# Patient Record
Sex: FEMALE | Race: BLACK OR AFRICAN AMERICAN | NOT HISPANIC OR LATINO | Employment: FULL TIME | URBAN - NONMETROPOLITAN AREA
[De-identification: names, ages, dates, MRNs, and addresses within clinical notes are randomized per-mention and may not be internally consistent; named-entity substitution may affect disease eponyms.]

---

## 2017-01-23 RX ORDER — HYDROXYZINE PAMOATE 25 MG/1
CAPSULE ORAL
Qty: 240 CAPSULE | Refills: 5 | Status: SHIPPED | OUTPATIENT
Start: 2017-01-23 | End: 2018-10-15

## 2017-03-13 ENCOUNTER — OFFICE VISIT (OUTPATIENT)
Dept: FAMILY MEDICINE CLINIC | Facility: CLINIC | Age: 47
End: 2017-03-13

## 2017-03-13 VITALS
OXYGEN SATURATION: 97 % | HEART RATE: 91 BPM | TEMPERATURE: 98.7 F | DIASTOLIC BLOOD PRESSURE: 70 MMHG | SYSTOLIC BLOOD PRESSURE: 115 MMHG | WEIGHT: 240 LBS | HEIGHT: 67 IN | BODY MASS INDEX: 37.67 KG/M2 | RESPIRATION RATE: 12 BRPM

## 2017-03-13 DIAGNOSIS — E66.01 MORBID OBESITY, UNSPECIFIED OBESITY TYPE (HCC): Primary | ICD-10-CM

## 2017-03-13 DIAGNOSIS — M25.50 CHRONIC JOINT PAIN: ICD-10-CM

## 2017-03-13 DIAGNOSIS — F41.8 DEPRESSION WITH ANXIETY: ICD-10-CM

## 2017-03-13 DIAGNOSIS — G89.29 CHRONIC JOINT PAIN: ICD-10-CM

## 2017-03-13 DIAGNOSIS — K76.0 NON-ALCOHOLIC FATTY LIVER DISEASE: ICD-10-CM

## 2017-03-13 DIAGNOSIS — M54.59 MECHANICAL LOW BACK PAIN: ICD-10-CM

## 2017-03-13 DIAGNOSIS — E78.5 DYSLIPIDEMIA: ICD-10-CM

## 2017-03-13 DIAGNOSIS — Z98.84 HISTORY OF BARIATRIC SURGERY: ICD-10-CM

## 2017-03-13 PROCEDURE — 99214 OFFICE O/P EST MOD 30 MIN: CPT | Performed by: GENERAL PRACTICE

## 2017-03-13 RX ORDER — LORAZEPAM 0.5 MG/1
0.5 TABLET ORAL DAILY PRN
Qty: 20 TABLET | Refills: 0 | Status: SHIPPED | OUTPATIENT
Start: 2017-03-13 | End: 2017-05-19 | Stop reason: SDUPTHER

## 2017-03-13 RX ORDER — PHENTERMINE HYDROCHLORIDE 37.5 MG/1
37.5 TABLET ORAL
Qty: 30 TABLET | Refills: 2 | Status: SHIPPED | OUTPATIENT
Start: 2017-03-13 | End: 2017-05-19

## 2017-03-13 NOTE — PROGRESS NOTES
Subjective   Susie Rangel is a 46 y.o. female.     History of Present Illness     Depression  Since last here she has had intermittent depression and anxiety associated with a loss of intention activities, difficulty concentrating, intermittent insomnia, and daytime fatigue.  When especially stressed it is felt as though her throat is closing off.  This generally resolves if she is able to relax.  There is no history of any suicidal ideation.  Her father  unexpectedly several months ago and work has been especially stressful as of late.    Left Lower Extremity Edema  She continues to have intermittent mild swelling about her left foot.  This has been on associated with any other symptoms and she denies any pain or discoloration.  The swelling tends to develop toward the end of the day and improves overnight    Dyslipidemia  Compliance with treatment has been fair. The patient exercises intermittently. She is currently being prescribed the following medication for her dyslipidemia - lifestyle modifcation. Most recent lipids include  Lab Results   Component Value Date    TRIG 67 2016    HDL 56 (L) 2016    LDLCALC 161 (H) 2016     Labs  Most recent , HDL 56, and TG 67.  TSH slightly low at 0.29.  Iron studies, B12, and vitamin D within normal limits     The following portions of the patient's history were reviewed and updated as appropriate: allergies, current medications, past family history, past medical history, past social history, past surgical history and problem list.    Review of Systems   Constitutional: Positive for fatigue. Negative for appetite change, chills, fever and unexpected weight change.   HENT: Negative for congestion, ear pain, rhinorrhea, sneezing, sore throat and voice change.    Eyes: Negative for visual disturbance.   Respiratory: Negative for cough, shortness of breath and wheezing.    Cardiovascular: Positive for leg swelling (left). Negative for chest pain and  palpitations.   Gastrointestinal: Negative for abdominal pain, blood in stool, constipation, diarrhea, nausea and vomiting.   Endocrine: Negative for polydipsia.   Genitourinary: Negative for difficulty urinating, dysuria, frequency, hematuria, menstrual problem, pelvic pain, urgency, vaginal bleeding and vaginal discharge.   Musculoskeletal: Positive for back pain. Negative for arthralgias, joint swelling, myalgias and neck pain.   Skin: Negative for color change.   Neurological: Negative for tremors, weakness, numbness and headaches.   Psychiatric/Behavioral: Positive for decreased concentration, dysphoric mood and sleep disturbance. Negative for suicidal ideas. The patient is nervous/anxious.      Objective   Physical Exam   Constitutional: She is oriented to person, place, and time. No distress.   No apparent distress. No pallor, jaundice, diaphoresis, or cyanosis.   HENT:   Head: Atraumatic.   Right Ear: Tympanic membrane, external ear and ear canal normal.   Left Ear: Tympanic membrane, external ear and ear canal normal.   Nose: Nose normal.   Mouth/Throat: Oropharynx is clear and moist. Mucous membranes are not pale and not cyanotic.   Eyes: EOM are normal. Pupils are equal, round, and reactive to light. No scleral icterus.   Neck: No JVD present. Carotid bruit is not present. No tracheal deviation present. No thyromegaly present.   Cardiovascular: Normal rate, regular rhythm, S1 normal, S2 normal and intact distal pulses.  Exam reveals no gallop, no S3 and no S4.    No murmur heard.  Pulmonary/Chest: Breath sounds normal. No stridor. No respiratory distress.   Abdominal: Soft. Normal aorta and bowel sounds are normal. She exhibits no distension, no abdominal bruit and no mass. There is no hepatosplenomegaly. There is no tenderness. No hernia.   Musculoskeletal: She exhibits no tenderness or deformity.       Vascular Status -  Her exam exhibits no right foot edema. Her exam exhibits left foot edema  (trace).  Lymphadenopathy:        Head (right side): No submandibular adenopathy present.        Head (left side): No submandibular adenopathy present.     She has no cervical adenopathy.   Neurological: She is alert and oriented to person, place, and time. She has normal reflexes. She displays normal reflexes. No cranial nerve deficit. She exhibits normal muscle tone. Coordination normal.   Skin: Skin is warm and dry. No rash noted. She is not diaphoretic. No cyanosis. No pallor. Nails show no clubbing.   Psychiatric: Her speech is normal and behavior is normal. She exhibits a depressed mood (teary when discussing her father's death however also smiled at times when appropriate). She expresses no suicidal ideation.     Assessment/Plan   Problems Addressed this Visit        Digestive    Morbid obesity  Post-bariatric surgery   Encouraged to continue to work on her diet and exercise plan   Continue current medication     Relevant Medications    phentermine (ADIPEX-P) 37.5 MG tablet    Non-alcoholic fatty liver disease       Nervous and Auditory    Mechanical low back pain  Reminded regarding symptomatic treatment.   Will continue current treatment.    Chronic joint pain       Other    Dyslipidemia  As above.  We'll continue to monitor     History of bariatric surgery    Depression with anxiety  With panic attacks  Significant situational component   Supportive therapy   Trial of lorazepam short-term for her panic attacks   We'll arrange therapy   Encouraged report if any worse or if any new symptoms or concerns     Relevant Medications    LORazepam (ATIVAN) 0.5 MG tablet

## 2017-03-20 ENCOUNTER — OFFICE VISIT (OUTPATIENT)
Dept: PSYCHIATRY | Facility: CLINIC | Age: 47
End: 2017-03-20

## 2017-03-20 DIAGNOSIS — F33.1 MAJOR DEPRESSIVE DISORDER, RECURRENT EPISODE, MODERATE (HCC): Primary | ICD-10-CM

## 2017-03-20 DIAGNOSIS — F41.9 ANXIETY DISORDER, UNSPECIFIED: ICD-10-CM

## 2017-03-20 PROCEDURE — 90791 PSYCH DIAGNOSTIC EVALUATION: CPT | Performed by: SOCIAL WORKER

## 2017-03-21 NOTE — PROGRESS NOTES
"IDENTIFYING INFORMATION:   The patient is a 46 y.o. female who is here today for initial appointment.  Patient currently lives with her  of many years in [place] and is employed as a  with [employer].  Patient has 1 adult daughter and 1 granddaughter who currently lives in [facility].    CHIEF COMPLIANT:  \"I struggle with depression and anxiety\"    HPI: Patient reports she has always struggled with anxiety and states her father recently passed away along with an incident of infidelity with her  which she feels significantly contributed to the recent exacerbation of her symptoms.  She also reports the lack of cooperation with family members in an attempt to settle her father's estate has caused her to discontinue communication was much of her family members due to the stress she was feeling while trying to be the facilitator of the process.  She reports she continues to struggle with feeling on edge, difficulty concentrating, inability to relax, ongoing insomnia, increased heart rate, becoming very irritable, and a periodic sense of impending doom.  She also reports secondary symptoms of depression including sad mood, periodic feelings of hopelessness, anhedonia, anergia, decreased motivation, and social isolation.  The patient also reports she has a history of obesity and had gastric surgery in the past and states she continues to struggle with her weight as she feels she eats as a stress reaction.      PAST PSYCHIATRIC HISTORY: Patient reports previous treatment history at [facility] in [facility] and is currently being treated by her primary care physician, Dr. Candelaria, in this office.  Patient reports no previous psychiatric hospitalizations and no other outpatient treatment episodes.  The patient denies any suicidal ideation or suicidal behavior.      SUBSTANCE ABUSE HISTORY: Patient reports she has an occasional social drink and " "denies any other substance use.      MEDICAL HISTORY: Patient has history of gastric sleeve surgery and hysterectomy.      CURRENT MEDICATIONS:  Current Outpatient Prescriptions   Medication Sig Dispense Refill   • hydrOXYzine (VISTARIL) 25 MG capsule TAKE 1 OR 2 CAPSULES 4 TIMES DAILY AS NEEDED 240 capsule 5   • LORazepam (ATIVAN) 0.5 MG tablet Take 1 tablet by mouth Daily As Needed for Anxiety. 20 tablet 0   • omeprazole (priLOSEC) 20 MG capsule   1   • phentermine (ADIPEX-P) 37.5 MG tablet Take 1 tablet by mouth Every Morning Before Breakfast. 30 tablet 2     No current facility-administered medications for this visit.          FAMILY HISTORY: Patient reports her father was an alcoholic and also reports positive family history of substance abuse with other family members including siblings.  She also reports positive family history of general mental illness but is unable to specify further.      SOCIAL HISTORY: Patient grew up in [place] with her biological parents until the age of 15 when her father left the family.  However, she states her father continued to be a part of her life as he returned to the area at least 2-3 times a year.  She reports she continues to live with her mother and states her childhood was \"okay\" with no abuse and domestic violence.  Patient reports she is a Scientologist person but does not attend Sikh at this time because of not getting along with her mother who is a .  Patient reports she has worked various jobs and is currently employed as a food services supervisor at [employer].  Patient has 1 adult daughter and one granddaughter.  Patient has been  multiple decades and states her  has a long history of  service.  Patient is not served in the  and does not have an arrest record.  She reports she is sexually active and considers herself heterosexual.      MENTAL STATUS EXAM:   Hygiene:   good  Cooperation:  " Cooperative  Eye Contact:  Good  Psychomotor Behavior:  Appropriate  Affect:  Appropriate  Hopelessness: Denies  Speech:  Normal  Thought Process:  Linear  Thought Content:  Normal  Suicidal:  None  Homicidal:  None  Hallucinations:  None  Delusion:  None  Memory:  Intact  Orientation:  Person, Place, Time and Situation  Reliability:  good  Insight:  Fair  Judgement:  Fair  Impulse Control:  Good  Physical/Medical Issues:  No     PROBLEM LIST:   Anxiety, depression     STRENGTHS:   Willingness to seek treatment, stable housing, stable employment, sense of responsibility to family    WEAKNESSES:  Strained relationship with family of origin, Limited support network, poor coping skills      SHORT-TERM GOALS: Patient will be compliant with clinic appointments.  Patient will complete Jiménez Depression Inventory and burns anxiety inventory and return in next session.  Patient will maintain stability and avoid higher level of care.    LONG-TERM GOALS: Patient will have cessation of symptoms and be able to function at optimal levels without continued treatment.     PLAN:   Patient will continue in individual outpatient psychotherapy sessions every 2-3 weeks at [facility] and pharmacotherapy as scheduled with Dr. Candelaria.        The patient was instructed to contact the clinic, call 911, or present to the nearest emergency room if crisis occurs.       Boyd Mead LCSW, ALEX

## 2017-04-14 ENCOUNTER — TELEPHONE (OUTPATIENT)
Dept: FAMILY MEDICINE CLINIC | Facility: CLINIC | Age: 47
End: 2017-04-14

## 2017-04-14 DIAGNOSIS — R60.0 EDEMA OF LEFT LOWER EXTREMITY: Primary | ICD-10-CM

## 2017-04-14 NOTE — TELEPHONE ENCOUNTER
I have sent this to Dr. Ocasio's office.     ----- Message from Hoang Palacios MD sent at 4/10/2017 12:23 PM EDT -----  Dr Ocasio would be my first choice        ----- Message -----     From: China Rangel MA     Sent: 4/10/2017  11:15 AM       To: Hoang Palacios MD    Pt is still having some swelling in her left foot. She wanted to know if you think she should see a foot dr?

## 2017-05-19 ENCOUNTER — OFFICE VISIT (OUTPATIENT)
Dept: FAMILY MEDICINE CLINIC | Facility: CLINIC | Age: 47
End: 2017-05-19

## 2017-05-19 DIAGNOSIS — L63.9 ALOPECIA AREATA: Primary | ICD-10-CM

## 2017-05-19 DIAGNOSIS — F41.8 DEPRESSION WITH ANXIETY: ICD-10-CM

## 2017-05-19 DIAGNOSIS — Z98.84 HISTORY OF BARIATRIC SURGERY: ICD-10-CM

## 2017-05-19 DIAGNOSIS — E78.5 DYSLIPIDEMIA: ICD-10-CM

## 2017-05-19 DIAGNOSIS — R60.0 EDEMA OF LEFT LOWER EXTREMITY: ICD-10-CM

## 2017-05-19 DIAGNOSIS — E66.01 MORBID OBESITY, UNSPECIFIED OBESITY TYPE (HCC): ICD-10-CM

## 2017-05-19 DIAGNOSIS — I87.2 VENOUS INSUFFICIENCY: ICD-10-CM

## 2017-05-19 PROCEDURE — 99214 OFFICE O/P EST MOD 30 MIN: CPT | Performed by: GENERAL PRACTICE

## 2017-05-19 RX ORDER — LORAZEPAM 0.5 MG/1
0.5 TABLET ORAL DAILY PRN
Qty: 20 TABLET | Refills: 0 | Status: SHIPPED | OUTPATIENT
Start: 2017-05-19 | End: 2017-08-11 | Stop reason: SDUPTHER

## 2017-05-20 VITALS
TEMPERATURE: 98.2 F | DIASTOLIC BLOOD PRESSURE: 70 MMHG | SYSTOLIC BLOOD PRESSURE: 120 MMHG | OXYGEN SATURATION: 96 % | HEIGHT: 67 IN | BODY MASS INDEX: 37.2 KG/M2 | RESPIRATION RATE: 12 BRPM | WEIGHT: 237 LBS | HEART RATE: 100 BPM

## 2017-05-22 ENCOUNTER — TELEPHONE (OUTPATIENT)
Dept: FAMILY MEDICINE CLINIC | Facility: CLINIC | Age: 47
End: 2017-05-22

## 2017-08-04 ENCOUNTER — OFFICE VISIT (OUTPATIENT)
Dept: FAMILY MEDICINE CLINIC | Facility: CLINIC | Age: 47
End: 2017-08-04

## 2017-08-04 DIAGNOSIS — R07.89 ATYPICAL CHEST PAIN: ICD-10-CM

## 2017-08-04 DIAGNOSIS — E78.5 DYSLIPIDEMIA: ICD-10-CM

## 2017-08-04 DIAGNOSIS — F41.8 DEPRESSION WITH ANXIETY: ICD-10-CM

## 2017-08-04 DIAGNOSIS — R60.0 EDEMA OF LEFT LOWER EXTREMITY: ICD-10-CM

## 2017-08-04 DIAGNOSIS — Z98.84 HISTORY OF BARIATRIC SURGERY: ICD-10-CM

## 2017-08-04 DIAGNOSIS — K21.9 GASTROESOPHAGEAL REFLUX DISEASE WITHOUT ESOPHAGITIS: ICD-10-CM

## 2017-08-04 DIAGNOSIS — I87.2 VENOUS INSUFFICIENCY: Primary | ICD-10-CM

## 2017-08-04 DIAGNOSIS — E66.01 MORBID OBESITY, UNSPECIFIED OBESITY TYPE (HCC): ICD-10-CM

## 2017-08-04 PROCEDURE — 99214 OFFICE O/P EST MOD 30 MIN: CPT | Performed by: GENERAL PRACTICE

## 2017-08-04 RX ORDER — HYDROXYZINE HYDROCHLORIDE 10 MG/1
TABLET, FILM COATED ORAL
COMMUNITY
Start: 2017-06-16 | End: 2018-02-09

## 2017-08-04 RX ORDER — PROPRANOLOL HYDROCHLORIDE 20 MG/1
20 TABLET ORAL 2 TIMES DAILY
Qty: 60 TABLET | Refills: 5 | Status: SHIPPED | OUTPATIENT
Start: 2017-08-04 | End: 2017-08-11 | Stop reason: SDUPTHER

## 2017-08-04 RX ORDER — BUPROPION HYDROCHLORIDE 150 MG/1
150 TABLET, EXTENDED RELEASE ORAL 2 TIMES DAILY
Qty: 60 TABLET | Refills: 5 | Status: SHIPPED | OUTPATIENT
Start: 2017-08-04 | End: 2019-01-18

## 2017-08-04 NOTE — PROGRESS NOTES
Subjective   Susie Rangel is a 46 y.o. female.     History of Present Illness     Chest Pain  Discharged from [facility] on 7/27/17 after an overnight admission for chest pain. This started abruptly and has largely been present since. The pain is described as a sharp left anterior ache radiating to the left shoulder. The pain has been associated with increased shortness of breath with activity, intermittent palpitations when she lies down at night, and initially she experienced numbness of the left arm. She has had more frequent heartburn over the last month described as a burning retrosternal pain associated with a bitter taste in her mouth. She also admits to more nervousness and worrying. She denies any orthopnea, PND, change in her left lower extremity swelling, cough, hemoptysis, dysphagia, nausea, vomiting, depression, loss of interest in activities, suicidal ideation, fever or chills. Initial EKG done at the hospital revealed very slow R wave progression however follow up studies are identical to those done here in the past. CXR and echocardiogram were unremarkable as was a HIDA scan done as an outpatient on 7/31/17. She has been scheduled to undergo a GXT on 8/8/17. She has been taking omeprazole since her discharge home and has been heartburn free    Left Lower Extremity Edema  She continues to have intermittent mild swelling about her left foot.  This has been on associated with any other symptoms and she denies any pain or discoloration.  The swelling tends to develop toward the end of the day and improves overnight.    Depression  She admits to anxiety and worrying associated with difficulty concentrating, intermittent insomnia, and daytime fatigue.  When especially stressed it feels as though her throat is closing off.  This generally resolves if she is able to relax.  There is no history of any depression, loss of interest in activities, or suicidal ideation. She has started a new job since last  here and while she enjoys it she has long days and a lot of responsibilities    The following portions of the patient's history were reviewed and updated as appropriate: allergies, current medications, past family history, past medical history, past social history and problem list.    Review of Systems   Constitutional: Positive for fatigue. Negative for appetite change, chills, fever and unexpected weight change.   HENT: Negative for congestion, ear pain, rhinorrhea, sneezing, sore throat and voice change.    Eyes: Negative for visual disturbance.   Respiratory: Positive for shortness of breath. Negative for cough and wheezing.    Cardiovascular: Positive for chest pain, palpitations and leg swelling (left).   Gastrointestinal: Negative for abdominal pain, blood in stool, constipation, diarrhea, nausea and vomiting.        Heartburn   Endocrine: Negative for polydipsia.   Genitourinary: Negative for difficulty urinating, dysuria, frequency, hematuria, menstrual problem, pelvic pain, urgency, vaginal bleeding and vaginal discharge.   Musculoskeletal: Positive for back pain. Negative for arthralgias, joint swelling, myalgias and neck pain.   Skin: Negative for color change.        Hair loss   Neurological: Negative for tremors, weakness, numbness and headaches.   Psychiatric/Behavioral: Positive for decreased concentration and sleep disturbance. Negative for dysphoric mood and suicidal ideas. The patient is nervous/anxious.      Objective   Physical Exam   Constitutional: She is oriented to person, place, and time. No distress.   No apparent distress. No pallor, jaundice, diaphoresis, or cyanosis.   HENT:   Head: Atraumatic.   Right Ear: Tympanic membrane, external ear and ear canal normal.   Left Ear: Tympanic membrane, external ear and ear canal normal.   Nose: Nose normal.   Mouth/Throat: Oropharynx is clear and moist. Mucous membranes are not pale and not cyanotic.   Eyes: EOM are normal. Pupils are equal, round,  and reactive to light. No scleral icterus.   Neck: No JVD present. Carotid bruit is not present. No tracheal deviation present. No thyromegaly present.   Cardiovascular: Normal rate, regular rhythm, S1 normal, S2 normal and intact distal pulses.  Exam reveals no gallop, no S3 and no S4.    No murmur heard.  Pulmonary/Chest: Breath sounds normal. No stridor. No respiratory distress. She exhibits tenderness (about the left upper anterior chest - identical to presenting pain).   Abdominal: Soft. Normal aorta and bowel sounds are normal. She exhibits no distension, no abdominal bruit and no mass. There is no hepatosplenomegaly. There is no tenderness. No hernia.   Musculoskeletal: She exhibits no tenderness or deformity.       Vascular Status -  Her exam exhibits no right foot edema. Her exam exhibits left foot edema (trace).  Lymphadenopathy:        Head (right side): No submandibular adenopathy present.        Head (left side): No submandibular adenopathy present.     She has no cervical adenopathy.   Neurological: She is alert and oriented to person, place, and time. She has normal reflexes. She displays normal reflexes. No cranial nerve deficit. She exhibits normal muscle tone. Coordination normal.   Skin: Skin is warm and dry. No rash noted. She is not diaphoretic. No cyanosis. No pallor. Nails show no clubbing.   Psychiatric: Her speech is normal and behavior is normal. Her mood appears anxious (at times). She expresses no suicidal ideation.     Assessment/Plan   Problems Addressed this Visit        Cardiovascular and Mediastinum    Venous insufficiency  Reminded regarding lifestyle modification       Digestive    Morbid obesity  S/P bariatric surgery  Encouraged to continue to work on her diet and exercise plan.    Gastroesophageal reflux disease without esophagitis  Advised regarding lifestyle modification including: eating smaller meals, elevation of the head of bed at night, avoidance of caffeine, chocolate,  nicotine and peppermint, and avoiding tight fitting clothing.  Will continue current treatment.       Nervous and Auditory    Atypical chest pain  Appears to be a combination of chest wall pain, GERD, and anxiety  Advised regarding symptomatic treatment  Trial of propranolol for the physical symptoms of anxiety  Patient would like to proceed with GXT  Encouraged to report if any worse or if any new symptoms or concerns.    Relevant Medications    propranolol (INDERAL) 20 MG tablet       Other    Dyslipidemia    History of bariatric surgery    Depression with anxiety  Supportive therapy.   Trial of bupropion  Will see back in 4-6 weeks    Relevant Medications    hydrOXYzine (ATARAX) 10 MG tablet    buPROPion SR (WELLBUTRIN SR) 150 MG 12 hr tablet    Edema of left lower extremity

## 2017-08-05 ENCOUNTER — APPOINTMENT (OUTPATIENT)
Dept: GENERAL RADIOLOGY | Facility: HOSPITAL | Age: 47
End: 2017-08-05

## 2017-08-05 ENCOUNTER — HOSPITAL ENCOUNTER (EMERGENCY)
Facility: HOSPITAL | Age: 47
Discharge: HOME | End: 2017-08-05
Attending: EMERGENCY MEDICINE | Admitting: EMERGENCY MEDICINE

## 2017-08-05 VITALS
OXYGEN SATURATION: 99 % | WEIGHT: 225 LBS | DIASTOLIC BLOOD PRESSURE: 80 MMHG | HEART RATE: 64 BPM | SYSTOLIC BLOOD PRESSURE: 118 MMHG | BODY MASS INDEX: 35.31 KG/M2 | RESPIRATION RATE: 18 BRPM | TEMPERATURE: 97.8 F | HEIGHT: 67 IN

## 2017-08-05 VITALS
SYSTOLIC BLOOD PRESSURE: 120 MMHG | BODY MASS INDEX: 36.73 KG/M2 | RESPIRATION RATE: 12 BRPM | DIASTOLIC BLOOD PRESSURE: 70 MMHG | HEIGHT: 67 IN | TEMPERATURE: 98 F | HEART RATE: 93 BPM | WEIGHT: 234 LBS | OXYGEN SATURATION: 97 %

## 2017-08-05 DIAGNOSIS — R07.9 CHEST PAIN, UNSPECIFIED TYPE: Primary | ICD-10-CM

## 2017-08-05 PROBLEM — K21.9 GASTROESOPHAGEAL REFLUX DISEASE WITHOUT ESOPHAGITIS: Status: ACTIVE | Noted: 2017-08-05

## 2017-08-05 LAB
ALBUMIN SERPL-MCNC: 4.4 G/DL (ref 3.5–5)
ALBUMIN/GLOB SERPL: 1.5 G/DL (ref 1.5–2.5)
ALP SERPL-CCNC: 94 U/L (ref 35–104)
ALT SERPL W P-5'-P-CCNC: 33 U/L (ref 10–36)
ANION GAP SERPL CALCULATED.3IONS-SCNC: 5.5 MMOL/L (ref 3.6–11.2)
AST SERPL-CCNC: 28 U/L (ref 10–30)
BASOPHILS # BLD AUTO: 0.04 10*3/MM3 (ref 0–0.3)
BASOPHILS NFR BLD AUTO: 0.4 % (ref 0–2)
BILIRUB SERPL-MCNC: 0.3 MG/DL (ref 0.2–1.8)
BUN BLD-MCNC: 10 MG/DL (ref 7–21)
BUN/CREAT SERPL: 13.3 (ref 7–25)
CALCIUM SPEC-SCNC: 9.3 MG/DL (ref 7.7–10)
CHLORIDE SERPL-SCNC: 108 MMOL/L (ref 99–112)
CO2 SERPL-SCNC: 28.5 MMOL/L (ref 24.3–31.9)
CREAT BLD-MCNC: 0.75 MG/DL (ref 0.43–1.29)
D DIMER PPP FEU-MCNC: 0.24 MCGFEU/ML (ref 0–0.5)
DEPRECATED RDW RBC AUTO: 39.3 FL (ref 37–54)
EOSINOPHIL # BLD AUTO: 0.14 10*3/MM3 (ref 0–0.7)
EOSINOPHIL NFR BLD AUTO: 1.6 % (ref 0–5)
ERYTHROCYTE [DISTWIDTH] IN BLOOD BY AUTOMATED COUNT: 12.1 % (ref 11.5–14.5)
GFR SERPL CREATININE-BSD FRML MDRD: 102 ML/MIN/1.73
GLOBULIN UR ELPH-MCNC: 3 GM/DL
GLUCOSE BLD-MCNC: 94 MG/DL (ref 70–110)
HCT VFR BLD AUTO: 39.8 % (ref 37–47)
HGB BLD-MCNC: 14.2 G/DL (ref 12–16)
HOLD SPECIMEN: NORMAL
IMM GRANULOCYTES # BLD: 0.02 10*3/MM3 (ref 0–0.03)
IMM GRANULOCYTES NFR BLD: 0.2 % (ref 0–0.5)
LYMPHOCYTES # BLD AUTO: 2.56 10*3/MM3 (ref 1–3)
LYMPHOCYTES NFR BLD AUTO: 28.6 % (ref 21–51)
MCH RBC QN AUTO: 32.5 PG (ref 27–33)
MCHC RBC AUTO-ENTMCNC: 35.7 G/DL (ref 33–37)
MCV RBC AUTO: 91.1 FL (ref 80–94)
MONOCYTES # BLD AUTO: 0.87 10*3/MM3 (ref 0.1–0.9)
MONOCYTES NFR BLD AUTO: 9.7 % (ref 0–10)
NEUTROPHILS # BLD AUTO: 5.33 10*3/MM3 (ref 1.4–6.5)
NEUTROPHILS NFR BLD AUTO: 59.5 % (ref 30–70)
OSMOLALITY SERPL CALC.SUM OF ELEC: 281.9 MOSM/KG (ref 273–305)
PLATELET # BLD AUTO: 302 10*3/MM3 (ref 130–400)
PMV BLD AUTO: 9.8 FL (ref 6–10)
POTASSIUM BLD-SCNC: 3.9 MMOL/L (ref 3.5–5.3)
PROT SERPL-MCNC: 7.4 G/DL (ref 6–8)
RBC # BLD AUTO: 4.37 10*6/MM3 (ref 4.2–5.4)
SODIUM BLD-SCNC: 142 MMOL/L (ref 135–153)
TROPONIN I SERPL-MCNC: <0.006 NG/ML
TROPONIN I SERPL-MCNC: <0.006 NG/ML
WBC NRBC COR # BLD: 8.96 10*3/MM3 (ref 4.5–12.5)
WHOLE BLOOD HOLD SPECIMEN: NORMAL
WHOLE BLOOD HOLD SPECIMEN: NORMAL

## 2017-08-05 PROCEDURE — 84484 ASSAY OF TROPONIN QUANT: CPT | Performed by: EMERGENCY MEDICINE

## 2017-08-05 PROCEDURE — 71010 XR CHEST 1 VW: CPT | Performed by: RADIOLOGY

## 2017-08-05 PROCEDURE — 93005 ELECTROCARDIOGRAM TRACING: CPT | Performed by: EMERGENCY MEDICINE

## 2017-08-05 PROCEDURE — 36415 COLL VENOUS BLD VENIPUNCTURE: CPT

## 2017-08-05 PROCEDURE — 80053 COMPREHEN METABOLIC PANEL: CPT | Performed by: EMERGENCY MEDICINE

## 2017-08-05 PROCEDURE — 85025 COMPLETE CBC W/AUTO DIFF WBC: CPT

## 2017-08-05 PROCEDURE — 99284 EMERGENCY DEPT VISIT MOD MDM: CPT

## 2017-08-05 PROCEDURE — 85379 FIBRIN DEGRADATION QUANT: CPT | Performed by: EMERGENCY MEDICINE

## 2017-08-05 PROCEDURE — 71010 HC CHEST PA OR AP: CPT

## 2017-08-05 RX ORDER — ASPIRIN 325 MG
325 TABLET ORAL ONCE
Status: COMPLETED | OUTPATIENT
Start: 2017-08-05 | End: 2017-08-05

## 2017-08-05 RX ORDER — SODIUM CHLORIDE 0.9 % (FLUSH) 0.9 %
10 SYRINGE (ML) INJECTION AS NEEDED
Status: DISCONTINUED | OUTPATIENT
Start: 2017-08-05 | End: 2017-08-06 | Stop reason: HOSPADM

## 2017-08-05 RX ADMIN — ASPIRIN 325 MG: 325 TABLET ORAL at 17:41

## 2017-08-05 NOTE — ED PROVIDER NOTES
Subjective   Patient is a 46 y.o. female presenting with chest pain.   Chest Pain   Pain location:  Substernal area and L chest  Pain quality: aching and dull    Pain radiates to:  Does not radiate  Onset quality:  Gradual  Timing:  Constant  Progression:  Worsening  Chronicity:  New  Context: lifting, movement, raising an arm and at rest    Context: not breathing, not drug use, not eating, not intercourse, not stress and not trauma    Relieved by:  Nothing  Worsened by:  Certain positions, coughing, deep breathing, exertion and movement  Ineffective treatments:  None tried  Associated symptoms: no abdominal pain, no altered mental status, no anorexia, no anxiety, no back pain, no cough, no diaphoresis, no dizziness, no fatigue, no fever, no headache, no lower extremity edema, no nausea, no numbness, no palpitations, no shortness of breath, no syncope, no vomiting and no weakness    Risk factors: no coronary artery disease, no high cholesterol, no hypertension and no smoking        Review of Systems   Constitutional: Negative for activity change, appetite change, chills, diaphoresis, fatigue and fever.   HENT: Negative for congestion, ear pain and sore throat.    Eyes: Negative for redness.   Respiratory: Negative for cough, chest tightness, shortness of breath and wheezing.    Cardiovascular: Positive for chest pain. Negative for palpitations, leg swelling and syncope.   Gastrointestinal: Negative for abdominal pain, anorexia, diarrhea, nausea and vomiting.   Genitourinary: Negative for dysuria and urgency.   Musculoskeletal: Negative for arthralgias, back pain, myalgias and neck pain.   Skin: Negative for pallor, rash and wound.   Neurological: Negative for dizziness, speech difficulty, weakness, numbness and headaches.   Psychiatric/Behavioral: Negative for agitation, behavioral problems, confusion and decreased concentration.       Past Medical History:   Diagnosis Date   • GERD (gastroesophageal reflux disease)     • Low back pain    • Obesity    • Vitamin D deficiency        No Known Allergies    Past Surgical History:   Procedure Laterality Date   • BREAST SURGERY     • GASTRIC SLEEVE LAPAROSCOPIC     • HYSTERECTOMY         Family History   Problem Relation Age of Onset   • No Known Problems Mother    • Heart disease Father        Social History     Social History   • Marital status:      Spouse name: N/A   • Number of children: N/A   • Years of education: N/A     Social History Main Topics   • Smoking status: Never Smoker   • Smokeless tobacco: Never Used   • Alcohol use No   • Drug use: No   • Sexual activity: Defer     Other Topics Concern   • None     Social History Narrative           Objective   Physical Exam   Constitutional: She is oriented to person, place, and time. She appears well-developed and well-nourished.  Non-toxic appearance. No distress.   HENT:   Head: Normocephalic and atraumatic.   Right Ear: External ear normal.   Left Ear: External ear normal.   Nose: Nose normal.   Mouth/Throat: Oropharynx is clear and moist and mucous membranes are normal. No oropharyngeal exudate. No tonsillar exudate.   Eyes: Conjunctivae, EOM and lids are normal. Pupils are equal, round, and reactive to light.   Neck: Normal range of motion and full passive range of motion without pain. Neck supple. No thyromegaly present.   Cardiovascular: Normal rate, regular rhythm, S1 normal, S2 normal, normal heart sounds, intact distal pulses and normal pulses.    Pulmonary/Chest: Effort normal and breath sounds normal. No tachypnea. No respiratory distress. She has no decreased breath sounds. She has no wheezes. She has no rales. She exhibits tenderness.   Abdominal: Soft. Normal appearance and bowel sounds are normal. She exhibits no distension. There is no tenderness. There is no rebound and no guarding.   Musculoskeletal: Normal range of motion. She exhibits no edema, tenderness or deformity.   Lymphadenopathy:     She has no  cervical adenopathy.   Neurological: She is alert and oriented to person, place, and time. She has normal strength. No cranial nerve deficit or sensory deficit. GCS eye subscore is 4. GCS verbal subscore is 5. GCS motor subscore is 6.   Skin: Skin is warm, dry and intact. No rash noted. She is not diaphoretic. No erythema. No pallor.   Psychiatric: She has a normal mood and affect. Her speech is normal and behavior is normal. Judgment and thought content normal. Cognition and memory are normal.   Nursing note and vitals reviewed.      Procedures         ED Course  ED Course   Value Comment By Time   XR Chest 1 View No Acute Abnormality. Paulino Holm MD 08/05 2677   ECG 12 Lead Normal Sinus Rhythm.  Nonspecific T-Wave Abnormalities.  No Acute Ischemia. Paulino Holm MD 08/05 4897                  MDM  Number of Diagnoses or Management Options  Chest pain, unspecified type: new and requires workup     Amount and/or Complexity of Data Reviewed  Clinical lab tests: ordered and reviewed  Tests in the radiology section of CPT®: ordered and reviewed  Tests in the medicine section of CPT®: ordered and reviewed  Independent visualization of images, tracings, or specimens: yes    Risk of Complications, Morbidity, and/or Mortality  Presenting problems: moderate  Diagnostic procedures: moderate  Management options: moderate    Patient Progress  Patient progress: stable      Final diagnoses:   Chest pain, unspecified type            Paulino Holm MD  08/07/17 0235

## 2017-08-06 NOTE — ED NOTES
Patient is resting on stretcher, patient's family at bedside x1. Patient updated on plan of care at this time, patient declines needing anything further. Patient is alert and oriented x4, respirations are regular and unlabored, NADN, will continue to monitor.     Derik Zavala RN  08/05/17 4131

## 2017-08-06 NOTE — ED NOTES
Patient is leaving ED with family at this time. Patient has no further needs or questions at discharge, patient verbalizes understanding of discharge instructions and importance of follow up care. Patient is alert and oriented x4, respirations are regular and unlabored, NADN.     Derik Zavala RN  08/05/17 0714

## 2017-08-11 ENCOUNTER — OFFICE VISIT (OUTPATIENT)
Dept: FAMILY MEDICINE CLINIC | Facility: CLINIC | Age: 47
End: 2017-08-11

## 2017-08-11 ENCOUNTER — TELEPHONE (OUTPATIENT)
Dept: FAMILY MEDICINE CLINIC | Facility: CLINIC | Age: 47
End: 2017-08-11

## 2017-08-11 DIAGNOSIS — G89.29 CHRONIC JOINT PAIN: ICD-10-CM

## 2017-08-11 DIAGNOSIS — K76.0 NON-ALCOHOLIC FATTY LIVER DISEASE: ICD-10-CM

## 2017-08-11 DIAGNOSIS — R00.2 PALPITATIONS: ICD-10-CM

## 2017-08-11 DIAGNOSIS — M25.50 CHRONIC JOINT PAIN: ICD-10-CM

## 2017-08-11 DIAGNOSIS — Z98.84 HISTORY OF BARIATRIC SURGERY: ICD-10-CM

## 2017-08-11 DIAGNOSIS — E78.5 DYSLIPIDEMIA: ICD-10-CM

## 2017-08-11 DIAGNOSIS — I87.2 VENOUS INSUFFICIENCY: Primary | ICD-10-CM

## 2017-08-11 DIAGNOSIS — F41.8 DEPRESSION WITH ANXIETY: ICD-10-CM

## 2017-08-11 DIAGNOSIS — K21.9 GASTROESOPHAGEAL REFLUX DISEASE WITHOUT ESOPHAGITIS: ICD-10-CM

## 2017-08-11 DIAGNOSIS — R07.89 ATYPICAL CHEST PAIN: ICD-10-CM

## 2017-08-11 DIAGNOSIS — M54.59 MECHANICAL LOW BACK PAIN: ICD-10-CM

## 2017-08-11 DIAGNOSIS — E66.01 MORBID OBESITY, UNSPECIFIED OBESITY TYPE (HCC): ICD-10-CM

## 2017-08-11 PROCEDURE — 99214 OFFICE O/P EST MOD 30 MIN: CPT | Performed by: GENERAL PRACTICE

## 2017-08-11 RX ORDER — PROPRANOLOL HYDROCHLORIDE 40 MG/1
40 TABLET ORAL 2 TIMES DAILY
Qty: 60 TABLET | Refills: 5 | Status: SHIPPED | OUTPATIENT
Start: 2017-08-11 | End: 2018-01-08 | Stop reason: SDUPTHER

## 2017-08-11 RX ORDER — LORAZEPAM 0.5 MG/1
0.5 TABLET ORAL DAILY PRN
Qty: 20 TABLET | Refills: 0 | Status: SHIPPED | OUTPATIENT
Start: 2017-08-11 | End: 2017-09-14 | Stop reason: SDUPTHER

## 2017-08-11 NOTE — PROGRESS NOTES
Subjective   Susie Rangel is a 46 y.o. female.     History of Present Illness     Chest Pain  Discharged from [facility] on 7/27/17 after an overnight admission for chest pain. This started abruptly and has largely been present since. The pain is described as a sharp left anterior ache radiating to the left shoulder. The pain has been associated with increased shortness of breath with activity, palpitations when she lies down at night, and initially she experienced numbness of the left arm. She has had more frequent heartburn over the last month described as a burning retrosternal pain associated with a bitter taste in her mouth. She also admits to more nervousness and worrying. She denies any orthopnea, PND, change in her left lower extremity swelling, cough, hemoptysis, dysphagia, nausea, vomiting, depression, loss of interest in activities, suicidal ideation, fever or chills. Initial EKG done at the hospital revealed very slow R wave progression however follow up studies are identical to those done here in the past. CXR and echocardiogram were unremarkable as was a HIDA scan done as an outpatient on 7/31/17. GXT performed on 8/8/17 revealed no evidence of inducible ischemia.     Left Lower Extremity Edema  She continues to have intermittent mild swelling about her left foot.  This has been on associated with any other symptoms and she denies any pain or discoloration.  The swelling tends to develop toward the end of the day and improves overnight.    Depression  She admits to persistent anxiety and worrying associated with difficulty concentrating, intermittent insomnia, and daytime fatigue.  When especially stressed it feels as though her throat is closing off.  This generally resolves if she is able to relax.  There is no history of any depression, loss of interest in activities, or suicidal ideation. She has started a new job since last here and while she enjoys it she has long days and a lot of  responsibilities. Taking bupropion with no apparent side effects thus far    The following portions of the patient's history were reviewed and updated as appropriate: allergies, current medications, past medical history, past social history and problem list.    Review of Systems   Constitutional: Positive for fatigue. Negative for appetite change, chills, fever and unexpected weight change.   HENT: Negative for congestion, ear pain, rhinorrhea, sneezing, sore throat and voice change.    Eyes: Negative for visual disturbance.   Respiratory: Positive for shortness of breath. Negative for cough and wheezing.    Cardiovascular: Positive for chest pain, palpitations and leg swelling (left).   Gastrointestinal: Negative for abdominal pain, blood in stool, constipation, diarrhea, nausea and vomiting.        Heartburn   Endocrine: Negative for polydipsia.   Genitourinary: Negative for difficulty urinating, dysuria, frequency, hematuria, menstrual problem, pelvic pain, urgency, vaginal bleeding and vaginal discharge.   Musculoskeletal: Positive for back pain. Negative for arthralgias, joint swelling, myalgias and neck pain.   Skin: Negative for color change.        Hair loss   Neurological: Negative for tremors, weakness, numbness and headaches.   Psychiatric/Behavioral: Positive for decreased concentration and sleep disturbance. Negative for dysphoric mood and suicidal ideas. The patient is nervous/anxious.      Objective   Physical Exam   Constitutional: She is oriented to person, place, and time. No distress.   No apparent distress. No pallor, jaundice, diaphoresis, or cyanosis.   HENT:   Head: Atraumatic.   Right Ear: Tympanic membrane, external ear and ear canal normal.   Left Ear: Tympanic membrane, external ear and ear canal normal.   Nose: Nose normal.   Mouth/Throat: Oropharynx is clear and moist. Mucous membranes are not pale and not cyanotic.   Eyes: EOM are normal. Pupils are equal, round, and reactive to light.  No scleral icterus.   Neck: No JVD present. Carotid bruit is not present. No tracheal deviation present. No thyromegaly present.   Cardiovascular: Normal rate, regular rhythm, S1 normal, S2 normal and intact distal pulses.  Exam reveals no gallop, no S3 and no S4.    No murmur heard.  Pulmonary/Chest: Breath sounds normal. No stridor. No respiratory distress. She exhibits tenderness (remains tender about the left upper anterior chest - identical to presenting pain).   Abdominal: Soft. Normal aorta and bowel sounds are normal. She exhibits no distension, no abdominal bruit and no mass. There is no hepatosplenomegaly. There is no tenderness. No hernia.   Musculoskeletal: She exhibits no tenderness or deformity.       Vascular Status -  Her exam exhibits no right foot edema. Her exam exhibits left foot edema (trace).  Lymphadenopathy:        Head (right side): No submandibular adenopathy present.        Head (left side): No submandibular adenopathy present.     She has no cervical adenopathy.   Neurological: She is alert and oriented to person, place, and time. She has normal reflexes. She displays normal reflexes. No cranial nerve deficit. She exhibits normal muscle tone. Coordination normal.   Skin: Skin is warm and dry. No rash noted. She is not diaphoretic. No cyanosis. No pallor. Nails show no clubbing.   Psychiatric: Her speech is normal and behavior is normal. Her mood appears anxious (at times). She expresses no suicidal ideation.     Assessment/Plan   Problems Addressed this Visit        Cardiovascular and Mediastinum    Venous insufficiency    Palpitations  Likely anxiety related  24 hour holter will me arranged  Will titrate propranolol somewhat    Relevant Orders    Holter Monitor - 24 Hour       Digestive    Morbid obesity    Non-alcoholic fatty liver disease    Gastroesophageal reflux disease without esophagitis  Symptoms are currently well controlled.  Reminded regarding lifestyle modification.  Will  continue current treatment.       Nervous and Auditory    Mechanical low back pain    Chronic joint pain    Atypical chest pain  Likely combination of chest wall pain, GERD, and anxiety  Will monitor    Relevant Medications    propranolol (INDERAL) 40 MG tablet       Other    Dyslipidemia  Encouraged to continue to work on her diet and exercise plan.    History of bariatric surgery    Depression with anxiety  Significant situational component. Supportive therapy. Will continue current medication.    Relevant Medications    LORazepam (ATIVAN) 0.5 MG tablet

## 2017-08-11 NOTE — TELEPHONE ENCOUNTER
----- Message from Hoang Raymundo MD sent at 8/10/2017  8:54 PM EDT -----  If she wishes we can increase her propranolol to 40 twice a day  ----- Message -----     From: Juana Nance MA     Sent: 8/10/2017   5:50 PM       To: Hoang Raymundo MD    Still having some small palpations   ----- Message -----     From: Hoang Raymundo MD     Sent: 8/10/2017   5:35 PM       To: Juana Nance MA    How has she felt since I saw her last week?  ----- Message -----     From: Juana Nance MA     Sent: 8/10/2017   4:38 PM       To: Hoang Raymundo MD    Please review her stress test, she wants your opinion and to know if you have any changes to her medications         Dr raymundo had a cancellation today and pt request to see him in person to discuss meds and not feeling in better.

## 2017-08-12 VITALS
RESPIRATION RATE: 12 BRPM | SYSTOLIC BLOOD PRESSURE: 120 MMHG | DIASTOLIC BLOOD PRESSURE: 65 MMHG | OXYGEN SATURATION: 97 % | HEIGHT: 67 IN | HEART RATE: 79 BPM | BODY MASS INDEX: 37.51 KG/M2 | WEIGHT: 239 LBS | TEMPERATURE: 98.1 F

## 2017-08-12 PROBLEM — R00.2 PALPITATIONS: Status: ACTIVE | Noted: 2017-08-12

## 2017-08-21 ENCOUNTER — OFFICE VISIT (OUTPATIENT)
Dept: FAMILY MEDICINE CLINIC | Facility: CLINIC | Age: 47
End: 2017-08-21

## 2017-08-21 VITALS
HEIGHT: 67 IN | SYSTOLIC BLOOD PRESSURE: 120 MMHG | BODY MASS INDEX: 37.67 KG/M2 | HEART RATE: 74 BPM | WEIGHT: 240 LBS | RESPIRATION RATE: 12 BRPM | TEMPERATURE: 98.3 F | DIASTOLIC BLOOD PRESSURE: 70 MMHG | OXYGEN SATURATION: 98 %

## 2017-08-21 DIAGNOSIS — E66.01 MORBID OBESITY, UNSPECIFIED OBESITY TYPE (HCC): ICD-10-CM

## 2017-08-21 DIAGNOSIS — M54.59 MECHANICAL LOW BACK PAIN: ICD-10-CM

## 2017-08-21 DIAGNOSIS — F41.8 DEPRESSION WITH ANXIETY: ICD-10-CM

## 2017-08-21 DIAGNOSIS — K21.9 GASTROESOPHAGEAL REFLUX DISEASE WITHOUT ESOPHAGITIS: ICD-10-CM

## 2017-08-21 DIAGNOSIS — K76.0 NON-ALCOHOLIC FATTY LIVER DISEASE: ICD-10-CM

## 2017-08-21 DIAGNOSIS — Z98.84 HISTORY OF BARIATRIC SURGERY: ICD-10-CM

## 2017-08-21 DIAGNOSIS — I87.2 VENOUS INSUFFICIENCY: Primary | ICD-10-CM

## 2017-08-21 DIAGNOSIS — G89.29 CHRONIC JOINT PAIN: ICD-10-CM

## 2017-08-21 DIAGNOSIS — E78.5 DYSLIPIDEMIA: ICD-10-CM

## 2017-08-21 DIAGNOSIS — M25.50 CHRONIC JOINT PAIN: ICD-10-CM

## 2017-08-21 DIAGNOSIS — R07.89 ATYPICAL CHEST PAIN: ICD-10-CM

## 2017-08-21 DIAGNOSIS — R00.2 PALPITATIONS: ICD-10-CM

## 2017-08-21 PROCEDURE — 99214 OFFICE O/P EST MOD 30 MIN: CPT | Performed by: GENERAL PRACTICE

## 2017-08-21 RX ORDER — OMEPRAZOLE 40 MG/1
40 CAPSULE, DELAYED RELEASE ORAL 2 TIMES DAILY
Qty: 60 CAPSULE | Refills: 5 | Status: SHIPPED | OUTPATIENT
Start: 2017-08-21 | End: 2018-10-15 | Stop reason: SDUPTHER

## 2017-08-21 RX ORDER — OMEPRAZOLE 40 MG/1
40 CAPSULE, DELAYED RELEASE ORAL DAILY
Qty: 30 CAPSULE | Refills: 5 | Status: SHIPPED | OUTPATIENT
Start: 2017-08-21 | End: 2017-08-21 | Stop reason: SDUPTHER

## 2017-08-21 NOTE — PROGRESS NOTES
Subjective   Susie Rangel is a 46 y.o. female.     History of Present Illness     Chest Pain  Discharged from [facility] on 7/27/17 after an overnight admission for chest pain. This started abruptly and has largely been present since. The pain is described as a sharp left anterior ache radiating to the left shoulder. The pain has been associated with increased shortness of breath with activity, palpitations when she lies down at night, and initially she experienced numbness of the left arm. She has had more frequent heartburn over the last month described as a burning retrosternal pain associated with a bitter taste in her mouth. She had a bad attack following lunch yesterday despite taking omeprazole 20 twice a day consistently.  She continues to struggle with increased nervousness and worrying. She denies any orthopnea, PND, change in her left lower extremity swelling, cough, hemoptysis, dysphagia, nausea, vomiting, depression, loss of interest in activities, suicidal ideation, fever or chills. Initial EKG done at the hospital revealed very slow R wave progression however follow up studies wer identical to those done here in the past. CXR and echocardiogram were unremarkable as was a HIDA scan done as an outpatient on 7/31/17. GXT performed on 8/8/17 revealed no evidence of inducible ischemia.  24-hour Holter monitor performed on 8/14/17 was unremarkable    Left Lower Extremity Edema  She continues to have intermittent mild swelling about her left foot.  This has been unassociated with any other symptoms and she denies any pain or discoloration.  The swelling tends to develop toward the end of the day and improves overnight.    Depression  She admits to persistent anxiety and worrying associated with difficulty concentrating, intermittent insomnia, and daytime fatigue.  When especially stressed it feels as though her throat is closing off.  This generally resolves if she is able to relax.  There is no  history of any depression, loss of interest in activities, or suicidal ideation. She has started a new job since last here and while she enjoys it she has long days and a lot of responsibilities. Taking bupropion with no apparent side effects thus far but has noted little improvement    The following portions of the patient's history were reviewed and updated as appropriate: allergies, current medications, past medical history, past social history and problem list.    Review of Systems   Constitutional: Positive for fatigue. Negative for appetite change, chills, fever and unexpected weight change.   HENT: Negative for congestion, ear pain, rhinorrhea, sneezing, sore throat and voice change.    Eyes: Negative for visual disturbance.   Respiratory: Positive for shortness of breath. Negative for cough and wheezing.    Cardiovascular: Positive for chest pain, palpitations and leg swelling (left).   Gastrointestinal: Negative for abdominal pain, blood in stool, constipation, diarrhea, nausea and vomiting.        Heartburn   Endocrine: Negative for polydipsia.   Genitourinary: Negative for difficulty urinating, dysuria, frequency, hematuria, menstrual problem, pelvic pain, urgency, vaginal bleeding and vaginal discharge.   Musculoskeletal: Positive for back pain. Negative for arthralgias, joint swelling, myalgias and neck pain.   Skin: Negative for color change.        Hair loss   Neurological: Negative for tremors, weakness, numbness and headaches.   Psychiatric/Behavioral: Positive for decreased concentration and sleep disturbance. Negative for dysphoric mood and suicidal ideas. The patient is nervous/anxious.      Objective   Physical Exam   Constitutional: She is oriented to person, place, and time. No distress.   No apparent distress. No pallor, jaundice, diaphoresis, or cyanosis.   HENT:   Head: Atraumatic.   Right Ear: Tympanic membrane, external ear and ear canal normal.   Left Ear: Tympanic membrane, external ear  and ear canal normal.   Nose: Nose normal.   Mouth/Throat: Oropharynx is clear and moist. Mucous membranes are not pale and not cyanotic.   Eyes: EOM are normal. Pupils are equal, round, and reactive to light. No scleral icterus.   Neck: No JVD present. Carotid bruit is not present. No tracheal deviation present. No thyromegaly present.   Cardiovascular: Normal rate, regular rhythm, S1 normal, S2 normal and intact distal pulses.  Exam reveals no gallop, no S3 and no S4.    No murmur heard.  Pulmonary/Chest: Breath sounds normal. No stridor. No respiratory distress. She exhibits tenderness (remains tender about the left upper anterior chest - identical to presenting pain).   Abdominal: Soft. Normal aorta and bowel sounds are normal. She exhibits no distension, no abdominal bruit and no mass. There is no hepatosplenomegaly. There is no tenderness. No hernia.   Musculoskeletal: She exhibits no tenderness or deformity.       Vascular Status -  Her exam exhibits no right foot edema. Her exam exhibits left foot edema (trace).  Lymphadenopathy:        Head (right side): No submandibular adenopathy present.        Head (left side): No submandibular adenopathy present.     She has no cervical adenopathy.   Neurological: She is alert and oriented to person, place, and time. She has normal reflexes. She displays normal reflexes. No cranial nerve deficit. She exhibits normal muscle tone. Coordination normal.   Skin: Skin is warm and dry. No rash noted. She is not diaphoretic. No cyanosis. No pallor. Nails show no clubbing.   Psychiatric: Her speech is normal and behavior is normal. Her mood appears anxious (at times). She expresses no suicidal ideation.     Assessment/Plan   Problems Addressed this Visit        Cardiovascular and Mediastinum    Venous insufficiency   Reminded regarding lifestyle modification    Palpitations  Recent 24-hour Holter unremarkable despite being symptomatic  Likely anxiety related  Continue current  dose of propranolol       Digestive    Morbid obesity    Non-alcoholic fatty liver disease    Gastroesophageal reflux disease without esophagitis  Symptoms are currently worse  Reminded regarding lifestyle modification.  We'll titrate omeprazole    Relevant Medications    omeprazole (priLOSEC) 40 MG capsule       Nervous and Auditory    Mechanical low back pain    Chronic joint pain    Atypical chest pain  Likely combination of chest wall pain, GERD, and anxiety  We'll continue to monitor        Other    Dyslipidemia    History of bariatric surgery    Depression with anxiety  Significant situational component. Supportive therapy. Will continue current medication And see back in 3 weeks' time sooner if any worse or if any new symptoms or concerns in the meantime

## 2017-09-12 ENCOUNTER — TELEPHONE (OUTPATIENT)
Dept: FAMILY MEDICINE CLINIC | Facility: CLINIC | Age: 47
End: 2017-09-12

## 2017-09-12 NOTE — TELEPHONE ENCOUNTER
----- Message from Hoang Palacios MD sent at 9/7/2017  5:13 PM EDT -----  Yes - release can be written by front staff  ----- Message -----     From: China Rangel MA     Sent: 9/7/2017   4:00 PM       To: Hoang Palacios MD    She is still feeling bad but needs to go back to work. Can you release her?

## 2017-09-14 ENCOUNTER — OFFICE VISIT (OUTPATIENT)
Dept: FAMILY MEDICINE CLINIC | Facility: CLINIC | Age: 47
End: 2017-09-14

## 2017-09-14 VITALS
BODY MASS INDEX: 38.14 KG/M2 | SYSTOLIC BLOOD PRESSURE: 120 MMHG | DIASTOLIC BLOOD PRESSURE: 70 MMHG | RESPIRATION RATE: 12 BRPM | HEART RATE: 72 BPM | TEMPERATURE: 98.4 F | OXYGEN SATURATION: 97 % | WEIGHT: 243 LBS | HEIGHT: 67 IN

## 2017-09-14 DIAGNOSIS — M25.50 CHRONIC JOINT PAIN: ICD-10-CM

## 2017-09-14 DIAGNOSIS — K76.0 NON-ALCOHOLIC FATTY LIVER DISEASE: ICD-10-CM

## 2017-09-14 DIAGNOSIS — R60.0 EDEMA OF LEFT LOWER EXTREMITY: ICD-10-CM

## 2017-09-14 DIAGNOSIS — F41.8 DEPRESSION WITH ANXIETY: ICD-10-CM

## 2017-09-14 DIAGNOSIS — R00.2 PALPITATIONS: Primary | ICD-10-CM

## 2017-09-14 DIAGNOSIS — E78.5 DYSLIPIDEMIA: ICD-10-CM

## 2017-09-14 DIAGNOSIS — E66.01 MORBID OBESITY, UNSPECIFIED OBESITY TYPE (HCC): ICD-10-CM

## 2017-09-14 DIAGNOSIS — K59.09 CHRONIC CONSTIPATION: ICD-10-CM

## 2017-09-14 DIAGNOSIS — G89.29 CHRONIC JOINT PAIN: ICD-10-CM

## 2017-09-14 DIAGNOSIS — K21.9 GASTROESOPHAGEAL REFLUX DISEASE WITHOUT ESOPHAGITIS: ICD-10-CM

## 2017-09-14 DIAGNOSIS — R23.2 HOT FLASHES: ICD-10-CM

## 2017-09-14 DIAGNOSIS — M54.59 MECHANICAL LOW BACK PAIN: ICD-10-CM

## 2017-09-14 DIAGNOSIS — Z98.84 HISTORY OF BARIATRIC SURGERY: ICD-10-CM

## 2017-09-14 PROBLEM — R07.89 ATYPICAL CHEST PAIN: Status: RESOLVED | Noted: 2017-08-04 | Resolved: 2017-09-14

## 2017-09-14 PROCEDURE — 99214 OFFICE O/P EST MOD 30 MIN: CPT | Performed by: GENERAL PRACTICE

## 2017-09-14 RX ORDER — DOCUSATE SODIUM 100 MG/1
100 CAPSULE, LIQUID FILLED ORAL 2 TIMES DAILY
Qty: 60 CAPSULE | Refills: 5 | Status: SHIPPED | OUTPATIENT
Start: 2017-09-14 | End: 2019-10-18 | Stop reason: SDUPTHER

## 2017-09-14 RX ORDER — LORAZEPAM 0.5 MG/1
0.5 TABLET ORAL DAILY PRN
Qty: 10 TABLET | Refills: 0 | Status: SHIPPED | OUTPATIENT
Start: 2017-09-14 | End: 2018-01-08

## 2017-09-14 NOTE — PROGRESS NOTES
Subjective   Susie Rangel is a 47 y.o. female.     History of Present Illness     Depression  Return to work since last here with a prompt increase in her anxiety along with increased chest pain, palpitations, difficulty sleeping, and fatigue.  Quit her job after several days with a marked improvement in the symptoms.  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  She denies any depression or suicidal ideation.  She remains on bupropion, hydroxyzine, propranolol, and lorazepam once or twice weekly as needed when especially stressed.    Dyslipidemia  Compliance with treatment has been good.  She has been following her diet and over the last week or two resumed yoga and hopes to start walking more.     Left Lower Extremity Edema  She continues to have intermittent mild swelling about her left foot.  This has been unassociated with any other symptoms and she denies any pain or discoloration.  The swelling tends to develop toward the end of the day and improves overnight.    The following portions of the patient's history were reviewed and updated as appropriate: allergies, current medications, past medical history, past social history, past surgical history and problem list.    Review of Systems   Constitutional: Positive for fatigue. Negative for appetite change, chills, fever and unexpected weight change.   HENT: Negative for congestion, ear pain, rhinorrhea, sneezing, sore throat and voice change.    Eyes: Negative for visual disturbance.   Respiratory: Positive for shortness of breath. Negative for cough and wheezing.    Cardiovascular: Positive for chest pain, palpitations and leg swelling (left).   Gastrointestinal: Positive for constipation (started on docusate since last year with a significant improvement). Negative for abdominal pain, blood in stool, diarrhea, nausea and vomiting.        Heartburn   Endocrine: Negative for polydipsia.        Intermittent hot  flashes   Genitourinary: Negative for difficulty urinating, dysuria, frequency, hematuria, menstrual problem, pelvic pain, urgency, vaginal bleeding and vaginal discharge.   Musculoskeletal: Positive for back pain. Negative for arthralgias, joint swelling, myalgias and neck pain.   Skin: Negative for color change.        Hair loss   Neurological: Negative for tremors, weakness, numbness and headaches.   Psychiatric/Behavioral: Positive for decreased concentration and sleep disturbance. Negative for dysphoric mood and suicidal ideas. The patient is nervous/anxious.      Objective   Physical Exam   Constitutional: She is oriented to person, place, and time. No distress.   No apparent distress. No pallor, jaundice, diaphoresis, or cyanosis.   HENT:   Head: Atraumatic.   Right Ear: Tympanic membrane, external ear and ear canal normal.   Left Ear: Tympanic membrane, external ear and ear canal normal.   Nose: Nose normal.   Mouth/Throat: Oropharynx is clear and moist. Mucous membranes are not pale and not cyanotic.   Eyes: EOM are normal. Pupils are equal, round, and reactive to light. No scleral icterus.   Neck: No JVD present. Carotid bruit is not present. No tracheal deviation present. No thyromegaly present.   Cardiovascular: Normal rate, regular rhythm, S1 normal, S2 normal and intact distal pulses.  Exam reveals no gallop, no S3 and no S4.    No murmur heard.  Pulmonary/Chest: Breath sounds normal. No stridor. No respiratory distress. She exhibits tenderness (remains tender about the left upper anterior chest - identical to presenting pain).   Abdominal: Soft. Normal aorta and bowel sounds are normal. She exhibits no distension, no abdominal bruit and no mass. There is no hepatosplenomegaly. There is no tenderness. No hernia.   Musculoskeletal: She exhibits no tenderness or deformity.       Vascular Status -  Her exam exhibits no right foot edema. Her exam exhibits left foot edema (trace).  Lymphadenopathy:         Head (right side): No submandibular adenopathy present.        Head (left side): No submandibular adenopathy present.     She has no cervical adenopathy.   Neurological: She is alert and oriented to person, place, and time. She has normal reflexes. She displays normal reflexes. No cranial nerve deficit. She exhibits normal muscle tone. Coordination normal.   Skin: Skin is warm and dry. No rash noted. She is not diaphoretic. No cyanosis. No pallor. Nails show no clubbing.   Psychiatric: Her speech is normal and behavior is normal. Her mood appears anxious (at times). She expresses no suicidal ideation.     Assessment/Plan   Problems Addressed this Visit        Cardiovascular and Mediastinum    Palpitations   Likely anxiety related  Improved  If she continues to do well at her return we'll consider tapering and discontinuing propranolol       Digestive    Morbid obesity  Post-bariatric surgery    Non-alcoholic fatty liver disease    Gastroesophageal reflux disease without esophagitis    Chronic constipation  Continue docusate    Relevant Medications    docusate sodium (COLACE) 100 MG capsule       Nervous and Auditory    Mechanical low back pain    Chronic joint pain       Genitourinary    Hot flashes  Likely anxiety related however mother reached menopause early  She is post ARELIS/USO for benign disease  Will check an FSH and LH with her next labs       Other    Dyslipidemia  Encouraged to continue to work on her diet and exercise plan.  Fasting lab work scheduled     Relevant Orders    Comprehensive Metabolic Panel    Lipid Panel    History of bariatric surgery    Relevant Orders    CBC & Differential    Comprehensive Metabolic Panel    Vitamin B12    Depression with anxiety  Significant situational component. Supportive therapy.   Continue current medication.    Relevant Medications    LORazepam (ATIVAN) 0.5 MG tablet    Other Relevant Orders    TSH    FSH & LH    Edema of left lower extremity

## 2017-10-13 ENCOUNTER — LAB (OUTPATIENT)
Dept: FAMILY MEDICINE CLINIC | Facility: CLINIC | Age: 47
End: 2017-10-13

## 2017-10-13 DIAGNOSIS — E78.5 DYSLIPIDEMIA: ICD-10-CM

## 2017-10-13 DIAGNOSIS — Z98.84 HISTORY OF BARIATRIC SURGERY: ICD-10-CM

## 2017-10-13 DIAGNOSIS — F41.8 DEPRESSION WITH ANXIETY: ICD-10-CM

## 2017-10-13 LAB
ALBUMIN SERPL-MCNC: 4.3 G/DL (ref 3.5–5)
ALBUMIN/GLOB SERPL: 1.3 G/DL (ref 1.5–2.5)
ALP SERPL-CCNC: 115 U/L (ref 35–104)
ALT SERPL W P-5'-P-CCNC: 57 U/L (ref 10–36)
ANION GAP SERPL CALCULATED.3IONS-SCNC: 4.8 MMOL/L (ref 3.6–11.2)
AST SERPL-CCNC: 45 U/L (ref 10–30)
BASOPHILS # BLD AUTO: 0.06 10*3/MM3 (ref 0–0.3)
BASOPHILS NFR BLD AUTO: 0.9 % (ref 0–2)
BILIRUB SERPL-MCNC: 0.5 MG/DL (ref 0.2–1.8)
BUN BLD-MCNC: 13 MG/DL (ref 7–21)
BUN/CREAT SERPL: 15.9 (ref 7–25)
CALCIUM SPEC-SCNC: 9.8 MG/DL (ref 7.7–10)
CHLORIDE SERPL-SCNC: 110 MMOL/L (ref 99–112)
CHOLEST SERPL-MCNC: 274 MG/DL (ref 0–200)
CO2 SERPL-SCNC: 26.2 MMOL/L (ref 24.3–31.9)
CREAT BLD-MCNC: 0.82 MG/DL (ref 0.43–1.29)
DEPRECATED RDW RBC AUTO: 41 FL (ref 37–54)
EOSINOPHIL # BLD AUTO: 0.12 10*3/MM3 (ref 0–0.7)
EOSINOPHIL NFR BLD AUTO: 1.8 % (ref 0–5)
ERYTHROCYTE [DISTWIDTH] IN BLOOD BY AUTOMATED COUNT: 12.7 % (ref 11.5–14.5)
FSH SERPL-ACNC: 38.7 MIU/ML
GFR SERPL CREATININE-BSD FRML MDRD: 91 ML/MIN/1.73
GLOBULIN UR ELPH-MCNC: 3.3 GM/DL
GLUCOSE BLD-MCNC: 98 MG/DL (ref 70–110)
HCT VFR BLD AUTO: 41.1 % (ref 37–47)
HDLC SERPL-MCNC: 64 MG/DL (ref 60–100)
HGB BLD-MCNC: 14.7 G/DL (ref 12–16)
IMM GRANULOCYTES # BLD: 0.01 10*3/MM3 (ref 0–0.03)
IMM GRANULOCYTES NFR BLD: 0.2 % (ref 0–0.5)
LDLC SERPL CALC-MCNC: 191 MG/DL (ref 0–100)
LDLC/HDLC SERPL: 2.99 {RATIO}
LH SERPL-ACNC: 13.6 MIU/ML
LYMPHOCYTES # BLD AUTO: 2.32 10*3/MM3 (ref 1–3)
LYMPHOCYTES NFR BLD AUTO: 34.8 % (ref 21–51)
MCH RBC QN AUTO: 32.2 PG (ref 27–33)
MCHC RBC AUTO-ENTMCNC: 35.8 G/DL (ref 33–37)
MCV RBC AUTO: 90.1 FL (ref 80–94)
MONOCYTES # BLD AUTO: 0.66 10*3/MM3 (ref 0.1–0.9)
MONOCYTES NFR BLD AUTO: 9.9 % (ref 0–10)
NEUTROPHILS # BLD AUTO: 3.49 10*3/MM3 (ref 1.4–6.5)
NEUTROPHILS NFR BLD AUTO: 52.4 % (ref 30–70)
OSMOLALITY SERPL CALC.SUM OF ELEC: 281.3 MOSM/KG (ref 273–305)
PLATELET # BLD AUTO: 330 10*3/MM3 (ref 130–400)
PMV BLD AUTO: 9.6 FL (ref 6–10)
POTASSIUM BLD-SCNC: 4.1 MMOL/L (ref 3.5–5.3)
PROT SERPL-MCNC: 7.6 G/DL (ref 6–8)
RBC # BLD AUTO: 4.56 10*6/MM3 (ref 4.2–5.4)
SODIUM BLD-SCNC: 141 MMOL/L (ref 135–153)
TRIGL SERPL-MCNC: 93 MG/DL (ref 0–150)
TSH SERPL DL<=0.05 MIU/L-ACNC: 0.74 MIU/ML (ref 0.55–4.78)
VIT B12 BLD-MCNC: 625 PG/ML (ref 211–911)
VLDLC SERPL-MCNC: 18.6 MG/DL
WBC NRBC COR # BLD: 6.66 10*3/MM3 (ref 4.5–12.5)

## 2017-10-13 PROCEDURE — 82607 VITAMIN B-12: CPT | Performed by: GENERAL PRACTICE

## 2017-10-13 PROCEDURE — 83002 ASSAY OF GONADOTROPIN (LH): CPT | Performed by: GENERAL PRACTICE

## 2017-10-13 PROCEDURE — 80053 COMPREHEN METABOLIC PANEL: CPT | Performed by: GENERAL PRACTICE

## 2017-10-13 PROCEDURE — 83001 ASSAY OF GONADOTROPIN (FSH): CPT | Performed by: GENERAL PRACTICE

## 2017-10-13 PROCEDURE — 36415 COLL VENOUS BLD VENIPUNCTURE: CPT

## 2017-10-13 PROCEDURE — 84443 ASSAY THYROID STIM HORMONE: CPT | Performed by: GENERAL PRACTICE

## 2017-10-13 PROCEDURE — 85025 COMPLETE CBC W/AUTO DIFF WBC: CPT | Performed by: GENERAL PRACTICE

## 2017-10-13 PROCEDURE — 80061 LIPID PANEL: CPT | Performed by: GENERAL PRACTICE

## 2017-10-24 ENCOUNTER — TELEPHONE (OUTPATIENT)
Dept: FAMILY MEDICINE CLINIC | Facility: CLINIC | Age: 47
End: 2017-10-24

## 2017-10-24 NOTE — TELEPHONE ENCOUNTER
----- Message from Hoang Palacios MD sent at 10/22/2017 10:48 PM EDT -----  Borderline menopausal - would repeat in 3-6 months  ----- Message -----     From: China Rangel MA     Sent: 10/20/2017   6:19 PM       To: Hoang Palacios MD    Pt wanted to know your thoughts on her hormone results?

## 2017-12-28 ENCOUNTER — CLINICAL SUPPORT (OUTPATIENT)
Dept: FAMILY MEDICINE CLINIC | Facility: CLINIC | Age: 47
End: 2017-12-28

## 2017-12-28 DIAGNOSIS — M54.5 LOW BACK PAIN, UNSPECIFIED BACK PAIN LATERALITY, UNSPECIFIED CHRONICITY, WITH SCIATICA PRESENCE UNSPECIFIED: Primary | ICD-10-CM

## 2017-12-28 PROCEDURE — 96372 THER/PROPH/DIAG INJ SC/IM: CPT | Performed by: PHYSICIAN ASSISTANT

## 2017-12-28 RX ORDER — METHYLPREDNISOLONE ACETATE 80 MG/ML
80 INJECTION, SUSPENSION INTRA-ARTICULAR; INTRALESIONAL; INTRAMUSCULAR; SOFT TISSUE ONCE
Status: COMPLETED | OUTPATIENT
Start: 2017-12-28 | End: 2017-12-28

## 2017-12-28 RX ORDER — KETOROLAC TROMETHAMINE 30 MG/ML
60 INJECTION, SOLUTION INTRAMUSCULAR; INTRAVENOUS ONCE
Status: COMPLETED | OUTPATIENT
Start: 2017-12-28 | End: 2017-12-28

## 2017-12-28 RX ADMIN — KETOROLAC TROMETHAMINE 60 MG: 30 INJECTION, SOLUTION INTRAMUSCULAR; INTRAVENOUS at 12:27

## 2017-12-28 RX ADMIN — METHYLPREDNISOLONE ACETATE 80 MG: 80 INJECTION, SUSPENSION INTRA-ARTICULAR; INTRALESIONAL; INTRAMUSCULAR; SOFT TISSUE at 12:29

## 2017-12-29 DIAGNOSIS — M54.5 ACUTE LOW BACK PAIN, UNSPECIFIED BACK PAIN LATERALITY, WITH SCIATICA PRESENCE UNSPECIFIED: Primary | ICD-10-CM

## 2017-12-29 RX ORDER — CYCLOBENZAPRINE HCL 10 MG
10 TABLET ORAL 3 TIMES DAILY PRN
Qty: 30 TABLET | Refills: 0 | Status: SHIPPED | OUTPATIENT
Start: 2017-12-29 | End: 2020-07-17

## 2018-01-08 ENCOUNTER — OFFICE VISIT (OUTPATIENT)
Dept: FAMILY MEDICINE CLINIC | Facility: CLINIC | Age: 48
End: 2018-01-08

## 2018-01-08 VITALS
RESPIRATION RATE: 12 BRPM | SYSTOLIC BLOOD PRESSURE: 125 MMHG | HEART RATE: 91 BPM | WEIGHT: 251 LBS | TEMPERATURE: 98.3 F | HEIGHT: 67 IN | BODY MASS INDEX: 39.39 KG/M2 | DIASTOLIC BLOOD PRESSURE: 80 MMHG | OXYGEN SATURATION: 97 %

## 2018-01-08 DIAGNOSIS — Z23 ENCOUNTER FOR IMMUNIZATION: ICD-10-CM

## 2018-01-08 DIAGNOSIS — IMO0001 CLASS 2 OBESITY WITH SERIOUS COMORBIDITY AND BODY MASS INDEX (BMI) OF 38.0 TO 38.9 IN ADULT, UNSPECIFIED OBESITY TYPE: ICD-10-CM

## 2018-01-08 DIAGNOSIS — E78.5 DYSLIPIDEMIA: ICD-10-CM

## 2018-01-08 DIAGNOSIS — Z00.00 HEALTHCARE MAINTENANCE: ICD-10-CM

## 2018-01-08 DIAGNOSIS — F41.8 DEPRESSION WITH ANXIETY: ICD-10-CM

## 2018-01-08 DIAGNOSIS — L21.9 SEBORRHEIC DERMATITIS: ICD-10-CM

## 2018-01-08 DIAGNOSIS — K76.0 NON-ALCOHOLIC FATTY LIVER DISEASE: ICD-10-CM

## 2018-01-08 DIAGNOSIS — M54.59 MECHANICAL LOW BACK PAIN: ICD-10-CM

## 2018-01-08 DIAGNOSIS — R82.90 FOUL SMELLING URINE: ICD-10-CM

## 2018-01-08 DIAGNOSIS — R07.89 ATYPICAL CHEST PAIN: ICD-10-CM

## 2018-01-08 DIAGNOSIS — I87.2 CHRONIC VENOUS INSUFFICIENCY: Primary | ICD-10-CM

## 2018-01-08 DIAGNOSIS — Z98.84 HISTORY OF BARIATRIC SURGERY: ICD-10-CM

## 2018-01-08 DIAGNOSIS — K59.09 CHRONIC CONSTIPATION: ICD-10-CM

## 2018-01-08 DIAGNOSIS — K21.9 GASTROESOPHAGEAL REFLUX DISEASE WITHOUT ESOPHAGITIS: ICD-10-CM

## 2018-01-08 PROBLEM — R00.2 PALPITATIONS: Status: RESOLVED | Noted: 2017-08-12 | Resolved: 2018-01-08

## 2018-01-08 LAB
BILIRUB BLD-MCNC: NEGATIVE MG/DL
CLARITY, POC: CLEAR
COLOR UR: YELLOW
GLUCOSE UR STRIP-MCNC: NEGATIVE MG/DL
KETONES UR QL: NEGATIVE
LEUKOCYTE EST, POC: NEGATIVE
NITRITE UR-MCNC: NEGATIVE MG/ML
PH UR: 6 [PH] (ref 5–8)
PROT UR STRIP-MCNC: NEGATIVE MG/DL
RBC # UR STRIP: NEGATIVE /UL
SP GR UR: 1.02 (ref 1–1.03)
UROBILINOGEN UR QL: ABNORMAL

## 2018-01-08 PROCEDURE — 99214 OFFICE O/P EST MOD 30 MIN: CPT | Performed by: GENERAL PRACTICE

## 2018-01-08 PROCEDURE — 90471 IMMUNIZATION ADMIN: CPT | Performed by: GENERAL PRACTICE

## 2018-01-08 PROCEDURE — 81003 URINALYSIS AUTO W/O SCOPE: CPT | Performed by: GENERAL PRACTICE

## 2018-01-08 PROCEDURE — 90686 IIV4 VACC NO PRSV 0.5 ML IM: CPT | Performed by: GENERAL PRACTICE

## 2018-01-08 RX ORDER — MELOXICAM 15 MG/1
15 TABLET ORAL DAILY PRN
Qty: 30 TABLET | Refills: 5 | Status: SHIPPED | OUTPATIENT
Start: 2018-01-08 | End: 2019-03-22

## 2018-01-08 RX ORDER — TRIAMCINOLONE ACETONIDE 1 MG/G
CREAM TOPICAL 2 TIMES DAILY PRN
Qty: 80 G | Refills: 5 | Status: SHIPPED | OUTPATIENT
Start: 2018-01-08 | End: 2019-03-22

## 2018-01-08 RX ORDER — PROPRANOLOL HYDROCHLORIDE 20 MG/1
20 TABLET ORAL 2 TIMES DAILY
Qty: 60 TABLET | Refills: 5 | Status: SHIPPED | OUTPATIENT
Start: 2018-01-08 | End: 2019-01-19

## 2018-01-08 NOTE — PROGRESS NOTES
Subjective   Susie Rangel is a 47 y.o. female.     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  She denies any depression or suicidal ideation.  She remains on bupropion, hydroxyzine, propranolol, and lorazepam once or twice weekly as needed when especially stressed.    Low Back Pain  While not entirely back to baseline, there has been a significant improvement in her low back pain.  This does not radiate at present and has been on associated with any changes in her strength, sensation, or bowel/bladder control.  There is no history of any fever, chills, or night sweats    Dyslipidemia  Compliance with treatment has been fair.  She resumed her diet within the last week and hopes to start walking again in the near future.  Lab Results   Component Value Date    CHOL 274 (H) 10/13/2017    TRIG 93 10/13/2017    HDL 64 10/13/2017    LDLCALC 191 (H) 10/13/2017     Left Lower Extremity Edema  She continues to have intermittent mild swelling about her left foot.  This has been unassociated with any other symptoms and she denies any pain or discoloration.  The swelling tends to develop toward the end of the day and improves overnight.    Labs  Most recent AST 45, ALT 57, and . FSH 38.7 and LH 13.6    The following portions of the patient's history were reviewed and updated as appropriate: allergies, current medications, past medical history, past social history and problem list.    Review of Systems   Constitutional: Positive for fatigue. Negative for appetite change, chills, fever and unexpected weight change.   HENT: Negative for congestion, ear pain, rhinorrhea, sneezing, sore throat and voice change.    Eyes: Negative for visual disturbance.   Respiratory: Negative for cough, shortness of breath and wheezing.    Cardiovascular: Positive for leg swelling (left). Negative for chest pain and palpitations.    Gastrointestinal: Negative for abdominal pain, blood in stool, constipation (chronic-intermittent), diarrhea, nausea and vomiting.   Endocrine: Negative for polydipsia.        Intermittent hot flashes   Genitourinary: Negative for difficulty urinating, dysuria, frequency, hematuria, menstrual problem, pelvic pain, urgency, vaginal bleeding and vaginal discharge.        Foul-smelling urine over the last several weeks   Musculoskeletal: Positive for back pain. Negative for arthralgias, joint swelling, myalgias and neck pain.   Skin: Negative for color change.        Hair loss   Neurological: Negative for tremors, weakness, numbness and headaches.   Psychiatric/Behavioral: Positive for decreased concentration. Negative for dysphoric mood, sleep disturbance and suicidal ideas. The patient is not nervous/anxious.      Objective   Physical Exam   Constitutional: She is oriented to person, place, and time. No distress.   No apparent distress. No pallor, jaundice, diaphoresis, or cyanosis.   HENT:   Head: Atraumatic.   Right Ear: Tympanic membrane, external ear and ear canal normal.   Left Ear: Tympanic membrane, external ear and ear canal normal.   Nose: Nose normal.   Mouth/Throat: Oropharynx is clear and moist. Mucous membranes are not pale and not cyanotic.   Eyes: EOM are normal. Pupils are equal, round, and reactive to light. No scleral icterus.   Neck: No JVD present. Carotid bruit is not present. No tracheal deviation present. No thyromegaly present.   Cardiovascular: Normal rate, regular rhythm, S1 normal, S2 normal and intact distal pulses.  Exam reveals no gallop, no S3 and no S4.    No murmur heard.  Pulmonary/Chest: Breath sounds normal. No stridor. No respiratory distress.   Abdominal: Soft. Normal aorta and bowel sounds are normal. She exhibits no distension, no abdominal bruit and no mass. There is no hepatosplenomegaly. There is no tenderness. No hernia.   Musculoskeletal: She exhibits no tenderness or  deformity.       Vascular Status -  Her exam exhibits no right foot edema. Her exam exhibits left foot edema (trace).  Lymphadenopathy:        Head (right side): No submandibular adenopathy present.        Head (left side): No submandibular adenopathy present.     She has no cervical adenopathy.   Neurological: She is alert and oriented to person, place, and time. She has normal reflexes. She displays normal reflexes. No cranial nerve deficit. She exhibits normal muscle tone. Coordination normal.   Skin: Skin is warm and dry. No rash noted. She is not diaphoretic. No cyanosis. No pallor. Nails show no clubbing.   Psychiatric: She has a normal mood and affect. Her speech is normal and behavior is normal. She expresses no suicidal ideation.     Assessment/Plan   Problems Addressed this Visit        Cardiovascular and Mediastinum    Chronic venous insufficiency   Reminded regarding lifestyle modification       Digestive    Class 2 obesity with serious comorbidity and body mass index (BMI) of 38.0 to 38.9 in adult  Encouraged to continue to work on her diet and exercise plan.    Non-alcoholic fatty liver disease  As above.     Gastroesophageal reflux disease without esophagitis    Chronic constipation       Nervous and Auditory    Mechanical low back pain  Reminded regarding symptomatic treatment.   Reviewed options going forward.  Patient will like to resume meloxicam     Relevant Medications    meloxicam (MOBIC) 15 MG tablet       Musculoskeletal and Integument    Seborrheic dermatitis    Relevant Medications    triamcinolone (KENALOG) 0.1 % cream       Other    Dyslipidemia  As above.   Reviewed the potential benefits and risks of lipid lowering therapy.  Patient will like to work harder on lifestyle modification prior to the initiation of medication     History of bariatric surgery    Depression with anxiety  Improved.. Supportive therapy.   Propranolol will be tapered.  Continue current medication  otherwise  Relevant Medications   propranolol (INDERAL) 20 MG tablet       Foul smelling urine    Relevant Orders    POC Urinalysis Dipstick, Automated (Completed)    Healthcare maintenance  Recommended a flu shot    Relevant Orders    Flu Vaccine Quad PF 3YR+ (Completed)    Encounter for immunization    Relevant Orders    Flu Vaccine Quad PF 3YR+ (Completed)

## 2018-01-22 ENCOUNTER — OFFICE VISIT (OUTPATIENT)
Dept: FAMILY MEDICINE CLINIC | Facility: CLINIC | Age: 48
End: 2018-01-22

## 2018-01-22 VITALS
OXYGEN SATURATION: 94 % | HEIGHT: 67 IN | BODY MASS INDEX: 39.71 KG/M2 | SYSTOLIC BLOOD PRESSURE: 126 MMHG | DIASTOLIC BLOOD PRESSURE: 80 MMHG | WEIGHT: 253 LBS | HEART RATE: 94 BPM | TEMPERATURE: 98.1 F

## 2018-01-22 DIAGNOSIS — K76.0 NON-ALCOHOLIC FATTY LIVER DISEASE: ICD-10-CM

## 2018-01-22 DIAGNOSIS — R59.0 LYMPHADENOPATHY, INGUINAL: Primary | ICD-10-CM

## 2018-01-22 DIAGNOSIS — M54.59 MECHANICAL LOW BACK PAIN: ICD-10-CM

## 2018-01-22 DIAGNOSIS — K21.9 GASTROESOPHAGEAL REFLUX DISEASE WITHOUT ESOPHAGITIS: ICD-10-CM

## 2018-01-22 PROCEDURE — 99214 OFFICE O/P EST MOD 30 MIN: CPT | Performed by: PHYSICIAN ASSISTANT

## 2018-01-22 RX ORDER — AMOXICILLIN AND CLAVULANATE POTASSIUM 875; 125 MG/1; MG/1
1 TABLET, FILM COATED ORAL EVERY 12 HOURS SCHEDULED
Qty: 20 TABLET | Refills: 0 | Status: SHIPPED | OUTPATIENT
Start: 2018-01-22 | End: 2018-02-09

## 2018-01-22 NOTE — PROGRESS NOTES
Subjective   Lynn Rangel is a 47 y.o. female.     Chief complaint: Lump in right abdomen    History of Present Illness     Lungs-  She complains of a lump in right groin area.  She has a history of tummy tuck and lump is in the area of scarring.  Onset one week ago.  No pus or fever reported.  She has a history of partial hysterectomy with removal of right ovary in 2017.  Pain is described as mild and worse with pushing on the lung.  No known injury.    Low back pain-stable    Gastroesophageal reflux disease-stable    Nonalcoholic fatty liver disease-stable  Results for LYNN RANGEL (MRN [id number]) as of 1/23/2018 09:51   Ref. Range 10/13/2017 08:41   Alkaline Phosphatase Latest Ref Range: 35 - 104 U/L 115 (H)   Total Protein Latest Ref Range: 6.0 - 8.0 g/dL 7.6   ALT (SGPT) Latest Ref Range: 10 - 36 U/L 57 (H)   AST (SGOT) Latest Ref Range: 10 - 30 U/L 45 (H)     The following portions of the patient's history were reviewed and updated as appropriate: allergies, current medications, past family history, past medical history, past social history, past surgical history and problem list.    Review of Systems   Constitutional: Negative for activity change, appetite change and fever.   HENT: Negative for ear pain, sinus pressure and sore throat.    Eyes: Negative for pain and visual disturbance.   Respiratory: Negative for cough and chest tightness.    Cardiovascular: Negative for chest pain and palpitations.   Gastrointestinal: Negative for abdominal pain, constipation, diarrhea, nausea and vomiting.   Endocrine: Negative for polydipsia and polyuria.   Genitourinary: Negative for dysuria and frequency.   Musculoskeletal: Negative for back pain and myalgias.        Abdominal lump   Skin: Negative for color change and rash.   Allergic/Immunologic: Negative for food allergies and immunocompromised state.   Neurological: Negative for dizziness, syncope and headaches.   Hematological: Negative for adenopathy. Does not  "bruise/bleed easily.   Psychiatric/Behavioral: Negative for hallucinations and suicidal ideas. The patient is not nervous/anxious.        /80 (BP Location: Left arm, Patient Position: Sitting, Cuff Size: Adult)  Pulse 94  Temp 98.1 °F (36.7 °C) (Oral)   Ht 170.2 cm (67.01\")  Wt 115 kg (253 lb)  SpO2 94%  BMI 39.62 kg/m2    Physical Exam   Constitutional: She is oriented to person, place, and time. She appears well-developed and well-nourished.   HENT:   Head: Normocephalic and atraumatic.   Nose: Nose normal.   Mouth/Throat: Oropharynx is clear and moist.   Eyes: Conjunctivae and EOM are normal. Pupils are equal, round, and reactive to light.   Neck: Normal range of motion. Neck supple. No tracheal deviation present. No thyromegaly present.   Cardiovascular: Normal rate, regular rhythm, normal heart sounds and intact distal pulses.    No murmur heard.  Pulmonary/Chest: Effort normal and breath sounds normal. No respiratory distress. She has no wheezes.   Abdominal: Soft. Bowel sounds are normal. There is no tenderness. There is no guarding.   Musculoskeletal: Normal range of motion. She exhibits tenderness. She exhibits no edema.   Scarring noted from hip to hip status post tummy tuck.  Numerous small masses are palpated in area of scarring but patient specifies approximately 1 x 1 cm tender flesh-colored mass slightly inferior in right groin area.   Lymphadenopathy:     She has no cervical adenopathy.   Neurological: She is alert and oriented to person, place, and time.   Skin: Skin is warm and dry. No rash noted. No erythema.   Psychiatric: She has a normal mood and affect. Her behavior is normal.   Nursing note and vitals reviewed.      Assessment/Plan     Diagnoses and all orders for this visit:    Lymphadenopathy, inguinal  -     amoxicillin-clavulanate (AUGMENTIN) 875-125 MG per tablet; Take 1 tablet by mouth Every 12 (Twelve) Hours.  -     Ambulatory Referral to General Surgery    [facility]" back pain    Gastroesophageal reflux disease without esophagitis    Non-alcoholic fatty liver disease               This document has been electronically signed by:  Trudy Quinones PA-C

## 2018-02-09 ENCOUNTER — OFFICE VISIT (OUTPATIENT)
Dept: SURGERY | Facility: CLINIC | Age: 48
End: 2018-02-09

## 2018-02-09 VITALS
HEART RATE: 74 BPM | DIASTOLIC BLOOD PRESSURE: 90 MMHG | SYSTOLIC BLOOD PRESSURE: 132 MMHG | BODY MASS INDEX: 40.02 KG/M2 | WEIGHT: 255 LBS | HEIGHT: 67 IN

## 2018-02-09 DIAGNOSIS — R19.09 LUMP IN THE GROIN: Primary | ICD-10-CM

## 2018-02-09 PROCEDURE — 76942 ECHO GUIDE FOR BIOPSY: CPT | Performed by: SURGERY

## 2018-02-09 PROCEDURE — 99243 OFF/OP CNSLTJ NEW/EST LOW 30: CPT | Performed by: SURGERY

## 2018-02-09 PROCEDURE — 20206 BIOPSY MUSCLE PERQ NEEDLE: CPT | Performed by: SURGERY

## 2018-02-09 NOTE — PROGRESS NOTES
Subjective   Susie Rangel is a 47 y.o. female here today for a lump in her groin referred by Trudy Quinones.    History of Present Illness  Ms. Rangel was seen in the office today for evaluation of a palpable abnormality in the right groin in the area of a previous abdominoplasty scar.  The patient states she noticed it a month ago.  It is not causing her pain but she was concerned about its presence.  The patient's last surgical procedure in the area of the incision was a hysterectomy in 2016 in November and an abdominoplasty in 2016 in June.  The patient states that there has not been any signs of infection associated with the mass  No Known Allergies  Current Outpatient Prescriptions   Medication Sig Dispense Refill   • buPROPion SR (WELLBUTRIN SR) 150 MG 12 hr tablet Take 1 tablet by mouth 2 (Two) Times a Day. 60 tablet 5   • cyclobenzaprine (FLEXERIL) 10 MG tablet Take 1 tablet by mouth 3 (Three) Times a Day As Needed for Muscle Spasms. 30 tablet 0   • hydrOXYzine (VISTARIL) 25 MG capsule TAKE 1 OR 2 CAPSULES 4 TIMES DAILY AS NEEDED 240 capsule 5   • meloxicam (MOBIC) 15 MG tablet Take 1 tablet by mouth Daily As Needed for Moderate Pain . 30 tablet 5   • omeprazole (priLOSEC) 40 MG capsule Take 1 capsule by mouth 2 (Two) Times a Day. 60 capsule 5   • propranolol (INDERAL) 20 MG tablet Take 1 tablet by mouth 2 (Two) Times a Day. 60 tablet 5   • triamcinolone (KENALOG) 0.1 % cream Apply  topically 2 (Two) Times a Day As Needed for Rash. 80 g 5   • docusate sodium (COLACE) 100 MG capsule Take 1 capsule by mouth 2 (Two) Times a Day. 60 capsule 5     No current facility-administered medications for this visit.      Past Medical History:   Diagnosis Date   • Arthritis    • GERD (gastroesophageal reflux disease)    • Low back pain    • Obesity    • Vitamin D deficiency      Past Surgical History:   Procedure Laterality Date   • BREAST SURGERY     • GASTRIC SLEEVE LAPAROSCOPIC     • HYSTERECTOMY     • PANNICULECTOMY        Review of Systems  General: weight gain 20 lbs  Integumentary: lump  Eyes: eyesight problems  ENT: negative  Respiratory: negative  Gastrointestinal: diverticulosis, constipation and abdominal pain  Cardiovascular: palpitations  Neurological: numbness and dizziness  Psychiatric: anxiety, insomnia and mood swings  Hematologic/Lymphatic: negative  Genitourinary: negative  Musculoskeletal: sore muscles, back pain and joint stiffness  Endocrine: hot flashes  Breasts: negative        Objective   There were no vitals taken for this visit.  Physical Exam  General:  This is a WD WN obese black female in no acute distress  HEENT exam:  WNL. Sclera are anicteric.  EOMI  Neck:  supple, FROM, without thyromegaly, cervical or supraclavicular adenopathy  Lungs:  Respiratory effort normal. Auscultation: Clear, without wheezes, rhonchi, rales  Heart:  Regular rate and rhythm, without murmur, gallop, rub.  No pedal edema  Abdomen: Status post abdominoplasty.  There is a well-healed abdominoplasty scar.  In the right inguinal area there is a firm approximately 1 cm mass in the subcutaneous tissue.  I do not appreciate any other palpable masses.  Musculoskeletal:  muscle strength/tone is normal.  Gait and station: normal. No digital cyanosis  Psyc:  alert, oriented x 3.  Mood and affect are appropriate  skin:  Warm with good turgor.  Without rash or lesion  extremities:  Examination of the extremities revealed no cyanosis, clubbing or edema.  Results/Data    Procedures   Ultrasound of Soft Tissues    Indication: Palpable abnormality right inguinal area    Description:With use of the 12.5 MHz transducer the area of clinical concern was scanned in multiple planes.    Findings: Corresponding to the area of clinical concern is a mixed echogenicity lesion approximately 1.3 cm in greatest dimension.  This is within 5 mm of the dermis.  On the sagittal view it appears to represent 2 adjacent lymph nodes that it does not have this  appearance in the transverse view.  No other masses are noted in the inguinal area.    Impression: Solid mass right inguinal area corresponding to palpable abnormality    Plan: Biopsy for histologic evaluation      Procedure Note    Procedure:  Right groin mass    Location:  Right inguinal area below abdominoplasty incision    Anesthesia: 1 Percent lidocaine with epinephrine    Description:  The risks and benefits of the procedure were discussed.  After prep of the skin with Betadine and infiltration with lidocaine a 3 mm incision was made in the skin.  Under ultrasound guidance 3 core biopsies of the lesion were obtained.    Complications:  none    Follow-up:  Pathology    Assessment/Plan     Solid mass right groin, low index of suspicion for neoplasm    Plan: Check pathology       Discussion/Summary    Errors in dictation may reflect use of voice recognition software and not all errors in transcription may have been detected prior to signing.    Future Appointments  Date Time Provider Department Center   5/8/2018 9:30 AM MD YVES Lester None

## 2018-02-13 ENCOUNTER — TELEPHONE (OUTPATIENT)
Dept: SURGERY | Facility: CLINIC | Age: 48
End: 2018-02-13

## 2018-04-17 ENCOUNTER — OFFICE VISIT (OUTPATIENT)
Dept: FAMILY MEDICINE CLINIC | Facility: CLINIC | Age: 48
End: 2018-04-17

## 2018-04-17 DIAGNOSIS — J06.9 VIRAL UPPER RESPIRATORY INFECTION: Primary | ICD-10-CM

## 2018-04-17 PROCEDURE — 99213 OFFICE O/P EST LOW 20 MIN: CPT | Performed by: GENERAL PRACTICE

## 2018-04-18 VITALS
HEIGHT: 67 IN | DIASTOLIC BLOOD PRESSURE: 70 MMHG | BODY MASS INDEX: 40.49 KG/M2 | OXYGEN SATURATION: 96 % | SYSTOLIC BLOOD PRESSURE: 120 MMHG | WEIGHT: 258 LBS | HEART RATE: 89 BPM | RESPIRATION RATE: 12 BRPM | TEMPERATURE: 98.1 F

## 2018-04-18 PROBLEM — J06.9 VIRAL UPPER RESPIRATORY INFECTION: Status: ACTIVE | Noted: 2018-04-18

## 2018-04-18 NOTE — PROGRESS NOTES
Subjective   Susie Rangel is a 47 y.o. female.     History of Present Illness     Upper Respiratory Tract Infection  Presents with the following symptoms : nasal congestion, postnasal drip, sore throst, cough, shortness of breath, nausea and diarrhea. Onset of symptoms was 3 days ago, and have been unchanged since that time. There is no history of any bilateral ear pain, hemoptysis, chest pain, vomiting, abdominal pain, rash, fever and chills.  She is drinking plenty of fluids. Evaluation to date: none. Treatment to date: none    The following portions of the patient's history were reviewed and updated as appropriate: allergies, current medications, past medical history and problem list.    Review of Systems   Constitutional: Positive for fatigue. Negative for appetite change, chills, fever and unexpected weight change.   HENT: Positive for congestion, postnasal drip, rhinorrhea, sneezing and sore throat. Negative for ear pain and voice change.    Eyes: Negative for visual disturbance.   Respiratory: Positive for cough and shortness of breath. Negative for wheezing.    Cardiovascular: Positive for leg swelling (left). Negative for chest pain and palpitations.   Gastrointestinal: Negative for abdominal pain, blood in stool, constipation (chronic-intermittent), diarrhea, nausea and vomiting.   Endocrine: Negative for polydipsia.        Intermittent hot flashes   Genitourinary: Negative for difficulty urinating, dysuria, frequency, hematuria, menstrual problem, pelvic pain, urgency, vaginal bleeding and vaginal discharge.   Musculoskeletal: Positive for back pain. Negative for arthralgias, joint swelling, myalgias and neck pain.   Skin: Negative for color change.        Hair loss   Neurological: Negative for tremors, weakness, numbness and headaches.   Psychiatric/Behavioral: Positive for decreased concentration. Negative for dysphoric mood, sleep disturbance and suicidal ideas. The patient is not nervous/anxious.       Objective   Physical Exam   Constitutional: She is oriented to person, place, and time. No distress.   No apparent distress. No pallor, jaundice, diaphoresis, or cyanosis.   HENT:   Head: Atraumatic.   Right Ear: Tympanic membrane, external ear and ear canal normal.   Left Ear: Tympanic membrane, external ear and ear canal normal.   Nose: Nose normal.   Mouth/Throat: Oropharynx is clear and moist. Mucous membranes are not pale and not cyanotic.   Eyes: EOM are normal. Pupils are equal, round, and reactive to light. No scleral icterus.   Neck: No JVD present. Carotid bruit is not present. No tracheal deviation present. No thyromegaly present.   Cardiovascular: Normal rate, regular rhythm, S1 normal, S2 normal and intact distal pulses.  Exam reveals no gallop, no S3 and no S4.    No murmur heard.  Pulmonary/Chest: Breath sounds normal. No stridor. No respiratory distress.   Abdominal: Soft. Normal aorta and bowel sounds are normal. She exhibits no distension, no abdominal bruit and no mass. There is no hepatosplenomegaly. There is no tenderness. No hernia.   Musculoskeletal: She exhibits no tenderness or deformity.     Vascular Status -  Her right foot exhibits no edema. Her left foot exhibits abnormal foot edema (trace).  Lymphadenopathy:        Head (right side): No submandibular adenopathy present.        Head (left side): No submandibular adenopathy present.     She has no cervical adenopathy.   Neurological: She is alert and oriented to person, place, and time. She has normal reflexes. She displays normal reflexes. No cranial nerve deficit. She exhibits normal muscle tone. Coordination normal.   Skin: Skin is warm and dry. No rash noted. She is not diaphoretic. No cyanosis. No pallor. Nails show no clubbing.   Psychiatric: She has a normal mood and affect. Her speech is normal and behavior is normal. She expresses no suicidal ideation.     Assessment/Plan   Problems Addressed this Visit        Respiratory     Viral upper respiratory infection  Viral upper respiratory tract infection  Advised regarding symptomatic treatment.  Discussed the importance of avoiding unnecessary antibiotic therapy.  Suggested OTC remedies.  Encouraged to report if any worse or if any new symptoms.  Call in 4 days if symptoms aren't resolving.

## 2018-04-25 ENCOUNTER — TELEPHONE (OUTPATIENT)
Dept: FAMILY MEDICINE CLINIC | Facility: CLINIC | Age: 48
End: 2018-04-25

## 2018-04-25 ENCOUNTER — HOSPITAL ENCOUNTER (OUTPATIENT)
Dept: GENERAL RADIOLOGY | Facility: HOSPITAL | Age: 48
Discharge: HOME | End: 2018-04-25
Admitting: GENERAL PRACTICE

## 2018-04-25 DIAGNOSIS — R05.9 COUGH: Primary | ICD-10-CM

## 2018-04-25 PROCEDURE — 71046 X-RAY EXAM CHEST 2 VIEWS: CPT

## 2018-04-25 PROCEDURE — 71046 X-RAY EXAM CHEST 2 VIEWS: CPT | Performed by: RADIOLOGY

## 2018-04-25 NOTE — TELEPHONE ENCOUNTER
4/25/2018 5:19 PM   S/W LYNN REGARDING XRAY ORDER TMILLS   ----- Message from Hoang Palacios MD sent at 4/25/2018  4:56 PM EDT -----  Ok - cayetano placed an order for a CXR at [facility] - she doesn't need appt she can just drop by there or the diagnostic center for it    ----- Message -----  From: Suzanna Marcus Rep  Sent: 4/25/2018   4:48 PM  To: Hoang Palacios MD    Little better but cough is no better worse if anything

## 2018-04-26 NOTE — PROGRESS NOTES
Spoke with pt about the following per Dr. Palacios. VH      -- Please let patient know that her CXR was normal

## 2018-06-26 ENCOUNTER — TELEPHONE (OUTPATIENT)
Dept: FAMILY MEDICINE CLINIC | Facility: CLINIC | Age: 48
End: 2018-06-26

## 2018-06-26 NOTE — TELEPHONE ENCOUNTER
LVM    ----- Message from Hoang Palacios MD sent at 6/23/2018 10:55 AM EDT -----  Can refer to dr donovan or dr hidalgo    ----- Message -----  From: China Rangel MA  Sent: 6/22/2018   9:41 AM  To: MD Dr. Robert Lester and yes      ----- Message -----  From: Hoang Palacios MD  Sent: 6/22/2018   9:32 AM  To: China Rangel MA    Who is her eye doc? Does she want a second opinion?     ----- Message -----  From: China Rangel MA  Sent: 6/22/2018   8:42 AM  To: Hoang Palacios MD    Says she has been losing her vision, the eye doc says she was fine, she wondered if she needed in labs or anything?

## 2018-08-23 ENCOUNTER — OFFICE VISIT (OUTPATIENT)
Dept: FAMILY MEDICINE CLINIC | Facility: CLINIC | Age: 48
End: 2018-08-23

## 2018-08-23 ENCOUNTER — TELEPHONE (OUTPATIENT)
Dept: FAMILY MEDICINE CLINIC | Facility: CLINIC | Age: 48
End: 2018-08-23

## 2018-08-23 VITALS
OXYGEN SATURATION: 98 % | WEIGHT: 260 LBS | BODY MASS INDEX: 40.81 KG/M2 | DIASTOLIC BLOOD PRESSURE: 80 MMHG | HEART RATE: 86 BPM | RESPIRATION RATE: 12 BRPM | TEMPERATURE: 98.7 F | HEIGHT: 67 IN | SYSTOLIC BLOOD PRESSURE: 125 MMHG

## 2018-08-23 DIAGNOSIS — L30.9 DERMATITIS: Primary | ICD-10-CM

## 2018-08-23 PROCEDURE — 96372 THER/PROPH/DIAG INJ SC/IM: CPT | Performed by: GENERAL PRACTICE

## 2018-08-23 PROCEDURE — 99213 OFFICE O/P EST LOW 20 MIN: CPT | Performed by: GENERAL PRACTICE

## 2018-08-23 RX ORDER — METHYLPREDNISOLONE ACETATE 80 MG/ML
80 INJECTION, SUSPENSION INTRA-ARTICULAR; INTRALESIONAL; INTRAMUSCULAR; SOFT TISSUE ONCE
Status: COMPLETED | OUTPATIENT
Start: 2018-08-23 | End: 2018-08-23

## 2018-08-23 RX ADMIN — METHYLPREDNISOLONE ACETATE 80 MG: 80 INJECTION, SUSPENSION INTRA-ARTICULAR; INTRALESIONAL; INTRAMUSCULAR; SOFT TISSUE at 16:53

## 2018-08-23 NOTE — TELEPHONE ENCOUNTER
Pt is getting an appointment this morning.   ----- Message from Hoang Palacios MD sent at 8/23/2018  9:42 AM EDT -----  Sure - can work her into see me this am    ----- Message -----  From: Ariadna Little MA  Sent: 8/23/2018   9:41 AM  To: Hoang Palacios MD    Patient called and stated that she has a really bad case of poison ivy. She wants to know if she can come in for a shot.

## 2018-08-24 NOTE — PROGRESS NOTES
Subjective   Susie Rangel is a 47 y.o. female.     History of Present Illness     Rash  Developed a rash about her forearms 4 days ago.  She had pulled weeds from her yard the day prior to its onset.  The rash started with blisters about the right forearm and then she developed redness about both antecubital fossa areas.  This has been associated with mild pruritus and burning.  There is no history of any rash elsewhere and she denies any fever, chills, or night sweats.  She denies anything else new in her environment    The following portions of the patient's history were reviewed and updated as appropriate: allergies, current medications, past medical history and problem list.    Review of Systems   Constitutional: Positive for fatigue. Negative for appetite change, chills, fever and unexpected weight change.   HENT: Negative for congestion, ear pain, rhinorrhea, sneezing, sore throat and voice change.    Eyes: Negative for visual disturbance.   Respiratory: Positive for shortness of breath. Negative for cough and wheezing.    Cardiovascular: Positive for chest pain, palpitations and leg swelling (left).   Gastrointestinal: Negative for abdominal pain, blood in stool, constipation, diarrhea, nausea and vomiting.        Heartburn   Endocrine: Negative for polydipsia.        Intermittent hot flashes   Genitourinary: Negative for difficulty urinating, dysuria, frequency, hematuria, menstrual problem, pelvic pain, urgency, vaginal bleeding and vaginal discharge.   Musculoskeletal: Positive for back pain. Negative for arthralgias, joint swelling, myalgias and neck pain.   Skin: Positive for rash. Negative for color change.        Hair loss   Neurological: Negative for tremors, weakness, numbness and headaches.   Psychiatric/Behavioral: Positive for decreased concentration and sleep disturbance. Negative for dysphoric mood and suicidal ideas. The patient is nervous/anxious.      Objective   Physical Exam    Constitutional: She is oriented to person, place, and time. She appears well-developed and well-nourished. No distress.   No apparent distress. No pallor, jaundice, diaphoresis, or cyanosis.   HENT:   Head: Normocephalic and atraumatic.   Right Ear: Tympanic membrane, external ear and ear canal normal.   Left Ear: Tympanic membrane, external ear and ear canal normal.   Nose: Nose normal.   Mouth/Throat: Oropharynx is clear and moist. Mucous membranes are not pale and not cyanotic.   Eyes: Pupils are equal, round, and reactive to light. Conjunctivae and EOM are normal. No scleral icterus.   Neck: Normal range of motion. Neck supple. No JVD present. Carotid bruit is not present. No tracheal deviation present. No thyromegaly present.   Cardiovascular: Normal rate, regular rhythm, S1 normal, S2 normal, normal heart sounds and intact distal pulses.  Exam reveals no gallop, no S3 and no S4.    No murmur heard.  Pulmonary/Chest: Effort normal and breath sounds normal. No stridor. No respiratory distress. She has no wheezes.   Musculoskeletal: Normal range of motion. She exhibits no edema, tenderness or deformity.     Vascular Status -  Her right foot exhibits no edema. Her left foot exhibits abnormal foot edema (trace).  Lymphadenopathy:        Head (right side): No submandibular adenopathy present.        Head (left side): No submandibular adenopathy present.     She has no cervical adenopathy.   Neurological: She is alert and oriented to person, place, and time. No cranial nerve deficit. Coordination normal.   Skin: Skin is warm and dry. She is not diaphoretic. No cyanosis. No pallor. Nails show no clubbing.   3-4 linear narrow blisters will aspect right forearm.  Slightly violaceous erythema both antecubital fossa's   Psychiatric: She has a normal mood and affect. Her speech is normal and behavior is normal. She expresses no suicidal ideation.   Nursing note and vitals reviewed.    Assessment/Plan   Problems Addressed  this Visit        Musculoskeletal and Integument    Dermatitis   Contact dermatitis however atypical for poison ivy   Advise regarding symptomatic treatment   Agreed on a corticosteroid injection   Encouraged to report if any worse, any new symptoms, or if not resolving over the next week     Relevant Medications    methylPREDNISolone acetate (DEPO-medrol) injection 80 mg (Completed)

## 2018-10-15 DIAGNOSIS — K21.9 GASTROESOPHAGEAL REFLUX DISEASE WITHOUT ESOPHAGITIS: ICD-10-CM

## 2018-10-15 DIAGNOSIS — F41.8 DEPRESSION WITH ANXIETY: ICD-10-CM

## 2018-10-15 RX ORDER — HYDROXYZINE HYDROCHLORIDE 10 MG/1
TABLET, FILM COATED ORAL
Qty: 90 TABLET | Refills: 5 | Status: SHIPPED | OUTPATIENT
Start: 2018-10-15 | End: 2019-03-22 | Stop reason: SDUPTHER

## 2018-10-15 RX ORDER — OMEPRAZOLE 40 MG/1
CAPSULE, DELAYED RELEASE ORAL
Qty: 60 CAPSULE | Refills: 5 | Status: SHIPPED | OUTPATIENT
Start: 2018-10-15 | End: 2019-03-22 | Stop reason: SDUPTHER

## 2019-01-18 ENCOUNTER — OFFICE VISIT (OUTPATIENT)
Dept: FAMILY MEDICINE CLINIC | Facility: CLINIC | Age: 49
End: 2019-01-18

## 2019-01-18 DIAGNOSIS — K76.0 NON-ALCOHOLIC FATTY LIVER DISEASE: ICD-10-CM

## 2019-01-18 DIAGNOSIS — F41.8 DEPRESSION WITH ANXIETY: ICD-10-CM

## 2019-01-18 DIAGNOSIS — K59.09 CHRONIC CONSTIPATION: ICD-10-CM

## 2019-01-18 DIAGNOSIS — Z00.00 HEALTHCARE MAINTENANCE: ICD-10-CM

## 2019-01-18 DIAGNOSIS — E78.5 DYSLIPIDEMIA: ICD-10-CM

## 2019-01-18 DIAGNOSIS — E66.01 CLASS 3 SEVERE OBESITY DUE TO EXCESS CALORIES WITHOUT SERIOUS COMORBIDITY WITH BODY MASS INDEX (BMI) OF 40.0 TO 44.9 IN ADULT (HCC): ICD-10-CM

## 2019-01-18 DIAGNOSIS — M25.50 CHRONIC JOINT PAIN: ICD-10-CM

## 2019-01-18 DIAGNOSIS — K21.9 GASTROESOPHAGEAL REFLUX DISEASE WITHOUT ESOPHAGITIS: ICD-10-CM

## 2019-01-18 DIAGNOSIS — G89.29 CHRONIC JOINT PAIN: ICD-10-CM

## 2019-01-18 DIAGNOSIS — M54.59 MECHANICAL LOW BACK PAIN: ICD-10-CM

## 2019-01-18 DIAGNOSIS — I87.2 CHRONIC VENOUS INSUFFICIENCY: Primary | ICD-10-CM

## 2019-01-18 DIAGNOSIS — Z23 ENCOUNTER FOR IMMUNIZATION: ICD-10-CM

## 2019-01-18 DIAGNOSIS — Z98.84 HISTORY OF BARIATRIC SURGERY: ICD-10-CM

## 2019-01-18 PROBLEM — R19.09 LUMP IN THE GROIN: Status: RESOLVED | Noted: 2018-02-09 | Resolved: 2019-01-18

## 2019-01-18 PROBLEM — R82.90 FOUL SMELLING URINE: Status: RESOLVED | Noted: 2018-01-08 | Resolved: 2019-01-18

## 2019-01-18 PROBLEM — J06.9 VIRAL UPPER RESPIRATORY INFECTION: Status: RESOLVED | Noted: 2018-04-18 | Resolved: 2019-01-18

## 2019-01-18 PROCEDURE — 99214 OFFICE O/P EST MOD 30 MIN: CPT | Performed by: GENERAL PRACTICE

## 2019-01-18 PROCEDURE — 90632 HEPA VACCINE ADULT IM: CPT | Performed by: GENERAL PRACTICE

## 2019-01-18 PROCEDURE — 90471 IMMUNIZATION ADMIN: CPT | Performed by: GENERAL PRACTICE

## 2019-01-18 NOTE — PROGRESS NOTES
Subjective   Susie Rangel is a 48 y.o. female.     History of Present Illness     Obesity  Post bariatric surgery. Unable to tolerate phentiramine and did not respond to topiramate. Trying to follow a low calorie diet but her activity remains limited due to her schedule.     Low Back Pain  There has been no change in the quality or severity of her back pain nor any new associated symptoms. This does not radiate at present and she denies any changes in her strength, sensation, or bowel/bladder control.  There is no history of any fever, chills, or night sweats    Dyslipidemia  Compliance with treatment has been fair. She has had no recent labs    Left Lower Extremity Edema  She continues to have intermittent mild swelling about her left foot.  This has been unassociated with any other symptoms and she denies any pain or discoloration.  The swelling tends to develop toward the end of the day and improves overnight.    Depression  She is frustrated regarding her weight gain but has otherwise been doing fairly well.  She denies any depression or suicidal ideation.  She remains on hydroxyzine as needed .    The following portions of the patient's history were reviewed and updated as appropriate: allergies, current medications, past medical history, past social history and problem list.    Review of Systems   Constitutional: Positive for fatigue. Negative for appetite change, chills, fever and unexpected weight change.   HENT: Negative for congestion, ear pain, rhinorrhea, sneezing, sore throat and voice change.    Eyes: Negative for visual disturbance.   Respiratory: Negative for cough, shortness of breath and wheezing.    Cardiovascular: Positive for leg swelling (left). Negative for chest pain and palpitations.   Gastrointestinal: Negative for abdominal pain, blood in stool, constipation (chronic-intermittent), diarrhea, nausea and vomiting.   Endocrine: Negative for polydipsia.        Intermittent hot flashes    Genitourinary: Negative for difficulty urinating, dysuria, frequency, hematuria, menstrual problem, pelvic pain, urgency, vaginal bleeding and vaginal discharge.   Musculoskeletal: Positive for back pain. Negative for arthralgias, joint swelling, myalgias and neck pain.   Skin: Negative for color change.        Hair loss   Neurological: Negative for tremors, weakness, numbness and headaches.   Psychiatric/Behavioral: Positive for decreased concentration. Negative for dysphoric mood, sleep disturbance and suicidal ideas. The patient is not nervous/anxious.      Objective   Physical Exam   Constitutional: She is oriented to person, place, and time. No distress.   Bright and in good spirits. No apparent distress. No pallor, jaundice, diaphoresis, or cyanosis.   HENT:   Head: Atraumatic.   Right Ear: Tympanic membrane, external ear and ear canal normal.   Left Ear: Tympanic membrane, external ear and ear canal normal.   Nose: Nose normal.   Mouth/Throat: Oropharynx is clear and moist. Mucous membranes are not pale and not cyanotic.   Eyes: EOM are normal. Pupils are equal, round, and reactive to light. No scleral icterus.   Neck: No JVD present. Carotid bruit is not present. No tracheal deviation present. No thyromegaly present.   Cardiovascular: Normal rate, regular rhythm, S1 normal, S2 normal and intact distal pulses. Exam reveals no gallop, no S3 and no S4.   No murmur heard.  Pulmonary/Chest: Breath sounds normal. No stridor. No respiratory distress.   Abdominal: Soft. Normal aorta and bowel sounds are normal. She exhibits no distension, no abdominal bruit and no mass. There is no hepatosplenomegaly. There is no tenderness. No hernia.   Musculoskeletal: She exhibits no tenderness or deformity.     Vascular Status -  Her right foot exhibits no edema. Her left foot exhibits abnormal foot edema (1+ primarily confined to the dorsum of the ankle and foot).  Lymphadenopathy:        Head (right side): No submandibular  adenopathy present.        Head (left side): No submandibular adenopathy present.     She has no cervical adenopathy.   Neurological: She is alert and oriented to person, place, and time. She has normal reflexes. She displays normal reflexes. No cranial nerve deficit. She exhibits normal muscle tone. Coordination normal.   Skin: Skin is warm and dry. No rash noted. She is not diaphoretic. No cyanosis. No pallor. Nails show no clubbing.   Psychiatric: She has a normal mood and affect. Her speech is normal and behavior is normal. She expresses no suicidal ideation.     Assessment/Plan   Problems Addressed this Visit        Cardiovascular and Mediastinum    Chronic venous insufficiency   Reminded regarding lifestyle modification       Digestive    Class 3 severe obesity without serious comorbidity with body mass index (BMI) of 40.0 to 44.9 in adult (CMS/[facility])  Encouraged to continue to work on her diet and exercise plan.  Will try to PA contrave    Non-alcoholic fatty liver disease    Gastroesophageal reflux disease without esophagitis    Chronic constipation       Nervous and Auditory    Mechanical low back pain  Reminded regarding symptomatic treatment.   Continue current medication    Chronic joint pain       Other    Dyslipidemia  As above.   Updated labs will be arranged at return.    History of bariatric surgery    Depression with anxiety  Stable.  Supportive therapy.   Continue current medication.    Healthcare maintenance  Patient has already received a flu shot   Reviewed the potential benefits and risks of hepatitis A immunizations. Patient wished to proceed with this and dose # 1 administered. Patient is aware that a second dose is required in 6 months.    Relevant Orders    Hepatitis A Vaccine Adult IM (Completed)    Encounter for immunization    Relevant Orders    Hepatitis A Vaccine Adult IM (Completed)

## 2019-01-19 VITALS
WEIGHT: 268 LBS | BODY MASS INDEX: 42.06 KG/M2 | SYSTOLIC BLOOD PRESSURE: 130 MMHG | TEMPERATURE: 98.7 F | RESPIRATION RATE: 12 BRPM | HEIGHT: 67 IN | HEART RATE: 85 BPM | DIASTOLIC BLOOD PRESSURE: 80 MMHG | OXYGEN SATURATION: 99 %

## 2019-02-13 RX ORDER — PROMETHAZINE HYDROCHLORIDE 6.25 MG/5ML
6.25-12.5 SYRUP ORAL EVERY 6 HOURS PRN
Qty: 60 ML | Refills: 0 | Status: SHIPPED | OUTPATIENT
Start: 2019-02-13 | End: 2019-03-22

## 2019-02-13 RX ORDER — FLUTICASONE PROPIONATE 50 MCG
SPRAY, SUSPENSION (ML) NASAL
Qty: 1 BOTTLE | Refills: 5 | Status: SHIPPED | OUTPATIENT
Start: 2019-02-13 | End: 2020-03-16 | Stop reason: SDUPTHER

## 2019-02-13 RX ORDER — AMOXICILLIN AND CLAVULANATE POTASSIUM 875; 125 MG/1; MG/1
1 TABLET, FILM COATED ORAL EVERY 12 HOURS SCHEDULED
Qty: 20 TABLET | Refills: 0 | Status: SHIPPED | OUTPATIENT
Start: 2019-02-13 | End: 2019-02-23

## 2019-02-13 RX ORDER — LORATADINE 10 MG/1
10 TABLET ORAL DAILY PRN
Qty: 30 TABLET | Refills: 5 | Status: SHIPPED | OUTPATIENT
Start: 2019-02-13 | End: 2019-12-16

## 2019-02-13 RX ORDER — GUAIFENESIN AND DEXTROMETHORPHAN HYDROBROMIDE 100; 10 MG/5ML; MG/5ML
5-10 SOLUTION ORAL EVERY 6 HOURS PRN
Qty: 60 ML | Refills: 0 | Status: SHIPPED | OUTPATIENT
Start: 2019-02-13 | End: 2019-03-22

## 2019-03-22 ENCOUNTER — OFFICE VISIT (OUTPATIENT)
Dept: FAMILY MEDICINE CLINIC | Facility: CLINIC | Age: 49
End: 2019-03-22

## 2019-03-22 DIAGNOSIS — Z00.00 HEALTHCARE MAINTENANCE: ICD-10-CM

## 2019-03-22 DIAGNOSIS — G89.29 CHRONIC JOINT PAIN: ICD-10-CM

## 2019-03-22 DIAGNOSIS — K59.09 CHRONIC CONSTIPATION: ICD-10-CM

## 2019-03-22 DIAGNOSIS — M25.50 CHRONIC JOINT PAIN: ICD-10-CM

## 2019-03-22 DIAGNOSIS — E78.5 DYSLIPIDEMIA: ICD-10-CM

## 2019-03-22 DIAGNOSIS — K21.9 GASTROESOPHAGEAL REFLUX DISEASE WITHOUT ESOPHAGITIS: ICD-10-CM

## 2019-03-22 DIAGNOSIS — K76.0 NON-ALCOHOLIC FATTY LIVER DISEASE: ICD-10-CM

## 2019-03-22 DIAGNOSIS — E66.01 CLASS 3 SEVERE OBESITY WITHOUT SERIOUS COMORBIDITY WITH BODY MASS INDEX (BMI) OF 40.0 TO 44.9 IN ADULT, UNSPECIFIED OBESITY TYPE (HCC): ICD-10-CM

## 2019-03-22 DIAGNOSIS — I87.2 CHRONIC VENOUS INSUFFICIENCY: Primary | ICD-10-CM

## 2019-03-22 DIAGNOSIS — F41.8 DEPRESSION WITH ANXIETY: ICD-10-CM

## 2019-03-22 DIAGNOSIS — M54.59 MECHANICAL LOW BACK PAIN: ICD-10-CM

## 2019-03-22 PROCEDURE — 99214 OFFICE O/P EST MOD 30 MIN: CPT | Performed by: GENERAL PRACTICE

## 2019-03-22 RX ORDER — HYDROXYZINE HYDROCHLORIDE 10 MG/1
10 TABLET, FILM COATED ORAL 3 TIMES DAILY
Qty: 90 TABLET | Refills: 5 | Status: SHIPPED | OUTPATIENT
Start: 2019-03-22 | End: 2019-10-18 | Stop reason: SDUPTHER

## 2019-03-22 RX ORDER — OMEPRAZOLE 40 MG/1
40 CAPSULE, DELAYED RELEASE ORAL 2 TIMES DAILY
Qty: 60 CAPSULE | Refills: 5 | Status: SHIPPED | OUTPATIENT
Start: 2019-03-22 | End: 2020-07-17 | Stop reason: SDUPTHER

## 2019-03-22 NOTE — PROGRESS NOTES
Subjective   Susie Rangel is a 48 y.o. female.     History of Present Illness     Depression  She recently learned that her  is cheating on her for the second time.  She admits to a depressed mood frequent crying spells, difficulty concentrating insomnia and fatigue.  She denies any suicidal ideation.  She has decided to leave her  and is making plans to spend time with family while she contemplates her future.  He has never been abusive and she does not fear for her safety at present.    Obesity  Post bariatric surgery. Unable to tolerate phentiramine and did not respond to topiramate.  She is taking contrave and denies any apparent side effects. Trying to follow a low calorie diet but her activity remains limited due to her schedule.     Low Back Pain  There has been no change in the quality or severity of her back pain nor any new associated symptoms. This does not radiate at present and she denies any changes in her strength, sensation, or bowel/bladder control.  There is no history of any fever, chills, or night sweats    Dyslipidemia  Compliance with treatment has been fair. She has had no recent labs    Left Lower Extremity Edema  She continues to have intermittent mild swelling about her left foot.  This has been unassociated with any other symptoms and she denies any pain or discoloration.  The swelling tends to develop toward the end of the day and improves overnight.    The following portions of the patient's history were reviewed and updated as appropriate: allergies, current medications, past medical history, past social history and problem list.    Review of Systems   Constitutional: Positive for fatigue. Negative for appetite change, chills, fever and unexpected weight change.   HENT: Negative for congestion, ear pain, rhinorrhea, sneezing, sore throat and voice change.    Eyes: Negative for visual disturbance.   Respiratory: Negative for cough, shortness of breath and wheezing.     Cardiovascular: Positive for leg swelling (left). Negative for chest pain and palpitations.   Gastrointestinal: Negative for abdominal pain, blood in stool, constipation (chronic-intermittent), diarrhea, nausea and vomiting.   Endocrine: Negative for polydipsia.        Intermittent hot flashes   Genitourinary: Negative for difficulty urinating, dysuria, frequency, hematuria, menstrual problem, pelvic pain, urgency, vaginal bleeding and vaginal discharge.   Musculoskeletal: Positive for back pain. Negative for arthralgias, joint swelling, myalgias and neck pain.   Skin: Negative for color change.        Hair loss   Neurological: Negative for tremors, weakness, numbness and headaches.   Psychiatric/Behavioral: Positive for decreased concentration, dysphoric mood and sleep disturbance. Negative for suicidal ideas. The patient is not nervous/anxious.      Objective   Physical Exam   Constitutional: She is oriented to person, place, and time. No distress.   Alert and oriented. Teary at times. No apparent distress. No pallor, jaundice, diaphoresis, or cyanosis.   HENT:   Head: Atraumatic.   Right Ear: Tympanic membrane, external ear and ear canal normal.   Left Ear: Tympanic membrane, external ear and ear canal normal.   Nose: Nose normal.   Mouth/Throat: Oropharynx is clear and moist. Mucous membranes are not pale and not cyanotic.   Eyes: EOM are normal. Pupils are equal, round, and reactive to light. No scleral icterus.   Neck: No JVD present. Carotid bruit is not present. No tracheal deviation present. No thyromegaly present.   Cardiovascular: Normal rate, regular rhythm, S1 normal, S2 normal and intact distal pulses. Exam reveals no gallop, no S3 and no S4.   No murmur heard.  Pulmonary/Chest: Breath sounds normal. No stridor. No respiratory distress.   Abdominal: Soft. Normal aorta and bowel sounds are normal. She exhibits no distension, no abdominal bruit and no mass. There is no hepatosplenomegaly. There is no  tenderness. No hernia.   Musculoskeletal: She exhibits no tenderness or deformity.     Vascular Status -  Her right foot exhibits no edema. Her left foot exhibits abnormal foot edema (1+ primarily confined to the dorsum of the ankle and foot).  Lymphadenopathy:        Head (right side): No submandibular adenopathy present.        Head (left side): No submandibular adenopathy present.     She has no cervical adenopathy.   Neurological: She is alert and oriented to person, place, and time. She has normal reflexes. She displays normal reflexes. No cranial nerve deficit. She exhibits normal muscle tone. Coordination normal.   Skin: Skin is warm and dry. No rash noted. She is not diaphoretic. No cyanosis. No pallor. Nails show no clubbing.   Psychiatric: Her speech is normal and behavior is normal. Thought content normal. She exhibits a depressed mood. She expresses no suicidal ideation.     Assessment/Plan   Problems Addressed this Visit        Cardiovascular and Mediastinum    Chronic venous insufficiency  Reminded regarding lifestyle modification       Digestive    Class 3 severe obesity without serious comorbidity with body mass index (BMI) of 40.0 to 44.9 in adult (CMS/[facility])  Encouraged to continue to work on her diet and exercise plan.  Continue current medication    Relevant Medications    naltrexone-bupropion ER (CONTRAVE) 8-90 MG tablet    Non-alcoholic fatty liver disease    Gastroesophageal reflux disease without esophagitis    Relevant Medications    omeprazole (priLOSEC) 40 MG capsule    Chronic constipation       Nervous and Auditory    Mechanical low back pain  Reminded regarding symptomatic treatment.   Continue current medication    Chronic joint pain       Other    Dyslipidemia    Depression with anxiety  Significant situational component  Supportive therapy  Patient uninterested in referral to counseling or psychiatry at present but will report if any worse or if any new symptoms  Recommended a leave of  absence from work until she returns in 6-8 weeks    Relevant Medications    hydrOXYzine (ATARAX) 10 MG tablet    Healthcare maintenance  Reminded that she will be due for hepatitis A #2 in late July

## 2019-03-23 VITALS
HEART RATE: 100 BPM | SYSTOLIC BLOOD PRESSURE: 125 MMHG | WEIGHT: 259 LBS | TEMPERATURE: 99.3 F | OXYGEN SATURATION: 99 % | RESPIRATION RATE: 12 BRPM | BODY MASS INDEX: 40.65 KG/M2 | DIASTOLIC BLOOD PRESSURE: 75 MMHG | HEIGHT: 67 IN

## 2019-04-19 ENCOUNTER — OFFICE VISIT (OUTPATIENT)
Dept: FAMILY MEDICINE CLINIC | Facility: CLINIC | Age: 49
End: 2019-04-19

## 2019-04-19 VITALS
DIASTOLIC BLOOD PRESSURE: 80 MMHG | SYSTOLIC BLOOD PRESSURE: 130 MMHG | WEIGHT: 259 LBS | BODY MASS INDEX: 40.65 KG/M2 | TEMPERATURE: 97 F | HEIGHT: 67 IN | OXYGEN SATURATION: 96 % | HEART RATE: 85 BPM | RESPIRATION RATE: 12 BRPM

## 2019-04-19 DIAGNOSIS — Z98.84 HISTORY OF BARIATRIC SURGERY: ICD-10-CM

## 2019-04-19 DIAGNOSIS — M25.50 CHRONIC JOINT PAIN: ICD-10-CM

## 2019-04-19 DIAGNOSIS — K59.09 CHRONIC CONSTIPATION: ICD-10-CM

## 2019-04-19 DIAGNOSIS — K76.0 NON-ALCOHOLIC FATTY LIVER DISEASE: ICD-10-CM

## 2019-04-19 DIAGNOSIS — G89.29 CHRONIC JOINT PAIN: ICD-10-CM

## 2019-04-19 DIAGNOSIS — F41.8 DEPRESSION WITH ANXIETY: ICD-10-CM

## 2019-04-19 DIAGNOSIS — E66.01 CLASS 3 SEVERE OBESITY WITHOUT SERIOUS COMORBIDITY WITH BODY MASS INDEX (BMI) OF 40.0 TO 44.9 IN ADULT, UNSPECIFIED OBESITY TYPE (HCC): ICD-10-CM

## 2019-04-19 DIAGNOSIS — Z00.00 HEALTHCARE MAINTENANCE: ICD-10-CM

## 2019-04-19 DIAGNOSIS — E78.5 DYSLIPIDEMIA: ICD-10-CM

## 2019-04-19 DIAGNOSIS — J45.909 REACTIVE AIRWAY DISEASE WITHOUT COMPLICATION, UNSPECIFIED ASTHMA SEVERITY, UNSPECIFIED WHETHER PERSISTENT: ICD-10-CM

## 2019-04-19 DIAGNOSIS — I87.2 CHRONIC VENOUS INSUFFICIENCY: Primary | ICD-10-CM

## 2019-04-19 DIAGNOSIS — M54.59 MECHANICAL LOW BACK PAIN: ICD-10-CM

## 2019-04-19 DIAGNOSIS — K21.9 GASTROESOPHAGEAL REFLUX DISEASE WITHOUT ESOPHAGITIS: ICD-10-CM

## 2019-04-19 PROCEDURE — 99214 OFFICE O/P EST MOD 30 MIN: CPT | Performed by: GENERAL PRACTICE

## 2019-04-19 RX ORDER — FLUTICASONE PROPIONATE 220 UG/1
2 AEROSOL, METERED RESPIRATORY (INHALATION)
Qty: 12 G | Refills: 0 | Status: SHIPPED | OUTPATIENT
Start: 2019-04-19 | End: 2020-03-16 | Stop reason: SDUPTHER

## 2019-04-19 RX ORDER — ALBUTEROL SULFATE 90 UG/1
2 AEROSOL, METERED RESPIRATORY (INHALATION) EVERY 4 HOURS PRN
Qty: 1 INHALER | Refills: 0 | Status: SHIPPED | OUTPATIENT
Start: 2019-04-19 | End: 2020-03-09 | Stop reason: SDUPTHER

## 2019-04-19 NOTE — PROGRESS NOTES
Subjective   Susie Rangel is a 48 y.o. female.     History of Present Illness     Upper Respiratory Tract Infection  Presents with the following symptoms : nasal congestion, postnasal drip, cough, shortness of breath and diarrhea. Onset of symptoms was 4 days ago, and have been unchanged since that time. There is no history of any sore throat, hoarse voice, hemoptysis, chest pain, nausea, vomiting, abdominal pain, rash, fever and chills.  She is drinking plenty of fluids. Evaluation to date: none. Treatment to date: none    Depression  She has been doing some better. Her  has entered inpatient rehab and she hopes that they will be able to repair their marriage. She has noted some improvement in her mood and interest in activities but remains anxious at times with a decreased appetite and difficulty sleeping. She denies any suicidal ideation.     Obesity  Post bariatric surgery. Unable to tolerate phentiramine and did not respond to topiramate.  She is taking contrave and feels that it has worked really well with no apparent side effects thus far. Trying to follow a low calorie diet but her activity remains limited due to her schedule.     Low Back Pain  There has been no change in the quality or severity of her back pain nor any new associated symptoms. This does not radiate at present and she denies any changes in her strength, sensation, or bowel/bladder control.  There is no history of any fever, chills, or night sweats    Dyslipidemia  Compliance with treatment has been fair.     Left Lower Extremity Edema  She continues to have intermittent mild swelling about her left foot.  This has been unassociated with any other symptoms and she denies any pain or discoloration.  The swelling tends to develop toward the end of the day and improves overnight.    The following portions of the patient's history were reviewed and updated as appropriate: allergies, current medications, past medical history, past social  history and problem list.    Review of Systems   Constitutional: Positive for fatigue. Negative for appetite change, chills, fever and unexpected weight change.   HENT: Positive for congestion, postnasal drip and rhinorrhea. Negative for ear pain, sneezing, sore throat and voice change.    Eyes: Negative for visual disturbance.   Respiratory: Positive for cough and shortness of breath. Negative for wheezing.    Cardiovascular: Positive for leg swelling (left). Negative for chest pain and palpitations.   Gastrointestinal: Positive for diarrhea. Negative for abdominal pain, blood in stool, constipation, nausea and vomiting.   Endocrine: Negative for polydipsia.        Intermittent hot flashes   Genitourinary: Negative for difficulty urinating, dysuria, frequency, hematuria, menstrual problem, pelvic pain, urgency, vaginal bleeding and vaginal discharge.   Musculoskeletal: Positive for back pain. Negative for arthralgias, joint swelling, myalgias and neck pain.   Skin: Negative for color change.        Hair loss   Neurological: Negative for tremors, weakness, numbness and headaches.   Psychiatric/Behavioral: Positive for decreased concentration, dysphoric mood and sleep disturbance. Negative for suicidal ideas. The patient is not nervous/anxious.      Objective   Physical Exam   Constitutional: She is oriented to person, place, and time. No distress.   Alert and in fair spirits. Intermittent cough. No apparent distress. No pallor, jaundice, diaphoresis, or cyanosis.   HENT:   Head: Atraumatic.   Right Ear: Tympanic membrane, external ear and ear canal normal.   Left Ear: Tympanic membrane, external ear and ear canal normal.   Nose: Nose normal.   Mouth/Throat: Oropharynx is clear and moist. Mucous membranes are not pale and not cyanotic.   Eyes: EOM are normal. Pupils are equal, round, and reactive to light. No scleral icterus.   Neck: No JVD present. Carotid bruit is not present. No tracheal deviation present. No  thyromegaly present.   Cardiovascular: Normal rate, regular rhythm, S1 normal, S2 normal and intact distal pulses. Exam reveals no gallop, no S3 and no S4.   No murmur heard.  Pulmonary/Chest: No stridor. No respiratory distress. She has no decreased breath sounds. She has wheezes (diffuse - mild). She has no rhonchi. She has no rales.   Abdominal: Soft. Normal aorta and bowel sounds are normal. She exhibits no distension, no abdominal bruit and no mass. There is no hepatosplenomegaly. There is no tenderness. No hernia.   Musculoskeletal: She exhibits no tenderness or deformity.     Vascular Status -  Her right foot exhibits no edema. Her left foot exhibits abnormal foot edema (1+ primarily confined to the dorsum of the ankle and foot).  Lymphadenopathy:        Head (right side): No submandibular adenopathy present.        Head (left side): No submandibular adenopathy present.     She has no cervical adenopathy.   Neurological: She is alert and oriented to person, place, and time. She has normal reflexes. She displays normal reflexes. No cranial nerve deficit. She exhibits normal muscle tone. Coordination normal.   Skin: Skin is warm and dry. No rash noted. She is not diaphoretic. No cyanosis. No pallor. Nails show no clubbing.   Psychiatric: She has a normal mood and affect. Her speech is normal and behavior is normal. Thought content normal. She expresses no suicidal ideation.     Assessment/Plan   Problems Addressed this Visit        Cardiovascular and Mediastinum    Chronic venous insufficiency   Reminded regarding lifestyle modification       Respiratory    Reactive airway disease without complication  Associated with viral URTI  Advised regarding symptomatic treatment  Albuterol as needed  ICS - fluticasone - until one to two weeks after symptoms resolve  Encouraged to report if any worse, any new symptoms, or if not resolving over the next several weeks    Relevant Medications    albuterol sulfate  (90  Base) MCG/ACT inhaler    fluticasone (FLOVENT HFA) 220 MCG/ACT inhaler       Digestive    Class 3 severe obesity without serious comorbidity with body mass index (BMI) of 40.0 to 44.9 in adult (CMS/[facility])  Encouraged to continue to work on her diet and exercise plan.  Continue current medication    Relevant Medications    naltrexone-bupropion ER (CONTRAVE) 8-90 MG tablet    Non-alcoholic fatty liver disease    Gastroesophageal reflux disease without esophagitis    Chronic constipation       Nervous and Auditory    Mechanical low back pain  Reminded regarding symptomatic treatment.    Chronic joint pain       Other    Dyslipidemia  As above.     History of bariatric surgery    Depression with anxiety  Significant situational component.   Supportive therapy.   Continue current medication.  Encouraged to report if any worse or if any new symptoms or concerns.    Healthcare maintenance  Hepatitis a #2 will be administered at return.

## 2019-07-09 ENCOUNTER — OFFICE VISIT (OUTPATIENT)
Dept: FAMILY MEDICINE CLINIC | Facility: CLINIC | Age: 49
End: 2019-07-09

## 2019-07-09 DIAGNOSIS — M25.50 CHRONIC JOINT PAIN: ICD-10-CM

## 2019-07-09 DIAGNOSIS — E78.5 DYSLIPIDEMIA: ICD-10-CM

## 2019-07-09 DIAGNOSIS — E66.01 CLASS 3 SEVERE OBESITY WITHOUT SERIOUS COMORBIDITY WITH BODY MASS INDEX (BMI) OF 40.0 TO 44.9 IN ADULT, UNSPECIFIED OBESITY TYPE (HCC): ICD-10-CM

## 2019-07-09 DIAGNOSIS — M54.59 MECHANICAL LOW BACK PAIN: ICD-10-CM

## 2019-07-09 DIAGNOSIS — Z00.00 HEALTHCARE MAINTENANCE: ICD-10-CM

## 2019-07-09 DIAGNOSIS — K21.9 GASTROESOPHAGEAL REFLUX DISEASE WITHOUT ESOPHAGITIS: ICD-10-CM

## 2019-07-09 DIAGNOSIS — G89.29 CHRONIC JOINT PAIN: ICD-10-CM

## 2019-07-09 DIAGNOSIS — F41.8 DEPRESSION WITH ANXIETY: ICD-10-CM

## 2019-07-09 DIAGNOSIS — I87.2 CHRONIC VENOUS INSUFFICIENCY: Primary | ICD-10-CM

## 2019-07-09 DIAGNOSIS — Z12.31 ENCOUNTER FOR SCREENING MAMMOGRAM FOR BREAST CANCER: ICD-10-CM

## 2019-07-09 DIAGNOSIS — Z98.84 HISTORY OF BARIATRIC SURGERY: ICD-10-CM

## 2019-07-09 DIAGNOSIS — K76.0 NON-ALCOHOLIC FATTY LIVER DISEASE: ICD-10-CM

## 2019-07-09 PROCEDURE — 99214 OFFICE O/P EST MOD 30 MIN: CPT | Performed by: GENERAL PRACTICE

## 2019-07-09 RX ORDER — LORAZEPAM 0.5 MG/1
0.5 TABLET ORAL EVERY 8 HOURS PRN
Qty: 20 TABLET | Refills: 0 | Status: SHIPPED | OUTPATIENT
Start: 2019-07-09 | End: 2019-12-16

## 2019-07-09 NOTE — PROGRESS NOTES
Subjective   Susie Rangel is a 48 y.o. female.     History of Present Illness     Depression  Her  finished rehab since she was last here but things are not going well.  He has been emotionally labile and she suspects that he is still drinking and possibly pursuing other relationships.  He is not staying consistently at home overnight. She remains anxious at times with a decreased appetite and difficulty sleeping. She denies any depression, loss of interest in activities, or suicidal ideation.     Obesity  Post bariatric surgery. Unable to tolerate phentiramine and did not respond to topiramate.  She remains on Contrave and feels that it has worked well with no apparent side effects thus far. Trying to follow a low calorie diet but her activity remains limited due to her schedule.     Low Back Pain  There has been no change in the quality or severity of her back pain nor any new associated symptoms. This does not radiate at present and she denies any changes in her strength, sensation, or bowel/bladder control.  There is no history of any fever, chills, or night sweats    Dyslipidemia  Compliance with treatment has been fair.  She has had no recent labs    Left Lower Extremity Edema  She continues to have intermittent mild swelling about her left foot.  This has been unassociated with any other symptoms and she denies any pain or discoloration.  The swelling tends to develop toward the end of the day and improves overnight.    The following portions of the patient's history were reviewed and updated as appropriate: allergies, current medications, past medical history, past social history and problem list.    Review of Systems   Constitutional: Positive for fatigue. Negative for appetite change, chills, fever and unexpected weight change.   HENT: Negative for congestion, ear pain, rhinorrhea, sneezing, sore throat and voice change.    Eyes: Negative for visual disturbance.   Respiratory: Negative for cough,  shortness of breath and wheezing.    Cardiovascular: Positive for leg swelling (left). Negative for chest pain and palpitations.   Gastrointestinal: Negative for abdominal pain, blood in stool, constipation (chronic-intermittent), diarrhea, nausea and vomiting.   Endocrine: Negative for polydipsia.        Intermittent hot flashes   Genitourinary: Negative for difficulty urinating, dysuria, frequency, hematuria, menstrual problem, pelvic pain, urgency, vaginal bleeding and vaginal discharge.   Musculoskeletal: Positive for back pain. Negative for arthralgias, joint swelling, myalgias and neck pain.   Skin: Negative for color change.        Hair loss   Neurological: Negative for tremors, weakness, numbness and headaches.   Psychiatric/Behavioral: Positive for decreased concentration, dysphoric mood and sleep disturbance. Negative for suicidal ideas. The patient is not nervous/anxious.      Objective   Physical Exam   Constitutional: She is oriented to person, place, and time. No distress.   Alert and in fair spirits. No apparent distress. No pallor, jaundice, diaphoresis, or cyanosis.   HENT:   Head: Atraumatic.   Right Ear: Tympanic membrane, external ear and ear canal normal.   Left Ear: Tympanic membrane, external ear and ear canal normal.   Nose: Nose normal.   Mouth/Throat: Oropharynx is clear and moist. Mucous membranes are not pale and not cyanotic.   Eyes: EOM are normal. Pupils are equal, round, and reactive to light. No scleral icterus.   Neck: No JVD present. Carotid bruit is not present. No tracheal deviation present. No thyromegaly present.   Cardiovascular: Normal rate, regular rhythm, S1 normal, S2 normal and intact distal pulses. Exam reveals no gallop, no S3 and no S4.   No murmur heard.  Pulmonary/Chest: Breath sounds normal. No stridor. No respiratory distress.   Abdominal: Soft. Normal aorta and bowel sounds are normal. She exhibits no distension, no abdominal bruit and no mass. There is no  hepatosplenomegaly. There is no tenderness. No hernia.   Musculoskeletal: She exhibits no tenderness or deformity.     Vascular Status -  Her right foot exhibits no edema. Her left foot exhibits abnormal foot edema (trace -  primarily confined to the dorsum of the ankle and foot).  Lymphadenopathy:        Head (right side): No submandibular adenopathy present.        Head (left side): No submandibular adenopathy present.     She has no cervical adenopathy.   Neurological: She is alert and oriented to person, place, and time. She has normal reflexes. She displays normal reflexes. No cranial nerve deficit. She exhibits normal muscle tone. Coordination normal.   Skin: Skin is warm and dry. No rash noted. She is not diaphoretic. No cyanosis. No pallor. Nails show no clubbing.   Psychiatric: Her speech is normal and behavior is normal. Thought content normal. She expresses no suicidal ideation.     Assessment/Plan   Problems Addressed this Visit        Cardiovascular and Mediastinum    Chronic venous insufficiency   Reminded regarding lifestyle modification       Digestive    Class 3 severe obesity without serious comorbidity with body mass index (BMI) of 40.0 to 44.9 in adult (CMS/[facility])  Encouraged to continue to work on her diet and exercise plan.  Continue current medication    Non-alcoholic fatty liver disease    Gastroesophageal reflux disease without esophagitis       Nervous and Auditory    Mechanical low back pain  Reminded regarding symptomatic treatment.   Continue current medication    Chronic joint pain  As above.       Other    Dyslipidemia  As above.  Updated labs will be drawn at her return.    History of bariatric surgery    Depression with anxiety  Significant situational component.   Supportive therapy.  Agreed on low-dose lorazepam as needed short-term  Encouraged to report if any worse or if any new symptoms or concerns.    Relevant Medications    LORazepam (ATIVAN) 0.5 MG tablet    Healthcare  maintenance  Will arrange an updated mammogram  Hepatitis a #2 will be administered at return along with a flu shot.    Relevant Orders    Mammo Screening Digital Tomosynthesis Bilateral With CAD

## 2019-07-10 VITALS
BODY MASS INDEX: 37.98 KG/M2 | RESPIRATION RATE: 12 BRPM | DIASTOLIC BLOOD PRESSURE: 70 MMHG | HEART RATE: 72 BPM | WEIGHT: 242 LBS | HEIGHT: 67 IN | TEMPERATURE: 98.6 F | OXYGEN SATURATION: 96 % | SYSTOLIC BLOOD PRESSURE: 120 MMHG

## 2019-07-10 PROBLEM — L30.9 DERMATITIS: Status: RESOLVED | Noted: 2018-08-23 | Resolved: 2019-07-10

## 2019-07-17 ENCOUNTER — TELEPHONE (OUTPATIENT)
Dept: FAMILY MEDICINE CLINIC | Facility: CLINIC | Age: 49
End: 2019-07-17

## 2019-07-17 RX ORDER — PROCHLORPERAZINE MALEATE 5 MG/1
5 TABLET ORAL 2 TIMES DAILY
Qty: 60 TABLET | Refills: 0 | Status: SHIPPED | OUTPATIENT
Start: 2019-07-17 | End: 2019-12-16

## 2019-07-17 NOTE — TELEPHONE ENCOUNTER
Pt is aware of this information.   ----- Message from Hoang Palacios MD sent at 7/17/2019 10:08 AM EDT -----  I have sent a prescription for Compazine to her pharmacy  If no better over the next week we may need to arrange a GI assessment    ----- Message -----  From: Ariadna Little MA  Sent: 7/16/2019   3:10 PM  To: Hoang Palacios MD    Patient called and stated that she has been so sick at her stomach the last couple of weeks. She is on Prilosec but she is not sure if it has stopped working. She wanted to know what she needs to do?

## 2019-07-26 ENCOUNTER — HOSPITAL ENCOUNTER (OUTPATIENT)
Dept: MAMMOGRAPHY | Facility: HOSPITAL | Age: 49
Discharge: HOME | End: 2019-07-26
Admitting: GENERAL PRACTICE

## 2019-07-26 DIAGNOSIS — Z00.00 HEALTHCARE MAINTENANCE: ICD-10-CM

## 2019-07-26 PROCEDURE — 77067 SCR MAMMO BI INCL CAD: CPT | Performed by: RADIOLOGY

## 2019-07-26 PROCEDURE — 77067 SCR MAMMO BI INCL CAD: CPT

## 2019-07-26 PROCEDURE — 77063 BREAST TOMOSYNTHESIS BI: CPT | Performed by: RADIOLOGY

## 2019-07-26 PROCEDURE — 77063 BREAST TOMOSYNTHESIS BI: CPT

## 2019-10-18 ENCOUNTER — OFFICE VISIT (OUTPATIENT)
Dept: FAMILY MEDICINE CLINIC | Facility: CLINIC | Age: 49
End: 2019-10-18

## 2019-10-18 DIAGNOSIS — Z00.00 HEALTHCARE MAINTENANCE: ICD-10-CM

## 2019-10-18 DIAGNOSIS — K21.9 GASTROESOPHAGEAL REFLUX DISEASE WITHOUT ESOPHAGITIS: ICD-10-CM

## 2019-10-18 DIAGNOSIS — I87.2 CHRONIC VENOUS INSUFFICIENCY: Primary | ICD-10-CM

## 2019-10-18 DIAGNOSIS — E66.01 CLASS 3 SEVERE OBESITY WITHOUT SERIOUS COMORBIDITY WITH BODY MASS INDEX (BMI) OF 40.0 TO 44.9 IN ADULT, UNSPECIFIED OBESITY TYPE (HCC): ICD-10-CM

## 2019-10-18 DIAGNOSIS — E78.5 DYSLIPIDEMIA: ICD-10-CM

## 2019-10-18 DIAGNOSIS — F41.8 DEPRESSION WITH ANXIETY: ICD-10-CM

## 2019-10-18 DIAGNOSIS — K76.0 NON-ALCOHOLIC FATTY LIVER DISEASE: ICD-10-CM

## 2019-10-18 DIAGNOSIS — K59.09 CHRONIC CONSTIPATION: ICD-10-CM

## 2019-10-18 DIAGNOSIS — M25.50 CHRONIC JOINT PAIN: ICD-10-CM

## 2019-10-18 DIAGNOSIS — Z23 ENCOUNTER FOR IMMUNIZATION: ICD-10-CM

## 2019-10-18 DIAGNOSIS — G89.29 CHRONIC JOINT PAIN: ICD-10-CM

## 2019-10-18 DIAGNOSIS — Z98.84 HISTORY OF BARIATRIC SURGERY: ICD-10-CM

## 2019-10-18 DIAGNOSIS — M54.59 MECHANICAL LOW BACK PAIN: ICD-10-CM

## 2019-10-18 PROCEDURE — 90632 HEPA VACCINE ADULT IM: CPT | Performed by: GENERAL PRACTICE

## 2019-10-18 PROCEDURE — 90471 IMMUNIZATION ADMIN: CPT | Performed by: GENERAL PRACTICE

## 2019-10-18 PROCEDURE — 90715 TDAP VACCINE 7 YRS/> IM: CPT | Performed by: GENERAL PRACTICE

## 2019-10-18 PROCEDURE — 99214 OFFICE O/P EST MOD 30 MIN: CPT | Performed by: GENERAL PRACTICE

## 2019-10-18 PROCEDURE — 90472 IMMUNIZATION ADMIN EACH ADD: CPT | Performed by: GENERAL PRACTICE

## 2019-10-18 RX ORDER — HYDROXYZINE HYDROCHLORIDE 10 MG/1
10 TABLET, FILM COATED ORAL 3 TIMES DAILY
Qty: 90 TABLET | Refills: 5 | Status: SHIPPED | OUTPATIENT
Start: 2019-10-18 | End: 2020-07-17 | Stop reason: SDUPTHER

## 2019-10-18 RX ORDER — DOCUSATE SODIUM 100 MG/1
100 CAPSULE, LIQUID FILLED ORAL 2 TIMES DAILY PRN
Qty: 60 CAPSULE | Refills: 5 | Status: SHIPPED | OUTPATIENT
Start: 2019-10-18 | End: 2020-07-17 | Stop reason: SDUPTHER

## 2019-10-18 NOTE — PROGRESS NOTES
"Subjective   Susie Rangel is a 49 y.o. female.     History of Present Illness     Depression  Her  has been doing somewhat better since last here.  With this she has noted a significant improvement in her nervousness, appetite, and sleep. She denies any depression, loss of interest in activities, or suicidal ideation.  She has come to the conclusion that she cannot \"fix him\" and is determined to work on her own emotional and physical health    Obesity  Post bariatric surgery. Unable to tolerate phentiramine and did not respond to topiramate.  She remains on contrave and feels that it has worked well with no apparent side effects thus far. Trying to follow a low calorie diet and increase her level of activity.    Low Back Pain  There has been no change in the quality or severity of her back pain nor any new associated symptoms. This does not radiate at present and she denies any changes in her strength, sensation, or bowel/bladder control.  There is no history of any fever, chills, or night sweats    Dyslipidemia  Compliance with treatment has been fair.  She has had no recent labs    Left Lower Extremity Edema  She continues to have intermittent mild swelling about her left foot.  This has been unassociated with any other symptoms and she denies any pain or discoloration.  The swelling tends to develop toward the end of the day and improves overnight.    The following portions of the patient's history were reviewed and updated as appropriate: allergies, current medications, past medical history, past social history and problem list.    Review of Systems   Constitutional: Positive for fatigue. Negative for appetite change, chills, fever and unexpected weight change.   HENT: Negative for congestion, ear pain, rhinorrhea, sneezing, sore throat and voice change.    Eyes: Negative for visual disturbance.   Respiratory: Negative for cough, shortness of breath and wheezing.    Cardiovascular: Positive for leg " swelling (left). Negative for chest pain and palpitations.   Gastrointestinal: Positive for constipation (chronic-intermittent). Negative for abdominal pain, blood in stool, diarrhea, nausea and vomiting.   Endocrine: Negative for polydipsia.        Intermittent hot flashes   Genitourinary: Negative for difficulty urinating, dysuria, frequency, hematuria, menstrual problem, pelvic pain, urgency, vaginal bleeding and vaginal discharge.   Musculoskeletal: Positive for back pain. Negative for arthralgias, joint swelling, myalgias and neck pain.   Skin: Negative for color change.        Hair loss   Neurological: Negative for tremors, weakness, numbness and headaches.   Psychiatric/Behavioral: Negative for decreased concentration, dysphoric mood, sleep disturbance and suicidal ideas. The patient is not nervous/anxious.      Objective   Physical Exam   Constitutional: She is oriented to person, place, and time. No distress.   Bright and in good spirits. No apparent distress. No pallor, jaundice, diaphoresis, or cyanosis.   HENT:   Head: Atraumatic.   Right Ear: Tympanic membrane, external ear and ear canal normal.   Left Ear: Tympanic membrane, external ear and ear canal normal.   Nose: Nose normal.   Mouth/Throat: Oropharynx is clear and moist. Mucous membranes are not pale and not cyanotic.   Eyes: EOM are normal. Pupils are equal, round, and reactive to light. No scleral icterus.   Neck: No JVD present. Carotid bruit is not present. No tracheal deviation present. No thyromegaly present.   Cardiovascular: Normal rate, regular rhythm, S1 normal, S2 normal and intact distal pulses. Exam reveals no gallop, no S3 and no S4.   No murmur heard.  Pulmonary/Chest: Breath sounds normal. No stridor. No respiratory distress.   Abdominal: Soft. Normal aorta and bowel sounds are normal. She exhibits no distension, no abdominal bruit and no mass. There is no hepatosplenomegaly. There is no tenderness. No hernia.   Musculoskeletal:  She exhibits no tenderness or deformity.     Vascular Status -  Her right foot exhibits no edema. Her left foot exhibits abnormal foot edema (trace -  primarily confined to the dorsum of the ankle and foot).  Lymphadenopathy:        Head (right side): No submandibular adenopathy present.        Head (left side): No submandibular adenopathy present.     She has no cervical adenopathy.   Neurological: She is alert and oriented to person, place, and time. She has normal reflexes. She displays normal reflexes. No cranial nerve deficit. She exhibits normal muscle tone. Coordination normal.   Skin: Skin is warm and dry. No rash noted. She is not diaphoretic. No cyanosis. No pallor. Nails show no clubbing.   Psychiatric: Her speech is normal and behavior is normal. Thought content normal. She expresses no suicidal ideation.     Assessment/Plan   Problems Addressed this Visit        Cardiovascular and Mediastinum    Chronic venous insufficiency   Reminded regarding lifestyle modification       Digestive    Class 3 severe obesity without serious comorbidity with body mass index (BMI) of 40.0 to 44.9 in adult (CMS/[facility])  Encouraged to continue to work on her diet and exercise plan.  Continue current medication    Relevant Medications    naltrexone-bupropion ER (CONTRAVE) 8-90 MG tablet    Non-alcoholic fatty liver disease    Gastroesophageal reflux disease without esophagitis    Chronic constipation  Reminded regarding dietary modification  Continue current medication    Relevant Medications    docusate sodium (COLACE) 100 MG capsule    Other Relevant Orders    CBC & Differential    TSH       Nervous and Auditory    Mechanical low back pain    Chronic joint pain       Other    Dyslipidemia  As above.  Scheduled for updated labs    Relevant Orders    Comprehensive Metabolic Panel    Lipid Panel    History of bariatric surgery    Relevant Orders    Vitamin D 25 Hydroxy    Vitamin B12    Depression with anxiety  Significant  situational component.   Supportive therapy.   Continue current medication.    Relevant Medications    hydrOXYzine (ATARAX) 10 MG tablet    naltrexone-bupropion ER (CONTRAVE) 8-90 MG tablet    Healthcare maintenance  Patient will obtain a flu shot through work  Recommended hepatitis A #2 along with an updated Tdap    Relevant Orders    Hepatitis A Vaccine Adult IM (Completed)    Tdap Vaccine Greater Than or Equal To 8yo IM (Completed)    Vitamin D 25 Hydroxy    Vitamin B12

## 2019-10-19 VITALS
WEIGHT: 240 LBS | OXYGEN SATURATION: 97 % | HEART RATE: 93 BPM | TEMPERATURE: 97.1 F | HEIGHT: 67 IN | BODY MASS INDEX: 37.67 KG/M2 | SYSTOLIC BLOOD PRESSURE: 120 MMHG | RESPIRATION RATE: 12 BRPM | DIASTOLIC BLOOD PRESSURE: 80 MMHG

## 2019-10-19 PROBLEM — J45.909 REACTIVE AIRWAY DISEASE WITHOUT COMPLICATION: Status: RESOLVED | Noted: 2019-04-19 | Resolved: 2019-10-19

## 2019-10-25 ENCOUNTER — LAB (OUTPATIENT)
Dept: FAMILY MEDICINE CLINIC | Facility: CLINIC | Age: 49
End: 2019-10-25

## 2019-10-25 DIAGNOSIS — E78.5 DYSLIPIDEMIA: ICD-10-CM

## 2019-10-25 DIAGNOSIS — K59.09 CHRONIC CONSTIPATION: ICD-10-CM

## 2019-10-25 DIAGNOSIS — Z98.84 HISTORY OF BARIATRIC SURGERY: ICD-10-CM

## 2019-10-25 DIAGNOSIS — Z00.00 HEALTHCARE MAINTENANCE: ICD-10-CM

## 2019-10-25 LAB
25(OH)D3 SERPL-MCNC: 41.8 NG/ML (ref 30–100)
ALBUMIN SERPL-MCNC: 4.3 G/DL (ref 3.5–5.2)
ALBUMIN/GLOB SERPL: 1.2 G/DL
ALP SERPL-CCNC: 113 U/L (ref 39–117)
ALT SERPL W P-5'-P-CCNC: 17 U/L (ref 1–33)
ANION GAP SERPL CALCULATED.3IONS-SCNC: 6.5 MMOL/L (ref 5–15)
AST SERPL-CCNC: 16 U/L (ref 1–32)
BASOPHILS # BLD AUTO: 0.05 10*3/MM3 (ref 0–0.2)
BASOPHILS NFR BLD AUTO: 0.8 % (ref 0–1.5)
BILIRUB SERPL-MCNC: 0.3 MG/DL (ref 0.2–1.2)
BUN BLD-MCNC: 10 MG/DL (ref 6–20)
BUN/CREAT SERPL: 10.1 (ref 7–25)
CALCIUM SPEC-SCNC: 9.7 MG/DL (ref 8.6–10.5)
CHLORIDE SERPL-SCNC: 104 MMOL/L (ref 98–107)
CHOLEST SERPL-MCNC: 249 MG/DL (ref 0–200)
CO2 SERPL-SCNC: 32.5 MMOL/L (ref 22–29)
CREAT BLD-MCNC: 0.99 MG/DL (ref 0.57–1)
DEPRECATED RDW RBC AUTO: 40.4 FL (ref 37–54)
EOSINOPHIL # BLD AUTO: 0.12 10*3/MM3 (ref 0–0.4)
EOSINOPHIL NFR BLD AUTO: 1.9 % (ref 0.3–6.2)
ERYTHROCYTE [DISTWIDTH] IN BLOOD BY AUTOMATED COUNT: 12.3 % (ref 12.3–15.4)
GFR SERPL CREATININE-BSD FRML MDRD: 73 ML/MIN/1.73
GLOBULIN UR ELPH-MCNC: 3.5 GM/DL
GLUCOSE BLD-MCNC: 79 MG/DL (ref 65–99)
HCT VFR BLD AUTO: 42.8 % (ref 34–46.6)
HDLC SERPL-MCNC: 64 MG/DL (ref 40–60)
HGB BLD-MCNC: 15.4 G/DL (ref 12–15.9)
IMM GRANULOCYTES # BLD AUTO: 0.01 10*3/MM3 (ref 0–0.05)
IMM GRANULOCYTES NFR BLD AUTO: 0.2 % (ref 0–0.5)
LDLC SERPL CALC-MCNC: 174 MG/DL (ref 0–100)
LDLC/HDLC SERPL: 2.73 {RATIO}
LYMPHOCYTES # BLD AUTO: 2.22 10*3/MM3 (ref 0.7–3.1)
LYMPHOCYTES NFR BLD AUTO: 35.7 % (ref 19.6–45.3)
MCH RBC QN AUTO: 32.5 PG (ref 26.6–33)
MCHC RBC AUTO-ENTMCNC: 36 G/DL (ref 31.5–35.7)
MCV RBC AUTO: 90.3 FL (ref 79–97)
MONOCYTES # BLD AUTO: 0.49 10*3/MM3 (ref 0.1–0.9)
MONOCYTES NFR BLD AUTO: 7.9 % (ref 5–12)
NEUTROPHILS # BLD AUTO: 3.32 10*3/MM3 (ref 1.7–7)
NEUTROPHILS NFR BLD AUTO: 53.5 % (ref 42.7–76)
NRBC BLD AUTO-RTO: 0 /100 WBC (ref 0–0.2)
PLATELET # BLD AUTO: 332 10*3/MM3 (ref 140–450)
PMV BLD AUTO: 9.7 FL (ref 6–12)
POTASSIUM BLD-SCNC: 4.4 MMOL/L (ref 3.5–5.2)
PROT SERPL-MCNC: 7.8 G/DL (ref 6–8.5)
RBC # BLD AUTO: 4.74 10*6/MM3 (ref 3.77–5.28)
SODIUM BLD-SCNC: 143 MMOL/L (ref 136–145)
TRIGL SERPL-MCNC: 53 MG/DL (ref 0–150)
TSH SERPL DL<=0.05 MIU/L-ACNC: 0.36 UIU/ML (ref 0.27–4.2)
VIT B12 BLD-MCNC: 571 PG/ML (ref 211–946)
VLDLC SERPL-MCNC: 10.6 MG/DL (ref 5–40)
WBC NRBC COR # BLD: 6.21 10*3/MM3 (ref 3.4–10.8)

## 2019-10-25 PROCEDURE — 82306 VITAMIN D 25 HYDROXY: CPT | Performed by: GENERAL PRACTICE

## 2019-10-25 PROCEDURE — 80061 LIPID PANEL: CPT | Performed by: GENERAL PRACTICE

## 2019-10-25 PROCEDURE — 80053 COMPREHEN METABOLIC PANEL: CPT | Performed by: GENERAL PRACTICE

## 2019-10-25 PROCEDURE — 85025 COMPLETE CBC W/AUTO DIFF WBC: CPT | Performed by: GENERAL PRACTICE

## 2019-10-25 PROCEDURE — 82607 VITAMIN B-12: CPT | Performed by: GENERAL PRACTICE

## 2019-10-25 PROCEDURE — 84443 ASSAY THYROID STIM HORMONE: CPT | Performed by: GENERAL PRACTICE

## 2019-11-23 ENCOUNTER — TELEPHONE (OUTPATIENT)
Dept: FAMILY MEDICINE CLINIC | Facility: CLINIC | Age: 49
End: 2019-11-23

## 2019-11-23 RX ORDER — LIDOCAINE 50 MG/G
3 PATCH TOPICAL EVERY 24 HOURS
Qty: 90 PATCH | Refills: 5 | Status: SHIPPED | OUTPATIENT
Start: 2019-11-23 | End: 2019-12-16

## 2019-11-23 NOTE — TELEPHONE ENCOUNTER
----- Message from Hoang Palacios MD sent at 11/23/2019 11:16 AM EST -----  Emailed prescription for lidocaine patches to [facility]  ----- Message -----  From: Ariadna Little MA  Sent: 11/23/2019  10:59 AM  To: Hoang Palacios MD    Patient called in requesting a prescription for lidocaine patches or some type of cream for her back pain.

## 2019-12-05 DIAGNOSIS — E66.01 CLASS 3 SEVERE OBESITY WITHOUT SERIOUS COMORBIDITY WITH BODY MASS INDEX (BMI) OF 40.0 TO 44.9 IN ADULT, UNSPECIFIED OBESITY TYPE (HCC): ICD-10-CM

## 2019-12-05 RX ORDER — NALTREXONE HYDROCHLORIDE AND BUPROPION HYDROCHLORIDE 8; 90 MG/1; MG/1
TABLET, EXTENDED RELEASE ORAL
Qty: 120 TABLET | Refills: 5 | Status: SHIPPED | OUTPATIENT
Start: 2019-12-05 | End: 2020-07-17 | Stop reason: SDUPTHER

## 2019-12-07 ENCOUNTER — OFFICE VISIT (OUTPATIENT)
Dept: FAMILY MEDICINE CLINIC | Facility: CLINIC | Age: 49
End: 2019-12-07

## 2019-12-07 VITALS
SYSTOLIC BLOOD PRESSURE: 124 MMHG | RESPIRATION RATE: 14 BRPM | DIASTOLIC BLOOD PRESSURE: 74 MMHG | HEIGHT: 67 IN | OXYGEN SATURATION: 96 % | BODY MASS INDEX: 37.83 KG/M2 | HEART RATE: 79 BPM | TEMPERATURE: 97.5 F | WEIGHT: 241 LBS

## 2019-12-07 DIAGNOSIS — M54.50 LEFT-SIDED LOW BACK PAIN WITHOUT SCIATICA, UNSPECIFIED CHRONICITY: Primary | ICD-10-CM

## 2019-12-07 LAB
BILIRUB BLD-MCNC: ABNORMAL MG/DL
CLARITY, POC: CLEAR
COLOR UR: YELLOW
GLUCOSE UR STRIP-MCNC: NEGATIVE MG/DL
KETONES UR QL: NEGATIVE
LEUKOCYTE EST, POC: NEGATIVE
NITRITE UR-MCNC: NEGATIVE MG/ML
PH UR: 6 [PH] (ref 5–8)
PROT UR STRIP-MCNC: NEGATIVE MG/DL
RBC # UR STRIP: NEGATIVE /UL
SP GR UR: 1.03 (ref 1–1.03)
UROBILINOGEN UR QL: NORMAL

## 2019-12-07 PROCEDURE — 96372 THER/PROPH/DIAG INJ SC/IM: CPT | Performed by: NURSE PRACTITIONER

## 2019-12-07 PROCEDURE — 81003 URINALYSIS AUTO W/O SCOPE: CPT | Performed by: NURSE PRACTITIONER

## 2019-12-07 PROCEDURE — 99213 OFFICE O/P EST LOW 20 MIN: CPT | Performed by: NURSE PRACTITIONER

## 2019-12-07 RX ORDER — METHYLPREDNISOLONE ACETATE 80 MG/ML
80 INJECTION, SUSPENSION INTRA-ARTICULAR; INTRALESIONAL; INTRAMUSCULAR; SOFT TISSUE ONCE
Status: COMPLETED | OUTPATIENT
Start: 2019-12-07 | End: 2019-12-07

## 2019-12-07 RX ORDER — KETOROLAC TROMETHAMINE 30 MG/ML
30 INJECTION, SOLUTION INTRAMUSCULAR; INTRAVENOUS EVERY 6 HOURS PRN
Status: DISCONTINUED | OUTPATIENT
Start: 2019-12-07 | End: 2019-12-07

## 2019-12-07 RX ORDER — KETOROLAC TROMETHAMINE 30 MG/ML
30 INJECTION, SOLUTION INTRAMUSCULAR; INTRAVENOUS ONCE
Status: COMPLETED | OUTPATIENT
Start: 2019-12-07 | End: 2019-12-07

## 2019-12-07 RX ORDER — MELOXICAM 7.5 MG/1
TABLET ORAL
Qty: 30 TABLET | Refills: 2 | Status: SHIPPED | OUTPATIENT
Start: 2019-12-07 | End: 2020-07-17 | Stop reason: SDUPTHER

## 2019-12-07 RX ADMIN — KETOROLAC TROMETHAMINE 30 MG: 30 INJECTION, SOLUTION INTRAMUSCULAR; INTRAVENOUS at 14:22

## 2019-12-07 RX ADMIN — METHYLPREDNISOLONE ACETATE 80 MG: 80 INJECTION, SUSPENSION INTRA-ARTICULAR; INTRALESIONAL; INTRAMUSCULAR; SOFT TISSUE at 14:21

## 2019-12-07 NOTE — PROGRESS NOTES
"  Susie Rangel is a 49 y.o. female who  presents to the clinic today c/o back pain which started years ago and has worsened for the past two to three weeks. Associated symptoms include pain in the lower lumbar region which is described \"like a toothache\". It is worse during the day and aggravated by sitting. She has tried muscle relaxants and Aleve without adequate relief. She has been more active lately especially around the house. She has had UTIs previously and is concerned she has another one.     Back Pain   The current episode started 1 to 4 weeks ago. The problem has been gradually worsening since onset. The pain is present in the lumbar spine. The quality of the pain is described as aching. The pain does not radiate. Pain scale: 7-9. The pain is worse during the day. The symptoms are aggravated by sitting. Associated symptoms include weakness. Pertinent negatives include no bladder incontinence, bowel incontinence, dysuria, fever or perianal numbness. She has tried muscle relaxant and NSAIDs for the symptoms. The treatment provided mild relief.    Refer to ROS for additional information.    The following portions of the patient's history were reviewed and updated as appropriate: allergies, current medications, past family history, past medical history, past social history, past surgical history and problem list.    Current Outpatient Medications:   •  albuterol sulfate  (90 Base) MCG/ACT inhaler, Inhale 2 puffs Every 4 (Four) Hours As Needed for Shortness of Air., Disp: 1 inhaler, Rfl: 0  •  CONTRAVE 8-90 MG tablet, TAKE 2 TABLETS BY MOUTH TWICE DAILY, Disp: 120 tablet, Rfl: 5  •  cyclobenzaprine (FLEXERIL) 10 MG tablet, Take 1 tablet by mouth 3 (Three) Times a Day As Needed for Muscle Spasms., Disp: 30 tablet, Rfl: 0  •  docusate sodium (COLACE) 100 MG capsule, Take 1 capsule by mouth 2 (Two) Times a Day As Needed for Constipation., Disp: 60 capsule, Rfl: 5  •  fluticasone (FLONASE) 50 MCG/ACT nasal " "spray, Administer 2 sprays both nostrils once daily, Disp: 1 bottle, Rfl: 5  •  fluticasone (FLOVENT HFA) 220 MCG/ACT inhaler, Inhale 2 puffs 2 (Two) Times a Day., Disp: 12 g, Rfl: 0  •  hydrOXYzine (ATARAX) 10 MG tablet, Take 1 tablet by mouth 3 (Three) Times a Day., Disp: 90 tablet, Rfl: 5  •  lidocaine (LIDODERM) 5 %, Place 3 patches on the skin as directed by provider Daily. Remove & Discard patch within 12 hours or as directed by MD, Disp: 90 patch, Rfl: 5  •  loratadine (CLARITIN) 10 MG tablet, Take 1 tablet by mouth Daily As Needed for Allergies., Disp: 30 tablet, Rfl: 5  •  LORazepam (ATIVAN) 0.5 MG tablet, Take 1 tablet by mouth Every 8 (Eight) Hours As Needed for Anxiety., Disp: 20 tablet, Rfl: 0  •  omeprazole (priLOSEC) 40 MG capsule, Take 1 capsule by mouth 2 (Two) Times a Day., Disp: 60 capsule, Rfl: 5  •  prochlorperazine (COMPAZINE) 5 MG tablet, Take 1 tablet by mouth 2 (Two) Times a Day., Disp: 60 tablet, Rfl: 0  •  meloxicam (MOBIC) 7.5 MG tablet, 1 to 2 tablets as needed daily for moderate pain, Disp: 30 tablet, Rfl: 2  No current facility-administered medications for this visit.     No Known Allergies    Review of Systems   Constitutional: Positive for activity change and fatigue. Negative for appetite change, diaphoresis and fever.   Eyes: Negative for discharge and visual disturbance.   Gastrointestinal: Negative for bowel incontinence.   Genitourinary: Negative for bladder incontinence, decreased urine volume, difficulty urinating, dysuria, flank pain, frequency and hematuria.   Musculoskeletal: Positive for back pain. Negative for gait problem.   Skin: Negative for color change and rash.   Neurological: Positive for weakness.   Hematological: Negative for adenopathy.     Visit Vitals  /74 (BP Location: Left arm, Patient Position: Sitting, Cuff Size: Adult)   Pulse 79   Temp 97.5 °F (36.4 °C) (Temporal)   Resp 14   Ht 170.2 cm (67\")   Wt 109 kg (241 lb)   SpO2 96%   BMI 37.75 kg/m² "     Physical Exam   Constitutional: She is oriented to person, place, and time. She appears well-developed and well-nourished. No distress.   HENT:   Head: Normocephalic.   Right Ear: Tympanic membrane and ear canal normal.   Left Ear: Tympanic membrane and ear canal normal.   Nose: Nose normal.   Mouth/Throat: Oropharynx is clear and moist and mucous membranes are normal. No oropharyngeal exudate.   Eyes: Pupils are equal, round, and reactive to light. Conjunctivae are normal. Right eye exhibits no discharge. Left eye exhibits no discharge. No scleral icterus.   Neck: Neck supple.   Cardiovascular: Normal rate, regular rhythm and normal heart sounds. Exam reveals no friction rub.   No murmur heard.  Pulmonary/Chest: Effort normal and breath sounds normal. No respiratory distress. She has no decreased breath sounds. She has no wheezes. She has no rhonchi. She has no rales.   Musculoskeletal: She exhibits tenderness. She exhibits no edema.   Lymphadenopathy:     She has no cervical adenopathy.   Neurological: She is alert and oriented to person, place, and time.   Skin: Skin is warm and dry. Capillary refill takes less than 2 seconds. No rash noted. No erythema.   Psychiatric: She has a normal mood and affect. Her speech is normal and behavior is normal. Judgment and thought content normal.   Nursing note and vitals reviewed.      Lab Results (last 24 hours)     Procedure Component Value Units Date/Time    POC Urinalysis Dipstick, Automated [[id number]]  (Abnormal) Collected:  12/07/19 1347    Specimen:  Urine Updated:  12/07/19 1348     Color Yellow     Clarity, UA Clear     Specific Gravity  1.030     pH, Urine 6.0     Leukocytes Negative     Nitrite, UA Negative     Protein, POC Negative mg/dL      Glucose, UA Negative mg/dL      Ketones, UA Negative     Urobilinogen, UA Normal     Bilirubin Small (1+)     Blood, UA Negative        Assessment/Plan   Diagnoses and all orders for this visit:    Left-sided low back  pain without sciatica, unspecified chronicity  -     POC Urinalysis Dipstick, Automated  -     methylPREDNISolone acetate (DEPO-medrol) injection 80 mg  -     Discontinue: ketorolac (TORADOL) injection 30 mg  -     meloxicam (MOBIC) 7.5 MG tablet; 1 to 2 tablets as needed daily for moderate pain  -     ketorolac (TORADOL) injection 30 mg    Findings and recommendations discussed with Susie. Reviewed her Urinalysis results with her. Treatment options reviewed. Counseled regarding supportive care measures. Encouraged her to seek further medical evaluation if symptoms worsen or do not improve within 48-72 hours.       This document has been electronically signed by HANNA Mcfadden, STEPHANIE-BC, ANH  December 7, 2019 2:46 PM

## 2019-12-07 NOTE — PATIENT INSTRUCTIONS
Chronic Back Pain  When back pain lasts longer than 3 months, it is called chronic back pain. Pain may get worse at certain times (flare-ups). There are things you can do at home to manage your pain.  Follow these instructions at home:  Activity         · Avoid bending and other activities that make pain worse.  · When standing:  ? Keep your upper back and neck straight.  ? Keep your shoulders pulled back.  ? Avoid slouching.  · When sitting:  ? Keep your back straight.  ? Relax your shoulders. Do not round your shoulders or pull them backward.  · Do not sit or  one place for long periods of time.  · Take short rest breaks during the day. Lying down or standing is usually better than sitting. Resting can help relieve pain.  · When sitting or lying down for a long time, do some mild activity or stretching. This will help to prevent stiffness and pain.  · Get regular exercise. Ask your doctor what activities are safe for you.  · Do not lift anything that is heavier than 10 lb (4.5 kg). To prevent injury when you lift things:  ? Bend your knees.  ? Keep the weight close to your body.  ? Avoid twisting.  Managing pain  · If told, put ice on the painful area. Your doctor may tell you to use ice for 24-48 hours after a flare-up starts.  ? Put ice in a plastic bag.  ? Place a towel between your skin and the bag.  ? Leave the ice on for 20 minutes, 2-3 times a day.  · If told, put heat on the painful area as often as told by your doctor. Use the heat source that your doctor recommends, such as a moist heat pack or a heating pad.  ? Place a towel between your skin and the heat source.  ? Leave the heat on for 20-30 minutes.  ? Remove the heat if your skin turns bright red. This is especially important if you are unable to feel pain, heat, or cold. You may have a greater risk of getting burned.  · Soak in a warm bath. This can help relieve pain.  · Take over-the-counter and prescription medicines only as told by your  "doctor.  General instructions  · Sleep on a firm mattress. Try lying on your side with your knees slightly bent. If you lie on your back, put a pillow under your knees.  · Keep all follow-up visits as told by your doctor. This is important.  Contact a doctor if:  · You have pain that does not get better with rest or medicine.  Get help right away if:  · One or both of your arms or legs feel weak.  · One or both of your arms or legs lose feeling (numbness).  · You have trouble controlling when you poop (bowel movement) or pee (urinate).  · You feel sick to your stomach (nauseous).  · You throw up (vomit).  · You have belly (abdominal) pain.  · You have shortness of breath.  · You pass out (faint).  Summary  · When back pain lasts longer than 3 months, it is called chronic back pain.  · Pain may get worse at certain times (flare-ups).  · Use ice and heat as told by your doctor. Your doctor may tell you to use ice after flare-ups.  This information is not intended to replace advice given to you by your health care provider. Make sure you discuss any questions you have with your health care provider.  Document Released: 06/05/2009 Document Revised: 08/02/2018 Document Reviewed: 08/02/2018  [employer] Interactive Patient Education © 2019 [employer]    DASH Eating Plan  DASH stands for \"Dietary Approaches to Stop Hypertension.\" The DASH eating plan is a healthy eating plan that has been shown to reduce high blood pressure (hypertension). It may also reduce your risk for type 2 diabetes, heart disease, and stroke. The DASH eating plan may also help with weight loss.  What are tips for following this plan?    General guidelines  Avoid eating more than 2,300 mg (milligrams) of salt (sodium) a day. If you have hypertension, you may need to reduce your sodium intake to 1,500 mg a day.  Limit alcohol intake to no more than 1 drink a day for nonpregnant women and 2 drinks a day for men. One drink equals 12 oz of beer, 5 oz of " "wine, or 1½ oz of hard liquor.  Work with your health care provider to maintain a healthy body weight or to lose weight. Ask what an ideal weight is for you.  Get at least 30 minutes of exercise that causes your heart to beat faster (aerobic exercise) most days of the week. Activities may include walking, swimming, or biking.  Work with your health care provider or diet and nutrition specialist (dietitian) to adjust your eating plan to your individual calorie needs.  Reading food labels    Check food labels for the amount of sodium per serving. Choose foods with less than 5 percent of the Daily Value of sodium. Generally, foods with less than 300 mg of sodium per serving fit into this eating plan.  To find whole grains, look for the word \"whole\" as the first word in the ingredient list.  Shopping  Buy products labeled as \"low-sodium\" or \"no salt added.\"  Buy fresh foods. Avoid canned foods and premade or frozen meals.  Cooking  Avoid adding salt when cooking. Use salt-free seasonings or herbs instead of table salt or sea salt. Check with your health care provider or pharmacist before using salt substitutes.  Do not dinero foods. Cook foods using healthy methods such as baking, boiling, grilling, and broiling instead.  Cook with heart-healthy oils, such as olive, canola, soybean, or sunflower oil.  Meal planning  Eat a balanced diet that includes:  5 or more servings of fruits and vegetables each day. At each meal, try to fill half of your plate with fruits and vegetables.  Up to 6-8 servings of whole grains each day.  Less than 6 oz of lean meat, poultry, or fish each day. A 3-oz serving of meat is about the same size as a deck of cards. One egg equals 1 oz.  2 servings of low-fat dairy each day.  A serving of nuts, seeds, or beans 5 times each week.  Heart-healthy fats. Healthy fats called Omega-3 fatty acids are found in foods such as flaxseeds and coldwater fish, like sardines, salmon, and mackerel.  Limit how much " you eat of the following:  Canned or prepackaged foods.  Food that is high in trans fat, such as fried foods.  Food that is high in saturated fat, such as fatty meat.  Sweets, desserts, sugary drinks, and other foods with added sugar.  Full-fat dairy products.  Do not salt foods before eating.  Try to eat at least 2 vegetarian meals each week.  Eat more home-cooked food and less restaurant, buffet, and fast food.  When eating at a restaurant, ask that your food be prepared with less salt or no salt, if possible.  What foods are recommended?  The items listed may not be a complete list. Talk with your dietitian about what dietary choices are best for you.  Grains  Whole-grain or whole-wheat bread. Whole-grain or whole-wheat pasta. Brown rice. Oatmeal. Quinoa. Bulgur. Whole-grain and low-sodium cereals. Evy bread. Low-fat, low-sodium crackers. Whole-wheat flour tortillas.  Vegetables  Fresh or frozen vegetables (raw, steamed, roasted, or grilled). Low-sodium or reduced-sodium tomato and vegetable juice. Low-sodium or reduced-sodium tomato sauce and tomato paste. Low-sodium or reduced-sodium canned vegetables.  Fruits  All fresh, dried, or frozen fruit. Canned fruit in natural juice (without added sugar).  Meat and other protein foods  Skinless chicken or turkey. Ground chicken or turkey. Pork with fat trimmed off. Fish and seafood. Egg whites. Dried beans, peas, or lentils. Unsalted nuts, nut butters, and seeds. Unsalted canned beans. Lean cuts of beef with fat trimmed off. Low-sodium, lean deli meat.  Dairy  Low-fat (1%) or fat-free (skim) milk. Fat-free, low-fat, or reduced-fat cheeses. Nonfat, low-sodium ricotta or cottage cheese. Low-fat or nonfat yogurt. Low-fat, low-sodium cheese.  Fats and oils  Soft margarine without trans fats. Vegetable oil. Low-fat, reduced-fat, or light mayonnaise and salad dressings (reduced-sodium). Canola, safflower, olive, soybean, and sunflower oils. Avocado.  Seasoning and other  foods  Herbs. Spices. Seasoning mixes without salt. Unsalted popcorn and pretzels. Fat-free sweets.  What foods are not recommended?  The items listed may not be a complete list. Talk with your dietitian about what dietary choices are best for you.  Grains  Baked goods made with fat, such as croissants, muffins, or some breads. Dry pasta or rice meal packs.  Vegetables  Creamed or fried vegetables. Vegetables in a cheese sauce. Regular canned vegetables (not low-sodium or reduced-sodium). Regular canned tomato sauce and paste (not low-sodium or reduced-sodium). Regular tomato and vegetable juice (not low-sodium or reduced-sodium). Pickles. Olives.  Fruits  Canned fruit in a light or heavy syrup. Fried fruit. Fruit in cream or butter sauce.  Meat and other protein foods  Fatty cuts of meat. Ribs. Fried meat. Martini. Sausage. Bologna and other processed lunch meats. Salami. Fatback. Hotdogs. Bratwurst. Salted nuts and seeds. Canned beans with added salt. Canned or smoked fish. Whole eggs or egg yolks. Chicken or turkey with skin.  Dairy  Whole or 2% milk, cream, and half-and-half. Whole or full-fat cream cheese. Whole-fat or sweetened yogurt. Full-fat cheese. Nondairy creamers. Whipped toppings. Processed cheese and cheese spreads.  Fats and oils  Butter. Stick margarine. Lard. Shortening. Ghee. Martini fat. Tropical oils, such as coconut, palm kernel, or palm oil.  Seasoning and other foods  Salted popcorn and pretzels. Onion salt, garlic salt, seasoned salt, table salt, and sea salt. Worcestershire sauce. Tartar sauce. Barbecue sauce. Teriyaki sauce. Soy sauce, including reduced-sodium. Steak sauce. Canned and packaged gravies. Fish sauce. Oyster sauce. Cocktail sauce. Horseradish that you find on the shelf. Ketchup. Mustard. Meat flavorings and tenderizers. Bouillon cubes. Hot sauce and Tabasco sauce. Premade or packaged marinades. Premade or packaged taco seasonings. Relishes. Regular salad dressings.  Where to find  more information:  National Heart, Lung, and Blood [place]: www.nhlbi.nih.gov  American Heart Association: www.heart.org  Summary  The DASH eating plan is a healthy eating plan that has been shown to reduce high blood pressure (hypertension). It may also reduce your risk for type 2 diabetes, heart disease, and stroke.  With the DASH eating plan, you should limit salt (sodium) intake to 2,300 mg a day. If you have hypertension, you may need to reduce your sodium intake to 1,500 mg a day.  When on the DASH eating plan, aim to eat more fresh fruits and vegetables, whole grains, lean proteins, low-fat dairy, and heart-healthy fats.  Work with your health care provider or diet and nutrition specialist (dietitian) to adjust your eating plan to your individual calorie needs.  This information is not intended to replace advice given to you by your health care provider. Make sure you discuss any questions you have with your health care provider.  Document Released: 12/06/2012 Document Revised: 12/11/2017 Document Reviewed: 12/11/2017  [employer] Interactive Patient Education © 2019 [employer]

## 2019-12-16 ENCOUNTER — OFFICE VISIT (OUTPATIENT)
Dept: FAMILY MEDICINE CLINIC | Facility: CLINIC | Age: 49
End: 2019-12-16

## 2019-12-16 VITALS
TEMPERATURE: 99.9 F | OXYGEN SATURATION: 96 % | DIASTOLIC BLOOD PRESSURE: 80 MMHG | WEIGHT: 242 LBS | SYSTOLIC BLOOD PRESSURE: 130 MMHG | BODY MASS INDEX: 37.98 KG/M2 | HEIGHT: 67 IN | HEART RATE: 115 BPM

## 2019-12-16 DIAGNOSIS — R11.2 NON-INTRACTABLE VOMITING WITH NAUSEA, UNSPECIFIED VOMITING TYPE: ICD-10-CM

## 2019-12-16 DIAGNOSIS — J02.9 PHARYNGITIS, UNSPECIFIED ETIOLOGY: Primary | ICD-10-CM

## 2019-12-16 LAB
EXPIRATION DATE: NORMAL
FLUAV AG NPH QL: NEGATIVE
FLUBV AG NPH QL: NEGATIVE
INTERNAL CONTROL: NORMAL
Lab: NORMAL

## 2019-12-16 PROCEDURE — 87804 INFLUENZA ASSAY W/OPTIC: CPT | Performed by: PHYSICIAN ASSISTANT

## 2019-12-16 PROCEDURE — 99213 OFFICE O/P EST LOW 20 MIN: CPT | Performed by: PHYSICIAN ASSISTANT

## 2019-12-16 RX ORDER — PROMETHAZINE HYDROCHLORIDE 25 MG/1
25 TABLET ORAL EVERY 6 HOURS PRN
Qty: 40 TABLET | Refills: 0 | Status: SHIPPED | OUTPATIENT
Start: 2019-12-16 | End: 2020-07-17

## 2019-12-16 RX ORDER — CEFDINIR 300 MG/1
600 CAPSULE ORAL DAILY
Qty: 20 CAPSULE | Refills: 0 | Status: SHIPPED | OUTPATIENT
Start: 2019-12-16 | End: 2019-12-26

## 2019-12-16 NOTE — PROGRESS NOTES
"Subjective   Susie Rangel is a 49 y.o. female.       Chief Complaint -sore throat    History of Present Illness -       Sore throat-  She complains of sore throat fever nausea and dry cough that began yesterday.  Some relief with Phenergan last night.  She states she has been exposed to strep throat in several family members.    The following portions of the patient's history were reviewed and updated as appropriate: allergies, current medications, past family history, past medical history, past social history, past surgical history and problem list.    Review of Systems   Constitutional: Positive for activity change, appetite change, chills, fatigue and fever.   HENT: Positive for sore throat. Negative for ear pain and sinus pressure.    Eyes: Negative for pain and visual disturbance.   Respiratory: Positive for cough. Negative for chest tightness.    Cardiovascular: Negative for chest pain and palpitations.   Gastrointestinal: Positive for nausea. Negative for abdominal pain, constipation, diarrhea and vomiting.   Endocrine: Negative for polydipsia and polyuria.   Genitourinary: Negative for dysuria and frequency.   Musculoskeletal: Negative for back pain and myalgias.   Skin: Negative for color change and rash.   Allergic/Immunologic: Negative for food allergies and immunocompromised state.   Neurological: Negative for dizziness, syncope and headaches.   Hematological: Negative for adenopathy. Does not bruise/bleed easily.   Psychiatric/Behavioral: Negative for hallucinations and suicidal ideas. The patient is not nervous/anxious.        /80   Pulse 115   Temp 99.9 °F (37.7 °C) (Oral)   Ht 170.2 cm (67.01\")   Wt 110 kg (242 lb)   SpO2 96%   BMI 37.89 kg/m²     Physical Exam   Constitutional: She is oriented to person, place, and time. No distress.   Pale lethargic lady   HENT:   Head: Normocephalic and atraumatic.   Right Ear: Tympanic membrane, external ear and ear canal normal.   Left Ear: Tympanic " membrane, external ear and ear canal normal.   Oropharynx erythematous without exudates   Neck: Normal range of motion. Neck supple. No thyromegaly present.   Cardiovascular: Normal rate, regular rhythm and normal heart sounds.   No murmur heard.  Pulmonary/Chest: Effort normal and breath sounds normal. She has no wheezes. She has no rales.   Abdominal: Soft. She exhibits no distension and no mass. There is tenderness (generalized). There is no guarding.   Lymphadenopathy:     She has cervical adenopathy.   Neurological: She is alert and oriented to person, place, and time.   Skin: Skin is warm. No rash noted. She is diaphoretic. There is pallor.   Psychiatric: She has a normal mood and affect. Her behavior is normal.   Nursing note and vitals reviewed.    Rapid influenza swab in office today was negative    Assessment/Plan     Diagnoses and all orders for this visit:    Pharyngitis, unspecified etiology  -     cefdinir (OMNICEF) 300 MG capsule; Take 2 capsules by mouth Daily for 10 days.    Non-intractable vomiting with nausea, unspecified vomiting type  -     promethazine (PHENERGAN) 25 MG tablet; Take 1 tablet by mouth Every 6 (Six) Hours As Needed for Nausea or Vomiting.                           This document has been electronically signed by:  Trudy Quinones PA-C

## 2020-02-14 ENCOUNTER — OFFICE VISIT (OUTPATIENT)
Dept: FAMILY MEDICINE CLINIC | Facility: CLINIC | Age: 50
End: 2020-02-14

## 2020-02-14 VITALS
HEART RATE: 95 BPM | WEIGHT: 242 LBS | BODY MASS INDEX: 37.98 KG/M2 | DIASTOLIC BLOOD PRESSURE: 62 MMHG | RESPIRATION RATE: 12 BRPM | TEMPERATURE: 98.5 F | OXYGEN SATURATION: 98 % | SYSTOLIC BLOOD PRESSURE: 118 MMHG | HEIGHT: 67 IN

## 2020-02-14 DIAGNOSIS — Z00.00 HEALTHCARE MAINTENANCE: ICD-10-CM

## 2020-02-14 DIAGNOSIS — M54.59 MECHANICAL LOW BACK PAIN: ICD-10-CM

## 2020-02-14 DIAGNOSIS — R23.2 HOT FLASHES: ICD-10-CM

## 2020-02-14 DIAGNOSIS — F41.8 DEPRESSION WITH ANXIETY: ICD-10-CM

## 2020-02-14 DIAGNOSIS — Z98.84 HISTORY OF BARIATRIC SURGERY: ICD-10-CM

## 2020-02-14 DIAGNOSIS — I87.2 CHRONIC VENOUS INSUFFICIENCY: Primary | ICD-10-CM

## 2020-02-14 DIAGNOSIS — E78.2 MIXED HYPERLIPIDEMIA: ICD-10-CM

## 2020-02-14 DIAGNOSIS — K21.9 GASTROESOPHAGEAL REFLUX DISEASE WITHOUT ESOPHAGITIS: ICD-10-CM

## 2020-02-14 DIAGNOSIS — E66.01 CLASS 3 SEVERE OBESITY WITHOUT SERIOUS COMORBIDITY WITH BODY MASS INDEX (BMI) OF 40.0 TO 44.9 IN ADULT, UNSPECIFIED OBESITY TYPE (HCC): ICD-10-CM

## 2020-02-14 DIAGNOSIS — K76.0 NON-ALCOHOLIC FATTY LIVER DISEASE: ICD-10-CM

## 2020-02-14 PROCEDURE — 83001 ASSAY OF GONADOTROPIN (FSH): CPT | Performed by: GENERAL PRACTICE

## 2020-02-14 PROCEDURE — 36415 COLL VENOUS BLD VENIPUNCTURE: CPT | Performed by: GENERAL PRACTICE

## 2020-02-14 PROCEDURE — 83002 ASSAY OF GONADOTROPIN (LH): CPT | Performed by: GENERAL PRACTICE

## 2020-02-14 PROCEDURE — 99214 OFFICE O/P EST MOD 30 MIN: CPT | Performed by: GENERAL PRACTICE

## 2020-02-14 NOTE — PROGRESS NOTES
"Maryjo Rangel is a 49 y.o. female.     History of Present Illness     Depression  Her  continues to do somewhat better.  With this she has noted a persistent improvement in her nervousness, appetite, and sleep. She denies any depression, loss of interest in activities, or suicidal ideation.  She has come to the conclusion that she cannot \"fix him\" and is determined to work on her own emotional and physical health  Lab Results   Component Value Date    TSH 0.361 10/25/2019     Obesity  Post bariatric surgery. Unable to tolerate phentiramine and did not respond to topiramate.  She remains on contrave and feels that it has worked fairly well with no apparent side effects thus far. Trying to follow a low calorie diet. Through winter she has been less active but hopes to change this. Most recent B12 571 with a vitamin D of 41.8    Low Back Pain  There has been no change in the quality or severity of her back pain nor any new associated symptoms. This does not radiate at present and she denies any changes in her strength, sensation, or bowel/bladder control.  There is no history of any fever, chills, or night sweats    Dyslipidemia  Compliance with treatment has been fair.    Lab Results   Component Value Date    CHOL 249 (H) 10/25/2019    TRIG 53 10/25/2019    HDL 64 (H) 10/25/2019     (H) 10/25/2019     Left Lower Extremity Edema  She continues to have intermittent mild swelling about her left foot.  This has been unassociated with any other symptoms and she denies any pain or discoloration.  The swelling tends to develop toward the end of the day and improves overnight.    The following portions of the patient's history were reviewed and updated as appropriate: allergies, current medications, past medical history, past social history and problem list.    Review of Systems   Constitutional: Positive for fatigue. Negative for appetite change, chills, fever and unexpected weight change.   HENT: " Negative for congestion, ear pain, rhinorrhea, sneezing, sore throat and voice change.    Eyes: Negative for visual disturbance.   Respiratory: Negative for cough, shortness of breath and wheezing.    Cardiovascular: Positive for leg swelling (left). Negative for chest pain and palpitations.   Gastrointestinal: Positive for constipation (chronic-intermittent). Negative for abdominal pain, blood in stool, diarrhea, nausea and vomiting.   Endocrine:        Hot flashes - worse since last here   Genitourinary: Negative for difficulty urinating, dysuria, frequency, hematuria and urgency.   Musculoskeletal: Positive for back pain. Negative for arthralgias, joint swelling, myalgias and neck pain.   Skin: Negative for rash.        Hair loss   Neurological: Negative for tremors, weakness, numbness and headaches.   Psychiatric/Behavioral: Negative for decreased concentration, dysphoric mood, sleep disturbance and suicidal ideas. The patient is nervous/anxious.      Objective   Physical Exam   Constitutional: She is oriented to person, place, and time. No distress.   Bright and in good spirits. No apparent distress. No pallor, jaundice, diaphoresis, or cyanosis.   HENT:   Head: Atraumatic.   Right Ear: Tympanic membrane, external ear and ear canal normal.   Left Ear: Tympanic membrane, external ear and ear canal normal.   Nose: Nose normal.   Mouth/Throat: Oropharynx is clear and moist. Mucous membranes are not pale and not cyanotic.   Eyes: Pupils are equal, round, and reactive to light. EOM are normal. No scleral icterus.   Neck: No JVD present. Carotid bruit is not present. No tracheal deviation present. No thyromegaly present.   Cardiovascular: Normal rate, regular rhythm, S1 normal, S2 normal and intact distal pulses. Exam reveals no gallop, no S3 and no S4.   No murmur heard.  Pulmonary/Chest: Breath sounds normal. No stridor. No respiratory distress.   Musculoskeletal: She exhibits no tenderness or deformity.      Vascular Status -  Her right foot exhibits no edema. Her left foot exhibits abnormal foot edema (trace -  primarily confined to the dorsum of the ankle and foot).  Lymphadenopathy:        Head (right side): No submandibular adenopathy present.        Head (left side): No submandibular adenopathy present.     She has no cervical adenopathy.   Neurological: She is alert and oriented to person, place, and time. She has normal reflexes. She displays normal reflexes. No cranial nerve deficit. She exhibits normal muscle tone. Coordination normal.   Skin: Skin is warm and dry. No rash noted. She is not diaphoretic. No cyanosis. No pallor. Nails show no clubbing.   Psychiatric: Her speech is normal and behavior is normal. Thought content normal. She expresses no suicidal ideation.     Assessment/Plan   Problems Addressed this Visit        Cardiovascular and Mediastinum    Chronic venous insufficiency   Reminded regarding lifestyle modification  Encouraged to report if any worse or if any new symptoms or concerns.    Hot flashes  Will recheck a FSH and LF  Reviewed options if she is postmenopausal. Patient will consider    Relevant Orders    FSH & LH    Mixed hyperlipidemia  Encouraged to continue to work on her diet and exercise plan.       Digestive    Class 3 severe obesity without serious comorbidity with body mass index (BMI) of 40.0 to 44.9 in adult (CMS/[facility])  As above.  Lengthy discuss regarding exercise options  Continue current medication    Non-alcoholic fatty liver disease    Gastroesophageal reflux disease without esophagitis       Nervous and Auditory    Mechanical low back pain  Reminded regarding symptomatic treatment.   Continue current medication       Other    History of bariatric surgery    Depression with anxiety  Significant situational component.   Supportive therapy.

## 2020-02-15 LAB
FSH SERPL-ACNC: 39.1 MIU/ML
LH SERPL-ACNC: 21.6 MIU/ML

## 2020-02-26 ENCOUNTER — TELEPHONE (OUTPATIENT)
Dept: FAMILY MEDICINE CLINIC | Facility: CLINIC | Age: 50
End: 2020-02-26

## 2020-02-26 NOTE — TELEPHONE ENCOUNTER
Left voicemail for the patient to call us back.   ----- Message from Hoang Palacios MD sent at 2/26/2020  9:40 AM EST -----  We can try her on estrogen is she would like    ----- Message -----  From: Ariadna Little MA  Sent: 2/25/2020   3:23 PM EST  To: Hoang Palacios MD    I spoke to the patient and explained to her that her lab work did show menopause. She wanted to know if there is anything she needed to do from here? She said she is currently miserable.

## 2020-03-09 ENCOUNTER — OFFICE VISIT (OUTPATIENT)
Dept: FAMILY MEDICINE CLINIC | Facility: CLINIC | Age: 50
End: 2020-03-09

## 2020-03-09 VITALS
DIASTOLIC BLOOD PRESSURE: 80 MMHG | OXYGEN SATURATION: 98 % | HEIGHT: 67 IN | SYSTOLIC BLOOD PRESSURE: 130 MMHG | BODY MASS INDEX: 39.71 KG/M2 | WEIGHT: 253 LBS | TEMPERATURE: 98.1 F | HEART RATE: 80 BPM

## 2020-03-09 DIAGNOSIS — J45.909 REACTIVE AIRWAY DISEASE WITHOUT COMPLICATION, UNSPECIFIED ASTHMA SEVERITY, UNSPECIFIED WHETHER PERSISTENT: ICD-10-CM

## 2020-03-09 DIAGNOSIS — J06.9 ACUTE URI: Primary | ICD-10-CM

## 2020-03-09 PROCEDURE — 96372 THER/PROPH/DIAG INJ SC/IM: CPT | Performed by: NURSE PRACTITIONER

## 2020-03-09 PROCEDURE — 99213 OFFICE O/P EST LOW 20 MIN: CPT | Performed by: NURSE PRACTITIONER

## 2020-03-09 RX ORDER — PROMETHAZINE HYDROCHLORIDE 6.25 MG/5ML
6.25-12.5 SYRUP ORAL EVERY 6 HOURS PRN
Qty: 60 ML | Refills: 0 | Status: SHIPPED | OUTPATIENT
Start: 2020-03-09 | End: 2020-05-28

## 2020-03-09 RX ORDER — GUAIFENESIN AND DEXTROMETHORPHAN HYDROBROMIDE 100; 10 MG/5ML; MG/5ML
5-10 SOLUTION ORAL EVERY 6 HOURS PRN
Qty: 60 ML | Refills: 0 | Status: SHIPPED | OUTPATIENT
Start: 2020-03-09 | End: 2020-05-28

## 2020-03-09 RX ORDER — CEFTRIAXONE 1 G/1
1 INJECTION, POWDER, FOR SOLUTION INTRAMUSCULAR; INTRAVENOUS ONCE
Status: COMPLETED | OUTPATIENT
Start: 2020-03-09 | End: 2020-03-09

## 2020-03-09 RX ORDER — AMOXICILLIN AND CLAVULANATE POTASSIUM 875; 125 MG/1; MG/1
1 TABLET, FILM COATED ORAL EVERY 12 HOURS SCHEDULED
Qty: 20 TABLET | Refills: 0 | Status: SHIPPED | OUTPATIENT
Start: 2020-03-09 | End: 2020-03-19

## 2020-03-09 RX ORDER — ALBUTEROL SULFATE 90 UG/1
2 AEROSOL, METERED RESPIRATORY (INHALATION) EVERY 4 HOURS PRN
Qty: 1 INHALER | Refills: 0 | Status: SHIPPED | OUTPATIENT
Start: 2020-03-09 | End: 2020-07-21 | Stop reason: SDUPTHER

## 2020-03-09 RX ORDER — METHYLPREDNISOLONE ACETATE 80 MG/ML
80 INJECTION, SUSPENSION INTRA-ARTICULAR; INTRALESIONAL; INTRAMUSCULAR; SOFT TISSUE ONCE
Status: COMPLETED | OUTPATIENT
Start: 2020-03-09 | End: 2020-03-09

## 2020-03-09 RX ADMIN — METHYLPREDNISOLONE ACETATE 80 MG: 80 INJECTION, SUSPENSION INTRA-ARTICULAR; INTRALESIONAL; INTRAMUSCULAR; SOFT TISSUE at 12:18

## 2020-03-09 RX ADMIN — CEFTRIAXONE 1 G: 1 INJECTION, POWDER, FOR SOLUTION INTRAMUSCULAR; INTRAVENOUS at 12:19

## 2020-03-09 NOTE — PROGRESS NOTES
"Subjective   Susie Rangel is a 49 y.o. female.     Chief Complaint   Patient presents with   • Cough   • Sore Throat       History of Present Illness       URI symptoms since Thursday.  Some generalized malaise, chills.  HA and dizziness.  Sinus pressure and pain.  Ear pain on the left.  She reports hoarseness and sore throat.  Has taken her meds at home and some claritin.  She reports fluid in her left ear in December but \"still not better\".  Not at goal.     The following portions of the patient's history were reviewed and updated as appropriate: CC, ROS, allergies, current medications, past family history, past medical history, past social history, past surgical history and problem list.      Review of Systems   Constitutional: Positive for fatigue. Negative for appetite change and unexpected weight change.   HENT: Positive for congestion, ear pain, rhinorrhea, sinus pressure, sinus pain, sore throat and voice change. Negative for nosebleeds, postnasal drip and trouble swallowing.    Eyes: Negative for photophobia, pain and visual disturbance.   Respiratory: Positive for cough and shortness of breath. Negative for chest tightness and wheezing.    Cardiovascular: Negative for chest pain and palpitations.   Gastrointestinal: Positive for nausea. Negative for abdominal pain, blood in stool, constipation, diarrhea and vomiting.   Endocrine: Negative for cold intolerance and polydipsia.   Genitourinary: Negative for difficulty urinating, flank pain, frequency and hematuria.   Musculoskeletal: Negative for arthralgias, back pain, gait problem, joint swelling and myalgias.   Skin: Negative for color change and rash.   Allergic/Immunologic: Negative.    Neurological: Positive for dizziness and headaches. Negative for syncope and numbness.   Hematological: Negative.    Psychiatric/Behavioral: Negative for dysphoric mood, sleep disturbance and suicidal ideas. The patient is not nervous/anxious.    All other systems " "reviewed and are negative.      Objective     /80   Pulse 80   Temp 98.1 °F (36.7 °C) (Temporal)   Ht 170.2 cm (67\")   Wt 115 kg (253 lb)   SpO2 98%   BMI 39.63 kg/m²     Physical Exam   Constitutional: She appears well-developed and well-nourished. No distress.   HENT:   Head: Normocephalic and atraumatic.   Right Ear: Ear canal normal. Tympanic membrane is injected. Tympanic membrane is not scarred, not retracted and not bulging. A middle ear effusion is present.   Left Ear: Ear canal normal. Tympanic membrane is injected. Tympanic membrane is not scarred, not retracted and not bulging. A middle ear effusion is present.   Nose: Mucosal edema and rhinorrhea present. No epistaxis. Right sinus exhibits frontal sinus tenderness. Right sinus exhibits no maxillary sinus tenderness. Left sinus exhibits frontal sinus tenderness. Left sinus exhibits no maxillary sinus tenderness.   Mouth/Throat: Uvula is midline and mucous membranes are normal. No uvula swelling. Oropharyngeal exudate and posterior oropharyngeal erythema (mild to moderately injected) present. No posterior oropharyngeal edema.   Eyes: Pupils are equal, round, and reactive to light. EOM are normal. No scleral icterus.   Neck: Normal range of motion. No thyromegaly present.   Cardiovascular: Normal rate, regular rhythm, normal heart sounds and intact distal pulses.   Pulmonary/Chest: Effort normal. She has decreased breath sounds (mildly over bronchial region of anterior chest). She has wheezes (scattered mild).   Abdominal: Soft. Bowel sounds are normal. There is no tenderness.   Musculoskeletal: Normal range of motion.   Lymphadenopathy:     She has cervical adenopathy.        Right cervical: Superficial cervical adenopathy present.        Left cervical: Superficial cervical adenopathy present.   Neurological: She is alert. She displays normal reflexes. No cranial nerve deficit.   Skin: Skin is warm and dry.   Psychiatric: She has a normal mood " and affect. Her behavior is normal. Judgment and thought content normal.   Vitals reviewed.      Assessment/Plan     Problem List Items Addressed This Visit     None      Visit Diagnoses     Acute URI    -  Primary    Relevant Medications    albuterol sulfate  (90 Base) MCG/ACT inhaler    cefTRIAXone (ROCEPHIN) injection 1 g    methylPREDNISolone acetate (DEPO-medrol) injection 80 mg    amoxicillin-clavulanate (AUGMENTIN) 875-125 MG per tablet    albuterol (PROVENTIL,VENTOLIN) 2 MG/5ML syrup    promethazine (PHENERGAN) 6.25 MG/5ML syrup    dextromethorphan-guaifenesin (TUSSIN DM)  MG/5ML syrup    Reactive airway disease without complication, unspecified asthma severity, unspecified whether persistent        Relevant Medications    albuterol sulfate  (90 Base) MCG/ACT inhaler        Patient's Body mass index is 39.63 kg/m². BMI is above normal parameters. Recommendations include: nutrition counseling.  Understands disease processes and need for medications.  Understands reasons for urgent and emergent care.  Patient (& family) verbalized agreement for treatment plan.   Emotional support and active listening provided.  Patient provided time to verbalize feelings.    Instructed to complete all of antibiotics for acute illness.  Increase PO fluids, avoid/limit caffeine.  Do not save any of the meds for later use.  Rest PRN  Injections today while in the office for acute symptom relief.     RTC PRN 3-5 days for worsening or non resolving symptoms  Sample of Arnuity 100 given.  Patient instructed on use.           This document has been electronically signed by:  HANNA Coombs, FNP-C    Dragon disclaimer:  Much of this encounter note is an electronic transcription/translation of spoken language to printed text. The electronic translation of spoken language may permit erroneous, or at times, nonsensical words or phrases to be inadvertently transcribed; Although I have reviewed the note for such  errors, some may still exist.

## 2020-03-16 ENCOUNTER — OFFICE VISIT (OUTPATIENT)
Dept: FAMILY MEDICINE CLINIC | Facility: CLINIC | Age: 50
End: 2020-03-16

## 2020-03-16 VITALS
HEIGHT: 67 IN | SYSTOLIC BLOOD PRESSURE: 126 MMHG | HEART RATE: 99 BPM | DIASTOLIC BLOOD PRESSURE: 72 MMHG | WEIGHT: 255 LBS | RESPIRATION RATE: 14 BRPM | BODY MASS INDEX: 40.02 KG/M2 | TEMPERATURE: 98.2 F | OXYGEN SATURATION: 98 %

## 2020-03-16 DIAGNOSIS — J30.9 CHRONIC ALLERGIC RHINITIS: Primary | ICD-10-CM

## 2020-03-16 DIAGNOSIS — J45.909 REACTIVE AIRWAY DISEASE WITHOUT COMPLICATION, UNSPECIFIED ASTHMA SEVERITY, UNSPECIFIED WHETHER PERSISTENT: ICD-10-CM

## 2020-03-16 PROCEDURE — 99213 OFFICE O/P EST LOW 20 MIN: CPT | Performed by: GENERAL PRACTICE

## 2020-03-16 RX ORDER — FLUTICASONE PROPIONATE 50 MCG
SPRAY, SUSPENSION (ML) NASAL
Qty: 1 BOTTLE | Refills: 5 | Status: SHIPPED | OUTPATIENT
Start: 2020-03-16 | End: 2020-07-17 | Stop reason: SDUPTHER

## 2020-03-16 RX ORDER — MONTELUKAST SODIUM 10 MG/1
10 TABLET ORAL DAILY
Qty: 30 TABLET | Refills: 5 | Status: SHIPPED | OUTPATIENT
Start: 2020-03-16 | End: 2020-07-17 | Stop reason: SDUPTHER

## 2020-03-16 RX ORDER — FLUTICASONE PROPIONATE 220 UG/1
2 AEROSOL, METERED RESPIRATORY (INHALATION)
Qty: 12 G | Refills: 0 | Status: SHIPPED | OUTPATIENT
Start: 2020-03-16 | End: 2020-07-21 | Stop reason: SDUPTHER

## 2020-03-17 NOTE — PROGRESS NOTES
Subjective   Susie Rangel is a 49 y.o. female.     History of Present Illness     Upper Respiratory Tract Symptoms  Returns with the following symptoms : nasal congestion, postnasal drip, periorbital pressure, left ear fullness and cough. Onset of symptoms was 10 days ago, and have been unchanged since that time. There is no history of any sore throat, hoarse voice, hemoptysis, chest pain, shortness of breath, nausea, vomiting, diarrhea, rash and fever.  She continues on amoxicillin-clavulanate as prescribed last week with no apparent side effects.  She is using albuterol as needed and feels that it helps some.  She had similar problems at this time last year.    The following portions of the patient's history were reviewed and updated as appropriate: allergies, current medications, past medical history and problem list.    Review of Systems   Constitutional: Positive for fatigue. Negative for appetite change, chills, fever and unexpected weight change.   HENT: Positive for congestion, ear pain (leftg ear fullness), postnasal drip, rhinorrhea, sinus pressure and sneezing. Negative for sore throat and voice change.    Eyes: Negative for visual disturbance.   Respiratory: Positive for cough. Negative for shortness of breath and wheezing.    Cardiovascular: Positive for leg swelling (left). Negative for chest pain and palpitations.   Gastrointestinal: Positive for constipation (chronic-intermittent). Negative for abdominal pain, blood in stool, diarrhea, nausea and vomiting.   Endocrine:        Hot flashes   Genitourinary: Negative for difficulty urinating, dysuria, frequency, hematuria and urgency.   Musculoskeletal: Positive for back pain. Negative for arthralgias and neck pain.   Skin: Negative for rash.        Hair loss   Neurological: Negative for weakness, numbness and headaches.   Psychiatric/Behavioral: Negative for decreased concentration, dysphoric mood and sleep disturbance. The patient is nervous/anxious.       Objective   Physical Exam   Constitutional: She is oriented to person, place, and time. No distress.   Alert and in fair spirits. No apparent distress. No pallor, jaundice, diaphoresis, or cyanosis.   HENT:   Head: Atraumatic.   Right Ear: Tympanic membrane, external ear and ear canal normal.   Left Ear: Tympanic membrane, external ear and ear canal normal.   Nose: Nose normal.   Mouth/Throat: Oropharynx is clear and moist. Mucous membranes are not pale and not cyanotic.   Eyes: Pupils are equal, round, and reactive to light. EOM are normal. No scleral icterus.   Neck: No JVD present. Carotid bruit is not present. No tracheal deviation present. No thyromegaly present.   Cardiovascular: Normal rate, regular rhythm, S1 normal, S2 normal and intact distal pulses. Exam reveals no gallop, no S3 and no S4.   No murmur heard.  Pulmonary/Chest: No stridor. No respiratory distress. She has wheezes (diffuse - mild - primarily on forced vital capacity). She has no rhonchi. She has no rales.   Musculoskeletal: She exhibits no tenderness or deformity.     Vascular Status -  Her right foot exhibits no edema. Her left foot exhibits abnormal foot edema (trace -  primarily confined to the dorsum of the ankle and foot).  Lymphadenopathy:        Head (right side): No submandibular adenopathy present.        Head (left side): No submandibular adenopathy present.     She has no cervical adenopathy.   Neurological: She is alert and oriented to person, place, and time. No cranial nerve deficit. She exhibits normal muscle tone. Coordination normal.   Skin: Skin is warm and dry. No rash noted. She is not diaphoretic. No cyanosis. No pallor. Nails show no clubbing.   Psychiatric: Her speech is normal and behavior is normal. Thought content normal. She expresses no suicidal ideation.     Assessment/Plan   Problems Addressed this Visit        Respiratory    Reactive airway disease without complication  We will resume an ICS and add  montelukast  Encouraged report if any worse, any new symptoms, or if not steadily improving over the next week    Relevant Medications    fluticasone (FLOVENT HFA) 220 MCG/ACT inhaler    montelukast (SINGULAIR) 10 MG tablet    Chronic allergic rhinitis   With left eustachian tube dysfunction  We will also resume her nasal corticosteroid  As above.    Relevant Medications    fluticasone (FLOVENT HFA) 220 MCG/ACT inhaler    fluticasone (FLONASE) 50 MCG/ACT nasal spray    montelukast (SINGULAIR) 10 MG tablet

## 2020-05-28 ENCOUNTER — OFFICE VISIT (OUTPATIENT)
Dept: FAMILY MEDICINE CLINIC | Facility: CLINIC | Age: 50
End: 2020-05-28

## 2020-05-28 VITALS
BODY MASS INDEX: 40.34 KG/M2 | TEMPERATURE: 97.5 F | HEIGHT: 67 IN | OXYGEN SATURATION: 98 % | DIASTOLIC BLOOD PRESSURE: 80 MMHG | HEART RATE: 82 BPM | SYSTOLIC BLOOD PRESSURE: 110 MMHG | WEIGHT: 257 LBS

## 2020-05-28 DIAGNOSIS — R23.2 HOT FLASHES: Primary | ICD-10-CM

## 2020-05-28 DIAGNOSIS — E66.01 CLASS 3 SEVERE OBESITY WITHOUT SERIOUS COMORBIDITY WITH BODY MASS INDEX (BMI) OF 40.0 TO 44.9 IN ADULT, UNSPECIFIED OBESITY TYPE (HCC): ICD-10-CM

## 2020-05-28 DIAGNOSIS — R60.0 PEDAL EDEMA: ICD-10-CM

## 2020-05-28 PROCEDURE — 99214 OFFICE O/P EST MOD 30 MIN: CPT | Performed by: NURSE PRACTITIONER

## 2020-05-28 RX ORDER — CLONIDINE HYDROCHLORIDE 0.1 MG/1
0.1 TABLET ORAL NIGHTLY PRN
Qty: 30 TABLET | Refills: 1 | Status: SHIPPED | OUTPATIENT
Start: 2020-05-28 | End: 2020-07-17

## 2020-05-28 NOTE — PROGRESS NOTES
Subjective   Susie Rangel is a 49 y.o. female.     No chief complaint on file.    CC  Hot flashes  Swelling in foot       History of Present Illness:    Partial hysterectomy a few years ago due to anemia from bleeding so heavy during her cycle. She has one ovary remaining. Hot flashes are keeping her up at night, breaking out in a sweat.   Drinks 32 oz of ice water thru the night to help cool down.   Was prescribed premarin by a NP but did not start due to PCP advising her to wait and see if symptoms improve.   Pt reports that she is miserable from the hot flashes and wants anything that will help her symptoms.        Swelling in left foot. Started after hysterectomy . Wearing a support stocking helped some.  Worse when she is up on her foot.  Swelling does go down overnight.  She has had imaging to rule out DVT.  Not hot.  Symptoms have been coming and going for a couple years now.    Based knee-patient reports that she had a gastric sleeve several years ago.  She has gained a significant amount of her weight back.  Patient is up approximately 14 pounds over the last 4 months.  She does report that she is doing some stress eating during the coronavirus pandemic.      The following portions of the patient's history and ROS were reviewed and updated as appropriate per provider:  Allergies, current medications, past family history, past medical history, past social history, past surgical history and problem list.    Review of Systems   Constitutional: Positive for activity change, chills (After hot flash), diaphoresis (With hot flash) and unexpected weight change. Negative for appetite change and fever.   HENT: Negative for congestion, sinus pressure, sinus pain, sore throat and trouble swallowing.    Eyes: Negative for pain, discharge and itching.   Respiratory: Negative for cough, chest tightness, shortness of breath and wheezing.    Cardiovascular: Positive for leg swelling. Negative for chest pain and  "palpitations.   Gastrointestinal: Positive for abdominal pain (In her ovarian region, bilateral) and constipation (Medications are helpful). Negative for blood in stool, nausea and vomiting.   Endocrine: Positive for heat intolerance. Negative for polydipsia, polyphagia and polyuria.   Genitourinary: Negative for dysuria, flank pain and frequency.   Musculoskeletal: Positive for arthralgias. Negative for neck stiffness.   Skin: Negative.    Allergic/Immunologic: Negative.    Neurological: Negative for dizziness, seizures and speech difficulty.   Hematological: Negative.    Psychiatric/Behavioral: Positive for sleep disturbance. Negative for dysphoric mood, self-injury and suicidal ideas.       Objective     /80   Pulse 82   Temp 97.5 °F (36.4 °C) (Temporal)   Ht 170.2 cm (67\")   Wt 117 kg (257 lb)   SpO2 98%   BMI 40.25 kg/m²   Office Visit on 02/14/2020   Component Date Value Ref Range Status   • FSH 02/14/2020 39.10  mIU/mL Final   • LH 02/14/2020 21.60  mIU/mL Final       Physical Exam   Constitutional: She is oriented to person, place, and time. She appears well-developed and well-nourished. No distress.   HENT:   Head: Atraumatic.   Right Ear: External ear normal.   Left Ear: External ear normal.   Eyes: Pupils are equal, round, and reactive to light. Conjunctivae are normal. Right eye exhibits no discharge. Left eye exhibits no discharge.   Neck: Neck supple. No thyromegaly present.   Cardiovascular: Normal rate and regular rhythm.   No murmur heard.  Pulmonary/Chest: Effort normal and breath sounds normal. No respiratory distress. She has no wheezes. She exhibits no tenderness.   Abdominal: Soft. Bowel sounds are normal. There is no tenderness.   Musculoskeletal: Normal range of motion.        Left ankle: She exhibits normal pulse.        Left foot: There is swelling. There is normal range of motion, no tenderness, normal capillary refill and no deformity.        Lymphadenopathy:     She has no " cervical adenopathy.   Neurological: She is alert and oriented to person, place, and time.   Skin: Skin is warm and dry. Capillary refill takes less than 2 seconds. No rash noted. She is not diaphoretic. No erythema. No pallor.   Psychiatric: She has a normal mood and affect. Her behavior is normal. Judgment and thought content normal.   Vitals reviewed.      Assessment/Plan     Problem List Items Addressed This Visit        Cardiovascular and Mediastinum    Hot flashes - Primary    Current Assessment & Plan     I discussed with Susie How the female productive system works.  I discussed the natural order of menopause and that despite having a partial hysterectomy several years ago she had one working ovary remaining which helped prevent her from having symptomatic menopause.  Now she is 49 years old and her body is going through natural menopause as her remaining ovary stops working.  I would like her to go ahead and start the Premarin as well as will add a clonidine at bedtime on an as-needed basis to help with hot flashes.  I have discussed with her avoiding spicy foods, caffeine, dressing in layers and maintaining a cool temperature in her home. I also recommend a gyn consult to further investigate her complaint of pain in her ovarian region. She is agreeable and would like to return to [facility].          Relevant Orders    Ambulatory Referral to Gynecology (Completed)       Digestive    Class 3 severe obesity without serious comorbidity with body mass index (BMI) of 40.0 to 44.9 in adult (CMS/HCC)    Overview     Hx laparoscopic sleeve gastrectomy          Current Assessment & Plan     Obesity is worsening.  Discussed the patient's BMI.  The BMI is above average; BMI management plan is completed.  General weight loss/lifestyle modification strategies discussed (elicit support from others; identify saboteurs; non-food rewards, etc).  Informal exercise measures discussed, e.g. taking stairs instead of  elevator.  Regular aerobic exercise program discussed. avoid favorite foods such as snack foods. Bariatric restart recommended. Exercise daily.   Emotional support and active listening provided.               Other    Pedal edema    Current Assessment & Plan     Seems to be worse with increased activity and during hot weather.  Have discussed drinking adequate hydration and limiting sodium intake.  I recommend she wear a supportive knee-high stocking to help with circulation.  Have reviewed imaging including Doppler in 2016 of the left lower extremity which had no evidence of DVT.  Have recommended she rest throughout the day with her left foot elevated to help with circulation.                    Patient's Body mass index is 40.25 kg/m². BMI is above normal parameters. Recommendations include: exercise counseling and nutrition counseling.        I have discussed diagnosis in detail today allowing time for questions and answers. Patient is aware of reasons to seek urgent or emergent medical care as well as reasons to return to the clinic for evaluation. Possible side effects, interactions and progression of symptoms discussed as well. Patient / family states understanding.   Emotional support and active listening provided.       Follow up in one month- 6 weeks, sooner if needed.           This document has been electronically signed by:  HANNA Sandy, NP-C

## 2020-05-28 NOTE — ASSESSMENT & PLAN NOTE
I discussed with Susie How the female productive system works.  I discussed the natural order of menopause and that despite having a partial hysterectomy several years ago she had one working ovary remaining which helped prevent her from having symptomatic menopause.  Now she is 49 years old and her body is going through natural menopause as her remaining ovary stops working.  I would like her to go ahead and start the Premarin as well as will add a clonidine at bedtime on an as-needed basis to help with hot flashes.  I have discussed with her avoiding spicy foods, caffeine, dressing in layers and maintaining a cool temperature in her home. I also recommend a gyn consult to further investigate her complaint of pain in her ovarian region. She is agreeable and would like to return to [facility].

## 2020-05-28 NOTE — ASSESSMENT & PLAN NOTE
Seems to be worse with increased activity and during hot weather.  Have discussed drinking adequate hydration and limiting sodium intake.  I recommend she wear a supportive knee-high stocking to help with circulation.  Have reviewed imaging including Doppler in 2016 of the left lower extremity which had no evidence of DVT.  Have recommended she rest throughout the day with her left foot elevated to help with circulation.

## 2020-05-28 NOTE — ASSESSMENT & PLAN NOTE
Obesity is worsening.  Discussed the patient's BMI.  The BMI is above average; BMI management plan is completed.  General weight loss/lifestyle modification strategies discussed (elicit support from others; identify saboteurs; non-food rewards, etc).  Informal exercise measures discussed, e.g. taking stairs instead of elevator.  Regular aerobic exercise program discussed. avoid favorite foods such as snack foods. Bariatric restart recommended. Exercise daily.   Emotional support and active listening provided.

## 2020-07-17 DIAGNOSIS — J30.9 CHRONIC ALLERGIC RHINITIS: ICD-10-CM

## 2020-07-17 DIAGNOSIS — M54.50 LEFT-SIDED LOW BACK PAIN WITHOUT SCIATICA, UNSPECIFIED CHRONICITY: ICD-10-CM

## 2020-07-17 DIAGNOSIS — F41.8 DEPRESSION WITH ANXIETY: ICD-10-CM

## 2020-07-17 DIAGNOSIS — E66.01 CLASS 3 SEVERE OBESITY WITHOUT SERIOUS COMORBIDITY WITH BODY MASS INDEX (BMI) OF 40.0 TO 44.9 IN ADULT, UNSPECIFIED OBESITY TYPE (HCC): ICD-10-CM

## 2020-07-17 DIAGNOSIS — J45.909 REACTIVE AIRWAY DISEASE WITHOUT COMPLICATION, UNSPECIFIED ASTHMA SEVERITY, UNSPECIFIED WHETHER PERSISTENT: ICD-10-CM

## 2020-07-17 DIAGNOSIS — K21.9 GASTROESOPHAGEAL REFLUX DISEASE WITHOUT ESOPHAGITIS: ICD-10-CM

## 2020-07-17 DIAGNOSIS — K59.09 CHRONIC CONSTIPATION: ICD-10-CM

## 2020-07-17 RX ORDER — OMEPRAZOLE 40 MG/1
40 CAPSULE, DELAYED RELEASE ORAL 2 TIMES DAILY
Qty: 60 CAPSULE | Refills: 5 | Status: SHIPPED | OUTPATIENT
Start: 2020-07-17 | End: 2021-09-20

## 2020-07-17 RX ORDER — DOCUSATE SODIUM 100 MG/1
100 CAPSULE, LIQUID FILLED ORAL 2 TIMES DAILY PRN
Qty: 60 CAPSULE | Refills: 5 | Status: SHIPPED | OUTPATIENT
Start: 2020-07-17 | End: 2020-07-21 | Stop reason: SDUPTHER

## 2020-07-17 RX ORDER — HYDROXYZINE HYDROCHLORIDE 10 MG/1
10 TABLET, FILM COATED ORAL 3 TIMES DAILY
Qty: 90 TABLET | Refills: 5 | Status: SHIPPED | OUTPATIENT
Start: 2020-07-17 | End: 2021-10-01 | Stop reason: SDUPTHER

## 2020-07-17 RX ORDER — MELOXICAM 7.5 MG/1
TABLET ORAL
Qty: 30 TABLET | Refills: 5 | Status: SHIPPED | OUTPATIENT
Start: 2020-07-17 | End: 2020-09-14

## 2020-07-17 RX ORDER — FLUTICASONE PROPIONATE 50 MCG
SPRAY, SUSPENSION (ML) NASAL
Qty: 1 BOTTLE | Refills: 5 | Status: SHIPPED | OUTPATIENT
Start: 2020-07-17 | End: 2021-06-18

## 2020-07-17 RX ORDER — MONTELUKAST SODIUM 10 MG/1
10 TABLET ORAL DAILY
Qty: 30 TABLET | Refills: 5 | Status: SHIPPED | OUTPATIENT
Start: 2020-07-17 | End: 2021-06-18

## 2020-07-21 ENCOUNTER — OFFICE VISIT (OUTPATIENT)
Dept: FAMILY MEDICINE CLINIC | Facility: CLINIC | Age: 50
End: 2020-07-21

## 2020-07-21 VITALS — BODY MASS INDEX: 39.24 KG/M2 | WEIGHT: 250 LBS | HEIGHT: 67 IN

## 2020-07-21 DIAGNOSIS — R60.0 PEDAL EDEMA: ICD-10-CM

## 2020-07-21 DIAGNOSIS — K59.09 CHRONIC CONSTIPATION: ICD-10-CM

## 2020-07-21 DIAGNOSIS — J45.909 REACTIVE AIRWAY DISEASE WITHOUT COMPLICATION, UNSPECIFIED ASTHMA SEVERITY, UNSPECIFIED WHETHER PERSISTENT: ICD-10-CM

## 2020-07-21 DIAGNOSIS — R23.2 HOT FLASHES: Primary | ICD-10-CM

## 2020-07-21 DIAGNOSIS — E66.01 CLASS 2 SEVERE OBESITY WITH SERIOUS COMORBIDITY AND BODY MASS INDEX (BMI) OF 39.0 TO 39.9 IN ADULT, UNSPECIFIED OBESITY TYPE (HCC): ICD-10-CM

## 2020-07-21 PROCEDURE — 99442 PR PHYS/QHP TELEPHONE EVALUATION 11-20 MIN: CPT | Performed by: NURSE PRACTITIONER

## 2020-07-21 RX ORDER — CLONIDINE HYDROCHLORIDE 0.1 MG/1
0.1 TABLET ORAL 2 TIMES DAILY PRN
Qty: 60 TABLET | Refills: 5 | Status: SHIPPED | OUTPATIENT
Start: 2020-07-21 | End: 2020-07-21

## 2020-07-21 RX ORDER — FLUTICASONE PROPIONATE 220 UG/1
2 AEROSOL, METERED RESPIRATORY (INHALATION)
Qty: 12 G | Refills: 5 | Status: SHIPPED | OUTPATIENT
Start: 2020-07-21 | End: 2021-03-18

## 2020-07-21 RX ORDER — ALBUTEROL SULFATE 90 UG/1
2 AEROSOL, METERED RESPIRATORY (INHALATION) EVERY 4 HOURS PRN
Qty: 1 INHALER | Refills: 5 | Status: SHIPPED | OUTPATIENT
Start: 2020-07-21 | End: 2021-03-18

## 2020-07-21 RX ORDER — DOCUSATE SODIUM 100 MG/1
100 CAPSULE, LIQUID FILLED ORAL 2 TIMES DAILY PRN
Qty: 60 CAPSULE | Refills: 5 | Status: SHIPPED | OUTPATIENT
Start: 2020-07-21 | End: 2021-03-18

## 2020-07-21 RX ORDER — CLONAZEPAM 1 MG/1
1 TABLET ORAL ONCE
COMMUNITY
End: 2020-07-21

## 2020-07-21 NOTE — PROGRESS NOTES
Establish patient called office of APRN today to discuss:   CC    Hot flashes  Obesity  Pedal edema    You have chosen to receive care through a telephone visit today. Do you consent to use a telephone visit for your medical care today? Yes     Brief HPI/ROS obtained as follows:    Patient reports that using clonidine took care of her hot flashes.  Prior to starting the clonidine she was having hot flashes all through the day while trying to work and waking up several times a night with hot flashes.  With use of the clonidine she only has an occasional hot flash once or twice through the week.  She did not start back on Premarin due to the expense.  She feels like she is doing better and does not need a hormonal supplement.  Susie has also started a new weight loss program.  She is doing Nutrisystem and has lost 7 pounds since her last visit.  She also reports that the edema in her feet has resolved after making a dietary and lifestyle changes.  Continues on Contrave.  States she is feeling better overall.    The following portions of the patient's history, chief complaint and ROS were reviewed and updated as appropriate per provider:  Allergies, current medications, past family history, past medical history, past social history, past surgical history and problem list.    Review of Systems   Constitutional: Positive for diaphoresis (during hot flashes ). Negative for activity change, chills, fatigue and fever.   HENT: Negative for congestion, sinus pressure, sinus pain and sore throat.    Eyes: Negative.    Respiratory: Negative for cough, chest tightness, shortness of breath and wheezing.    Cardiovascular: Negative.  Negative for leg swelling.   Gastrointestinal: Positive for constipation (needs refill on colace ). Negative for abdominal pain, blood in stool, nausea and vomiting.   Endocrine: Positive for heat intolerance. Negative for cold intolerance, polydipsia, polyphagia and polyuria.   Genitourinary:  Negative for difficulty urinating, dyspareunia, flank pain and frequency.   Musculoskeletal: Positive for arthralgias (Occasional aches and pains). Negative for joint swelling.   Skin: Negative.    Allergic/Immunologic: Negative.    Neurological: Negative for seizures, facial asymmetry and speech difficulty.   Hematological: Negative.    Psychiatric/Behavioral: Negative for dysphoric mood, self-injury and suicidal ideas.        The current allergy list and medication list was reviewed with patient for accuracy.     Assessment   Very pleasant and friendly 49-year-old female.  Alert and oriented x3.  Respirations not labored with conversation.  No cough.  Speech normal.  Hearing adequate.  Cooperative.  Mood normal.  Thought process normal.    Diagnoses and all orders for this visit:    Hot flashes  Comments:  Continue clonidine 0.1 mg up to twice daily as needed for hot flashes.  Monitor blood pressure randomly and report readings less than 100/60 or symptoms of low   Orders:  -     Discontinue: cloNIDine (Catapres) 0.1 MG tablet; Take 1 tablet by mouth 2 (Two) Times a Day As Needed for High Blood Pressure.    Pedal edema  Comments:  Improved.  Continue on a low sodium diet.  Wear compression stockings when up on feet for extended periods of time.    Chronic constipation  Comments:  Routine exercise.  Adequate fluid and fiber in diet.  Refill Colace.  Orders:  -     docusate sodium (COLACE) 100 MG capsule; Take 1 capsule by mouth 2 (Two) Times a Day As Needed for Constipation.    Reactive airway disease without complication, unspecified asthma severity, unspecified whether persistent  Comments:  Asthma precautions discussed.  Refill inhalers.  Orders:  -     fluticasone (Flovent HFA) 220 MCG/ACT inhaler; Inhale 2 puffs 2 (Two) Times a Day.  -     albuterol sulfate  (90 Base) MCG/ACT inhaler; Inhale 2 puffs Every 4 (Four) Hours As Needed for Shortness of Air.    Class 2 severe obesity with serious comorbidity  and body mass index (BMI) of 39.0 to 39.9 in adult, unspecified obesity type (CMS/HCC)  Comments:  Continue Nutrisystem.  Low sodium heart healthy diet.  Routine exercise at least 3 days a week as tolerated.      Patient's Body mass index is 39.15 kg/m². BMI is above normal parameters. Recommendations include: exercise counseling and nutrition counseling.         Current Outpatient Medications:   •  albuterol sulfate  (90 Base) MCG/ACT inhaler, Inhale 2 puffs Every 4 (Four) Hours As Needed for Shortness of Air., Disp: 1 inhaler, Rfl: 5  •  docusate sodium (COLACE) 100 MG capsule, Take 1 capsule by mouth 2 (Two) Times a Day As Needed for Constipation., Disp: 60 capsule, Rfl: 5  •  fluticasone (Flonase) 50 MCG/ACT nasal spray, Administer 2 sprays both nostrils once daily, Disp: 1 bottle, Rfl: 5  •  fluticasone (Flovent HFA) 220 MCG/ACT inhaler, Inhale 2 puffs 2 (Two) Times a Day., Disp: 12 g, Rfl: 5  •  hydrOXYzine (ATARAX) 10 MG tablet, Take 1 tablet by mouth 3 (Three) Times a Day., Disp: 90 tablet, Rfl: 5  •  meloxicam (Mobic) 7.5 MG tablet, 1 to 2 tablets as needed daily for moderate pain, Disp: 30 tablet, Rfl: 5  •  montelukast (SINGULAIR) 10 MG tablet, Take 1 tablet by mouth Daily., Disp: 30 tablet, Rfl: 5  •  naltrexone-bupropion ER (Contrave) 8-90 MG tablet, Take 2 tablets by mouth 2 (Two) Times a Day., Disp: 120 tablet, Rfl: 5  •  omeprazole (priLOSEC) 40 MG capsule, Take 1 capsule by mouth 2 (Two) Times a Day., Disp: 60 capsule, Rfl: 5    Medication list reviewed and discussed with patient today.  Refill routine medications as needed.  Several of her routine medications have remaining refill from her last PCP visit.    Pt has been instructed today regarding low fat heart smart diet. Weight management and routine exercise has been recommended. Avoid high fat foods, starchy foods and processed foods. Increase lean meats, fresh vegetables and fresh fruits.     Coronavirus precautions have been reviewed  and discussed.  I have discussed the CDC recommendations  of social distancing, hand washing, wearing mask and disinfecting commonly touched items. Reviewed need to notify PCP and self quarantine with mild symptoms.  Discussed procedure to obtain Covid-19 testing and notification of PCP/health dept/ED/Urgent Center if symptoms begin. Understanding verbalized.      Patient instructed and advised to call if symptoms are increasing or new symptoms occur.    Understands reasons for urgent and emergent care.  Patient (& family) verbalized agreement for treatment plan.     Follow up 3 months, sooner if needed.   Routine labs every 3-6 months.     This visit has been rescheduled as a phone visit to comply with patient safety concerns in accordance with CDC recommendations. Total time of discussion was 13 minutes.

## 2020-07-21 NOTE — ASSESSMENT & PLAN NOTE
Obesity is improving with lifestyle modifications and Nutrisystem weight loss program.  Discussed the patient's BMI.  The BMI is above average; BMI management plan is completed.  General weight loss/lifestyle modification strategies discussed (elicit support from others; identify saboteurs; non-food rewards, etc).  Informal exercise measures discussed, e.g. taking stairs instead of elevator.

## 2020-07-28 ENCOUNTER — OFFICE VISIT (OUTPATIENT)
Dept: FAMILY MEDICINE CLINIC | Facility: CLINIC | Age: 50
End: 2020-07-28

## 2020-07-28 DIAGNOSIS — R19.7 DIARRHEA, UNSPECIFIED TYPE: ICD-10-CM

## 2020-07-28 DIAGNOSIS — R11.2 NAUSEA AND VOMITING, INTRACTABILITY OF VOMITING NOT SPECIFIED, UNSPECIFIED VOMITING TYPE: Primary | ICD-10-CM

## 2020-07-28 PROCEDURE — 99442 PR PHYS/QHP TELEPHONE EVALUATION 11-20 MIN: CPT | Performed by: NURSE PRACTITIONER

## 2020-07-28 RX ORDER — ONDANSETRON 4 MG/1
4 TABLET, FILM COATED ORAL EVERY 8 HOURS PRN
Qty: 20 TABLET | Refills: 1 | Status: SHIPPED | OUTPATIENT
Start: 2020-07-28 | End: 2022-01-06

## 2020-07-28 NOTE — PATIENT INSTRUCTIONS
Diarrhea  Diarrhea is when you pass loose and watery poop (stool) often. Diarrhea can make you feel weak and cause you to lose water in your body (get dehydrated). Losing water in your body can cause you to:  · Feel tired and thirsty.  · Have a dry mouth.  · Go pee (urinate) less often.  Diarrhea often lasts 2-3 days. However, it can last longer if it is a sign of something more serious. It is important to treat your diarrhea as told by your doctor.  Follow these instructions at home:  Eating and drinking         Follow these instructions as told by your doctor:  · Take an ORS (oral rehydration solution). This is a drink that helps you replace fluids and minerals your body lost. It is sold at pharmacies and stores.  · Drink plenty of fluids, such as:  ? Water.  ? Ice chips.  ? Diluted fruit juice.  ? Low-calorie sports drinks.  ? Milk, if you want.  · Avoid drinking fluids that have a lot of sugar or caffeine in them.  · Eat bland, easy-to-digest foods in small amounts as you are able. These foods include:  ? Bananas.  ? Applesauce.  ? Rice.  ? Low-fat (lean) meats.  ? Toast.  ? Crackers.  · Avoid alcohol.  · Avoid spicy or fatty foods.    Medicines  · Take over-the-counter and prescription medicines only as told by your doctor.  · If you were prescribed an antibiotic medicine, take it as told by your doctor. Do not stop using the antibiotic even if you start to feel better.  General instructions    · Wash your hands often using soap and water. If soap and water are not available, use a hand . Others in your home should wash their hands as well. Hands should be washed:  ? After using the toilet or changing a diaper.  ? Before preparing, cooking, or serving food.  ? While caring for a sick person.  ? While visiting someone in a hospital.  · Drink enough fluid to keep your pee (urine) pale yellow.  · Rest at home while you get better.  · Watch your condition for any changes.  · Take a warm bath to help with  any burning or pain from having diarrhea.  · Keep all follow-up visits as told by your doctor. This is important.  Contact a doctor if:  · You have a fever.  · Your diarrhea gets worse.  · You have new symptoms.  · You cannot keep fluids down.  · You feel light-headed or dizzy.  · You have a headache.  · You have muscle cramps.  Get help right away if:  · You have chest pain.  · You feel very weak or you pass out (faint).  · You have bloody or black poop or poop that looks like tar.  · You have very bad pain, cramping, or bloating in your belly (abdomen).  · You have trouble breathing or you are breathing very quickly.  · Your heart is beating very quickly.  · Your skin feels cold and clammy.  · You feel confused.  · You have signs of losing too much water in your body, such as:  ? Dark pee, very little pee, or no pee.  ? Cracked lips.  ? Dry mouth.  ? Sunken eyes.  ? Sleepiness.  ? Weakness.  Summary  · Diarrhea is when you pass loose and watery poop (stool) often.  · Diarrhea can make you feel weak and cause you to lose water in your body (get dehydrated).  · Take an ORS (oral rehydration solution). This is a drink that is sold at pharmacies and stores.  · Eat bland, easy-to-digest foods in small amounts as you are able.  · Contact a doctor if your condition gets worse. Get help right away if you have signs that you have lost too much water in your body.  This information is not intended to replace advice given to you by your health care provider. Make sure you discuss any questions you have with your health care provider.  Document Released: 06/05/2009 Document Revised: 05/24/2019 Document Reviewed: 05/24/2019  [employer] Patient Education © 2020 [employer]

## 2020-07-28 NOTE — PROGRESS NOTES
"You have chosen to receive care through a telephone visit. Do you consent to use a telephone visit for your medical care today? Yes  Susie Rangel is a 49 y.o. female who is c/o gastrointestinal symptoms which started during the night. She awakened feeling she needed to have a bowel movement. She had an episode of diarrhea and then started vomiting which was her last food intake. She is feeling better this morning..\"just weak\".      GI Problem   The primary symptoms include fatigue, nausea, vomiting (Jello/Protein shake ) and diarrhea. Primary symptoms do not include fever, abdominal pain, melena, hematemesis, jaundice, hematochezia, dysuria or rash. The illness began today. The onset was sudden.   The illness is also significant for chills. The illness does not include dysphagia. Significant associated medical issues include gastric bypass.     The following portions of the patient's history were reviewed and updated as appropriate: allergies, current medications, past family history, past medical history, past social history, past surgical history and problem list.    Current Outpatient Medications:   •  albuterol sulfate  (90 Base) MCG/ACT inhaler, Inhale 2 puffs Every 4 (Four) Hours As Needed for Shortness of Air., Disp: 1 inhaler, Rfl: 5  •  docusate sodium (COLACE) 100 MG capsule, Take 1 capsule by mouth 2 (Two) Times a Day As Needed for Constipation., Disp: 60 capsule, Rfl: 5  •  fluticasone (Flonase) 50 MCG/ACT nasal spray, Administer 2 sprays both nostrils once daily, Disp: 1 bottle, Rfl: 5  •  fluticasone (Flovent HFA) 220 MCG/ACT inhaler, Inhale 2 puffs 2 (Two) Times a Day., Disp: 12 g, Rfl: 5  •  hydrOXYzine (ATARAX) 10 MG tablet, Take 1 tablet by mouth 3 (Three) Times a Day., Disp: 90 tablet, Rfl: 5  •  meloxicam (Mobic) 7.5 MG tablet, 1 to 2 tablets as needed daily for moderate pain, Disp: 30 tablet, Rfl: 5  •  montelukast (SINGULAIR) 10 MG tablet, Take 1 tablet by mouth Daily., Disp: 30 tablet, " Rfl: 5  •  naltrexone-bupropion ER (Contrave) 8-90 MG tablet, Take 2 tablets by mouth 2 (Two) Times a Day., Disp: 120 tablet, Rfl: 5  •  omeprazole (priLOSEC) 40 MG capsule, Take 1 capsule by mouth 2 (Two) Times a Day., Disp: 60 capsule, Rfl: 5  •  ondansetron (Zofran) 4 MG tablet, Take 1 tablet by mouth Every 8 (Eight) Hours As Needed for Nausea or Vomiting., Disp: 20 tablet, Rfl: 1    No Known Allergies    Review of Systems   Constitutional: Positive for appetite change, chills and fatigue. Negative for fever.   Eyes: Negative for visual disturbance.   Respiratory: Negative for cough, shortness of breath and wheezing.    Gastrointestinal: Positive for diarrhea, nausea and vomiting (Jello/Protein shake ). Negative for abdominal pain, anal bleeding, blood in stool, dysphagia, hematemesis, hematochezia, jaundice and melena.   Genitourinary: Negative for decreased urine volume, difficulty urinating and dysuria.   Skin: Negative for rash.   Psychiatric/Behavioral: Positive for sleep disturbance.   All other systems reviewed and are negative.    There were no vitals taken for this visit.    Physical Exam  Deferred    Assessment/Plan   Diagnoses and all orders for this visit:    Nausea and vomiting, intractability of vomiting not specified, unspecified vomiting type  Comments:  Counseled regarding supportive care measures including food choices for today and hydrating.  Orders:  -     ondansetron (Zofran) 4 MG tablet; Take 1 tablet by mouth Every 8 (Eight) Hours As Needed for Nausea or Vomiting.    Diarrhea, unspecified type  Comments:  Counseled regarding supportive care measures including food choices for today and hydrating.    Symptoms discussed with Susie. Treatment options reviewed. Counseled regarding supportive care measures including food choices for today and hydrating.  S/S of concern reviewed and if occur to seek further medical evaluation or if she does not continue to improve.   This visit has been  scheduled as a phone visit to comply with patient safety concerns in accordance with CDC recommendations. Total time of discussion was 12 minutes.         This document has been electronically signed by HANNA Mcfadden, STEPHANIE-BC, ANH  July 28, 2020 09:33

## 2020-09-14 ENCOUNTER — OFFICE VISIT (OUTPATIENT)
Dept: FAMILY MEDICINE CLINIC | Facility: CLINIC | Age: 50
End: 2020-09-14

## 2020-09-14 VITALS
WEIGHT: 243 LBS | BODY MASS INDEX: 38.14 KG/M2 | SYSTOLIC BLOOD PRESSURE: 110 MMHG | HEIGHT: 67 IN | DIASTOLIC BLOOD PRESSURE: 78 MMHG | HEART RATE: 65 BPM | OXYGEN SATURATION: 98 % | TEMPERATURE: 98.6 F

## 2020-09-14 DIAGNOSIS — Z23 ENCOUNTER FOR IMMUNIZATION: ICD-10-CM

## 2020-09-14 DIAGNOSIS — M54.50 LEFT-SIDED LOW BACK PAIN WITHOUT SCIATICA, UNSPECIFIED CHRONICITY: Primary | ICD-10-CM

## 2020-09-14 PROCEDURE — 96372 THER/PROPH/DIAG INJ SC/IM: CPT | Performed by: NURSE PRACTITIONER

## 2020-09-14 PROCEDURE — 99213 OFFICE O/P EST LOW 20 MIN: CPT | Performed by: NURSE PRACTITIONER

## 2020-09-14 PROCEDURE — 90471 IMMUNIZATION ADMIN: CPT | Performed by: NURSE PRACTITIONER

## 2020-09-14 PROCEDURE — 90686 IIV4 VACC NO PRSV 0.5 ML IM: CPT | Performed by: NURSE PRACTITIONER

## 2020-09-14 RX ORDER — DICLOFENAC SODIUM AND MISOPROSTOL 50; 200 MG/1; UG/1
1 TABLET, DELAYED RELEASE ORAL 2 TIMES DAILY
Qty: 60 TABLET | Refills: 0 | Status: SHIPPED | OUTPATIENT
Start: 2020-09-14 | End: 2021-03-18

## 2020-09-14 RX ORDER — DEXAMETHASONE SODIUM PHOSPHATE 4 MG/ML
8 INJECTION, SOLUTION INTRA-ARTICULAR; INTRALESIONAL; INTRAMUSCULAR; INTRAVENOUS; SOFT TISSUE ONCE
Status: COMPLETED | OUTPATIENT
Start: 2020-09-14 | End: 2020-09-14

## 2020-09-14 RX ORDER — METHOCARBAMOL 500 MG/1
500 TABLET, FILM COATED ORAL 3 TIMES DAILY
Qty: 90 TABLET | Refills: 0 | Status: SHIPPED | OUTPATIENT
Start: 2020-09-14 | End: 2021-03-18

## 2020-09-14 RX ORDER — CLONIDINE HYDROCHLORIDE 0.1 MG/1
TABLET ORAL
COMMUNITY
Start: 2020-07-21 | End: 2021-03-18

## 2020-09-14 RX ORDER — KETOROLAC TROMETHAMINE 30 MG/ML
60 INJECTION, SOLUTION INTRAMUSCULAR; INTRAVENOUS ONCE
Status: COMPLETED | OUTPATIENT
Start: 2020-09-14 | End: 2020-09-14

## 2020-09-14 RX ADMIN — KETOROLAC TROMETHAMINE 60 MG: 30 INJECTION, SOLUTION INTRAMUSCULAR; INTRAVENOUS at 17:53

## 2020-09-14 RX ADMIN — DEXAMETHASONE SODIUM PHOSPHATE 8 MG: 4 INJECTION, SOLUTION INTRA-ARTICULAR; INTRALESIONAL; INTRAMUSCULAR; INTRAVENOUS; SOFT TISSUE at 17:50

## 2020-09-14 NOTE — PROGRESS NOTES
Subjective   Susie Rangel is a 50 y.o. female.     Chief Complaint   Patient presents with   • Back Pain       History of Present Illness     Back complaint-reports she did some landscaping approx 2 months ago but reports she cannot tell that any soreness improved.  She has history of SI joint discomfort.  She has received some injections but that only gives temporarily relief.  She reports she was started on Meloxicam that seemed to help her back discomfort but she noted a pattern of daily pounding of her head when she was taking.  She has taken aleve liqui-gel but stopped after bariatric surgery.  She reports back pain is low and some leg numbness.  She has not had any fall or injury.  She is working from home so she does feel that she is sitting more than she normally would during the day.  Not at goal  Right Side pain-reports she had a biopsy of a lymph node per general surgery. She reports she has noted a continued discomfort over the area where she had the biopsy.  She reports that she has gained some weight but does not think that is directly effecting the area.  She does not directly feel the pain today but is more questioning why is the pain occurring.    The following portions of the patient's history were reviewed and updated as appropriate: CC, ROS, allergies, current medications, past family history, past medical history, past social history, past surgical history and problem list.      Review of Systems   Constitutional: Positive for fatigue. Negative for appetite change and unexpected weight change.        Actively working on weight loss   HENT: Negative for congestion, ear pain, nosebleeds, postnasal drip, rhinorrhea, sore throat, trouble swallowing and voice change.    Eyes: Negative for photophobia, pain and visual disturbance.   Respiratory: Negative for cough and shortness of breath.    Cardiovascular: Positive for leg swelling (chronic since hysterectomy). Negative for chest pain and  "palpitations.   Gastrointestinal: Negative for abdominal pain, blood in stool, constipation, diarrhea and nausea.   Endocrine: Negative for cold intolerance and polydipsia.        Hot flashes   Genitourinary: Negative for difficulty urinating, flank pain, frequency and hematuria.        Some difficulty holding urine when she feels urge   Musculoskeletal: Positive for arthralgias, back pain and gait problem. Negative for joint swelling and myalgias.   Skin: Negative for color change and rash.   Allergic/Immunologic: Negative.    Neurological: Positive for headaches (with mobic use). Negative for dizziness, syncope and numbness.   Hematological: Negative.    Psychiatric/Behavioral: Negative for dysphoric mood, sleep disturbance and suicidal ideas. The patient is not nervous/anxious.    All other systems reviewed and are negative.      Objective     /78 (BP Location: Right arm, Patient Position: Sitting, Cuff Size: Adult)   Pulse 65   Temp 98.6 °F (37 °C) (Temporal)   Ht 170.2 cm (67.01\")   Wt 110 kg (243 lb)   SpO2 98%   BMI 38.05 kg/m²     Physical Exam  Vitals signs reviewed.   Constitutional:       General: She is not in acute distress.     Appearance: She is well-developed. She is not diaphoretic.   HENT:      Head: Normocephalic and atraumatic.      Comments: Oropharynx not examined.  Patient is presently wearing a face covering/mask due to COVID-19 pandemic.     Right Ear: Hearing, tympanic membrane, ear canal and external ear normal.      Left Ear: Hearing, tympanic membrane, ear canal and external ear normal.   Eyes:      General: Lids are normal. No scleral icterus.     Extraocular Movements:      Right eye: Normal extraocular motion and no nystagmus.      Left eye: Normal extraocular motion and no nystagmus.      Conjunctiva/sclera: Conjunctivae normal.      Pupils: Pupils are equal, round, and reactive to light.   Neck:      Musculoskeletal: Normal range of motion and neck supple.      Thyroid: " No thyromegaly.      Vascular: No carotid bruit or JVD.      Trachea: No tracheal tenderness.   Cardiovascular:      Rate and Rhythm: Normal rate and regular rhythm.      Heart sounds: Normal heart sounds, S1 normal and S2 normal. No murmur.   Pulmonary:      Effort: Pulmonary effort is normal.      Breath sounds: Normal breath sounds.   Chest:      Chest wall: No tenderness.   Abdominal:      General: Bowel sounds are normal.      Palpations: Abdomen is soft. There is no mass.      Tenderness: There is no abdominal tenderness.   Musculoskeletal:      Lumbar back: She exhibits decreased range of motion, tenderness, pain and spasm.      Right lower leg: No edema.      Left lower leg: No edema.        Legs:       Comments: Gait antalgic  equal bilaterally. No muscular atrophy or flaccidity.  Worse of spasm along lumbar spine but spasm present from scapula and distal along thoracic paraspinal muscles.  Tenderness reported with palpation   Lymphadenopathy:      Cervical: No cervical adenopathy.      Right cervical: No superficial cervical adenopathy.     Left cervical: No superficial cervical adenopathy.   Skin:     General: Skin is warm and dry.      Capillary Refill: Capillary refill takes less than 2 seconds.      Coloration: Skin is not pale.      Findings: No erythema.      Nails: There is no clubbing.     Neurological:      Mental Status: She is alert and oriented to person, place, and time.      Cranial Nerves: No cranial nerve deficit.      Sensory: No sensory deficit.      Motor: No tremor, atrophy or abnormal muscle tone.      Coordination: Coordination normal.      Gait: Gait normal.      Deep Tendon Reflexes: Reflexes are normal and symmetric.   Psychiatric:         Attention and Perception: She is attentive.         Mood and Affect: Mood normal.         Speech: Speech normal.         Behavior: Behavior normal.         Thought Content: Thought content normal.         Judgment: Judgment normal.                  Assessment/Plan     Problem List Items Addressed This Visit        Other    Encounter for immunization    Relevant Orders    Fluarix Quad >6 Months (0975-9095) (Completed)      Other Visit Diagnoses     Left-sided low back pain without sciatica, unspecified chronicity    -  Primary    Relevant Medications    methocarbamol (Robaxin) 500 MG tablet    diclofenac-miSOPROStol (ARTHROTEC 50) 50-0.2 MG EC tablet    ketorolac (TORADOL) injection 60 mg (Completed)    dexamethasone (DECADRON) injection 8 mg (Completed)    Other Relevant Orders    Ambulatory Referral to Physical Therapy Evaluate and treat    XR Hip With or Without Pelvis 2 - 3 View Right    XR Hip With or Without Pelvis 2 - 3 View Left    XR Spine Lumbar 2 or 3 View          Patient's Body mass index is 38.05 kg/m². BMI is above normal parameters. Recommendations include: nutrition counseling.     Understands disease processes and need for medications.  Understands reasons for urgent and emergent care.  Patient (& family) verbalized agreement for treatment plan.   Emotional support and active listening provided.  Patient provided time to verbalize feelings.    Injections today while in the office for acute symptom relief.     Discussed with patient her workstation at home encourage ways for her to think of ability to sit to stand as well as stretch and also elevate her legs.    Referral to PT as patient has been in the past and did feel that it was helpful for her back pain.    Injections today and will attempt to obtain Arthrotec due to patient's past GI irritation status to see if she is able to tolerate    RTC PRN 3-5 days for worsening or non resolving symptoms          This document has been electronically signed by:  HANNA Coombs FNP-C Dragon disclaimer:  Much of this encounter note is an electronic transcription/translation of spoken language to printed text. The electronic translation of spoken language may permit erroneous, or at  times, nonsensical words or phrases to be inadvertently transcribed; Although I have reviewed the note for such errors, some may still exist.

## 2020-09-22 RX ORDER — PHENTERMINE HYDROCHLORIDE 37.5 MG/1
TABLET ORAL
Qty: 30 TABLET | Refills: 0 | Status: SHIPPED | OUTPATIENT
Start: 2020-09-22 | End: 2020-12-15

## 2020-09-26 ENCOUNTER — HOSPITAL ENCOUNTER (OUTPATIENT)
Dept: GENERAL RADIOLOGY | Facility: HOSPITAL | Age: 50
Discharge: HOME | End: 2020-09-26

## 2020-09-26 PROCEDURE — 73502 X-RAY EXAM HIP UNI 2-3 VIEWS: CPT

## 2020-09-26 PROCEDURE — 73523 X-RAY EXAM HIPS BI 5/> VIEWS: CPT | Performed by: RADIOLOGY

## 2020-09-26 PROCEDURE — 72100 X-RAY EXAM L-S SPINE 2/3 VWS: CPT

## 2020-09-26 PROCEDURE — 72100 X-RAY EXAM L-S SPINE 2/3 VWS: CPT | Performed by: RADIOLOGY

## 2020-10-21 ENCOUNTER — OFFICE VISIT (OUTPATIENT)
Dept: FAMILY MEDICINE CLINIC | Facility: CLINIC | Age: 50
End: 2020-10-21

## 2020-10-21 DIAGNOSIS — R51.9 ACUTE NONINTRACTABLE HEADACHE, UNSPECIFIED HEADACHE TYPE: Primary | ICD-10-CM

## 2020-10-21 DIAGNOSIS — U07.1 COVID-19 VIRUS DETECTED: ICD-10-CM

## 2020-10-21 PROCEDURE — 99442 PR PHYS/QHP TELEPHONE EVALUATION 11-20 MIN: CPT | Performed by: NURSE PRACTITIONER

## 2020-10-21 RX ORDER — DOXYCYCLINE HYCLATE 100 MG/1
100 CAPSULE ORAL 2 TIMES DAILY
Qty: 20 CAPSULE | Refills: 0 | Status: SHIPPED | OUTPATIENT
Start: 2020-10-21 | End: 2020-10-31

## 2020-10-21 RX ORDER — AMOXICILLIN AND CLAVULANATE POTASSIUM 875; 125 MG/1; MG/1
TABLET, FILM COATED ORAL
COMMUNITY
Start: 2020-10-17 | End: 2020-10-21

## 2020-10-21 RX ORDER — BUTALBITAL, ACETAMINOPHEN AND CAFFEINE 50; 325; 40 MG/1; MG/1; MG/1
1 TABLET ORAL EVERY 6 HOURS PRN
Qty: 30 TABLET | Refills: 0 | Status: SHIPPED | OUTPATIENT
Start: 2020-10-21 | End: 2021-03-18

## 2020-10-21 RX ORDER — FLUCONAZOLE 150 MG/1
150 TABLET ORAL ONCE
Qty: 1 TABLET | Refills: 0 | Status: SHIPPED | OUTPATIENT
Start: 2020-10-21 | End: 2020-10-21

## 2020-10-21 NOTE — PROGRESS NOTES
Establish patient called office of APRN today to discuss:   CC    Covid     You have chosen to receive care through a telephone visit today. Do you consent to use a telephone visit for your medical care today? Yes       Brief HPI/ROS obtained as follows:    Tested positive yesterday. Has been sick for almost one month. Started last month with joint pain. Was tested on October 2nd with a negative test. Was tested again yesterday with a positive. She is having joint pain and fatigue. Had some cough and cold like symptoms on and off for 2 weeks then became intensified 3 days ago. Lost her taste a few days ago. Having fatigue and very bad headache. Head still feels congested.  Taking Augmentin which is hurting her stomach and causing GI symptoms.  Has lots of head congestion.  Headache is worse when she bends over and causes great sinus pressure.  Has tried multiple over-the-counter and conservative measures which is not helpful.  No history of substance abuse or addiction.  Not taking Adipex during acute illness.    Needs a work note for quarantine.    The following portions of the patient's history, chief complaint and ROS were reviewed and updated as appropriate per provider:  Allergies, current medications, past family history, past medical history, past social history, past surgical history and problem list.    Review of Systems   Constitutional: Positive for activity change, appetite change and fatigue. Negative for fever.   HENT: Positive for congestion, sinus pressure and sinus pain.    Eyes: Negative for pain, discharge and itching.   Respiratory: Positive for cough (Occasional ). Negative for chest tightness, shortness of breath and wheezing.    Cardiovascular: Negative for chest pain, palpitations and leg swelling.   Gastrointestinal: Positive for abdominal pain and nausea. Negative for vomiting.   Endocrine: Negative.    Musculoskeletal: Positive for arthralgias (Severe arthralgia with Covid) and myalgias.  Negative for joint swelling and neck stiffness.   Skin: Negative.    Allergic/Immunologic: Negative for environmental allergies, food allergies and immunocompromised state.   Neurological: Positive for headaches. Negative for facial asymmetry and light-headedness.   Hematological: Negative.    Psychiatric/Behavioral: Positive for sleep disturbance (Joint pain, headache). Negative for self-injury and suicidal ideas.       The current allergy list and medication list was reviewed with patient for accuracy.     Assessment       Alert and oriented x3.  Respirations not labored with conversation.  No cough.  Speech normal.  Hearing adequate.  Cooperative.  Mood normal.  Thought process normal.    Diagnoses and all orders for this visit:    1. Acute nonintractable headache, unspecified headache type (Primary)  -     butalbital-acetaminophen-caffeine (Esgic) -40 MG per tablet; Take 1 tablet by mouth Every 6 (Six) Hours As Needed for Headache.  Dispense: 30 tablet; Refill: 0    2. COVID-19 virus detected  Comments:  Patient will remain quarantined through November 2    Other orders  -     doxycycline (VIBRAMYCIN) 100 MG capsule; Take 1 capsule by mouth 2 (Two) Times a Day for 10 days.  Dispense: 20 capsule; Refill: 0  -     fluconazole (Diflucan) 150 MG tablet; Take 1 tablet by mouth 1 (One) Time for 1 dose.  Dispense: 1 tablet; Refill: 0      [facility].   Covid precautions have been reviewed and discussed.  Self quarantine through November 2.  May return to work on November 2.    Encouraged rest, fluids and symptomatic treatment.  Will provide patient with a limited supply of the Fioricet for headache.  This may also be beneficial with her arthralgia related to Covid.  [place]/PDMP reviewed.    Reviewed disease/virus process, possible complications, reasons for urgent care and possible side effects of medications. Pt/family state understanding.     Emotional support and  active listening provided.     Patient instructed and advised to call if symptoms are increasing or new symptoms occur.    Understands reasons for urgent and emergent care.  Patient (& family) verbalized agreement for treatment plan.     RTC 2-5 days if not improved, sooner if condition worsens/changes. Symptomatic care advised as well as reasons for urgent or emergent care. Pt / family state understanding.     This visit has been rescheduled as a phone visit to comply with patient safety concerns in accordance with CDC recommendations. Total time of discussion was 12 minutes.

## 2020-11-09 DIAGNOSIS — K76.0 NON-ALCOHOLIC FATTY LIVER DISEASE: ICD-10-CM

## 2020-11-09 DIAGNOSIS — G89.29 CHRONIC JOINT PAIN: ICD-10-CM

## 2020-11-09 DIAGNOSIS — Z98.84 HISTORY OF BARIATRIC SURGERY: ICD-10-CM

## 2020-11-09 DIAGNOSIS — F41.8 DEPRESSION WITH ANXIETY: ICD-10-CM

## 2020-11-09 DIAGNOSIS — E78.2 MIXED HYPERLIPIDEMIA: Primary | ICD-10-CM

## 2020-11-09 DIAGNOSIS — M25.50 CHRONIC JOINT PAIN: ICD-10-CM

## 2020-11-11 ENCOUNTER — LAB (OUTPATIENT)
Dept: FAMILY MEDICINE CLINIC | Facility: CLINIC | Age: 50
End: 2020-11-11

## 2020-11-11 DIAGNOSIS — Z98.84 HISTORY OF BARIATRIC SURGERY: ICD-10-CM

## 2020-11-11 DIAGNOSIS — E78.2 MIXED HYPERLIPIDEMIA: ICD-10-CM

## 2020-11-11 DIAGNOSIS — Z77.011 LEAD EXPOSURE: Primary | ICD-10-CM

## 2020-11-11 DIAGNOSIS — G89.29 CHRONIC JOINT PAIN: ICD-10-CM

## 2020-11-11 DIAGNOSIS — M25.50 CHRONIC JOINT PAIN: ICD-10-CM

## 2020-11-11 DIAGNOSIS — F41.8 DEPRESSION WITH ANXIETY: ICD-10-CM

## 2020-11-11 LAB
25(OH)D3 SERPL-MCNC: 36 NG/ML (ref 30–100)
ALBUMIN SERPL-MCNC: 4.3 G/DL (ref 3.5–5.2)
ALBUMIN/GLOB SERPL: 1.6 G/DL
ALP SERPL-CCNC: 145 U/L (ref 39–117)
ALT SERPL W P-5'-P-CCNC: 44 U/L (ref 1–33)
ANION GAP SERPL CALCULATED.3IONS-SCNC: 7.3 MMOL/L (ref 5–15)
AST SERPL-CCNC: 27 U/L (ref 1–32)
BASOPHILS # BLD AUTO: 0.06 10*3/MM3 (ref 0–0.2)
BASOPHILS NFR BLD AUTO: 0.8 % (ref 0–1.5)
BILIRUB SERPL-MCNC: 0.4 MG/DL (ref 0–1.2)
BUN SERPL-MCNC: 11 MG/DL (ref 6–20)
BUN/CREAT SERPL: 16.9 (ref 7–25)
CALCIUM SPEC-SCNC: 9.2 MG/DL (ref 8.6–10.5)
CHLORIDE SERPL-SCNC: 106 MMOL/L (ref 98–107)
CHOLEST SERPL-MCNC: 275 MG/DL (ref 0–200)
CHROMATIN AB SERPL-ACNC: 51.8 IU/ML (ref 0–14)
CK SERPL-CCNC: 141 U/L (ref 20–180)
CO2 SERPL-SCNC: 26.7 MMOL/L (ref 22–29)
CREAT SERPL-MCNC: 0.65 MG/DL (ref 0.57–1)
CRP SERPL-MCNC: 0.34 MG/DL (ref 0–0.5)
DEPRECATED RDW RBC AUTO: 40.9 FL (ref 37–54)
EOSINOPHIL # BLD AUTO: 0.14 10*3/MM3 (ref 0–0.4)
EOSINOPHIL NFR BLD AUTO: 1.8 % (ref 0.3–6.2)
ERYTHROCYTE [DISTWIDTH] IN BLOOD BY AUTOMATED COUNT: 12.5 % (ref 12.3–15.4)
ERYTHROCYTE [SEDIMENTATION RATE] IN BLOOD: 9 MM/HR (ref 0–20)
GFR SERPL CREATININE-BSD FRML MDRD: 118 ML/MIN/1.73
GLOBULIN UR ELPH-MCNC: 2.7 GM/DL
GLUCOSE SERPL-MCNC: 83 MG/DL (ref 65–99)
HCT VFR BLD AUTO: 39.4 % (ref 34–46.6)
HDLC SERPL-MCNC: 70 MG/DL (ref 40–60)
HGB BLD-MCNC: 14 G/DL (ref 12–15.9)
IMM GRANULOCYTES # BLD AUTO: 0.03 10*3/MM3 (ref 0–0.05)
IMM GRANULOCYTES NFR BLD AUTO: 0.4 % (ref 0–0.5)
LDLC SERPL CALC-MCNC: 194 MG/DL (ref 0–100)
LDLC/HDLC SERPL: 2.72 {RATIO}
LYMPHOCYTES # BLD AUTO: 2.82 10*3/MM3 (ref 0.7–3.1)
LYMPHOCYTES NFR BLD AUTO: 35.3 % (ref 19.6–45.3)
MCH RBC QN AUTO: 31.9 PG (ref 26.6–33)
MCHC RBC AUTO-ENTMCNC: 35.5 G/DL (ref 31.5–35.7)
MCV RBC AUTO: 89.7 FL (ref 79–97)
MONOCYTES # BLD AUTO: 0.56 10*3/MM3 (ref 0.1–0.9)
MONOCYTES NFR BLD AUTO: 7 % (ref 5–12)
NEUTROPHILS NFR BLD AUTO: 4.38 10*3/MM3 (ref 1.7–7)
NEUTROPHILS NFR BLD AUTO: 54.7 % (ref 42.7–76)
NRBC BLD AUTO-RTO: 0 /100 WBC (ref 0–0.2)
PLATELET # BLD AUTO: 388 10*3/MM3 (ref 140–450)
PMV BLD AUTO: 10 FL (ref 6–12)
POTASSIUM SERPL-SCNC: 4.4 MMOL/L (ref 3.5–5.2)
PROT SERPL-MCNC: 7 G/DL (ref 6–8.5)
RBC # BLD AUTO: 4.39 10*6/MM3 (ref 3.77–5.28)
SODIUM SERPL-SCNC: 140 MMOL/L (ref 136–145)
TRIGL SERPL-MCNC: 72 MG/DL (ref 0–150)
TSH SERPL DL<=0.05 MIU/L-ACNC: 0.67 UIU/ML (ref 0.27–4.2)
VIT B12 BLD-MCNC: 469 PG/ML (ref 211–946)
VLDLC SERPL-MCNC: 11 MG/DL (ref 5–40)
WBC # BLD AUTO: 7.99 10*3/MM3 (ref 3.4–10.8)

## 2020-11-11 PROCEDURE — 80053 COMPREHEN METABOLIC PANEL: CPT | Performed by: GENERAL PRACTICE

## 2020-11-11 PROCEDURE — 36415 COLL VENOUS BLD VENIPUNCTURE: CPT | Performed by: GENERAL PRACTICE

## 2020-11-11 PROCEDURE — 82607 VITAMIN B-12: CPT | Performed by: GENERAL PRACTICE

## 2020-11-11 PROCEDURE — 80061 LIPID PANEL: CPT | Performed by: GENERAL PRACTICE

## 2020-11-11 PROCEDURE — 86038 ANTINUCLEAR ANTIBODIES: CPT | Performed by: GENERAL PRACTICE

## 2020-11-11 PROCEDURE — 82306 VITAMIN D 25 HYDROXY: CPT | Performed by: GENERAL PRACTICE

## 2020-11-11 PROCEDURE — 85025 COMPLETE CBC W/AUTO DIFF WBC: CPT | Performed by: GENERAL PRACTICE

## 2020-11-11 PROCEDURE — 82550 ASSAY OF CK (CPK): CPT | Performed by: GENERAL PRACTICE

## 2020-11-11 PROCEDURE — 84443 ASSAY THYROID STIM HORMONE: CPT | Performed by: GENERAL PRACTICE

## 2020-11-11 PROCEDURE — 86140 C-REACTIVE PROTEIN: CPT | Performed by: GENERAL PRACTICE

## 2020-11-11 PROCEDURE — 83655 ASSAY OF LEAD: CPT | Performed by: NURSE PRACTITIONER

## 2020-11-11 PROCEDURE — 86431 RHEUMATOID FACTOR QUANT: CPT | Performed by: GENERAL PRACTICE

## 2020-11-11 PROCEDURE — 85652 RBC SED RATE AUTOMATED: CPT | Performed by: GENERAL PRACTICE

## 2020-11-12 LAB — ANA SER QL: NEGATIVE

## 2020-11-14 LAB
LEAD BLDV-MCNC: <1 UG/DL (ref 0–4)
ZPP RBC-MCNC: 20 UG/DL (ref 0–99)

## 2020-11-17 ENCOUNTER — LAB (OUTPATIENT)
Dept: FAMILY MEDICINE CLINIC | Facility: CLINIC | Age: 50
End: 2020-11-17

## 2020-11-17 DIAGNOSIS — G89.29 CHRONIC JOINT PAIN: Primary | ICD-10-CM

## 2020-11-17 DIAGNOSIS — G89.29 CHRONIC JOINT PAIN: ICD-10-CM

## 2020-11-17 DIAGNOSIS — M25.50 CHRONIC JOINT PAIN: ICD-10-CM

## 2020-11-17 DIAGNOSIS — M25.50 CHRONIC JOINT PAIN: Primary | ICD-10-CM

## 2020-11-17 PROCEDURE — 86200 CCP ANTIBODY: CPT | Performed by: GENERAL PRACTICE

## 2020-11-18 ENCOUNTER — TELEPHONE (OUTPATIENT)
Dept: FAMILY MEDICINE CLINIC | Facility: CLINIC | Age: 50
End: 2020-11-18

## 2020-11-18 DIAGNOSIS — M25.50 CHRONIC JOINT PAIN: Primary | ICD-10-CM

## 2020-11-18 DIAGNOSIS — M54.59 MECHANICAL LOW BACK PAIN: ICD-10-CM

## 2020-11-18 DIAGNOSIS — R76.8 RHEUMATOID FACTOR POSITIVE: ICD-10-CM

## 2020-11-18 DIAGNOSIS — G89.29 CHRONIC JOINT PAIN: Primary | ICD-10-CM

## 2020-11-18 NOTE — TELEPHONE ENCOUNTER
----- Message from Hoang Palacios MD sent at 11/18/2020  1:13 PM EST -----  Referral placed in epic  Please forward copies of all of her labs from last month along with my most recent progress note  ----- Message -----  From: Ariadna Little MA  Sent: 11/18/2020  12:59 PM EST  To: Hoang Palacios MD    She would like to see Jay quinn in [place].   ----- Message -----  From: Hoang Palacios MD  Sent: 11/16/2020   7:08 PM EST  To: Ariadna Little MA    Her RF factor is elevated  The rest of her rheumatologic tests were ok  We should arrange a rheumatology opinion-does she have a preference?  ----- Message -----  From: Ariadna Little MA  Sent: 11/16/2020  11:22 AM EST  To: Hoang Palacios MD    Pt called and wanted to know her blood work results. She said that she has been in a lot of pain the last couple of nights. She said that she has not been able to sleep at all due to her pain. She is wondering if her rheumatoid panel is elevated.

## 2020-11-19 LAB — CCP IGA+IGG SERPL IA-ACNC: 6 UNITS (ref 0–19)

## 2020-12-15 RX ORDER — PHENTERMINE HYDROCHLORIDE 37.5 MG/1
TABLET ORAL
Qty: 30 TABLET | Refills: 0 | Status: SHIPPED | OUTPATIENT
Start: 2020-12-15 | End: 2021-02-08

## 2021-02-08 ENCOUNTER — LAB (OUTPATIENT)
Dept: FAMILY MEDICINE CLINIC | Facility: CLINIC | Age: 51
End: 2021-02-08

## 2021-02-08 DIAGNOSIS — M79.7 FIBROMYALGIA, PRIMARY: ICD-10-CM

## 2021-02-08 DIAGNOSIS — M47.816 LUMBAR SPONDYLOSIS: ICD-10-CM

## 2021-02-08 DIAGNOSIS — Z79.890 NEED FOR PROPHYLACTIC HORMONE REPLACEMENT THERAPY (POSTMENOPAUSAL): ICD-10-CM

## 2021-02-08 DIAGNOSIS — M54.50 LOW BACK PAIN, UNSPECIFIED BACK PAIN LATERALITY, UNSPECIFIED CHRONICITY, UNSPECIFIED WHETHER SCIATICA PRESENT: ICD-10-CM

## 2021-02-08 DIAGNOSIS — R76.8 FALSE POSITIVE SEROLOGICAL TEST FOR SYPHILIS: ICD-10-CM

## 2021-02-08 DIAGNOSIS — M79.7 SCAPULOHUMERAL FIBROSITIS: ICD-10-CM

## 2021-02-08 DIAGNOSIS — M05.79 SEROPOSITIVE RHEUMATOID ARTHRITIS OF MULTIPLE SITES (HCC): Primary | ICD-10-CM

## 2021-02-08 PROCEDURE — 85007 BL SMEAR W/DIFF WBC COUNT: CPT

## 2021-02-08 PROCEDURE — 85025 COMPLETE CBC W/AUTO DIFF WBC: CPT | Performed by: INTERNAL MEDICINE

## 2021-02-08 PROCEDURE — 80053 COMPREHEN METABOLIC PANEL: CPT | Performed by: INTERNAL MEDICINE

## 2021-02-08 RX ORDER — PHENTERMINE HYDROCHLORIDE 37.5 MG/1
TABLET ORAL
Qty: 30 TABLET | Refills: 0 | Status: SHIPPED | OUTPATIENT
Start: 2021-02-08 | End: 2021-07-01

## 2021-02-09 LAB
ALBUMIN SERPL-MCNC: 4.3 G/DL (ref 3.5–5.2)
ALBUMIN/GLOB SERPL: 1.5 G/DL
ALP SERPL-CCNC: 118 U/L (ref 39–117)
ALT SERPL W P-5'-P-CCNC: 36 U/L (ref 1–33)
ANION GAP SERPL CALCULATED.3IONS-SCNC: 9.9 MMOL/L (ref 5–15)
ANISOCYTOSIS BLD QL: ABNORMAL
AST SERPL-CCNC: 26 U/L (ref 1–32)
BASOPHILS # BLD MANUAL: 0.07 10*3/MM3 (ref 0–0.2)
BASOPHILS NFR BLD AUTO: 1 % (ref 0–1.5)
BILIRUB SERPL-MCNC: 0.2 MG/DL (ref 0–1.2)
BUN SERPL-MCNC: 12 MG/DL (ref 6–20)
BUN/CREAT SERPL: 16.4 (ref 7–25)
BURR CELLS BLD QL SMEAR: ABNORMAL
CALCIUM SPEC-SCNC: 9.6 MG/DL (ref 8.6–10.5)
CHLORIDE SERPL-SCNC: 104 MMOL/L (ref 98–107)
CO2 SERPL-SCNC: 27.1 MMOL/L (ref 22–29)
CREAT SERPL-MCNC: 0.73 MG/DL (ref 0.57–1)
DACRYOCYTES BLD QL SMEAR: ABNORMAL
DEPRECATED RDW RBC AUTO: 44.8 FL (ref 37–54)
EOSINOPHIL # BLD MANUAL: 0.07 10*3/MM3 (ref 0–0.4)
EOSINOPHIL NFR BLD MANUAL: 1 % (ref 0.3–6.2)
ERYTHROCYTE [DISTWIDTH] IN BLOOD BY AUTOMATED COUNT: 13.5 % (ref 12.3–15.4)
GFR SERPL CREATININE-BSD FRML MDRD: 103 ML/MIN/1.73
GLOBULIN UR ELPH-MCNC: 2.8 GM/DL
GLUCOSE SERPL-MCNC: 96 MG/DL (ref 65–99)
HCT VFR BLD AUTO: 42.4 % (ref 34–46.6)
HGB BLD-MCNC: 14.4 G/DL (ref 12–15.9)
LYMPHOCYTES # BLD MANUAL: 3.02 10*3/MM3 (ref 0.7–3.1)
LYMPHOCYTES NFR BLD MANUAL: 41 % (ref 19.6–45.3)
LYMPHOCYTES NFR BLD MANUAL: 9 % (ref 5–12)
MCH RBC QN AUTO: 31.4 PG (ref 26.6–33)
MCHC RBC AUTO-ENTMCNC: 34 G/DL (ref 31.5–35.7)
MCV RBC AUTO: 92.4 FL (ref 79–97)
MICROCYTES BLD QL: ABNORMAL
MONOCYTES # BLD AUTO: 0.66 10*3/MM3 (ref 0.1–0.9)
MYELOCYTES NFR BLD MANUAL: 2 % (ref 0–0)
NEUTROPHILS # BLD AUTO: 3.39 10*3/MM3 (ref 1.7–7)
NEUTROPHILS NFR BLD MANUAL: 46 % (ref 42.7–76)
NRBC BLD AUTO-RTO: 0 /100 WBC (ref 0–0.2)
PLAT MORPH BLD: NORMAL
PLATELET # BLD AUTO: 343 10*3/MM3 (ref 140–450)
PMV BLD AUTO: 10.3 FL (ref 6–12)
POIKILOCYTOSIS BLD QL SMEAR: ABNORMAL
POTASSIUM SERPL-SCNC: 4.1 MMOL/L (ref 3.5–5.2)
PROT SERPL-MCNC: 7.1 G/DL (ref 6–8.5)
RBC # BLD AUTO: 4.59 10*6/MM3 (ref 3.77–5.28)
SODIUM SERPL-SCNC: 141 MMOL/L (ref 136–145)
WBC # BLD AUTO: 7.36 10*3/MM3 (ref 3.4–10.8)
WBC MORPH BLD: NORMAL

## 2021-03-17 ENCOUNTER — HOSPITAL ENCOUNTER (OUTPATIENT)
Dept: CT IMAGING | Facility: HOSPITAL | Age: 51
Discharge: HOME | End: 2021-03-17
Admitting: NURSE PRACTITIONER

## 2021-03-17 ENCOUNTER — OFFICE VISIT (OUTPATIENT)
Dept: FAMILY MEDICINE CLINIC | Facility: CLINIC | Age: 51
End: 2021-03-17

## 2021-03-17 VITALS
WEIGHT: 267 LBS | OXYGEN SATURATION: 98 % | SYSTOLIC BLOOD PRESSURE: 124 MMHG | DIASTOLIC BLOOD PRESSURE: 90 MMHG | TEMPERATURE: 97 F | HEIGHT: 67 IN | HEART RATE: 84 BPM | BODY MASS INDEX: 41.91 KG/M2

## 2021-03-17 DIAGNOSIS — K59.09 OTHER CONSTIPATION: Primary | ICD-10-CM

## 2021-03-17 DIAGNOSIS — R10.11 RUQ PAIN: Primary | ICD-10-CM

## 2021-03-17 DIAGNOSIS — R19.8: ICD-10-CM

## 2021-03-17 PROCEDURE — 74176 CT ABD & PELVIS W/O CONTRAST: CPT

## 2021-03-17 PROCEDURE — 74176 CT ABD & PELVIS W/O CONTRAST: CPT | Performed by: RADIOLOGY

## 2021-03-17 PROCEDURE — 99213 OFFICE O/P EST LOW 20 MIN: CPT | Performed by: NURSE PRACTITIONER

## 2021-03-17 RX ORDER — FOLIC ACID 1 MG/1
1000 TABLET ORAL DAILY
COMMUNITY
Start: 2021-02-08 | End: 2021-06-18

## 2021-03-17 RX ORDER — PREDNISONE 10 MG/1
TABLET ORAL
COMMUNITY
Start: 2021-02-27 | End: 2021-06-18

## 2021-03-17 RX ORDER — POLYETHYLENE GLYCOL 3350 17 G/17G
17 POWDER, FOR SOLUTION ORAL DAILY PRN
Qty: 60 PACKET | Refills: 5 | Status: SHIPPED | OUTPATIENT
Start: 2021-03-17 | End: 2021-03-18

## 2021-03-17 RX ORDER — IBUPROFEN 800 MG/1
TABLET ORAL
COMMUNITY
Start: 2021-02-22 | End: 2021-03-18

## 2021-03-17 RX ORDER — TIZANIDINE 4 MG/1
4 TABLET ORAL DAILY
COMMUNITY
Start: 2021-03-05 | End: 2021-04-27 | Stop reason: HOSPADM

## 2021-03-17 NOTE — PROGRESS NOTES
"Subjective   Susie Rangel is a 50 y.o. female.     Chief Complaint   Patient presents with   • Abdominal Pain       History of Present Illness     Abdomen complaint-right sided.   She does have a history of a node/tumor in her right lower side but was \"fatty tumor\".  She has been diagnosed with RA and has been on steroids for several weeks.  She reports she has been trying to walk and had only been walking short distance but yesterday she did walk further.  She reports that she had however woke with some mild discomfort along her hip and groin level.  Pain is constant but varies based on sitting (dull in nature) to standing/walking (pain increases in sharpness and severity).  She has taken some Ibuprofen.  She is also on Methotrexate for immuno suppression.  She reports pain does not seem to be radiating .  No nausea today but did have some yesterday.  She did take zofran. She reports no near falls, falls, or any type of injury.  Mild constipation is reported.  Some increase in urine frequency but no dysuria.  No hematuria or blood in stool.  She reports she is fatigued today.  No known illness exposure.  No fever or chills today.  Patient is status post hysterectomy.  She does report she has one remaining ovary but she is uncertain which side the ovary is on.  Throat concerns-has been noting some increased difficulty in swallowing.  She reports she is not having any increased GERD.  She reports she has had some anxiety and does get some throat discomfort when she is anxious.      The following portions of the patient's history were reviewed and updated as appropriate: CC, ROS, allergies, current medications, past family history, past medical history, past social history, past surgical history and problem list.      Review of Systems   Constitutional: Positive for fatigue. Negative for appetite change and fever.   HENT: Negative for congestion, ear pain, nosebleeds, postnasal drip, rhinorrhea, sore throat, tinnitus, " "trouble swallowing and voice change.    Eyes: Negative for blurred vision, photophobia and visual disturbance.   Respiratory: Negative for cough, chest tightness, shortness of breath and wheezing.    Cardiovascular: Negative for chest pain, palpitations and leg swelling.   Gastrointestinal: Positive for abdominal pain. Negative for blood in stool, constipation, diarrhea, nausea and GERD.   Endocrine: Negative for cold intolerance, heat intolerance and polydipsia.   Genitourinary: Positive for frequency. Negative for decreased urine volume, difficulty urinating, dysuria and hematuria.   Musculoskeletal: Positive for back pain. Negative for arthralgias, gait problem and myalgias.   Skin: Negative for color change, pallor and bruise.   Allergic/Immunologic: Negative.    Neurological: Positive for dizziness. Negative for syncope, numbness and headache.   Hematological: Negative.    Psychiatric/Behavioral: Negative for decreased concentration, sleep disturbance, suicidal ideas and depressed mood. The patient is not nervous/anxious.    All other systems reviewed and are negative.      Objective     /90   Pulse 84   Temp 97 °F (36.1 °C)   Ht 170.2 cm (67.01\")   Wt 121 kg (267 lb)   SpO2 98%   BMI 41.81 kg/m²     Physical Exam  Vitals reviewed.   Constitutional:       General: She is not in acute distress.     Appearance: Normal appearance. She is well-developed.   HENT:      Head: Normocephalic and atraumatic.      Comments: Oropharynx not examined.  Patient is presently wearing a face covering/mask due to COVID-19 pandemic.     Right Ear: Ear canal and external ear normal.      Left Ear: Tympanic membrane, ear canal and external ear normal.   Eyes:      General: No scleral icterus.     Pupils: Pupils are equal, round, and reactive to light.   Neck:      Thyroid: No thyromegaly.      Vascular: No JVD.   Cardiovascular:      Rate and Rhythm: Normal rate and regular rhythm.      Heart sounds: Normal heart sounds. "   Pulmonary:      Effort: Pulmonary effort is normal.      Breath sounds: Normal breath sounds.   Abdominal:      General: Bowel sounds are normal.      Palpations: Abdomen is soft.      Tenderness: There is abdominal tenderness in the right lower quadrant. Positive signs include psoas sign.       Musculoskeletal:      Cervical back: Normal range of motion and neck supple.      Lumbar back: Spasms and tenderness present.      Right hip: Tenderness and bony tenderness present. Decreased range of motion.      Left hip: Tenderness and bony tenderness present. Decreased range of motion.   Skin:     General: Skin is warm and dry.      Capillary Refill: Capillary refill takes less than 2 seconds.   Neurological:      Mental Status: She is alert and oriented to person, place, and time.      Cranial Nerves: No cranial nerve deficit.      Coordination: Coordination normal.      Gait: Gait normal.   Psychiatric:         Behavior: Behavior normal.         Thought Content: Thought content normal.         Judgment: Judgment normal.       Assessment/Plan     Problem List Items Addressed This Visit     None      Visit Diagnoses     RUQ pain    -  Primary    Relevant Orders    CT Abdomen Pelvis Without Contrast    Psoas test positive        Relevant Orders    CT Abdomen Pelvis Without Contrast             Understands disease processes and need for medications.  Understands reasons for urgent and emergent care.  Patient (& family) verbalized agreement for treatment plan.   Emotional support and active listening provided.  Patient provided time to verbalize feelings.    We will attempt to arrange a stat CT of abdomen.  Will call patient with results of CT scan when available.        This document has been electronically signed by:  HANNA Coombs FNP-C Dragon disclaimer:  Much of this encounter note is an electronic transcription/translation of spoken language to printed text. The electronic translation of spoken language  may permit erroneous, or at times, nonsensical words or phrases to be inadvertently transcribed; Although I have reviewed the note for such errors, some may still exist.

## 2021-03-17 NOTE — PROGRESS NOTES
Patient notified of labs via [employer].  Patient message is as follows:      No appendix concerns.  Abundant amount of stool in right and transverse colon.  Simple right renal cyst 3.3 cm

## 2021-03-18 ENCOUNTER — OFFICE VISIT (OUTPATIENT)
Dept: FAMILY MEDICINE CLINIC | Facility: CLINIC | Age: 51
End: 2021-03-18

## 2021-03-18 ENCOUNTER — BULK ORDERING (OUTPATIENT)
Dept: CASE MANAGEMENT | Facility: OTHER | Age: 51
End: 2021-03-18

## 2021-03-18 VITALS — BODY MASS INDEX: 41.91 KG/M2 | HEIGHT: 67 IN | WEIGHT: 267 LBS

## 2021-03-18 DIAGNOSIS — R60.0 PEDAL EDEMA: ICD-10-CM

## 2021-03-18 DIAGNOSIS — F41.8 DEPRESSION WITH ANXIETY: ICD-10-CM

## 2021-03-18 DIAGNOSIS — K59.09 CHRONIC CONSTIPATION: ICD-10-CM

## 2021-03-18 DIAGNOSIS — M05.79 RHEUMATOID ARTHRITIS INVOLVING MULTIPLE SITES WITH POSITIVE RHEUMATOID FACTOR (HCC): ICD-10-CM

## 2021-03-18 DIAGNOSIS — Z23 IMMUNIZATION DUE: ICD-10-CM

## 2021-03-18 DIAGNOSIS — I87.2 CHRONIC VENOUS INSUFFICIENCY: ICD-10-CM

## 2021-03-18 DIAGNOSIS — K76.0 NON-ALCOHOLIC FATTY LIVER DISEASE: ICD-10-CM

## 2021-03-18 DIAGNOSIS — Z12.31 ENCOUNTER FOR SCREENING MAMMOGRAM FOR BREAST CANCER: ICD-10-CM

## 2021-03-18 DIAGNOSIS — R13.14 PHARYNGOESOPHAGEAL DYSPHAGIA: ICD-10-CM

## 2021-03-18 DIAGNOSIS — J30.9 CHRONIC ALLERGIC RHINITIS: Primary | ICD-10-CM

## 2021-03-18 DIAGNOSIS — M54.59 MECHANICAL LOW BACK PAIN: ICD-10-CM

## 2021-03-18 DIAGNOSIS — E66.01 CLASS 2 SEVERE OBESITY WITH SERIOUS COMORBIDITY AND BODY MASS INDEX (BMI) OF 39.0 TO 39.9 IN ADULT, UNSPECIFIED OBESITY TYPE (HCC): ICD-10-CM

## 2021-03-18 DIAGNOSIS — Z00.00 HEALTHCARE MAINTENANCE: ICD-10-CM

## 2021-03-18 DIAGNOSIS — E78.2 MIXED HYPERLIPIDEMIA: ICD-10-CM

## 2021-03-18 DIAGNOSIS — K21.9 GASTROESOPHAGEAL REFLUX DISEASE WITHOUT ESOPHAGITIS: ICD-10-CM

## 2021-03-18 PROBLEM — J45.909 REACTIVE AIRWAY DISEASE WITHOUT COMPLICATION: Status: RESOLVED | Noted: 2019-04-19 | Resolved: 2021-03-18

## 2021-03-18 PROCEDURE — 99214 OFFICE O/P EST MOD 30 MIN: CPT | Performed by: GENERAL PRACTICE

## 2021-03-18 NOTE — PROGRESS NOTES
Subjective   Susie Rangel is a 50 y.o. female.     You have chosen to receive care through a telehealth visit.  Do you consent to use a video/audio connection for your medical care today? Yes    I did not complete this video visit with the patient using [employer] but rather [employer].    History of Present Illness     Constipation  She gives a 3 to 4 week history of increased constipation.  This has been associated with intermittent abdominal distention.  Within the last year she has also experienced an intermittent tightness about her upper neck when swallowing solid food.  There is no history of any nausea, vomiting, or heartburn and she denies any diarrhea, hematochezia, or melena.  There is no history of any fever, chills, night sweats or weight loss.  She has tried MiraLAX with limited effect    Rheumatoid Arthritis  RF drawn on 11/11/2020 returned at 51.8.  CCP antibodies returned negative as did an RENATO.  ESR and CRP were unremarkable.  She is currently followed by rheumatology and is prescribed prednisone 10 daily methotrexate 15 weekly and folate 1 daily.  She has noted some improvement in her joint pain.  She admits to morning stiffness for 20 to 30 minutes but denies any joint swelling or redness and has had no rash.  Lab Results   Component Value Date    WBC 7.36 02/08/2021    HGB 14.4 02/08/2021    HCT 42.4 02/08/2021    MCV 92.4 02/08/2021     02/08/2021     Lab Results   Component Value Date    GLUCOSE 96 02/08/2021    BUN 12 02/08/2021    CREATININE 0.73 02/08/2021    EGFRIFAFRI 103 02/08/2021    BCR 16.4 02/08/2021    K 4.1 02/08/2021    CO2 27.1 02/08/2021    CALCIUM 9.6 02/08/2021    ALBUMIN 4.30 02/08/2021    AST 26 02/08/2021    ALT 36 (H) 02/08/2021     Low Back Pain  There has been no change in the quality or severity of her back pain nor any new associated symptoms.  She continues to deny any changes in her strength, sensation, or bowel/bladder control.      Left Lower Extremity Edema  She  continues to have intermittent mild swelling about her left foot.  This has been unassociated with any other symptoms and she denies any pain or discoloration.  The swelling tends to develop toward the end of the day and improves overnight.    Dyslipidemia  Compliance with treatment has been fair.    Lab Results   Component Value Date    CHOL 275 (H) 11/11/2020    TRIG 72 11/11/2020    HDL 70 (H) 11/11/2020     (H) 11/11/2020     Depression  Her  continues to do better and she has had a continued improvement in her nervousness, appetite, and sleep.  There is no history of any depression, loss of interest in activities, or suicidal ideation.    Lab Results   Component Value Date    TSH 0.666 11/11/2020     Labs  Most recent vitamin D 36 with a B12 of 469    The following portions of the patient's history were reviewed and updated as appropriate: allergies, current medications, past medical history, past social history and problem list.    Review of Systems   Constitutional: Positive for fatigue. Negative for appetite change, chills, fever and unexpected weight change.   HENT: Positive for rhinorrhea and sneezing. Negative for congestion, postnasal drip, sinus pressure, sore throat and voice change.    Eyes: Negative for visual disturbance.   Respiratory: Negative for cough, shortness of breath and wheezing.    Cardiovascular: Positive for leg swelling (left). Negative for chest pain and palpitations.   Gastrointestinal: Positive for constipation. Negative for abdominal pain, blood in stool, diarrhea, nausea and vomiting.   Endocrine:        Hot flashes   Genitourinary: Negative for difficulty urinating, dysuria, frequency, hematuria and urgency.   Musculoskeletal: Positive for arthralgias and back pain. Negative for neck pain.   Skin: Negative for rash.        Hair loss   Neurological: Negative for weakness, numbness and headaches.   Psychiatric/Behavioral: Negative for decreased concentration,  dysphoric mood and sleep disturbance. The patient is nervous/anxious.      Objective   Physical Exam  Constitutional:       Comments: Bright and in fair spirits. No apparent distress. No pallor, jaundice, diaphoresis, or cyanosis.     Pulmonary:      Comments: No cough or audible wheezing  Skin:     Findings: No rash.   Psychiatric:         Attention and Perception: Attention normal.         Mood and Affect: Mood normal.         Speech: Speech normal.         Behavior: Behavior normal.         Thought Content: Thought content normal.       Assessment/Plan   Problems Addressed this Visit        Allergies and Adverse Reactions    Chronic allergic rhinitis        Cardiac and Vasculature    Chronic venous insufficiency    Mixed hyperlipidemia  Encouraged to continue to work on her diet and exercise plan.  We will discussed the potential benefits and risks of statin therapy at her return       Endocrine and Metabolic    Class 2 obesity with body mass index (BMI) of 39.0 to 39.9 in adult       Gastrointestinal Abdominal     Chronic constipation  Trial of linaclotide in place of polyethylene glycol  We will arrange a GI assessment    Relevant Medications    linaclotide (LINZESS) 145 MCG capsule capsule    Other Relevant Orders    Ambulatory Referral to Gastroenterology    Gastroesophageal reflux disease without esophagitis   Symptoms are currently well controlled.  Continue current medication.    Non-alcoholic fatty liver disease    Pharyngoesophageal dysphagia  As above.    Relevant Orders    Ambulatory Referral to Gastroenterology       Health Encounters    Healthcare maintenance  Patient has received [employer] Covid immunization  We will arrange an updated mammogram along with a DEXA scan    Relevant Orders    Mammo Screening Digital Tomosynthesis Bilateral With CAD    DEXA Bone Density Axial       Mental Health    Depression with anxiety  Significant situational component.   Supportive therapy.   Continue  current medication.       Musculoskeletal and Injuries    Mechanical low back pain  Reminded regarding symptomatic treatment.   Reviewed options and agreed on a pain management assessment    Relevant Orders    Ambulatory Referral to Pain Management    Rheumatoid arthritis involving multiple sites with positive rheumatoid factor (CMS/HCC)  Continue current medication  Follow up with  rheumatology    Relevant Orders    DEXA Bone Density Axial       Symptoms and Signs    Pedal edema         Diagnoses       Codes Comments    Chronic allergic rhinitis    -  Primary ICD-10-CM: J30.9  ICD-9-CM: 477.9     Mixed hyperlipidemia     ICD-10-CM: E78.2  ICD-9-CM: 272.2     Chronic venous insufficiency     ICD-10-CM: I87.2  ICD-9-CM: 459.81     Non-alcoholic fatty liver disease     ICD-10-CM: K76.0  ICD-9-CM: 571.8     Gastroesophageal reflux disease without esophagitis     ICD-10-CM: K21.9  ICD-9-CM: 530.81     Chronic constipation     ICD-10-CM: K59.09  ICD-9-CM: 564.00     Healthcare maintenance     ICD-10-CM: Z00.00  ICD-9-CM: V70.0     Depression with anxiety     ICD-10-CM: F41.8  ICD-9-CM: 300.4     Mechanical low back pain     ICD-10-CM: M54.5  ICD-9-CM: 724.2     Rheumatoid arthritis involving multiple sites with positive rheumatoid factor (CMS/HCC)     ICD-10-CM: M05.79  ICD-9-CM: 714.0     Pharyngoesophageal dysphagia     ICD-10-CM: R13.14  ICD-9-CM: 787.24     Encounter for screening mammogram for breast cancer     ICD-10-CM: Z12.31  ICD-9-CM: V76.12     Class 2 severe obesity with serious comorbidity and body mass index (BMI) of 39.0 to 39.9 in adult, unspecified obesity type (CMS/HCC)     ICD-10-CM: E66.01, Z68.39  ICD-9-CM: 278.01, V85.39     Pedal edema     ICD-10-CM: R60.0  ICD-9-CM: 782.3

## 2021-03-24 ENCOUNTER — TELEPHONE (OUTPATIENT)
Dept: FAMILY MEDICINE CLINIC | Facility: CLINIC | Age: 51
End: 2021-03-24

## 2021-03-31 ENCOUNTER — TELEPHONE (OUTPATIENT)
Dept: FAMILY MEDICINE CLINIC | Facility: CLINIC | Age: 51
End: 2021-03-31

## 2021-03-31 NOTE — TELEPHONE ENCOUNTER
Caller: [employer]    Relationship: INS[employer]ANCE COMPANY    Best call back number: [phone]    What form or medical record are you requesting: VERBAL CONFIRMATION OF WHEN THE PATIENT WAS TAKEN OFF WORK, A DIAGNOSIS, AND ESTIMATED RETURN TO WORK DATE.     Who is requesting this form or medical record from you: INSURANCE COMPANY.    How would you like to receive the form or medical records (pick-up, mail, fax): VIA TELEPHONE  If fax, what is the fax number: N/A  If mail, what is the address: N/A  If pick-up, provide patient with address and location details: N/A    Timeframe paperwork needed: ASAP    Additional notes: [employer] IS REQUESTING A CALL BACK.

## 2021-04-07 ENCOUNTER — CONSULT (OUTPATIENT)
Dept: GASTROENTEROLOGY | Facility: CLINIC | Age: 51
End: 2021-04-07

## 2021-04-07 VITALS
DIASTOLIC BLOOD PRESSURE: 92 MMHG | BODY MASS INDEX: 41.25 KG/M2 | TEMPERATURE: 96.8 F | WEIGHT: 262.8 LBS | HEART RATE: 83 BPM | SYSTOLIC BLOOD PRESSURE: 137 MMHG | HEIGHT: 67 IN | OXYGEN SATURATION: 96 %

## 2021-04-07 DIAGNOSIS — K59.00 CONSTIPATION, UNSPECIFIED CONSTIPATION TYPE: Primary | ICD-10-CM

## 2021-04-07 DIAGNOSIS — R13.19 ESOPHAGEAL DYSPHAGIA: ICD-10-CM

## 2021-04-07 DIAGNOSIS — K59.00 CONSTIPATION, UNSPECIFIED CONSTIPATION TYPE: ICD-10-CM

## 2021-04-07 DIAGNOSIS — Z01.818 PREOPERATIVE CLEARANCE: Primary | ICD-10-CM

## 2021-04-07 PROCEDURE — 99204 OFFICE O/P NEW MOD 45 MIN: CPT | Performed by: INTERNAL MEDICINE

## 2021-04-07 RX ORDER — LEFLUNOMIDE 10 MG/1
10 TABLET ORAL DAILY
COMMUNITY
End: 2022-12-13 | Stop reason: DRUGHIGH

## 2021-04-07 RX ORDER — SODIUM, POTASSIUM,MAG SULFATES 17.5-3.13G
2 SOLUTION, RECONSTITUTED, ORAL ORAL EVERY 12 HOURS
Qty: 708 ML | Refills: 0 | Status: SHIPPED | OUTPATIENT
Start: 2021-04-07 | End: 2021-04-08

## 2021-04-07 NOTE — PROGRESS NOTES
Subjective     Susie Rangel is a 50 y.o. female who presents to the office today as a consultation from Hoang Palacios MD for evaluation of Abdominal Pain        History of Present Illness:  The patient presents today for evaluation of constipation and throat discomfort.  The patient states she will have a bm daily to every 3 days.  Sometimes she does empty well and sometimes she doesn't.  Her stool is a Ponce score 1-3 but with medications it will be a type 4. She was given Linzess by her PCP but it was not successful.  She has had a gastric sleeve.  She has reflux.  At times she will have reflux during the night.  She states that fluid will reflux up into her mouth.  It is not as bad as usual lately since she has been on prednisone.  She complains of cervical discomfort when swallowing but does not have pain when swallowing.  She takes omeprazole over-the-counter intermittently.  She does not take this on a daily basis.  Recently, diagnosed with RA.  She is on Methotrexate. She is intermittently on Prednisone since December 2020. No family history of colon cancer. No BRBPR or melena.      Review of Systems:  Review of Systems   Constitutional: Positive for fatigue. Negative for chills and fever.   HENT: Positive for trouble swallowing.    Eyes: Negative.    Respiratory: Negative for cough, choking, chest tightness and shortness of breath.    Cardiovascular: Negative for chest pain.   Gastrointestinal: Positive for abdominal distention, abdominal pain, constipation and nausea. Negative for anal bleeding, blood in stool, diarrhea and vomiting.   Endocrine: Negative.    Genitourinary: Negative for difficulty urinating.   Musculoskeletal: Positive for back pain. Negative for neck pain.   Skin: Negative.    Allergic/Immunologic: Negative for environmental allergies and food allergies.   Neurological: Positive for dizziness, light-headedness and headaches.   Hematological: Does not bruise/bleed easily.    Psychiatric/Behavioral: Negative.        Past Medical History:  Past Medical History:   Diagnosis Date   • Arthritis    • Class 2 obesity with serious comorbidity and body mass index (BMI) of 38.0 to 38.9 in adult 6/16/2016    Hx laparoscopic sleeve gastrectomy    • Dyslipidemia 6/16/2016   • GERD (gastroesophageal reflux disease)    • Low back pain    • Obesity    • Vitamin D deficiency        Past Surgical History:  Past Surgical History:   Procedure Laterality Date   • BREAST SURGERY     • GASTRIC SLEEVE LAPAROSCOPIC     • HYSTERECTOMY     • PANNICULECTOMY     • REDUCTION MAMMAPLASTY      2013       Family History:  Family History   Problem Relation Age of Onset   • Hypertension Mother    • Heart disease Father    • Breast cancer Neg Hx        Social History:  Social History     Socioeconomic History   • Marital status:      Spouse name: oren   • Number of children: 1   • Years of education: 14   • Highest education level: Not on file   Tobacco Use   • Smoking status: Never Smoker   • Smokeless tobacco: Never Used   Vaping Use   • Vaping Use: Never used   Substance and Sexual Activity   • Alcohol use: No   • Drug use: No   • Sexual activity: Yes       Current Medication List:    Current Outpatient Medications:   •  fluticasone (Flonase) 50 MCG/ACT nasal spray, Administer 2 sprays both nostrils once daily, Disp: 1 bottle, Rfl: 5  •  folic acid (FOLVITE) 1 MG tablet, Take 1,000 mcg by mouth Daily., Disp: , Rfl:   •  hydrOXYzine (ATARAX) 10 MG tablet, Take 1 tablet by mouth 3 (Three) Times a Day., Disp: 90 tablet, Rfl: 5  •  leflunomide (ARAVA) 10 MG tablet, Take 10 mg by mouth Daily., Disp: , Rfl:   •  linaclotide (LINZESS) 145 MCG capsule capsule, 1 every morning before breakfast, Disp: 48 capsule, Rfl: 0  •  methotrexate 2.5 MG tablet, Take 15 mg by mouth 1 (One) Time Per Week., Disp: , Rfl:   •  montelukast (SINGULAIR) 10 MG tablet, Take 1 tablet by mouth Daily., Disp: 30 tablet, Rfl: 5  •  omeprazole  "(priLOSEC) 40 MG capsule, Take 1 capsule by mouth 2 (Two) Times a Day., Disp: 60 capsule, Rfl: 5  •  ondansetron (Zofran) 4 MG tablet, Take 1 tablet by mouth Every 8 (Eight) Hours As Needed for Nausea or Vomiting., Disp: 20 tablet, Rfl: 1  •  phentermine (ADIPEX-P) 37.5 MG tablet, TAKE 1/2 TABLET BY MOUTH EVERY DAY X 1 WEEK, THEN TAKE 1 TABLET BY MOUTH DAILY THEREAFTER, Disp: 30 tablet, Rfl: 0  •  predniSONE (DELTASONE) 10 MG tablet, TAKE 1 TABLET BY MOUTH EVERY DAY FOR 2 TO 5 DAYS AS NEEDED FOR FLARE UP. MAY REPEAT 1 TIME A WEEK, Disp: , Rfl:   •  tiZANidine (ZANAFLEX) 4 MG tablet, Take 4 mg by mouth Daily., Disp: , Rfl:   •  sodium-potassium-magnesium sulfates (Suprep Bowel Prep Kit) 17.5-3.13-1.6 GM/177ML solution oral solution, Take 2 bottles by mouth Every 12 (Twelve) Hours for 2 doses., Disp: 708 mL, Rfl: 0    Allergies:   Patient has no known allergies.    Vitals:  /92 (BP Location: Left arm, Patient Position: Sitting, Cuff Size: Adult)   Pulse 83   Temp 96.8 °F (36 °C)   Ht 170.2 cm (67\")   Wt 119 kg (262 lb 12.8 oz)   SpO2 96%   BMI 41.16 kg/m²     Physical Exam:  Physical Exam  Constitutional:       Appearance: She is obese.   HENT:      Head: Normocephalic and atraumatic.      Nose: Nose normal. No congestion or rhinorrhea.   Eyes:      General: No scleral icterus.     Extraocular Movements: Extraocular movements intact.      Conjunctiva/sclera: Conjunctivae normal.      Pupils: Pupils are equal, round, and reactive to light.   Cardiovascular:      Rate and Rhythm: Normal rate and regular rhythm.      Pulses: Normal pulses.      Heart sounds: Normal heart sounds.   Pulmonary:      Effort: Pulmonary effort is normal.      Breath sounds: Normal breath sounds.   Abdominal:      General: Abdomen is flat. Bowel sounds are normal. There is no distension.      Palpations: Abdomen is soft. There is no shifting dullness, fluid wave, hepatomegaly, splenomegaly, mass or pulsatile mass.      Tenderness: " There is no abdominal tenderness. There is no guarding or rebound.      Hernia: No hernia is present.   Musculoskeletal:         General: No swelling or tenderness.      Cervical back: Normal range of motion and neck supple.   Skin:     General: Skin is warm and dry.      Coloration: Skin is not jaundiced.   Neurological:      General: No focal deficit present.      Mental Status: She is alert and oriented to person, place, and time.   Psychiatric:         Mood and Affect: Mood normal.         Behavior: Behavior normal.         Results Review:  Lab Results:   No visits with results within 1 Month(s) from this visit.   Latest known visit with results is:   Lab on 02/08/2021   Component Date Value Ref Range Status   • Glucose 02/08/2021 96  65 - 99 mg/dL Final   • BUN 02/08/2021 12  6 - 20 mg/dL Final   • Creatinine 02/08/2021 0.73  0.57 - 1.00 mg/dL Final   • Sodium 02/08/2021 141  136 - 145 mmol/L Final   • Potassium 02/08/2021 4.1  3.5 - 5.2 mmol/L Final   • Chloride 02/08/2021 104  98 - 107 mmol/L Final   • CO2 02/08/2021 27.1  22.0 - 29.0 mmol/L Final   • Calcium 02/08/2021 9.6  8.6 - 10.5 mg/dL Final   • Total Protein 02/08/2021 7.1  6.0 - 8.5 g/dL Final   • Albumin 02/08/2021 4.30  3.50 - 5.20 g/dL Final   • ALT (SGPT) 02/08/2021 36* 1 - 33 U/L Final   • AST (SGOT) 02/08/2021 26  1 - 32 U/L Final   • Alkaline Phosphatase 02/08/2021 118* 39 - 117 U/L Final   • Total Bilirubin 02/08/2021 0.2  0.0 - 1.2 mg/dL Final   • eGFR   Amer 02/08/2021 103  >60 mL/min/1.73 Final   • Globulin 02/08/2021 2.8  gm/dL Final   • A/G Ratio 02/08/2021 1.5  g/dL Final   • BUN/Creatinine Ratio 02/08/2021 16.4  7.0 - 25.0 Final   • Anion Gap 02/08/2021 9.9  5.0 - 15.0 mmol/L Final   • WBC 02/08/2021 7.36  3.40 - 10.80 10*3/mm3 Final   • RBC 02/08/2021 4.59  3.77 - 5.28 10*6/mm3 Final   • Hemoglobin 02/08/2021 14.4  12.0 - 15.9 g/dL Final   • Hematocrit 02/08/2021 42.4  34.0 - 46.6 % Final   • MCV 02/08/2021 92.4  79.0 - 97.0 fL  Final   • MCH 02/08/2021 31.4  26.6 - 33.0 pg Final   • MCHC 02/08/2021 34.0  31.5 - 35.7 g/dL Final   • RDW 02/08/2021 13.5  12.3 - 15.4 % Final   • RDW-SD 02/08/2021 44.8  37.0 - 54.0 fl Final   • MPV 02/08/2021 10.3  6.0 - 12.0 fL Final   • Platelets 02/08/2021 343  140 - 450 10*3/mm3 Final   • nRBC 02/08/2021 0.0  0.0 - 0.2 /100 WBC Final   • Neutrophil % 02/08/2021 46.0  42.7 - 76.0 % Final   • Lymphocyte % 02/08/2021 41.0  19.6 - 45.3 % Final   • Monocyte % 02/08/2021 9.0  5.0 - 12.0 % Final   • Eosinophil % 02/08/2021 1.0  0.3 - 6.2 % Final   • Basophil % 02/08/2021 1.0  0.0 - 1.5 % Final   • Myelocyte % 02/08/2021 2.0* 0.0 - 0.0 % Final   • Neutrophils Absolute 02/08/2021 3.39  1.70 - 7.00 10*3/mm3 Final   • Lymphocytes Absolute 02/08/2021 3.02  0.70 - 3.10 10*3/mm3 Final   • Monocytes Absolute 02/08/2021 0.66  0.10 - 0.90 10*3/mm3 Final   • Eosinophils Absolute 02/08/2021 0.07  0.00 - 0.40 10*3/mm3 Final   • Basophils Absolute 02/08/2021 0.07  0.00 - 0.20 10*3/mm3 Final   • Anisocytosis 02/08/2021 Slight/1+  None Seen Final   • Ines Cells 02/08/2021 Slight/1+  None Seen Final   • Dacrocytes 02/08/2021 Slight/1+  None Seen Final   • Microcytes 02/08/2021 Slight/1+  None Seen Final   • Poikilocytes 02/08/2021 Mod/2+  None Seen Final   • WBC Morphology 02/08/2021 Normal  Normal Final   • Platelet Morphology 02/08/2021 Normal  Normal Final       Assessment/Plan     Visit Diagnoses:    ICD-10-CM ICD-9-CM   1. Constipation, unspecified constipation type  K59.00 564.00   2. Esophageal dysphagia  R13.10 787.20       Plan:  No orders of the defined types were placed in this encounter.      ESOPHAGOGASTRODUODENOSCOPY WITH BIOPSY (N/A), COLONOSCOPY WITH BIOPSY (N/A)  I will plan for EGD due to the patient's complaint of discomfort in the cervical region when swallowing.  She is 50 years old and has not yet had a colonoscopy.  She is complaining of constipation that is intermittent.  I will proceed with colonoscopy.   She will follow-up after her procedures.    MEDICATION ISSUES:  Discussed medication options and treatment plan of prescribed medication as well as the risks, benefits, and side effects including potential falls, possible impaired driving and metabolic adversities among others. Patient is agreeable to call the office with any worsening of symptoms or onset of side effects. Patient is agreeable to call 911 or go to the nearest ER should he/she begin having SI/HI.     MEDS ORDERED DURING VISIT:  New Medications Ordered This Visit   Medications   • sodium-potassium-magnesium sulfates (Suprep Bowel Prep Kit) 17.5-3.13-1.6 GM/177ML solution oral solution     Sig: Take 2 bottles by mouth Every 12 (Twelve) Hours for 2 doses.     Dispense:  708 mL     Refill:  0       Return Follow-up after procedures.             This document has been electronically signed by Tamara Mehta MD   April 7, 2021 10:04 EDT        Part of this note may be an electronic transcription/translation of spoken language to printed text using the Dragon Dictation System.

## 2021-04-20 PROBLEM — K59.00 CONSTIPATION: Status: ACTIVE | Noted: 2021-04-20

## 2021-04-20 PROBLEM — R13.19 ESOPHAGEAL DYSPHAGIA: Status: ACTIVE | Noted: 2021-04-20

## 2021-04-21 ENCOUNTER — HOSPITAL ENCOUNTER (OUTPATIENT)
Dept: MAMMOGRAPHY | Facility: HOSPITAL | Age: 51
Discharge: HOME | End: 2021-04-21

## 2021-04-21 ENCOUNTER — HOSPITAL ENCOUNTER (OUTPATIENT)
Dept: BONE DENSITY | Facility: HOSPITAL | Age: 51
Discharge: HOME | End: 2021-04-21

## 2021-04-21 DIAGNOSIS — M05.79 RHEUMATOID ARTHRITIS INVOLVING MULTIPLE SITES WITH POSITIVE RHEUMATOID FACTOR (HCC): ICD-10-CM

## 2021-04-21 DIAGNOSIS — Z00.00 HEALTHCARE MAINTENANCE: ICD-10-CM

## 2021-04-21 DIAGNOSIS — Z12.31 ENCOUNTER FOR SCREENING MAMMOGRAM FOR BREAST CANCER: ICD-10-CM

## 2021-04-21 PROCEDURE — 77063 BREAST TOMOSYNTHESIS BI: CPT | Performed by: RADIOLOGY

## 2021-04-21 PROCEDURE — 77080 DXA BONE DENSITY AXIAL: CPT

## 2021-04-21 PROCEDURE — 77067 SCR MAMMO BI INCL CAD: CPT | Performed by: RADIOLOGY

## 2021-04-21 PROCEDURE — 77080 DXA BONE DENSITY AXIAL: CPT | Performed by: RADIOLOGY

## 2021-04-21 PROCEDURE — 77067 SCR MAMMO BI INCL CAD: CPT

## 2021-04-21 PROCEDURE — 77063 BREAST TOMOSYNTHESIS BI: CPT

## 2021-04-23 ENCOUNTER — LAB (OUTPATIENT)
Dept: LAB | Facility: HOSPITAL | Age: 51
End: 2021-04-23

## 2021-04-23 DIAGNOSIS — Z01.818 PREOPERATIVE CLEARANCE: ICD-10-CM

## 2021-04-23 DIAGNOSIS — K59.00 CONSTIPATION, UNSPECIFIED CONSTIPATION TYPE: ICD-10-CM

## 2021-04-23 DIAGNOSIS — R13.19 ESOPHAGEAL DYSPHAGIA: ICD-10-CM

## 2021-04-23 LAB — SARS-COV-2 RNA NOSE QL NAA+PROBE: NOT DETECTED

## 2021-04-23 PROCEDURE — C9803 HOPD COVID-19 SPEC COLLECT: HCPCS

## 2021-04-23 PROCEDURE — U0004 COV-19 TEST NON-CDC HGH THRU: HCPCS

## 2021-04-26 ENCOUNTER — DOCUMENTATION (OUTPATIENT)
Dept: GASTROENTEROLOGY | Facility: CLINIC | Age: 51
End: 2021-04-26

## 2021-04-26 NOTE — PROGRESS NOTES
Verbal order given to patient's pharmacy for 2 bottles of magnesium citrate and 4 dulcolax tablets as her insurance would not pay for araiza-prep

## 2021-04-27 ENCOUNTER — ANESTHESIA (OUTPATIENT)
Dept: PERIOP | Facility: HOSPITAL | Age: 51
End: 2021-04-27

## 2021-04-27 ENCOUNTER — HOSPITAL ENCOUNTER (OUTPATIENT)
Facility: HOSPITAL | Age: 51
Setting detail: HOSPITAL OUTPATIENT SURGERY
Discharge: HOME | End: 2021-04-27
Attending: INTERNAL MEDICINE | Admitting: INTERNAL MEDICINE

## 2021-04-27 ENCOUNTER — ANESTHESIA EVENT (OUTPATIENT)
Dept: PERIOP | Facility: HOSPITAL | Age: 51
End: 2021-04-27

## 2021-04-27 VITALS
TEMPERATURE: 98.2 F | BODY MASS INDEX: 40.34 KG/M2 | HEART RATE: 61 BPM | WEIGHT: 257 LBS | OXYGEN SATURATION: 99 % | RESPIRATION RATE: 20 BRPM | SYSTOLIC BLOOD PRESSURE: 124 MMHG | HEIGHT: 67 IN | DIASTOLIC BLOOD PRESSURE: 69 MMHG

## 2021-04-27 DIAGNOSIS — R13.19 ESOPHAGEAL DYSPHAGIA: ICD-10-CM

## 2021-04-27 DIAGNOSIS — K59.00 CONSTIPATION, UNSPECIFIED CONSTIPATION TYPE: ICD-10-CM

## 2021-04-27 PROCEDURE — 45378 DIAGNOSTIC COLONOSCOPY: CPT | Performed by: INTERNAL MEDICINE

## 2021-04-27 PROCEDURE — 25010000002 PROPOFOL 10 MG/ML EMULSION: Performed by: NURSE ANESTHETIST, CERTIFIED REGISTERED

## 2021-04-27 PROCEDURE — 25010000002 FENTANYL CITRATE (PF) 100 MCG/2ML SOLUTION: Performed by: NURSE ANESTHETIST, CERTIFIED REGISTERED

## 2021-04-27 PROCEDURE — 43239 EGD BIOPSY SINGLE/MULTIPLE: CPT | Performed by: INTERNAL MEDICINE

## 2021-04-27 RX ORDER — OXYCODONE HYDROCHLORIDE AND ACETAMINOPHEN 5; 325 MG/1; MG/1
1 TABLET ORAL ONCE AS NEEDED
Status: CANCELLED | OUTPATIENT
Start: 2021-04-27

## 2021-04-27 RX ORDER — SODIUM CHLORIDE, SODIUM LACTATE, POTASSIUM CHLORIDE, CALCIUM CHLORIDE 600; 310; 30; 20 MG/100ML; MG/100ML; MG/100ML; MG/100ML
125 INJECTION, SOLUTION INTRAVENOUS ONCE
Status: COMPLETED | OUTPATIENT
Start: 2021-04-27 | End: 2021-04-27

## 2021-04-27 RX ORDER — KETOROLAC TROMETHAMINE 30 MG/ML
30 INJECTION, SOLUTION INTRAMUSCULAR; INTRAVENOUS EVERY 6 HOURS PRN
Status: CANCELLED | OUTPATIENT
Start: 2021-04-27 | End: 2021-04-30

## 2021-04-27 RX ORDER — DROPERIDOL 2.5 MG/ML
0.62 INJECTION, SOLUTION INTRAMUSCULAR; INTRAVENOUS ONCE AS NEEDED
Status: CANCELLED | OUTPATIENT
Start: 2021-04-27

## 2021-04-27 RX ORDER — FENTANYL CITRATE 50 UG/ML
INJECTION, SOLUTION INTRAMUSCULAR; INTRAVENOUS AS NEEDED
Status: DISCONTINUED | OUTPATIENT
Start: 2021-04-27 | End: 2021-04-27 | Stop reason: SURG

## 2021-04-27 RX ORDER — MEPERIDINE HYDROCHLORIDE 25 MG/ML
12.5 INJECTION INTRAMUSCULAR; INTRAVENOUS; SUBCUTANEOUS
Status: CANCELLED | OUTPATIENT
Start: 2021-04-27 | End: 2021-04-28

## 2021-04-27 RX ORDER — FENTANYL CITRATE 50 UG/ML
50 INJECTION, SOLUTION INTRAMUSCULAR; INTRAVENOUS
Status: CANCELLED | OUTPATIENT
Start: 2021-04-27

## 2021-04-27 RX ORDER — BACLOFEN 10 MG/1
10 TABLET ORAL 2 TIMES DAILY
Qty: 60 TABLET | Refills: 5 | Status: SHIPPED | OUTPATIENT
Start: 2021-04-27 | End: 2022-07-08

## 2021-04-27 RX ORDER — SODIUM CHLORIDE 0.9 % (FLUSH) 0.9 %
10 SYRINGE (ML) INJECTION EVERY 12 HOURS SCHEDULED
Status: DISCONTINUED | OUTPATIENT
Start: 2021-04-27 | End: 2021-04-27 | Stop reason: HOSPADM

## 2021-04-27 RX ORDER — ONDANSETRON 2 MG/ML
4 INJECTION INTRAMUSCULAR; INTRAVENOUS AS NEEDED
Status: CANCELLED | OUTPATIENT
Start: 2021-04-27

## 2021-04-27 RX ORDER — SODIUM CHLORIDE, SODIUM LACTATE, POTASSIUM CHLORIDE, CALCIUM CHLORIDE 600; 310; 30; 20 MG/100ML; MG/100ML; MG/100ML; MG/100ML
100 INJECTION, SOLUTION INTRAVENOUS ONCE AS NEEDED
Status: CANCELLED | OUTPATIENT
Start: 2021-04-27

## 2021-04-27 RX ORDER — PROPOFOL 10 MG/ML
VIAL (ML) INTRAVENOUS AS NEEDED
Status: DISCONTINUED | OUTPATIENT
Start: 2021-04-27 | End: 2021-04-27 | Stop reason: SURG

## 2021-04-27 RX ORDER — MIDAZOLAM HYDROCHLORIDE 1 MG/ML
2 INJECTION INTRAMUSCULAR; INTRAVENOUS
Status: DISCONTINUED | OUTPATIENT
Start: 2021-04-27 | End: 2021-04-27 | Stop reason: HOSPADM

## 2021-04-27 RX ORDER — SODIUM CHLORIDE 0.9 % (FLUSH) 0.9 %
10 SYRINGE (ML) INJECTION AS NEEDED
Status: DISCONTINUED | OUTPATIENT
Start: 2021-04-27 | End: 2021-04-27 | Stop reason: HOSPADM

## 2021-04-27 RX ORDER — IPRATROPIUM BROMIDE AND ALBUTEROL SULFATE 2.5; .5 MG/3ML; MG/3ML
3 SOLUTION RESPIRATORY (INHALATION) ONCE AS NEEDED
Status: CANCELLED | OUTPATIENT
Start: 2021-04-27

## 2021-04-27 RX ORDER — MIDAZOLAM HYDROCHLORIDE 1 MG/ML
1 INJECTION INTRAMUSCULAR; INTRAVENOUS
Status: DISCONTINUED | OUTPATIENT
Start: 2021-04-27 | End: 2021-04-27 | Stop reason: HOSPADM

## 2021-04-27 RX ADMIN — PROPOFOL 50 MG: 10 INJECTION, EMULSION INTRAVENOUS at 10:16

## 2021-04-27 RX ADMIN — FENTANYL CITRATE 100 MCG: 50 INJECTION INTRAMUSCULAR; INTRAVENOUS at 10:16

## 2021-04-27 RX ADMIN — PROPOFOL 200 MCG/KG/MIN: 10 INJECTION, EMULSION INTRAVENOUS at 10:16

## 2021-04-27 RX ADMIN — SODIUM CHLORIDE, POTASSIUM CHLORIDE, SODIUM LACTATE AND CALCIUM CHLORIDE: 600; 310; 30; 20 INJECTION, SOLUTION INTRAVENOUS at 09:45

## 2021-04-27 NOTE — ANESTHESIA PREPROCEDURE EVALUATION
Anesthesia Evaluation     no history of anesthetic complications:  NPO Solid Status: > 8 hours  NPO Liquid Status: > 8 hours           Airway   Mallampati: II  TM distance: >3 FB  Neck ROM: full  No difficulty expected  Dental - normal exam     Pulmonary - normal exam   Cardiovascular - normal exam    (+) hyperlipidemia,       Neuro/Psych  (+) psychiatric history Anxiety,     GI/Hepatic/Renal/Endo    (+) morbid obesity, GERD,  liver disease,     Musculoskeletal     Abdominal  - normal exam   Substance History      OB/GYN          Other                      Anesthesia Plan    ASA 3     general     intravenous induction     Anesthetic plan, all risks, benefits, and alternatives have been provided, discussed and informed consent has been obtained with: patient.

## 2021-04-27 NOTE — ANESTHESIA POSTPROCEDURE EVALUATION
Patient: Susie Rangel    Procedure Summary     Date: 04/27/21 Room / Location: [facility] OR  /  [facility] OR    Anesthesia Start: 1013 Anesthesia Stop: 1052    Procedures:       ESOPHAGOGASTRODUODENOSCOPY WITH BIOPSY (N/A Esophagus)      COLONOSCOPY WITH BIOPSY (N/A ) Diagnosis:       Constipation, unspecified constipation type      Esophageal dysphagia      (Constipation, unspecified constipation type [K59.00])      (Esophageal dysphagia [R13.10])    Surgeons: Tamara Mehta MD Provider: Darrick Velasquez MD    Anesthesia Type: general ASA Status: 3          Anesthesia Type: general    Vitals  Vitals Value Taken Time   /70 04/27/21 1103   Temp 98.2 °F (36.8 °C) 04/27/21 1053   Pulse 74 04/27/21 1103   Resp 20 04/27/21 1103   SpO2 97 % 04/27/21 1103           Post Anesthesia Care and Evaluation    Patient location during evaluation: PHASE II  Patient participation: complete - patient participated  Level of consciousness: awake and alert  Pain score: 1  Pain management: adequate  Airway patency: patent  Anesthetic complications: No anesthetic complications  PONV Status: controlled  Cardiovascular status: acceptable  Respiratory status: acceptable  Hydration status: acceptable

## 2021-04-29 LAB — LAB AP CASE REPORT: NORMAL

## 2021-06-18 ENCOUNTER — OFFICE VISIT (OUTPATIENT)
Dept: FAMILY MEDICINE CLINIC | Facility: CLINIC | Age: 51
End: 2021-06-18

## 2021-06-18 DIAGNOSIS — E78.2 MIXED HYPERLIPIDEMIA: ICD-10-CM

## 2021-06-18 DIAGNOSIS — M54.59 MECHANICAL LOW BACK PAIN: ICD-10-CM

## 2021-06-18 DIAGNOSIS — Z98.84 HISTORY OF BARIATRIC SURGERY: ICD-10-CM

## 2021-06-18 DIAGNOSIS — K59.09 CHRONIC CONSTIPATION: ICD-10-CM

## 2021-06-18 DIAGNOSIS — I87.2 CHRONIC VENOUS INSUFFICIENCY: ICD-10-CM

## 2021-06-18 DIAGNOSIS — Z00.00 HEALTHCARE MAINTENANCE: ICD-10-CM

## 2021-06-18 DIAGNOSIS — F41.8 DEPRESSION WITH ANXIETY: ICD-10-CM

## 2021-06-18 DIAGNOSIS — K76.0 NON-ALCOHOLIC FATTY LIVER DISEASE: ICD-10-CM

## 2021-06-18 DIAGNOSIS — K21.9 GASTROESOPHAGEAL REFLUX DISEASE WITHOUT ESOPHAGITIS: ICD-10-CM

## 2021-06-18 DIAGNOSIS — J30.9 CHRONIC ALLERGIC RHINITIS: Primary | ICD-10-CM

## 2021-06-18 DIAGNOSIS — E66.01 CLASS 2 SEVERE OBESITY WITH SERIOUS COMORBIDITY AND BODY MASS INDEX (BMI) OF 39.0 TO 39.9 IN ADULT, UNSPECIFIED OBESITY TYPE (HCC): ICD-10-CM

## 2021-06-18 DIAGNOSIS — M05.79 RHEUMATOID ARTHRITIS INVOLVING MULTIPLE SITES WITH POSITIVE RHEUMATOID FACTOR (HCC): ICD-10-CM

## 2021-06-18 DIAGNOSIS — Z23 ENCOUNTER FOR IMMUNIZATION: ICD-10-CM

## 2021-06-18 PROCEDURE — 99214 OFFICE O/P EST MOD 30 MIN: CPT | Performed by: GENERAL PRACTICE

## 2021-06-18 NOTE — PROGRESS NOTES
Subjective   Susie Rangel is a 50 y.o. female.     Chief Complaint  She returns for a scheduled reassessment of multiple medical problems including chronic low back pain, chronic lower extremity edema, constipation, and more recently diagnosed RA    History of Present Illness     Rheumatoid Arthritis  RF drawn on 11/11/2020 returned at 51.8.  CCP antibodies returned negative as did an RENATO.  ESR and CRP were unremarkable.  She is currently followed by rheumatology and prescribed leflunomide 10 daily.  She was taken off methotrexate 1 week ago.  She has noted a significant improvement in her joint pain and stiffness.  She continues to deny any joint swelling or redness and has had no rash.    Low Back Pain  There has been no change in the quality or severity of her back pain nor any new associated symptoms.  She continues to deny any changes in her strength, sensation, or bowel/bladder control.  She has established care with pain management    Left Lower Extremity Edema  She continues to have intermittent mild swelling about her left foot.  This has been unassociated with any other symptoms and she denies any pain or discoloration.  The swelling tends to develop toward the end of the day and improves overnight.    Constipation  She has made some dietary modifications and has noted an improvement in her constipation.  She denies any recent abdominal distention.  Within the last year she has had an occasional sense of tightness about her upper neck when swallowing solid food.  There is no history of any nausea, vomiting, or heartburn and she continues to deny any diarrhea, hematochezia, or melena.  There is no history of any fever, chills, night sweats or weight loss.  She took linzess 145 daily for some time with little improvement.  EGD performed on 4/27/2021 revealed evidence of a previous sleeve gastrectomy along with mild gastritis.  Colonoscopy performed the same day revealed small internal hemorrhoids but was  otherwise unremarkable.    Dyslipidemia  Compliance with treatment has been fair.      Depression  She remains under considerable amount of stress but feels that she is managing.  Her father in law  this year and her  has been staying with his mother.  She has been doing fairly well with her nervousness, appetite, and sleep and continues to deny any depression, loss of interest in activities, or suicidal ideation.      The following portions of the patient's history were reviewed and updated as appropriate: allergies, current medications, past medical history, past social history and problem list.    Review of Systems   Constitutional: Positive for fatigue. Negative for appetite change, chills, fever and unexpected weight change.   HENT: Positive for rhinorrhea and sneezing. Negative for congestion, postnasal drip, sinus pressure, sore throat and voice change.    Eyes: Negative for visual disturbance.   Respiratory: Negative for cough, shortness of breath and wheezing.    Cardiovascular: Positive for leg swelling (left). Negative for chest pain and palpitations.   Gastrointestinal: Negative for abdominal pain, blood in stool, constipation, diarrhea, nausea and vomiting.   Endocrine:        Hot flashes   Genitourinary: Negative for difficulty urinating, dysuria, frequency, hematuria and urgency.   Musculoskeletal: Positive for arthralgias and back pain. Negative for neck pain.   Skin: Negative for rash.        Hair loss   Neurological: Negative for weakness, numbness and headaches.   Psychiatric/Behavioral: Negative for decreased concentration, dysphoric mood and sleep disturbance. The patient is nervous/anxious.      Objective   Physical Exam  Constitutional:       General: She is not in acute distress.     Appearance: Normal appearance. She is well-developed. She is not diaphoretic.      Comments: Bright and in good spirits. No apparent distress. No pallor, jaundice, diaphoresis, or cyanosis.   HENT:       Head: Atraumatic.      Right Ear: Tympanic membrane, ear canal and external ear normal.      Left Ear: Tympanic membrane, ear canal and external ear normal.   Eyes:      Conjunctiva/sclera: Conjunctivae normal.   Neck:      Thyroid: No thyroid mass or thyromegaly.      Vascular: No carotid bruit or JVD.      Trachea: Trachea normal. No tracheal deviation.   Cardiovascular:      Rate and Rhythm: Normal rate and regular rhythm.      Heart sounds: Normal heart sounds, S1 normal and S2 normal. No murmur heard.   No gallop.    Pulmonary:      Effort: Pulmonary effort is normal.      Breath sounds: Normal breath sounds.   Abdominal:      General: Bowel sounds are normal. There is no distension.   Musculoskeletal:      Right lower leg: No edema.      Left lower leg: Edema (trace) present.      Comments: No peripheral joint redness or warmth.   Lymphadenopathy:      Head:      Right side of head: No submental, submandibular, tonsillar, preauricular, posterior auricular or occipital adenopathy.      Left side of head: No submental, submandibular, tonsillar, preauricular, posterior auricular or occipital adenopathy.      Cervical: No cervical adenopathy.      Upper Body:      Right upper body: No supraclavicular adenopathy.      Left upper body: No supraclavicular adenopathy.   Skin:     General: Skin is warm.      Coloration: Skin is not cyanotic, jaundiced or pale.      Findings: No rash.      Nails: There is no clubbing.   Neurological:      Mental Status: She is alert and oriented to person, place, and time.      Cranial Nerves: No cranial nerve deficit.      Motor: No tremor.      Coordination: Coordination normal.      Gait: Gait normal.   Psychiatric:         Attention and Perception: Attention normal.         Mood and Affect: Mood normal.         Speech: Speech normal.         Behavior: Behavior normal.         Thought Content: Thought content normal.       Assessment/Plan   Problems Addressed this Visit         Allergies and Adverse Reactions    Chronic allergic rhinitis        Cardiac and Vasculature    Chronic venous insufficiency  Reminded regarding lifestyle modification    Mixed hyperlipidemia  Encouraged to continue to work on her diet and exercise plan.       Endocrine and Metabolic    Class 2 obesity with body mass index (BMI) of 39.0 to 39.9 in adult  As above.  Continue current medication    History of bariatric surgery       Gastrointestinal Abdominal     Chronic constipation  Reminded regarding lifestyle modification  Encouraged to report if any worse or if any new symptoms or concerns.    Gastroesophageal reflux disease without esophagitis   Symptoms are currently well controlled.  Reminded regarding lifestyle modification.  Continue current medication.    Non-alcoholic fatty liver disease       Health Encounters    Healthcare maintenance  Patient has received both doses of the COVID-19 vaccine.  Recommended a Pneumovax 23    Relevant Orders    Pneumococcal Polysaccharide Vaccine 23-Valent Greater Than or Equal To 1yo Subcutaneous / IM       Mental Health    Depression with anxiety  Significant situational component.   Supportive therapy.   Encouraged to report if any worse or if any new symptoms or concerns.       Musculoskeletal and Injuries    Mechanical low back pain  Reminded regarding symptomatic treatment.   Continue current medication  Follow up with pain management    Rheumatoid arthritis involving multiple sites with positive rheumatoid factor (CMS/[facility])  As above.  Follow up with  rheumatology          Diagnoses       Codes Comments    Chronic allergic rhinitis    -  Primary ICD-10-CM: J30.9  ICD-9-CM: 477.9     Mixed hyperlipidemia     ICD-10-CM: E78.2  ICD-9-CM: 272.2     Chronic venous insufficiency     ICD-10-CM: I87.2  ICD-9-CM: 459.81     History of bariatric surgery     ICD-10-CM: Z98.84  ICD-9-CM: V45.86     Class 2 severe obesity with serious comorbidity and body mass index (BMI) of 39.0 to  39.9 in adult, unspecified obesity type (CMS/HCC)     ICD-10-CM: E66.01, Z68.39  ICD-9-CM: 278.01, V85.39     Non-alcoholic fatty liver disease     ICD-10-CM: K76.0  ICD-9-CM: 571.8     Gastroesophageal reflux disease without esophagitis     ICD-10-CM: K21.9  ICD-9-CM: 530.81     Chronic constipation     ICD-10-CM: K59.09  ICD-9-CM: 564.00     Healthcare maintenance     ICD-10-CM: Z00.00  ICD-9-CM: V70.0     Depression with anxiety     ICD-10-CM: F41.8  ICD-9-CM: 300.4     Rheumatoid arthritis involving multiple sites with positive rheumatoid factor (CMS/HCC)     ICD-10-CM: M05.79  ICD-9-CM: 714.0     Encounter for immunization     ICD-10-CM: Z23  ICD-9-CM: V03.89     Mechanical low back pain     ICD-10-CM: M54.5  ICD-9-CM: 724.2

## 2021-06-19 VITALS
HEART RATE: 92 BPM | HEIGHT: 67 IN | DIASTOLIC BLOOD PRESSURE: 60 MMHG | WEIGHT: 258 LBS | SYSTOLIC BLOOD PRESSURE: 126 MMHG | TEMPERATURE: 96.8 F | RESPIRATION RATE: 14 BRPM | BODY MASS INDEX: 40.49 KG/M2 | OXYGEN SATURATION: 96 %

## 2021-06-19 PROBLEM — R13.14 PHARYNGOESOPHAGEAL DYSPHAGIA: Status: RESOLVED | Noted: 2021-03-18 | Resolved: 2021-06-19

## 2021-06-19 PROBLEM — K59.00 CONSTIPATION: Status: RESOLVED | Noted: 2021-04-20 | Resolved: 2021-06-19

## 2021-06-19 PROBLEM — R13.19 ESOPHAGEAL DYSPHAGIA: Status: RESOLVED | Noted: 2021-04-20 | Resolved: 2021-06-19

## 2021-06-21 PROCEDURE — 90471 IMMUNIZATION ADMIN: CPT | Performed by: GENERAL PRACTICE

## 2021-06-21 PROCEDURE — 90732 PPSV23 VACC 2 YRS+ SUBQ/IM: CPT | Performed by: GENERAL PRACTICE

## 2021-06-30 ENCOUNTER — OFFICE VISIT (OUTPATIENT)
Dept: GASTROENTEROLOGY | Facility: CLINIC | Age: 51
End: 2021-06-30

## 2021-06-30 VITALS
HEIGHT: 67 IN | DIASTOLIC BLOOD PRESSURE: 84 MMHG | SYSTOLIC BLOOD PRESSURE: 128 MMHG | HEART RATE: 74 BPM | TEMPERATURE: 98 F | BODY MASS INDEX: 40.49 KG/M2 | WEIGHT: 258 LBS

## 2021-06-30 DIAGNOSIS — E66.01 CLASS 2 SEVERE OBESITY WITH SERIOUS COMORBIDITY AND BODY MASS INDEX (BMI) OF 39.0 TO 39.9 IN ADULT, UNSPECIFIED OBESITY TYPE (HCC): Primary | ICD-10-CM

## 2021-06-30 DIAGNOSIS — K59.04 CHRONIC IDIOPATHIC CONSTIPATION: ICD-10-CM

## 2021-06-30 DIAGNOSIS — Q31.8 VOCAL CORD ANOMALY: Primary | ICD-10-CM

## 2021-06-30 PROCEDURE — 99213 OFFICE O/P EST LOW 20 MIN: CPT | Performed by: PHYSICIAN ASSISTANT

## 2021-07-01 RX ORDER — PHENTERMINE HYDROCHLORIDE 37.5 MG/1
37.5 TABLET ORAL
Qty: 30 TABLET | Refills: 0 | Status: SHIPPED | OUTPATIENT
Start: 2021-07-01 | End: 2022-01-06

## 2021-08-19 ENCOUNTER — LAB (OUTPATIENT)
Dept: FAMILY MEDICINE CLINIC | Facility: CLINIC | Age: 51
End: 2021-08-19

## 2021-08-19 DIAGNOSIS — R68.89 FLU-LIKE SYMPTOMS: Primary | ICD-10-CM

## 2021-08-19 DIAGNOSIS — R68.89 FLU-LIKE SYMPTOMS: ICD-10-CM

## 2021-08-19 PROCEDURE — U0004 COV-19 TEST NON-CDC HGH THRU: HCPCS | Performed by: GENERAL PRACTICE

## 2021-08-19 PROCEDURE — C9803 HOPD COVID-19 SPEC COLLECT: HCPCS

## 2021-08-20 LAB — SARS-COV-2 RNA PNL SPEC NAA+PROBE: NOT DETECTED

## 2021-09-18 DIAGNOSIS — K21.9 GASTROESOPHAGEAL REFLUX DISEASE WITHOUT ESOPHAGITIS: ICD-10-CM

## 2021-09-20 RX ORDER — OMEPRAZOLE 40 MG/1
CAPSULE, DELAYED RELEASE ORAL
Qty: 60 CAPSULE | Refills: 5 | Status: SHIPPED | OUTPATIENT
Start: 2021-09-20 | End: 2023-01-20

## 2021-10-01 ENCOUNTER — OFFICE VISIT (OUTPATIENT)
Dept: FAMILY MEDICINE CLINIC | Facility: CLINIC | Age: 51
End: 2021-10-01

## 2021-10-01 DIAGNOSIS — I87.2 CHRONIC VENOUS INSUFFICIENCY: ICD-10-CM

## 2021-10-01 DIAGNOSIS — J30.9 CHRONIC ALLERGIC RHINITIS: Primary | ICD-10-CM

## 2021-10-01 DIAGNOSIS — E66.01 CLASS 2 SEVERE OBESITY WITH SERIOUS COMORBIDITY AND BODY MASS INDEX (BMI) OF 39.0 TO 39.9 IN ADULT, UNSPECIFIED OBESITY TYPE (HCC): ICD-10-CM

## 2021-10-01 DIAGNOSIS — K21.9 GASTROESOPHAGEAL REFLUX DISEASE WITHOUT ESOPHAGITIS: ICD-10-CM

## 2021-10-01 DIAGNOSIS — K76.0 NON-ALCOHOLIC FATTY LIVER DISEASE: ICD-10-CM

## 2021-10-01 DIAGNOSIS — M05.79 RHEUMATOID ARTHRITIS INVOLVING MULTIPLE SITES WITH POSITIVE RHEUMATOID FACTOR (HCC): ICD-10-CM

## 2021-10-01 DIAGNOSIS — E78.2 MIXED HYPERLIPIDEMIA: ICD-10-CM

## 2021-10-01 DIAGNOSIS — Z98.84 HISTORY OF BARIATRIC SURGERY: ICD-10-CM

## 2021-10-01 DIAGNOSIS — M54.59 MECHANICAL LOW BACK PAIN: ICD-10-CM

## 2021-10-01 DIAGNOSIS — F41.8 DEPRESSION WITH ANXIETY: ICD-10-CM

## 2021-10-01 DIAGNOSIS — K59.09 CHRONIC CONSTIPATION: ICD-10-CM

## 2021-10-01 DIAGNOSIS — Z00.00 HEALTHCARE MAINTENANCE: ICD-10-CM

## 2021-10-01 PROCEDURE — 99214 OFFICE O/P EST MOD 30 MIN: CPT | Performed by: GENERAL PRACTICE

## 2021-10-01 RX ORDER — HYDROXYZINE HYDROCHLORIDE 10 MG/1
10 TABLET, FILM COATED ORAL 3 TIMES DAILY
Qty: 90 TABLET | Refills: 5 | Status: SHIPPED | OUTPATIENT
Start: 2021-10-01 | End: 2022-03-04 | Stop reason: SDUPTHER

## 2021-10-01 NOTE — PROGRESS NOTES
Subjective   Susie Rangel is a 51 y.o. female.     Chief Complaint  She returns for a scheduled reassessment of multiple medical problems including RA, chronic mechanical low back pain, chronic left lower extremity edema, chronic constipation, and depression    History of Present Illness     Rheumatoid Arthritis  RF drawn on 11/11/2020 returned at 51.8.  CCP antibodies returned negative as did an RENATO.  ESR and CRP were unremarkable.  She continues to be followed by rheumatology and remains on leflunomide 10 daily.  She has noted a significant improvement in her peripheral joint pain and stiffness.  She continues to deny any joint swelling or redness and has had no rash.    Low Back Pain  She complains of increased low back pain. There has been no change in the quality nor any new associated symptoms.  She continues to deny any changes in her strength, sensation, or bowel/bladder control.  She continues to be followed by pain management    Left Lower Extremity Edema  She continues to have intermittent mild swelling about her left foot.  This has been unassociated with any other symptoms and she denies any pain or discoloration.  The swelling tends to develop toward the end of the day and improves overnight.    Constipation  She has made some dietary modifications and has noted some improvement in her constipation. She feels plecanatide helped some as well. She denies any recent abdominal distention.  Within the last year she has had an occasional sense of tightness about her upper neck when swallowing solid food.  There is no history of any nausea, vomiting, or heartburn and she continues to deny any diarrhea, hematochezia, or melena.  There is no history of any fever, chills, night sweats or weight loss.  She took linzess 145 daily for some time with little improvement.  EGD performed on 4/27/2021 revealed evidence of a previous sleeve gastrectomy along with mild gastritis.  Colonoscopy performed the same day  revealed small internal hemorrhoids but was otherwise unremarkable.    Dyslipidemia  Compliance with treatment has been fair.      Depression  She remains under considerable amount of stress but feels that she is managing.  Her father in law  this year and her  continues to stay with his mother.  She has been doing fairly well with her nervousness, appetite, and sleep and continues to deny any depression, loss of interest in activities, or suicidal ideation.      The following portions of the patient's history were reviewed and updated as appropriate: allergies, current medications, past medical history, past social history and problem list.    Review of Systems   Constitutional: Positive for fatigue. Negative for appetite change, chills, fever and unexpected weight change.   HENT: Positive for rhinorrhea and sneezing. Negative for congestion, postnasal drip, sinus pressure, sore throat and voice change.    Eyes: Negative for visual disturbance.   Respiratory: Negative for cough, shortness of breath and wheezing.    Cardiovascular: Positive for leg swelling (left). Negative for chest pain and palpitations.   Gastrointestinal: Negative for abdominal pain, blood in stool, constipation, diarrhea, nausea and vomiting.   Endocrine:        Hot flashes   Genitourinary: Negative for difficulty urinating, dysuria, frequency, hematuria and urgency.   Musculoskeletal: Positive for arthralgias and back pain. Negative for neck pain.   Skin: Negative for rash.        Hair loss   Neurological: Negative for weakness, numbness and headaches.   Psychiatric/Behavioral: Negative for decreased concentration, dysphoric mood and sleep disturbance. The patient is nervous/anxious.      Objective   Physical Exam  Constitutional:       General: She is not in acute distress.     Appearance: Normal appearance. She is well-developed. She is not diaphoretic.      Comments: Bright and in good spirits. No apparent distress. No pallor,  jaundice, diaphoresis, or cyanosis.   HENT:      Head: Atraumatic.      Right Ear: Tympanic membrane, ear canal and external ear normal.      Left Ear: Tympanic membrane, ear canal and external ear normal.   Eyes:      Conjunctiva/sclera: Conjunctivae normal.   Neck:      Thyroid: No thyroid mass or thyromegaly.      Vascular: No carotid bruit or JVD.      Trachea: Trachea normal. No tracheal deviation.   Cardiovascular:      Rate and Rhythm: Normal rate and regular rhythm.      Heart sounds: Normal heart sounds, S1 normal and S2 normal. No murmur heard.   No gallop.    Pulmonary:      Effort: Pulmonary effort is normal.      Breath sounds: Normal breath sounds.   Abdominal:      General: Bowel sounds are normal. There is no distension.   Musculoskeletal:      Right lower leg: No edema.      Left lower leg: Edema (trace) present.      Comments: No peripheral joint redness or warmth.   Lymphadenopathy:      Head:      Right side of head: No submental, submandibular, tonsillar, preauricular, posterior auricular or occipital adenopathy.      Left side of head: No submental, submandibular, tonsillar, preauricular, posterior auricular or occipital adenopathy.      Cervical: No cervical adenopathy.      Upper Body:      Right upper body: No supraclavicular adenopathy.      Left upper body: No supraclavicular adenopathy.   Skin:     General: Skin is warm.      Coloration: Skin is not cyanotic, jaundiced or pale.      Findings: No rash.      Nails: There is no clubbing.   Neurological:      Mental Status: She is alert and oriented to person, place, and time.      Cranial Nerves: No cranial nerve deficit.      Motor: No tremor.      Coordination: Coordination normal.      Gait: Gait normal.   Psychiatric:         Attention and Perception: Attention normal.         Mood and Affect: Mood normal.         Speech: Speech normal.         Behavior: Behavior normal.         Thought Content: Thought content normal.        Assessment/Plan   Problems Addressed this Visit        Allergies and Adverse Reactions    Chronic allergic rhinitis        Cardiac and Vasculature    Chronic venous insufficiency  Reminded regarding lifestyle modification    Mixed hyperlipidemia  Encouraged to continue to work on her diet and exercise plan.  Updated labs will be arranged at return.       Endocrine and Metabolic    Class 2 obesity with body mass index (BMI) of 39.0 to 39.9 in adult    History of bariatric surgery       Gastrointestinal Abdominal     Chronic constipation  Continue current medication  Given further samples of plecanatide     Gastroesophageal reflux disease without esophagitis    Non-alcoholic fatty liver disease       Health Encounters    Healthcare maintenance  Patient received the J[employer] COVID-19 vaccine  Recommended a flu shot       Mental Health    Depression with anxiety  Significant situational component.   Supportive therapy.   Continue current medication    Relevant Medications    hydrOXYzine (ATARAX) 10 MG tablet       Musculoskeletal and Injuries    Mechanical low back pain  Reminded regarding symptomatic treatment.   Continue current medication  Follow up with pain management    Rheumatoid arthritis involving multiple sites with positive rheumatoid factor (HCC)  As above.  Follow up with  rheumatology      Diagnoses       Codes Comments    Chronic allergic rhinitis    -  Primary ICD-10-CM: J30.9  ICD-9-CM: 477.9     Chronic venous insufficiency     ICD-10-CM: I87.2  ICD-9-CM: 459.81     Mixed hyperlipidemia     ICD-10-CM: E78.2  ICD-9-CM: 272.2     History of bariatric surgery     ICD-10-CM: Z98.84  ICD-9-CM: V45.86     Class 2 severe obesity with serious comorbidity and body mass index (BMI) of 39.0 to 39.9 in adult, unspecified obesity type (HCC)     ICD-10-CM: E66.01, Z68.39  ICD-9-CM: 278.01, V85.39     Non-alcoholic fatty liver disease     ICD-10-CM: K76.0  ICD-9-CM: 571.8     Gastroesophageal reflux disease without  esophagitis     ICD-10-CM: K21.9  ICD-9-CM: 530.81     Chronic constipation     ICD-10-CM: K59.09  ICD-9-CM: 564.00     Healthcare maintenance     ICD-10-CM: Z00.00  ICD-9-CM: V70.0     Depression with anxiety     ICD-10-CM: F41.8  ICD-9-CM: 300.4     Mechanical low back pain     ICD-10-CM: M54.59  ICD-9-CM: 724.2     Rheumatoid arthritis involving multiple sites with positive rheumatoid factor (HCC)     ICD-10-CM: M05.79  ICD-9-CM: 714.0

## 2021-10-02 VITALS
DIASTOLIC BLOOD PRESSURE: 65 MMHG | RESPIRATION RATE: 14 BRPM | TEMPERATURE: 97.1 F | HEIGHT: 67 IN | HEART RATE: 96 BPM | OXYGEN SATURATION: 98 % | BODY MASS INDEX: 40.49 KG/M2 | WEIGHT: 258 LBS | SYSTOLIC BLOOD PRESSURE: 124 MMHG

## 2022-01-06 ENCOUNTER — OFFICE VISIT (OUTPATIENT)
Dept: FAMILY MEDICINE CLINIC | Facility: CLINIC | Age: 52
End: 2022-01-06

## 2022-01-06 VITALS
WEIGHT: 254 LBS | DIASTOLIC BLOOD PRESSURE: 90 MMHG | SYSTOLIC BLOOD PRESSURE: 138 MMHG | OXYGEN SATURATION: 99 % | HEART RATE: 79 BPM | TEMPERATURE: 97.3 F | HEIGHT: 67 IN | BODY MASS INDEX: 39.87 KG/M2

## 2022-01-06 DIAGNOSIS — E66.01 CLASS 2 SEVERE OBESITY WITH SERIOUS COMORBIDITY AND BODY MASS INDEX (BMI) OF 39.0 TO 39.9 IN ADULT, UNSPECIFIED OBESITY TYPE: Chronic | ICD-10-CM

## 2022-01-06 DIAGNOSIS — E66.01 CLASS 2 SEVERE OBESITY WITH SERIOUS COMORBIDITY AND BODY MASS INDEX (BMI) OF 39.0 TO 39.9 IN ADULT, UNSPECIFIED OBESITY TYPE: Primary | ICD-10-CM

## 2022-01-06 DIAGNOSIS — K21.9 GASTROESOPHAGEAL REFLUX DISEASE WITHOUT ESOPHAGITIS: Chronic | ICD-10-CM

## 2022-01-06 DIAGNOSIS — R30.0 DYSURIA: Primary | ICD-10-CM

## 2022-01-06 DIAGNOSIS — M54.59 MECHANICAL LOW BACK PAIN: Chronic | ICD-10-CM

## 2022-01-06 PROCEDURE — 99214 OFFICE O/P EST MOD 30 MIN: CPT | Performed by: PHYSICIAN ASSISTANT

## 2022-01-06 RX ORDER — MELOXICAM 15 MG/1
TABLET ORAL
COMMUNITY
Start: 2021-12-06 | End: 2022-12-13

## 2022-01-06 RX ORDER — PHENTERMINE HYDROCHLORIDE 37.5 MG/1
37.5 TABLET ORAL
Qty: 30 TABLET | Refills: 0 | Status: SHIPPED | OUTPATIENT
Start: 2022-01-06 | End: 2022-03-04 | Stop reason: SDUPTHER

## 2022-01-06 RX ORDER — HYDROCODONE BITARTRATE AND ACETAMINOPHEN 5; 325 MG/1; MG/1
TABLET ORAL
COMMUNITY
Start: 2021-10-23 | End: 2022-12-14

## 2022-01-06 RX ORDER — PHENAZOPYRIDINE HYDROCHLORIDE 200 MG/1
200 TABLET, FILM COATED ORAL 3 TIMES DAILY PRN
Qty: 21 TABLET | Refills: 0 | Status: SHIPPED | OUTPATIENT
Start: 2022-01-06 | End: 2022-07-08

## 2022-01-06 RX ORDER — NITROFURANTOIN 25; 75 MG/1; MG/1
100 CAPSULE ORAL 2 TIMES DAILY
Qty: 20 CAPSULE | Refills: 0 | Status: SHIPPED | OUTPATIENT
Start: 2022-01-06 | End: 2022-07-08

## 2022-01-06 NOTE — PROGRESS NOTES
"Subjective   Susie Rangel is a 51 y.o. female.       Chief Complaint -   dysuria    History of Present Illness -    ROS    Dysuria-  Patient complains of urinary frequency and painful urination that began 2 days ago.  She has a history of renal calculi.  She complains of a worsening low back pain.    Chronic low back pain-  Not at goal due to worsening of low back pain with associated dysuria today.  She reports that back pain is usually controlled with Norco, Mobic, and baclofen.    Gastroesophageal reflux disease-stable with omeprazole    Obesity-  Not at goal with current dietary and lack of exercise regimen    The following portions of the patient's history were reviewed and updated as appropriate: allergies, current medications, past family history, past medical history, past social history, past surgical history and problem list.    Review of Systems    Objective  Vital signs:  /90   Pulse 79   Temp 97.3 °F (36.3 °C) (Temporal)   Ht 170.2 cm (67.01\")   Wt 115 kg (254 lb)   SpO2 99%   BMI 39.77 kg/m²     Physical Exam  Vitals and nursing note reviewed.   Constitutional:       Appearance: Normal appearance. She is well-developed. She is obese.   Eyes:      Extraocular Movements: Extraocular movements intact.      Conjunctiva/sclera: Conjunctivae normal.   Cardiovascular:      Rate and Rhythm: Normal rate and regular rhythm.      Heart sounds: Normal heart sounds. No murmur heard.      Pulmonary:      Effort: Pulmonary effort is normal. No respiratory distress.      Breath sounds: Normal breath sounds. No wheezing.   Musculoskeletal:         General: Tenderness present.      Comments: Decreased range of joint motion with lumbar flexion.  Muscle tightening noted left paraspinal musculature lumbar.  No CVA tenderness appreciated.   Skin:     General: Skin is warm and dry.      Findings: No rash.   Neurological:      Mental Status: She is alert and oriented to person, place, and time.   Psychiatric:    "      Mood and Affect: Mood normal.         Behavior: Behavior normal.         Thought Content: Thought content normal.         The following data was reviewed by: ROMAIN Rahman on 01/06/2022:  CMP    CMP 2/8/21   Glucose 96   BUN 12   Creatinine 0.73   eGFR African Am 103   Sodium 141   Potassium 4.1   Chloride 104   Calcium 9.6   Albumin 4.30   Total Bilirubin 0.2   Alkaline Phosphatase 118 (A)   AST (SGOT) 26   ALT (SGPT) 36 (A)   (A) Abnormal value            CBC w/diff    CBC w/Diff 2/8/21   WBC 7.36   RBC 4.59   Hemoglobin 14.4   Hematocrit 42.4   MCV 92.4   MCH 31.4   MCHC 34.0   RDW 13.5   Platelets 343                   Data reviewed: urinalysis       Assessment/Plan     Diagnoses and all orders for this visit:    1. Dysuria (Primary)  Comments:  Plan to empirically treat for cystitis  Start Pyridium and Macrobid  Advised that if symptoms persist or worsen greater than 5 days to return to clinic   Orders:  -     phenazopyridine (Pyridium) 200 MG tablet; Take 1 tablet by mouth 3 (Three) Times a Day As Needed for Bladder Spasms.  Dispense: 21 tablet; Refill: 0  -     nitrofurantoin, macrocrystal-monohydrate, (Macrobid) 100 MG capsule; Take 1 capsule by mouth 2 (Two) Times a Day.  Dispense: 20 capsule; Refill: 0    2. Mechanical low back pain  Comments:  Advised conservative measures including hot baths and heating pad  Continue Mobic baclofen and Norco    3. Gastroesophageal reflux disease without esophagitis  Comments:  Continue omeprazole  Advised to avoid known trigger foods    4. Class 2 severe obesity with serious comorbidity and body mass index (BMI) of 39.0 to 39.9 in adult, unspecified obesity type (HCC)  Comments:  Advised smaller portion sizes  Advised 30 minutes CV activity daily as tolerated            Patient was given instructions and counseling regarding his condition or for health maintenance advice. Please see specific information pulled into the AVS if appropriate      This document  has been electronically signed by:  Trudy Quinones PA-C

## 2022-03-04 ENCOUNTER — TELEPHONE (OUTPATIENT)
Dept: FAMILY MEDICINE CLINIC | Facility: CLINIC | Age: 52
End: 2022-03-04

## 2022-03-04 DIAGNOSIS — E66.01 CLASS 2 SEVERE OBESITY WITH SERIOUS COMORBIDITY AND BODY MASS INDEX (BMI) OF 39.0 TO 39.9 IN ADULT, UNSPECIFIED OBESITY TYPE: ICD-10-CM

## 2022-03-04 DIAGNOSIS — F41.8 DEPRESSION WITH ANXIETY: ICD-10-CM

## 2022-03-04 RX ORDER — HYDROXYZINE HYDROCHLORIDE 10 MG/1
10 TABLET, FILM COATED ORAL 3 TIMES DAILY
Qty: 90 TABLET | Refills: 5 | Status: SHIPPED | OUTPATIENT
Start: 2022-03-04 | End: 2022-12-14

## 2022-03-04 RX ORDER — PHENTERMINE HYDROCHLORIDE 37.5 MG/1
37.5 TABLET ORAL
Qty: 30 TABLET | Refills: 0 | Status: SHIPPED | OUTPATIENT
Start: 2022-03-04 | End: 2022-04-14 | Stop reason: SDUPTHER

## 2022-03-04 NOTE — TELEPHONE ENCOUNTER
----- Message from Hoang Palacios MD sent at 3/4/2022 11:30 AM EST -----  Emailed  ----- Message -----  From: Ariadna Gillespie MA  Sent: 3/4/2022  11:05 AM EST  To: Hoang Palacios MD    Patient called in requesting a prescription for linzess 145 to the pharmacy.

## 2022-03-25 ENCOUNTER — OFFICE VISIT (OUTPATIENT)
Dept: FAMILY MEDICINE CLINIC | Facility: CLINIC | Age: 52
End: 2022-03-25

## 2022-03-25 DIAGNOSIS — E66.01 CLASS 2 SEVERE OBESITY WITH SERIOUS COMORBIDITY AND BODY MASS INDEX (BMI) OF 39.0 TO 39.9 IN ADULT, UNSPECIFIED OBESITY TYPE: ICD-10-CM

## 2022-03-25 DIAGNOSIS — Z98.84 HISTORY OF BARIATRIC SURGERY: ICD-10-CM

## 2022-03-25 DIAGNOSIS — L63.9 ALOPECIA AREATA: ICD-10-CM

## 2022-03-25 DIAGNOSIS — F41.8 DEPRESSION WITH ANXIETY: ICD-10-CM

## 2022-03-25 DIAGNOSIS — M05.79 RHEUMATOID ARTHRITIS INVOLVING MULTIPLE SITES WITH POSITIVE RHEUMATOID FACTOR: ICD-10-CM

## 2022-03-25 DIAGNOSIS — K76.0 NON-ALCOHOLIC FATTY LIVER DISEASE: ICD-10-CM

## 2022-03-25 DIAGNOSIS — M54.59 MECHANICAL LOW BACK PAIN: ICD-10-CM

## 2022-03-25 DIAGNOSIS — Z12.31 ENCOUNTER FOR SCREENING MAMMOGRAM FOR BREAST CANCER: ICD-10-CM

## 2022-03-25 DIAGNOSIS — K21.9 GASTROESOPHAGEAL REFLUX DISEASE WITHOUT ESOPHAGITIS: ICD-10-CM

## 2022-03-25 DIAGNOSIS — Z00.00 HEALTHCARE MAINTENANCE: ICD-10-CM

## 2022-03-25 DIAGNOSIS — L21.9 SEBORRHEIC DERMATITIS: ICD-10-CM

## 2022-03-25 DIAGNOSIS — E78.2 MIXED HYPERLIPIDEMIA: ICD-10-CM

## 2022-03-25 DIAGNOSIS — K59.09 CHRONIC CONSTIPATION: ICD-10-CM

## 2022-03-25 DIAGNOSIS — J30.9 CHRONIC ALLERGIC RHINITIS: Primary | ICD-10-CM

## 2022-03-25 DIAGNOSIS — I87.2 CHRONIC VENOUS INSUFFICIENCY: ICD-10-CM

## 2022-03-25 PROCEDURE — 99214 OFFICE O/P EST MOD 30 MIN: CPT | Performed by: GENERAL PRACTICE

## 2022-03-25 NOTE — PROGRESS NOTES
Subjective   Susie Rangel is a 51 y.o. female.     Chief Complaint  She returns for a scheduled reassessment of multiple medical problems including rheumatoid arthritis, chronic mechanical low back pain, chronic lower extremity edema, chronic constipation, and depression    History of Present Illness     Rheumatoid Arthritis  RF drawn on 11/11/2020 returned at 51.8.  CCP antibodies returned negative as did an RENATO.  ESR and CRP were unremarkable.  She continues to be followed by rheumatology and is currently prescribed meloxicam and leflunomide with prednisone available for flares.  She takes 10 mg of the latter for 2 to 5 days monthly on average.  She has noted a continued improvement in her peripheral joint pain and stiffness.  There is no history of any joint swelling or redness and she has had no rash.    Low Back Pain  She complains of persistent low back pain. There has been no change in the quality nor any new associated symptoms.  She continues to deny any changes in her strength, sensation, or bowel/bladder control.  She continues to be followed by pain management    Left Lower Extremity Edema  She has noted some improvement in the swelling about her left foot.  This has been unassociated with any other symptoms and she continues to deny any pain or discoloration.  The swelling tends to develop toward the end of the day and improves overnight.    Constipation  She has made some dietary modifications and has noted some improvement in her constipation. She is currently on Linzess 145 daily.  She is uncertain as of yet whether it is as effective as Trulance.. She denies any recent abdominal distention.  Within the last year she has had an occasional sense of tightness about her upper neck when swallowing solid food.  There is no history of any nausea, vomiting, or heartburn and she continues to deny any diarrhea, hematochezia, or melena.  There is no history of any fever, chills, night sweats or weight loss.   EGD performed on 4/27/2021 revealed evidence of a previous sleeve gastrectomy along with mild gastritis.  Colonoscopy performed the same day revealed small internal hemorrhoids but was otherwise unremarkable.    Dyslipidemia  Compliance with treatment has been fair.  She has not had a recent lipid profile    Depression  She remains under considerable amount of stress but feels that she is managing.  She has been doing fairly well with her nervousness, appetite, and sleep and continues to deny any depression, loss of interest in activities, or suicidal ideation.  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    Review of Systems   Constitutional: Positive for fatigue. Negative for appetite change, chills, fever and unexpected weight change.   HENT: Positive for rhinorrhea and sneezing. Negative for congestion, postnasal drip, sinus pressure, sore throat and voice change.    Eyes: Negative for visual disturbance.   Respiratory: Negative for cough, shortness of breath and wheezing.    Cardiovascular: Positive for leg swelling (left). Negative for chest pain and palpitations.   Gastrointestinal: Negative for abdominal pain, blood in stool, constipation, diarrhea, nausea and vomiting.   Endocrine:        Hot flashes   Genitourinary: Negative for difficulty urinating, dysuria, frequency, hematuria and urgency.   Musculoskeletal: Positive for arthralgias and back pain. Negative for neck pain.   Skin: Negative for rash.        Hair loss   Neurological: Negative for weakness, numbness and headaches.   Psychiatric/Behavioral: Negative for decreased concentration, dysphoric mood and sleep disturbance. The patient is nervous/anxious.      Objective   Physical Exam  Constitutional:       General: She is not in acute distress.     Appearance: Normal  appearance. She is well-developed. She is not diaphoretic.      Comments: Bright and in good spirits. No apparent distress. No pallor, jaundice, diaphoresis, or cyanosis.   HENT:      Head: Atraumatic.      Right Ear: Tympanic membrane, ear canal and external ear normal.      Left Ear: Tympanic membrane, ear canal and external ear normal.   Eyes:      Conjunctiva/sclera: Conjunctivae normal.   Neck:      Thyroid: No thyroid mass or thyromegaly.      Vascular: No carotid bruit or JVD.      Trachea: Trachea normal. No tracheal deviation.   Cardiovascular:      Rate and Rhythm: Normal rate and regular rhythm.      Heart sounds: Normal heart sounds, S1 normal and S2 normal. No murmur heard.    No gallop.   Pulmonary:      Effort: Pulmonary effort is normal.      Breath sounds: Normal breath sounds.   Chest:   Breasts:      Right: No supraclavicular adenopathy.      Left: No supraclavicular adenopathy.       Abdominal:      General: Bowel sounds are normal. There is no distension.   Musculoskeletal:      Right lower leg: No edema.      Left lower leg: Edema (trace) present.      Comments: No peripheral joint redness or warmth.   Lymphadenopathy:      Head:      Right side of head: No submental, submandibular, tonsillar, preauricular, posterior auricular or occipital adenopathy.      Left side of head: No submental, submandibular, tonsillar, preauricular, posterior auricular or occipital adenopathy.      Cervical: No cervical adenopathy.      Upper Body:      Right upper body: No supraclavicular adenopathy.      Left upper body: No supraclavicular adenopathy.   Skin:     General: Skin is warm.      Coloration: Skin is not cyanotic, jaundiced or pale.      Findings: No rash.      Nails: There is no clubbing.   Neurological:      Mental Status: She is alert and oriented to person, place, and time.      Cranial Nerves: No cranial nerve deficit.      Motor: No tremor.      Coordination: Coordination normal.      Gait: Gait  normal.   Psychiatric:         Attention and Perception: Attention normal.         Mood and Affect: Mood normal.         Speech: Speech normal.         Behavior: Behavior normal.         Thought Content: Thought content normal.       Assessment/Plan   Problems Addressed this Visit        Allergies and Adverse Reactions    Chronic allergic rhinitis        Cardiac and Vasculature    Chronic venous insufficiency  Reminded regarding lifestyle modification    Relevant Orders    CBC & Differential    Mixed hyperlipidemia  Encouraged to continue to work on her diet and exercise plan.  Scheduled for updated labs    Relevant Orders    Comprehensive Metabolic Panel    Lipid Panel    TSH       Endocrine and Metabolic    Class 2 obesity with body mass index (BMI) of 39.0 to 39.9 in adult  As above.    History of bariatric surgery    Relevant Orders    CBC & Differential    Vitamin D 25 Hydroxy    Vitamin B12       Gastrointestinal Abdominal     Chronic constipation  Reminded regarding lifestyle modification  Continue current medication    Relevant Orders    CBC & Differential    Gastroesophageal reflux disease without esophagitis    Non-alcoholic fatty liver disease    Relevant Orders    CBC & Differential    Comprehensive Metabolic Panel       Genitourinary and Reproductive     Encounter for screening mammogram for breast cancer    Relevant Orders    Mammo Screening Digital Tomosynthesis Bilateral With CAD       Health Encounters    Healthcare maintenance  Patient received a second [employer] COVID-19 vaccination on 11/18/2021  Reviewed the potential benefits and risks of Shingrix.  Patient will consider  We will arrange an updated mammogram    Relevant Orders    Mammo Screening Digital Tomosynthesis Bilateral With CAD       Mental Health    Depression with anxiety  Significant situational component.   Supportive therapy.   Continue current medication.    Relevant Orders    TSH       Musculoskeletal and Injuries     Mechanical low back pain  Reminded regarding symptomatic treatment.   Continue current medication  Follow up with pain management    Rheumatoid arthritis involving multiple sites with positive rheumatoid factor (HCC)  As above.  Follow up with  rheumatology    Relevant Orders    Vitamin D 25 Hydroxy    Vitamin B12       Skin    Alopecia areata    Seborrheic dermatitis      Diagnoses       Codes Comments    Chronic allergic rhinitis    -  Primary ICD-10-CM: J30.9  ICD-9-CM: 477.9     Chronic venous insufficiency     ICD-10-CM: I87.2  ICD-9-CM: 459.81     Mixed hyperlipidemia     ICD-10-CM: E78.2  ICD-9-CM: 272.2     Class 2 severe obesity with serious comorbidity and body mass index (BMI) of 39.0 to 39.9 in adult, unspecified obesity type (HCC)     ICD-10-CM: E66.01, Z68.39  ICD-9-CM: 278.01, V85.39     History of bariatric surgery     ICD-10-CM: Z98.84  ICD-9-CM: V45.86     Chronic constipation     ICD-10-CM: K59.09  ICD-9-CM: 564.00     Gastroesophageal reflux disease without esophagitis     ICD-10-CM: K21.9  ICD-9-CM: 530.81     Non-alcoholic fatty liver disease     ICD-10-CM: K76.0  ICD-9-CM: 571.8     Healthcare maintenance     ICD-10-CM: Z00.00  ICD-9-CM: V70.0     Depression with anxiety     ICD-10-CM: F41.8  ICD-9-CM: 300.4     Mechanical low back pain     ICD-10-CM: M54.59  ICD-9-CM: 724.2     Rheumatoid arthritis involving multiple sites with positive rheumatoid factor (HCC)     ICD-10-CM: M05.79  ICD-9-CM: 714.0     Alopecia areata     ICD-10-CM: L63.9  ICD-9-CM: 704.01     Seborrheic dermatitis     ICD-10-CM: L21.9  ICD-9-CM: 690.10     Encounter for screening mammogram for breast cancer     ICD-10-CM: Z12.31  ICD-9-CM: V76.12

## 2022-03-26 VITALS
RESPIRATION RATE: 14 BRPM | HEIGHT: 67 IN | BODY MASS INDEX: 40.02 KG/M2 | WEIGHT: 255 LBS | OXYGEN SATURATION: 100 % | TEMPERATURE: 97.2 F | HEART RATE: 96 BPM | DIASTOLIC BLOOD PRESSURE: 70 MMHG | SYSTOLIC BLOOD PRESSURE: 124 MMHG

## 2022-04-14 DIAGNOSIS — E66.01 CLASS 2 SEVERE OBESITY WITH SERIOUS COMORBIDITY AND BODY MASS INDEX (BMI) OF 39.0 TO 39.9 IN ADULT, UNSPECIFIED OBESITY TYPE: ICD-10-CM

## 2022-04-14 RX ORDER — PHENTERMINE HYDROCHLORIDE 37.5 MG/1
37.5 TABLET ORAL
Qty: 30 TABLET | Refills: 2 | Status: SHIPPED | OUTPATIENT
Start: 2022-04-14 | End: 2022-05-16

## 2022-04-27 ENCOUNTER — APPOINTMENT (OUTPATIENT)
Dept: MAMMOGRAPHY | Facility: HOSPITAL | Age: 52
End: 2022-04-27

## 2022-05-14 DIAGNOSIS — E66.01 CLASS 2 SEVERE OBESITY WITH SERIOUS COMORBIDITY AND BODY MASS INDEX (BMI) OF 39.0 TO 39.9 IN ADULT, UNSPECIFIED OBESITY TYPE: ICD-10-CM

## 2022-05-16 RX ORDER — PHENTERMINE HYDROCHLORIDE 37.5 MG/1
37.5 TABLET ORAL
Qty: 30 TABLET | Refills: 0 | Status: SHIPPED | OUTPATIENT
Start: 2022-05-16 | End: 2022-06-20 | Stop reason: SDUPTHER

## 2022-06-20 DIAGNOSIS — E66.01 CLASS 2 SEVERE OBESITY WITH SERIOUS COMORBIDITY AND BODY MASS INDEX (BMI) OF 39.0 TO 39.9 IN ADULT, UNSPECIFIED OBESITY TYPE: ICD-10-CM

## 2022-06-20 RX ORDER — PHENTERMINE HYDROCHLORIDE 37.5 MG/1
37.5 TABLET ORAL
Qty: 30 TABLET | Refills: 0 | Status: SHIPPED | OUTPATIENT
Start: 2022-06-20 | End: 2022-09-23 | Stop reason: SDUPTHER

## 2022-07-08 ENCOUNTER — OFFICE VISIT (OUTPATIENT)
Dept: FAMILY MEDICINE CLINIC | Facility: CLINIC | Age: 52
End: 2022-07-08

## 2022-07-08 VITALS
DIASTOLIC BLOOD PRESSURE: 94 MMHG | WEIGHT: 241 LBS | HEIGHT: 67 IN | SYSTOLIC BLOOD PRESSURE: 108 MMHG | HEART RATE: 79 BPM | BODY MASS INDEX: 37.83 KG/M2 | OXYGEN SATURATION: 100 % | TEMPERATURE: 98.1 F

## 2022-07-08 DIAGNOSIS — E78.2 MIXED HYPERLIPIDEMIA: Chronic | ICD-10-CM

## 2022-07-08 DIAGNOSIS — Z78.0 MENOPAUSE: ICD-10-CM

## 2022-07-08 DIAGNOSIS — K21.9 GASTROESOPHAGEAL REFLUX DISEASE WITHOUT ESOPHAGITIS: Chronic | ICD-10-CM

## 2022-07-08 DIAGNOSIS — F41.8 DEPRESSION WITH ANXIETY: Primary | Chronic | ICD-10-CM

## 2022-07-08 DIAGNOSIS — Z12.31 ENCOUNTER FOR SCREENING MAMMOGRAM FOR MALIGNANT NEOPLASM OF BREAST: ICD-10-CM

## 2022-07-08 DIAGNOSIS — E66.01 CLASS 2 SEVERE OBESITY WITH SERIOUS COMORBIDITY AND BODY MASS INDEX (BMI) OF 37.0 TO 37.9 IN ADULT, UNSPECIFIED OBESITY TYPE: Chronic | ICD-10-CM

## 2022-07-08 PROCEDURE — 99214 OFFICE O/P EST MOD 30 MIN: CPT | Performed by: PHYSICIAN ASSISTANT

## 2022-07-08 RX ORDER — TIZANIDINE 4 MG/1
4 TABLET ORAL NIGHTLY PRN
COMMUNITY

## 2022-07-10 NOTE — PROGRESS NOTES
"Subjective   Susie Rangel is a 51 y.o. female.       Chief Complaint -anxiety    History of Present Illness -    ROS    Anxiety-  Patient complains of anxiety with depressive symptoms.  She has increased stress with regards to current family situations.  She is helping care for her 's mother and he has recently gone into an inpatient treatment program at the [facility] for substance abuse and PTSD.  She reports labile emotions and feelings of being overwhelmed and anxious.  She denies any hallucinations SI or HI.    Gastroesophageal reflux disease-stable with omeprazole and dietary modification    Hyperlipidemia-stable with dietary modification    Obesity-  She is doing well with the use of diet exercise and Adipex for appetite suppression.    The following portions of the patient's history were reviewed and updated as appropriate: allergies, current medications, past family history, past medical history, past social history, past surgical history and problem list.    Review of Systems    Objective  Vital signs:  /94   Pulse 79   Temp 98.1 °F (36.7 °C) (Temporal)   Ht 170.2 cm (67.01\")   Wt 109 kg (241 lb)   SpO2 100%   BMI 37.73 kg/m²     Physical Exam  Vitals and nursing note reviewed.   Constitutional:       Appearance: Normal appearance. She is well-developed.   Eyes:      Extraocular Movements: Extraocular movements intact.      Conjunctiva/sclera: Conjunctivae normal.   Cardiovascular:      Rate and Rhythm: Normal rate and regular rhythm.      Heart sounds: Normal heart sounds. No murmur heard.  Pulmonary:      Effort: Pulmonary effort is normal. No respiratory distress.      Breath sounds: Normal breath sounds. No wheezing.   Musculoskeletal:         General: No tenderness.   Skin:     General: Skin is warm and dry.      Findings: No rash.   Neurological:      Mental Status: She is alert and oriented to person, place, and time.   Psychiatric:         Behavior: Behavior normal.  "        Thought Content: Thought content normal.      Comments: Mood is serious and concerned         The following data was reviewed by: ROMAIN Rahman on 07/08/2022:      Lab Results   Component Value Date    TSH 0.666 11/11/2020     Lab Results   Component Value Date    GLUCOSE 96 02/08/2021    BUN 12 02/08/2021    CREATININE 0.73 02/08/2021    EGFRIFAFRI 103 02/08/2021    BCR 16.4 02/08/2021    K 4.1 02/08/2021    CO2 27.1 02/08/2021    CALCIUM 9.6 02/08/2021    ALBUMIN 4.30 02/08/2021    AST 26 02/08/2021    ALT 36 (H) 02/08/2021                        Assessment & Plan     Diagnoses and all orders for this visit:    1. Depression with anxiety (Primary)  Comments:  Start Vraylar 1.5 mg daily  Samples given to the patient today and discussed potential side effects  Advised conservative measures for dealing with stress  Orders:  -     Cariprazine HCl (Vraylar) 1.5 MG capsule capsule; Take 1 capsule by mouth Daily.  Dispense: 28 capsule; Refill: 0    2. Encounter for screening mammogram for malignant neoplasm of breast  -     Mammo Screening Digital Tomosynthesis Bilateral With CAD; Future    3. Menopause  -     DEXA Bone Density Axial; Future    4. Mixed hyperlipidemia  Comments:  Advised low-cholesterol diet    5. Gastroesophageal reflux disease without esophagitis  Comments:  Continue omeprazole  Advised to avoid known trigger foods    6. Class 2 severe obesity with serious comorbidity and body mass index (BMI) of 37.0 to 37.9 in adult, unspecified obesity type (HCC)  Comments:  Advised to hold Adipex until anxiety is controlled  Advised 30 minutes CV activity daily  Advised healthy food options            Patient was given instructions and counseling regarding his condition or for health maintenance advice. Please see specific information pulled into the AVS if appropriate      This document has been electronically signed by:  Trudy Quinones PA-C

## 2022-07-20 ENCOUNTER — HOSPITAL ENCOUNTER (OUTPATIENT)
Dept: MAMMOGRAPHY | Facility: HOSPITAL | Age: 52
Discharge: HOME | End: 2022-07-20
Admitting: PHYSICIAN ASSISTANT

## 2022-07-20 DIAGNOSIS — Z12.31 ENCOUNTER FOR SCREENING MAMMOGRAM FOR MALIGNANT NEOPLASM OF BREAST: ICD-10-CM

## 2022-07-20 PROCEDURE — 77063 BREAST TOMOSYNTHESIS BI: CPT

## 2022-07-20 PROCEDURE — 77067 SCR MAMMO BI INCL CAD: CPT | Performed by: RADIOLOGY

## 2022-07-20 PROCEDURE — 77067 SCR MAMMO BI INCL CAD: CPT

## 2022-07-20 PROCEDURE — 77063 BREAST TOMOSYNTHESIS BI: CPT | Performed by: RADIOLOGY

## 2022-09-23 ENCOUNTER — OFFICE VISIT (OUTPATIENT)
Dept: FAMILY MEDICINE CLINIC | Facility: CLINIC | Age: 52
End: 2022-09-23

## 2022-09-23 DIAGNOSIS — M05.79 RHEUMATOID ARTHRITIS INVOLVING MULTIPLE SITES WITH POSITIVE RHEUMATOID FACTOR: ICD-10-CM

## 2022-09-23 DIAGNOSIS — M54.59 MECHANICAL LOW BACK PAIN: ICD-10-CM

## 2022-09-23 DIAGNOSIS — K21.9 GASTROESOPHAGEAL REFLUX DISEASE WITHOUT ESOPHAGITIS: ICD-10-CM

## 2022-09-23 DIAGNOSIS — E66.01 CLASS 2 SEVERE OBESITY WITH SERIOUS COMORBIDITY AND BODY MASS INDEX (BMI) OF 39.0 TO 39.9 IN ADULT, UNSPECIFIED OBESITY TYPE: ICD-10-CM

## 2022-09-23 DIAGNOSIS — K76.0 NON-ALCOHOLIC FATTY LIVER DISEASE: ICD-10-CM

## 2022-09-23 DIAGNOSIS — Z23 ENCOUNTER FOR IMMUNIZATION: ICD-10-CM

## 2022-09-23 DIAGNOSIS — I87.2 CHRONIC VENOUS INSUFFICIENCY: ICD-10-CM

## 2022-09-23 DIAGNOSIS — R03.0 ELEVATED BLOOD PRESSURE READING: ICD-10-CM

## 2022-09-23 DIAGNOSIS — E66.01 CLASS 2 SEVERE OBESITY WITH SERIOUS COMORBIDITY AND BODY MASS INDEX (BMI) OF 37.0 TO 37.9 IN ADULT, UNSPECIFIED OBESITY TYPE: ICD-10-CM

## 2022-09-23 DIAGNOSIS — Z00.00 HEALTHCARE MAINTENANCE: ICD-10-CM

## 2022-09-23 DIAGNOSIS — E78.2 MIXED HYPERLIPIDEMIA: ICD-10-CM

## 2022-09-23 DIAGNOSIS — K59.09 CHRONIC CONSTIPATION: ICD-10-CM

## 2022-09-23 DIAGNOSIS — Z98.84 HISTORY OF BARIATRIC SURGERY: ICD-10-CM

## 2022-09-23 DIAGNOSIS — J30.9 CHRONIC ALLERGIC RHINITIS: Primary | ICD-10-CM

## 2022-09-23 DIAGNOSIS — F41.8 DEPRESSION WITH ANXIETY: ICD-10-CM

## 2022-09-23 PROCEDURE — 90686 IIV4 VACC NO PRSV 0.5 ML IM: CPT | Performed by: GENERAL PRACTICE

## 2022-09-23 PROCEDURE — 90472 IMMUNIZATION ADMIN EACH ADD: CPT | Performed by: GENERAL PRACTICE

## 2022-09-23 PROCEDURE — 90471 IMMUNIZATION ADMIN: CPT | Performed by: GENERAL PRACTICE

## 2022-09-23 PROCEDURE — 99214 OFFICE O/P EST MOD 30 MIN: CPT | Performed by: GENERAL PRACTICE

## 2022-09-23 PROCEDURE — 90677 PCV20 VACCINE IM: CPT | Performed by: GENERAL PRACTICE

## 2022-09-23 RX ORDER — ZOSTER VACCINE RECOMBINANT, ADJUVANTED 50 MCG/0.5
50 KIT INTRAMUSCULAR SEE ADMIN INSTRUCTIONS
Qty: 1 EACH | Refills: 1 | Status: SHIPPED | OUTPATIENT
Start: 2022-09-23

## 2022-09-23 RX ORDER — PHENTERMINE HYDROCHLORIDE 37.5 MG/1
37.5 TABLET ORAL
Qty: 30 TABLET | Refills: 2 | Status: SHIPPED | OUTPATIENT
Start: 2022-09-23 | End: 2023-01-20 | Stop reason: SDUPTHER

## 2022-09-23 NOTE — PROGRESS NOTES
Subjective   Susie Rangel is a 52 y.o. female.     Chief Complaint  She returns for a scheduled reassessment of multiple medical problems including rheumatoid arthritis, mechanical low back pain, left lower extremity edema, chronic constipation, depression, and obesity with BMI of 37 post bariatric surgery    History of Present Illness     Rheumatoid Arthritis  RF drawn on 11/11/2020 returned at 51.8.  CCP antibodies returned negative as did an RENATO.  ESR and CRP were unremarkable.  She continues to be followed by rheumatology and is currently prescribed meloxicam and leflunomide with prednisone available for flares.  She takes 10 mg of the latter for 2 to 5 days monthly on average.  She has noted a continued improvement in her peripheral joint pain and stiffness.  There is no history of any joint swelling or redness and she has had no rash.    Low Back Pain  She complains of persistent low back pain. There has been no change in the quality nor any new associated symptoms.  She continues to deny any changes in her strength, sensation, or bowel/bladder control.  She continues to be followed by pain management    Left Lower Extremity Edema  She has noted some improvement in the swelling about her left foot.  This has been unassociated with any other symptoms and she continues to deny any pain or discoloration.  The swelling tends to develop toward the end of the day and improves overnight.    Constipation  She has made some dietary modifications and has noted some improvement in her constipation. She denies any recent abdominal distention.  Within the last year she has had an occasional sense of tightness about her upper neck when swallowing solid food.  There is no history of any nausea, vomiting, or heartburn and she continues to deny any diarrhea, hematochezia, or melena.  There is no history of any fever, chills, night sweats or weight loss.  EGD performed on 4/27/2021 revealed evidence of a previous sleeve  gastrectomy along with mild gastritis.  Colonoscopy performed the same day revealed small internal hemorrhoids but was otherwise unremarkable.  She is on no medication at present    Dyslipidemia  Her compliance with treatment has been fair.      Depression  She remains under considerable amount of stress but feels that she is managing.  She has been doing fairly well with her nervousness, appetite, and sleep and continues to deny any depression, loss of interest in activities, or suicidal ideation.  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  She remains on phentermine and was recently started on tirzepatide elsewhere.  She has not experienced any side effects thus far    The following portions of the patient's history were reviewed and updated as appropriate: allergies, current medications, past medical history, past social history and problem list.    Review of Systems   Constitutional: Positive for fatigue. Negative for appetite change, chills, fever and unexpected weight change.   HENT: Positive for rhinorrhea and sneezing. Negative for congestion, postnasal drip, sinus pressure, sore throat and voice change.    Eyes: Negative for visual disturbance.   Respiratory: Negative for cough, shortness of breath and wheezing.    Cardiovascular: Positive for leg swelling (left). Negative for chest pain and palpitations.   Gastrointestinal: Negative for abdominal pain, blood in stool, constipation, diarrhea, nausea and vomiting.   Endocrine:        Hot flashes   Genitourinary: Negative for difficulty urinating, dysuria, frequency, hematuria and urgency.   Musculoskeletal: Positive for arthralgias and back pain. Negative for neck pain.   Skin: Negative for rash.        Hair loss   Neurological: Negative for weakness, numbness and headaches.   Psychiatric/Behavioral: Negative for decreased concentration, dysphoric mood and sleep disturbance. The patient is  nervous/anxious.      Objective   Physical Exam  Constitutional:       General: She is not in acute distress.     Appearance: Normal appearance. She is well-developed. She is not diaphoretic.      Comments: Bright and in good spirits. No apparent distress. No pallor, jaundice, diaphoresis, or cyanosis.   HENT:      Head: Atraumatic.      Right Ear: Tympanic membrane, ear canal and external ear normal.      Left Ear: Tympanic membrane, ear canal and external ear normal.   Eyes:      Conjunctiva/sclera: Conjunctivae normal.   Neck:      Thyroid: No thyroid mass or thyromegaly.      Vascular: No carotid bruit or JVD.      Trachea: Trachea normal. No tracheal deviation.   Cardiovascular:      Rate and Rhythm: Normal rate and regular rhythm.      Heart sounds: Normal heart sounds, S1 normal and S2 normal. No murmur heard.    No gallop.   Pulmonary:      Effort: Pulmonary effort is normal.      Breath sounds: Normal breath sounds.   Chest:   Breasts:      Right: No supraclavicular adenopathy.      Left: No supraclavicular adenopathy.       Abdominal:      General: Bowel sounds are normal. There is no distension.   Musculoskeletal:      Right lower leg: No edema.      Left lower leg: Edema (trace) present.      Comments: No peripheral joint redness or warmth.   Lymphadenopathy:      Head:      Right side of head: No submental, submandibular, tonsillar, preauricular, posterior auricular or occipital adenopathy.      Left side of head: No submental, submandibular, tonsillar, preauricular, posterior auricular or occipital adenopathy.      Cervical: No cervical adenopathy.      Upper Body:      Right upper body: No supraclavicular adenopathy.      Left upper body: No supraclavicular adenopathy.   Skin:     General: Skin is warm.      Coloration: Skin is not cyanotic, jaundiced or pale.      Findings: No rash.      Nails: There is no clubbing.   Neurological:      Mental Status: She is alert and oriented to person, place, and  time.      Cranial Nerves: No cranial nerve deficit.      Motor: No tremor.      Coordination: Coordination normal.      Gait: Gait normal.   Psychiatric:         Attention and Perception: Attention normal.         Mood and Affect: Mood normal.         Speech: Speech normal.         Behavior: Behavior normal.         Thought Content: Thought content normal.       Assessment & Plan   Problems Addressed this Visit        Allergies and Adverse Reactions    Chronic allergic rhinitis        Cardiac and Vasculature    Chronic venous insufficiency  Reminded regarding lifestyle modification    Relevant Orders    CBC & Differential    Comprehensive Metabolic Panel    Elevated blood pressure reading  Encouraged to continue to work on her diet and exercise plan  Will continue to monitor    Mixed hyperlipidemia  As above.  Scheduled for updated labs    Relevant Orders    Comprehensive Metabolic Panel    Lipid Panel    TSH       Endocrine and Metabolic    Class 2 obesity with body mass index (BMI) of 37.0 to 37.9 in adult  As above.  Continue current medication    Relevant Medications    phentermine (ADIPEX-P) 37.5 MG tablet    Other Relevant Orders    Hemoglobin A1c    History of bariatric surgery    Relevant Orders    CBC & Differential    Hemoglobin A1c    Vitamin D 25 Hydroxy    Vitamin B12       Gastrointestinal Abdominal     Chronic constipation  Reminded regarding lifestyle modification  Encouraged to report if any worse or if any new symptoms or concerns.    Relevant Orders    CBC & Differential    TSH    Gastroesophageal reflux disease without esophagitis    Relevant Orders    CBC & Differential    Non-alcoholic fatty liver disease    Relevant Orders    Comprehensive Metabolic Panel       Health Encounters    Healthcare maintenance  Flu shot and Prevnar 20 administered  Encouraged to obtain an updated bivalent COVID-19 vaccination.  Advised regarding the potential benefits of Shingrix.  Prescription emailed to her  pharmacy    Relevant Medications    Zoster Vac Recomb Adjuvanted (Shingrix) 50 MCG/0.5ML reconstituted suspension    Other Relevant Orders    FluLaval/Fluarix/Fluzone >6 Months (Completed)    Pneumococcal Conjugate Vaccine 20-Valent All (Completed)       Mental Health    Depression with anxiety    Stable.  Supportive therapy.   Continue current medication.    Other Relevant Orders   TSH                  Musculoskeletal and Injuries    Mechanical low back pain  Reminded regarding symptomatic treatment.   Continue current medication  Follow up with pain management    Rheumatoid arthritis involving multiple sites with positive rheumatoid factor (HCC)  Continue current medication  Follow up with  rheumatology    Relevant Orders    CBC & Differential    Comprehensive Metabolic Panel          Diagnoses       Codes Comments    Chronic allergic rhinitis    -  Primary ICD-10-CM: J30.9  ICD-9-CM: 477.9     Chronic venous insufficiency     ICD-10-CM: I87.2  ICD-9-CM: 459.81     Mixed hyperlipidemia     ICD-10-CM: E78.2  ICD-9-CM: 272.2     Class 2 severe obesity with serious comorbidity and body mass index (BMI) of 37.0 to 37.9 in adult, unspecified obesity type (HCC)     ICD-10-CM: E66.01, Z68.37  ICD-9-CM: 278.01, V85.37     History of bariatric surgery     ICD-10-CM: Z98.84  ICD-9-CM: V45.86     Chronic constipation     ICD-10-CM: K59.09  ICD-9-CM: 564.00     Gastroesophageal reflux disease without esophagitis     ICD-10-CM: K21.9  ICD-9-CM: 530.81     Non-alcoholic fatty liver disease     ICD-10-CM: K76.0  ICD-9-CM: 571.8     Healthcare maintenance     ICD-10-CM: Z00.00  ICD-9-CM: V70.0     Depression with anxiety     ICD-10-CM: F41.8  ICD-9-CM: 300.4     Mechanical low back pain     ICD-10-CM: M54.59  ICD-9-CM: 724.2     Rheumatoid arthritis involving multiple sites with positive rheumatoid factor (HCC)     ICD-10-CM: M05.79  ICD-9-CM: 714.0     Encounter for immunization     ICD-10-CM: Z23  ICD-9-CM: V03.89     Class 2  severe obesity with serious comorbidity and body mass index (BMI) of 39.0 to 39.9 in adult, unspecified obesity type (HCC)     ICD-10-CM: E66.01, Z68.39  ICD-9-CM: 278.01, V85.39     Elevated blood pressure reading     ICD-10-CM: R03.0  ICD-9-CM: 796.2

## 2022-09-24 VITALS
RESPIRATION RATE: 14 BRPM | HEART RATE: 74 BPM | HEIGHT: 67 IN | BODY MASS INDEX: 37.04 KG/M2 | SYSTOLIC BLOOD PRESSURE: 142 MMHG | DIASTOLIC BLOOD PRESSURE: 88 MMHG | WEIGHT: 236 LBS | OXYGEN SATURATION: 99 % | TEMPERATURE: 98.6 F

## 2022-09-24 PROBLEM — R03.0 ELEVATED BLOOD PRESSURE READING: Status: ACTIVE | Noted: 2022-09-24

## 2022-09-28 ENCOUNTER — CLINICAL SUPPORT (OUTPATIENT)
Dept: FAMILY MEDICINE CLINIC | Facility: CLINIC | Age: 52
End: 2022-09-28

## 2022-09-28 DIAGNOSIS — K21.9 GASTROESOPHAGEAL REFLUX DISEASE WITHOUT ESOPHAGITIS: ICD-10-CM

## 2022-09-28 DIAGNOSIS — M05.79 RHEUMATOID ARTHRITIS INVOLVING MULTIPLE SITES WITH POSITIVE RHEUMATOID FACTOR: ICD-10-CM

## 2022-09-28 DIAGNOSIS — K59.09 CHRONIC CONSTIPATION: ICD-10-CM

## 2022-09-28 DIAGNOSIS — F41.8 DEPRESSION WITH ANXIETY: ICD-10-CM

## 2022-09-28 DIAGNOSIS — E78.2 MIXED HYPERLIPIDEMIA: ICD-10-CM

## 2022-09-28 DIAGNOSIS — K76.0 NON-ALCOHOLIC FATTY LIVER DISEASE: ICD-10-CM

## 2022-09-28 DIAGNOSIS — Z98.84 HISTORY OF BARIATRIC SURGERY: ICD-10-CM

## 2022-09-28 DIAGNOSIS — E66.01 CLASS 2 SEVERE OBESITY WITH SERIOUS COMORBIDITY AND BODY MASS INDEX (BMI) OF 37.0 TO 37.9 IN ADULT, UNSPECIFIED OBESITY TYPE: ICD-10-CM

## 2022-09-28 DIAGNOSIS — I87.2 CHRONIC VENOUS INSUFFICIENCY: ICD-10-CM

## 2022-09-28 PROCEDURE — 82607 VITAMIN B-12: CPT | Performed by: GENERAL PRACTICE

## 2022-09-28 PROCEDURE — 82306 VITAMIN D 25 HYDROXY: CPT | Performed by: GENERAL PRACTICE

## 2022-09-28 PROCEDURE — 80061 LIPID PANEL: CPT | Performed by: GENERAL PRACTICE

## 2022-09-28 PROCEDURE — 85025 COMPLETE CBC W/AUTO DIFF WBC: CPT | Performed by: GENERAL PRACTICE

## 2022-09-28 PROCEDURE — 80053 COMPREHEN METABOLIC PANEL: CPT | Performed by: GENERAL PRACTICE

## 2022-09-28 PROCEDURE — 83036 HEMOGLOBIN GLYCOSYLATED A1C: CPT | Performed by: GENERAL PRACTICE

## 2022-09-28 PROCEDURE — 36415 COLL VENOUS BLD VENIPUNCTURE: CPT | Performed by: GENERAL PRACTICE

## 2022-09-28 PROCEDURE — 84443 ASSAY THYROID STIM HORMONE: CPT | Performed by: GENERAL PRACTICE

## 2022-09-29 LAB
25(OH)D3 SERPL-MCNC: 55 NG/ML (ref 30–100)
ALBUMIN SERPL-MCNC: 4.4 G/DL (ref 3.5–5.2)
ALBUMIN/GLOB SERPL: 1.9 G/DL
ALP SERPL-CCNC: 103 U/L (ref 39–117)
ALT SERPL W P-5'-P-CCNC: 30 U/L (ref 1–33)
ANION GAP SERPL CALCULATED.3IONS-SCNC: 11.4 MMOL/L (ref 5–15)
AST SERPL-CCNC: 18 U/L (ref 1–32)
BASOPHILS # BLD AUTO: 0.09 10*3/MM3 (ref 0–0.2)
BASOPHILS NFR BLD AUTO: 0.9 % (ref 0–1.5)
BILIRUB SERPL-MCNC: 0.4 MG/DL (ref 0–1.2)
BUN SERPL-MCNC: 16 MG/DL (ref 6–20)
BUN/CREAT SERPL: 18.2 (ref 7–25)
CALCIUM SPEC-SCNC: 9.5 MG/DL (ref 8.6–10.5)
CHLORIDE SERPL-SCNC: 107 MMOL/L (ref 98–107)
CHOLEST SERPL-MCNC: 267 MG/DL (ref 0–200)
CO2 SERPL-SCNC: 26.6 MMOL/L (ref 22–29)
CREAT SERPL-MCNC: 0.88 MG/DL (ref 0.57–1)
DEPRECATED RDW RBC AUTO: 41 FL (ref 37–54)
EGFRCR SERPLBLD CKD-EPI 2021: 80.2 ML/MIN/1.73
EOSINOPHIL # BLD AUTO: 0.07 10*3/MM3 (ref 0–0.4)
EOSINOPHIL NFR BLD AUTO: 0.7 % (ref 0.3–6.2)
ERYTHROCYTE [DISTWIDTH] IN BLOOD BY AUTOMATED COUNT: 12.4 % (ref 12.3–15.4)
GLOBULIN UR ELPH-MCNC: 2.3 GM/DL
GLUCOSE SERPL-MCNC: 73 MG/DL (ref 65–99)
HBA1C MFR BLD: 4.9 % (ref 4.8–5.6)
HCT VFR BLD AUTO: 44.5 % (ref 34–46.6)
HDLC SERPL-MCNC: 72 MG/DL (ref 40–60)
HGB BLD-MCNC: 15.5 G/DL (ref 12–15.9)
IMM GRANULOCYTES # BLD AUTO: 0.03 10*3/MM3 (ref 0–0.05)
IMM GRANULOCYTES NFR BLD AUTO: 0.3 % (ref 0–0.5)
LDLC SERPL CALC-MCNC: 188 MG/DL (ref 0–100)
LDLC/HDLC SERPL: 2.57 {RATIO}
LYMPHOCYTES # BLD AUTO: 3.04 10*3/MM3 (ref 0.7–3.1)
LYMPHOCYTES NFR BLD AUTO: 28.9 % (ref 19.6–45.3)
MCH RBC QN AUTO: 31.8 PG (ref 26.6–33)
MCHC RBC AUTO-ENTMCNC: 34.8 G/DL (ref 31.5–35.7)
MCV RBC AUTO: 91.4 FL (ref 79–97)
MONOCYTES # BLD AUTO: 0.91 10*3/MM3 (ref 0.1–0.9)
MONOCYTES NFR BLD AUTO: 8.7 % (ref 5–12)
NEUTROPHILS NFR BLD AUTO: 6.37 10*3/MM3 (ref 1.7–7)
NEUTROPHILS NFR BLD AUTO: 60.5 % (ref 42.7–76)
NRBC BLD AUTO-RTO: 0 /100 WBC (ref 0–0.2)
PLATELET # BLD AUTO: 351 10*3/MM3 (ref 140–450)
PMV BLD AUTO: 10.3 FL (ref 6–12)
POTASSIUM SERPL-SCNC: 3.8 MMOL/L (ref 3.5–5.2)
PROT SERPL-MCNC: 6.7 G/DL (ref 6–8.5)
RBC # BLD AUTO: 4.87 10*6/MM3 (ref 3.77–5.28)
SODIUM SERPL-SCNC: 145 MMOL/L (ref 136–145)
TRIGL SERPL-MCNC: 51 MG/DL (ref 0–150)
TSH SERPL DL<=0.05 MIU/L-ACNC: 0.92 UIU/ML (ref 0.27–4.2)
VIT B12 BLD-MCNC: 539 PG/ML (ref 211–946)
VLDLC SERPL-MCNC: 7 MG/DL (ref 5–40)
WBC NRBC COR # BLD: 10.51 10*3/MM3 (ref 3.4–10.8)

## 2022-10-10 DIAGNOSIS — M54.59 MECHANICAL LOW BACK PAIN: Primary | ICD-10-CM

## 2022-10-10 DIAGNOSIS — M05.79 RHEUMATOID ARTHRITIS INVOLVING MULTIPLE SITES WITH POSITIVE RHEUMATOID FACTOR: ICD-10-CM

## 2022-11-03 ENCOUNTER — OFFICE VISIT (OUTPATIENT)
Dept: PAIN MEDICINE | Facility: CLINIC | Age: 52
End: 2022-11-03

## 2022-11-03 VITALS
DIASTOLIC BLOOD PRESSURE: 82 MMHG | HEIGHT: 67 IN | BODY MASS INDEX: 35.16 KG/M2 | HEART RATE: 76 BPM | WEIGHT: 224 LBS | OXYGEN SATURATION: 100 % | SYSTOLIC BLOOD PRESSURE: 133 MMHG | RESPIRATION RATE: 16 BRPM | TEMPERATURE: 97.7 F

## 2022-11-03 DIAGNOSIS — M47.816 SPONDYLOSIS OF LUMBAR REGION WITHOUT MYELOPATHY OR RADICULOPATHY: Primary | ICD-10-CM

## 2022-11-03 DIAGNOSIS — M46.1 SACROILIITIS: ICD-10-CM

## 2022-11-03 PROCEDURE — 99204 OFFICE O/P NEW MOD 45 MIN: CPT | Performed by: STUDENT IN AN ORGANIZED HEALTH CARE EDUCATION/TRAINING PROGRAM

## 2022-11-03 RX ORDER — TIRZEPATIDE 10 MG/.5ML
INJECTION, SOLUTION SUBCUTANEOUS
COMMUNITY
Start: 2022-10-19 | End: 2023-02-08 | Stop reason: DRUGHIGH

## 2022-11-03 RX ORDER — PREDNISONE 10 MG/1
TABLET ORAL
COMMUNITY
Start: 2022-08-29

## 2022-11-03 NOTE — PROGRESS NOTES
11/03/2022      Referring Physician: Hoang Palacios MD  [address] [place] [address]    Primary Physician: Hoang Palacios MD    CHIEF COMPLAINT or REASON FOR VISIT: Back Pain (Bilateral) and Hip Pain (Bilateral)      HISTORY OF PRESENT ILLNESS:  Ms. Susie Rangel is 52 y.o. female who presents as a new patient referral for evaluation and treatment of chronic low back pain and L>R sacroiliac joint pain.  She does have a past medical history rheumatoid arthritis, treated by a rheumatologist.  She has had this issue for several years, previously came to [facility] and underwent sacroiliac joint injections.  Subsequently was managed by Dr. Huber, pain management, starting in April 2021 with serial left-sided sacroiliac joint injections.  She does complain of some right sacroiliac joint pain as well.  She recently underwent left sacroiliac joint injection in August 2022 and continues to have benefit from that.  She is interested in relocating her pain management to [facility] due to relocation of her former clinic site.    Today primary pain complaint is low back pain without any radiation into lower extremities.  Pain is exacerbated by extension and twisting and turning.  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  She previously completed physical therapy without benefit.  She has trialed meloxicam, prednisone but continues have pain.  Patient denies any bowel or bladder dysfunction, lower extremity weakness, new onset saddle anesthesia or unexplained weight loss.     She has been engaged in a weight loss regimen, recently lost 30 pounds which has somewhat helped her pain.        Objective Pain Scoring:   BRIEF PAIN INVENTORY:  Total score:   Pain Score    11/03/22 1055   PainSc:   7   PainLoc: Back      PHQ-2: PHQ-2 Total Score: 6  PHQ-9: PHQ-9: Brief Depression Severity Measure Score: 17  Opioid Risk Tool:         Review of Systems:   ROS negative  except as otherwise noted     Past Medical History:   Past Medical History:   Diagnosis Date   • Arthritis    • Class 2 obesity with serious comorbidity and body mass index (BMI) of 38.0 to 38.9 in adult 6/16/2016    Hx laparoscopic sleeve gastrectomy    • Dyslipidemia 6/16/2016   • GERD (gastroesophageal reflux disease)    • Low back pain    • Obesity    • Vitamin D deficiency          Past Surgical History:   Past Surgical History:   Procedure Laterality Date   • BREAST SURGERY     • COLONOSCOPY N/A 4/27/2021    Procedure: COLONOSCOPY WITH BIOPSY;  Surgeon: Tamara Mehta MD;  Location: [facility];  Service: Gastroenterology;  Laterality: N/A;   • ENDOSCOPY N/A 4/27/2021    Procedure: ESOPHAGOGASTRODUODENOSCOPY WITH BIOPSY;  Surgeon: Tamara Mehta MD;  Location: [facility];  Service: Gastroenterology;  Laterality: N/A;   • GASTRIC SLEEVE LAPAROSCOPIC     • HYSTERECTOMY     • PANNICULECTOMY     • REDUCTION MAMMAPLASTY      2013         Family History   Family History   Problem Relation Age of Onset   • Hypertension Mother    • Heart disease Father    • Breast cancer Paternal Cousin          Social History   Social History     Socioeconomic History   • Marital status:      Spouse name: oren   • Number of children: 1   • Years of education: 14   Tobacco Use   • Smoking status: Never   • Smokeless tobacco: Never   Vaping Use   • Vaping Use: Never used   Substance and Sexual Activity   • Alcohol use: No   • Drug use: No   • Sexual activity: Yes        Medications:     Current Outpatient Medications:   •  hydrOXYzine (ATARAX) 10 MG tablet, Take 1 tablet by mouth 3 (Three) Times a Day., Disp: 90 tablet, Rfl: 5  •  leflunomide (ARAVA) 10 MG tablet, Take 10 mg by mouth Daily., Disp: , Rfl:   •  meloxicam (MOBIC) 15 MG tablet, TAKE 1 TABLET BY MOUTH EVERY DAY WITH FOOD AS DIRECTED AS NEEDED FOR PAIN, Disp: , Rfl:   •  omeprazole (priLOSEC) 40 MG capsule, TAKE 1 CAPSULE BY MOUTH TWICE DAILY,  "Disp: 60 capsule, Rfl: 5  •  phentermine (ADIPEX-P) 37.5 MG tablet, Take 1 tablet by mouth Every Morning Before Breakfast., Disp: 30 tablet, Rfl: 2  •  tiZANidine (ZANAFLEX) 4 MG tablet, Take 4 mg by mouth At Night As Needed for Muscle Spasms., Disp: , Rfl:   •  Zoster Vac Recomb Adjuvanted (Shingrix) 50 MCG/0.5ML reconstituted suspension, Inject 0.5 mL into the appropriate muscle as directed by prescriber See Admin Instructions. Repeat in 2-6 months, Disp: 1 each, Rfl: 1  •  Cariprazine HCl (Vraylar) 1.5 MG capsule capsule, Take 1 capsule by mouth Daily., Disp: 28 capsule, Rfl: 0  •  HYDROcodone-acetaminophen (NORCO) 5-325 MG per tablet, TAKE 1/2 TO 1 TABLET BY MOUTH EVERY DAY AS NEEDED FOR PAIN, Disp: , Rfl:   •  Mounjaro 10 MG/0.5ML solution pen-injector, , Disp: , Rfl:   •  predniSONE (DELTASONE) 10 MG tablet, TAKE 1 TABLET BY MOUTH EVERY DAY FOR 2 TO 5 DAYS AS NEEDED FOR FLARE UP. MAY REPEAT 1 TIME A WEEK, Disp: , Rfl:         Physical Exam:     Vitals:    11/03/22 1055   BP: 133/82   Pulse: 76   Resp: 16   Temp: 97.7 °F (36.5 °C)   TempSrc: Infrared   SpO2: 100%   Weight: 102 kg (224 lb)   Height: 170.2 cm (67.01\")   PainSc:   7   PainLoc: Back        General: Alert and oriented, No acute distress.   HEENT: Normocephalic, atraumatic.   Cardiovascular: No gross edema  Respiratory: Respirations are non-labored    Cervical Spine:   No masses or atrophy  Range of motion - Flexion normal. Extension normal. Lateral rotation normal.   Palpation - nontender   Spurling's - negative     Thoracic Spine:   Inspection: Freely movable fatty deposit left upper thoracic subcutaneous tissue  Paraspinal muscle palpation: nontender  Spinous process palpation: nontender    Lumbar Spine:   No masses or atrophy  Range of motion - Flexion normal. Extension reduced   facet Loading: Positive bilaterally  Facet Palpation - tender bilaterally  PSIS tenderness -positive left  Norbert's/CARIDAD/Thigh thrust - Negative bilaterally  Straight " leg raise: Negative bilaterally  Slump test: Negative bilaterally    Motor Exam:    Strength: Rate on 1-5 scale Right Left    C5-Elbow flexion, Deltoid 5 5    C6-Wrist extension 5 5    C7- Elbow / finger extension 5 5    C8- Finger flexion 5 5    T1- [employer] hand 5 5    Strength: Rate on 1-5 scale Right Left    L1/2- hip flexion 5 5    L3- knee extension 5 5    L4- ankle dorsiflexion 5 5    L5- great toe extension 5 5    S1- ankle plantarflexion 5 5    Sensory Exam: Full and equal sensation to light touch throughout.  Neurologic: Cranial Nerves II-XII are grossly intact.   Sethi’s -negative bilaterally   Clonus -negative bilaterally    Psychiatric: Cooperative.   Gait: Normal   Assistive Devices: None    Imaging Studies:   No results found for this or any previous visit.      Impression & Plan:   Ms. Susie Rangel is a 52 y.o. female with past medical history significant for rheumatoid arthritis who presents to the pain clinic for evaluation and treatment of chronic low back pain, bilateral sacroiliitis.  Pain generator today on clinical examination is primarily lumbar facet pain.  I do not have access to her images however I did review radiologist's report of lumbar MRI which demonstrated multilevel facet degenerative changes.    I discussed the relevant risk benefits and alternatives to proceeding with lumbar medial branch blocks and ablation.  Patient elected to proceed therefore we will schedule her for L3/L4/L5 medial branch blocks addressing the L4/L5/S1 joints bilaterally.  If these blocks greater than 80% benefit we will proceed with ablation.  Can also repeat sacroiliac joint injection as needed; patient doing well today.  1. Spondylosis of lumbar region without myelopathy or radiculopathy    2. Sacroiliitis (HCC)        PLAN:  1. Medication Recommendations: Continue meloxicam, prednisone as directed by rheumatologist.    2. Physical Therapy: Continue HEP    3. Psychological: defer    4. Complementary  and alternative (CAM) Therapies: Consider TENS unit, back brace    5. Labs: None indicated     6. Imaging: Lumbar MRI report reviewed; no access to images today.    7. Interventions: Schedule bilateral L3/L4/L5 medial branch blocks addressing the L4/L5/S1 facet joints.  (CPT 36317, 16124) if diagnostic we will proceed with ablation.    8. Referrals: None indicated     9. Records requested: n/a    10. Lifestyle goals: Continue weight loss    Return in about 2 months (around 1/3/2023).       [facility]  Bryce Gates MD

## 2022-11-09 ENCOUNTER — TELEPHONE (OUTPATIENT)
Dept: PAIN MEDICINE | Facility: CLINIC | Age: 52
End: 2022-11-09

## 2022-11-09 NOTE — TELEPHONE ENCOUNTER
LVM for patient to call and confirm that we need her to arrive at 730 instead of 830 for her injection on 11/18/2022    HUB OKAY TO READ/ RELAY MSG

## 2022-11-21 ENCOUNTER — DOCUMENTATION (OUTPATIENT)
Dept: PAIN MEDICINE | Facility: CLINIC | Age: 52
End: 2022-11-21

## 2022-11-22 ENCOUNTER — OUTSIDE FACILITY SERVICE (OUTPATIENT)
Dept: PAIN MEDICINE | Facility: CLINIC | Age: 52
End: 2022-11-22

## 2022-11-22 PROCEDURE — 64493 INJ PARAVERT F JNT L/S 1 LEV: CPT | Performed by: STUDENT IN AN ORGANIZED HEALTH CARE EDUCATION/TRAINING PROGRAM

## 2022-11-22 PROCEDURE — 64494 INJ PARAVERT F JNT L/S 2 LEV: CPT | Performed by: STUDENT IN AN ORGANIZED HEALTH CARE EDUCATION/TRAINING PROGRAM

## 2022-12-06 ENCOUNTER — TELEPHONE (OUTPATIENT)
Dept: PAIN MEDICINE | Facility: CLINIC | Age: 52
End: 2022-12-06

## 2022-12-06 DIAGNOSIS — M47.816 SPONDYLOSIS OF LUMBAR REGION WITHOUT MYELOPATHY OR RADICULOPATHY: Primary | ICD-10-CM

## 2022-12-06 NOTE — TELEPHONE ENCOUNTER
FOLLOW-UP CALL AFTER PROCEDURE  I spoke with Susie Rangel regarding how they are feeling after her procedure with Dr. Gates.   Patient reports they are doing well.    Susie Rangel underwent a bilateral L3/L4/L5 medial branch blocks addressing the L4/L5/S1 facet joints. on 11/22/2022  Pain level before procedure: 9/10   Pain level after procedure: 0/10  Patient reports that the pain relief lasted for multiple hours.   Today, Susie reports pain has returned to baseline after 24 hours   Patient denies side effects or complications  Patient does not have any questions or concerns at this time  Disposition:   • I provided information regarding date and time of next procedure:12/20/2022 Patient verbalized understanding   • Location of procedure: [address] [facility], 1st floor ([facility] Patient instructed to check in at the registration desk. Patient verbalized understanding   • I advised that patient must have a , and that the  must remain in the premises until after the procedure is completed and the patient is ready to be discharged. Patient verbalized understanding.    • Reminded patient of instructions to stop blood thinners when indicated  Reminded NPO status: Nothing by mouth (eat or drink) for at least 8 hours prior to the procedure. Patient can take medications with a sip of water. Patient verbalized understanding

## 2022-12-13 ENCOUNTER — OFFICE VISIT (OUTPATIENT)
Dept: FAMILY MEDICINE CLINIC | Facility: CLINIC | Age: 52
End: 2022-12-13

## 2022-12-13 DIAGNOSIS — Z98.84 HISTORY OF BARIATRIC SURGERY: ICD-10-CM

## 2022-12-13 DIAGNOSIS — K21.9 GASTROESOPHAGEAL REFLUX DISEASE WITHOUT ESOPHAGITIS: ICD-10-CM

## 2022-12-13 DIAGNOSIS — K59.09 CHRONIC CONSTIPATION: ICD-10-CM

## 2022-12-13 DIAGNOSIS — R03.0 ELEVATED BLOOD PRESSURE READING: ICD-10-CM

## 2022-12-13 DIAGNOSIS — E78.2 MIXED HYPERLIPIDEMIA: Primary | ICD-10-CM

## 2022-12-13 DIAGNOSIS — M05.79 RHEUMATOID ARTHRITIS INVOLVING MULTIPLE SITES WITH POSITIVE RHEUMATOID FACTOR: ICD-10-CM

## 2022-12-13 DIAGNOSIS — Z00.00 HEALTHCARE MAINTENANCE: ICD-10-CM

## 2022-12-13 DIAGNOSIS — K76.0 NON-ALCOHOLIC FATTY LIVER DISEASE: ICD-10-CM

## 2022-12-13 DIAGNOSIS — I87.2 CHRONIC VENOUS INSUFFICIENCY: ICD-10-CM

## 2022-12-13 DIAGNOSIS — F41.8 DEPRESSION WITH ANXIETY: ICD-10-CM

## 2022-12-13 DIAGNOSIS — M54.59 MECHANICAL LOW BACK PAIN: ICD-10-CM

## 2022-12-13 DIAGNOSIS — E66.01 CLASS 2 SEVERE OBESITY WITH SERIOUS COMORBIDITY AND BODY MASS INDEX (BMI) OF 37.0 TO 37.9 IN ADULT, UNSPECIFIED OBESITY TYPE: ICD-10-CM

## 2022-12-13 PROCEDURE — 99214 OFFICE O/P EST MOD 30 MIN: CPT | Performed by: GENERAL PRACTICE

## 2022-12-13 RX ORDER — LEFLUNOMIDE 20 MG/1
20 TABLET ORAL DAILY
COMMUNITY
Start: 2022-11-30
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
## 2022-12-14 VITALS
BODY MASS INDEX: 34.84 KG/M2 | HEIGHT: 67 IN | RESPIRATION RATE: 14 BRPM | HEART RATE: 86 BPM | TEMPERATURE: 98.6 F | WEIGHT: 222 LBS | OXYGEN SATURATION: 99 % | SYSTOLIC BLOOD PRESSURE: 156 MMHG | DIASTOLIC BLOOD PRESSURE: 78 MMHG

## 2022-12-20 ENCOUNTER — OUTSIDE FACILITY SERVICE (OUTPATIENT)
Dept: PAIN MEDICINE | Facility: CLINIC | Age: 52
End: 2022-12-20

## 2022-12-20 ENCOUNTER — TELEPHONE (OUTPATIENT)
Dept: PAIN MEDICINE | Facility: CLINIC | Age: 52
End: 2022-12-20

## 2022-12-20 ENCOUNTER — DOCUMENTATION (OUTPATIENT)
Dept: PAIN MEDICINE | Facility: CLINIC | Age: 52
End: 2022-12-20

## 2022-12-20 DIAGNOSIS — M47.816 SPONDYLOSIS OF LUMBAR REGION WITHOUT MYELOPATHY OR RADICULOPATHY: Primary | ICD-10-CM

## 2022-12-20 PROCEDURE — 64493 INJ PARAVERT F JNT L/S 1 LEV: CPT | Performed by: STUDENT IN AN ORGANIZED HEALTH CARE EDUCATION/TRAINING PROGRAM

## 2022-12-20 PROCEDURE — 64494 INJ PARAVERT F JNT L/S 2 LEV: CPT | Performed by: STUDENT IN AN ORGANIZED HEALTH CARE EDUCATION/TRAINING PROGRAM

## 2022-12-20 NOTE — PROGRESS NOTES
[facility]  [address] [place] [address]    Bryce Gates MD    PROCEDURE: Fluoroscopically-guided #2 Lumbar Medial Branch Nerve Blocks targeting the bilateral L4/5 and L5/S1 facet joints  PRE-OP DIAGNOSIS: Lumbar spondylosis  POST-OP DIAGNOSIS: Lumbar spondylosis    ANTIPLATELET/ANTICOAGULANT STOP DATE: Discussed with the patient held according to MINERVA guidelines    CONSENT: Risks, benefits and options were explained to the patient, all questions were answered and written informed consent was obtained.  ANESTHESIA: See anesthesia note  PROCEDURE NOTE:  A pre-procedural time out was performed to confirm the correct patient, procedure, side, and site. A sterile field was prepped in standard fashion using Chlorhexidine and draped with sterile towels. The selected medial branch nerves were identified using an ipsilateral oblique fluoroscopic view. A 25 gauge 3.5 inch spinal needle was advanced using intermittent fluoroscopy toward the junction of the transverse process and superior articulating process targeting the above listed medial branch nerves.  Bilateral L5 primary dorsal ramus nerve was targeted at the junction of the sacral ala and the sacral articular process. Needle placement was confirmed with biplanar fluoroscopic imaging. Following negative aspiration, 1 mL of bupivacaine 0.5% was injected at each location. The needles were re-styletted and withdrawn. The patient's skin was cleaned with alcohol and the injection sites covered with bandages.   EBL: None  COMPLICATIONS: None      The patient was monitored for at least 30 minutes prior to discharge. Vital signs remained stable throughout the procedure and in the recovery area. There were no immediate complications and the patient tolerated the procedure well. Sensory and motor exam was unchanged from baseline. The patient received written discharge instructions prior to discharge.  FOLLOW UP: As scheduled  ADDITIONAL  NOTES:     [facility]    Bryce Gates MD

## 2022-12-20 NOTE — TELEPHONE ENCOUNTER
FOLLOW-UP CALL AFTER PROCEDURE  I spoke with Susie Rangel regarding how they are feeling after her procedure with Dr. Gates.   Patient reports they are doing well.    Susie Rangel underwent a bilateral L3/L4/L5 medial branch blocks addressing the L4/L5/S1 facet joints. on 12/20/2022  Pain level before procedure: 9/10   Pain level after procedure: 1/10  Patient reports that the pain relief lasted for multiple hours.   Today, Susie reports pain has returned to baseline after 24 hours   Patient denies side effects or complications  Patient does not have any questions or concerns at this time  Disposition:   • I provided information regarding date and time of next procedure:1/17/2023 Patient verbalized understanding   • Location of procedure: [address] [facility], 1st floor ([facility]). Patient instructed to check in at the registration desk. Patient verbalized understanding   • I advised that patient must have a , and that the  must remain in the premises until after the procedure is completed and the patient is ready to be discharged. Patient verbalized understanding.    • Reminded patient of instructions to stop blood thinners when indicated  Reminded NPO status: Nothing by mouth (eat or drink) for at least 8 hours prior to the procedure. Patient can take medications with a sip of water. Patient verbalized understanding

## 2023-01-17 ENCOUNTER — OUTSIDE FACILITY SERVICE (OUTPATIENT)
Dept: PAIN MEDICINE | Facility: CLINIC | Age: 53
End: 2023-01-17
Payer: COMMERCIAL

## 2023-01-17 ENCOUNTER — DOCUMENTATION (OUTPATIENT)
Dept: PAIN MEDICINE | Facility: CLINIC | Age: 53
End: 2023-01-17

## 2023-01-17 PROCEDURE — 64636 DESTROY L/S FACET JNT ADDL: CPT | Performed by: STUDENT IN AN ORGANIZED HEALTH CARE EDUCATION/TRAINING PROGRAM

## 2023-01-17 PROCEDURE — 64635 DESTROY LUMB/SAC FACET JNT: CPT | Performed by: STUDENT IN AN ORGANIZED HEALTH CARE EDUCATION/TRAINING PROGRAM

## 2023-01-17 NOTE — PROGRESS NOTES
[facility]  [address] [place] [address]    PROCEDURE: Fluoroscopically-guided bilateral L3, L4, L5 lumbar Medial Branch Nerve Radiofrequency Ablation (RFA) targeting the bilateral L4/L5/S1 facet joints     PRE-OP DIAGNOSIS: [Lumbar spondylosis]   POST-OP DIAGNOSIS: [Lumbar spondylosis]     BLOOD THINNERS (ANTIPLATELETS/ANTICOAGULANTS): Were discussed with the patient and MINERVA Guidelines were followed.     CONSENT: Risks, benefits and options were explained to the patient, all questions were answered and written informed consent was obtained.     ANESTHESIA: See anesthesia note    PROCEDURE NOTE: A pre-procedural time out was performed to confirm the correct patient, procedure, side, and site. A sterile field was prepped in standard fashion using Chlorhexidine and draped with sterile towels. The selected medial branch nerves were identified using an ipsilateral oblique fluoroscopic view. The overlying skin and subcutaneous tissue was anesthetized with 1% lidocaine. A 20 gauge curved 10 cm venom RFA needle with 10 mm active tip was advanced using intermittent fluoroscopy toward the junction of the transverse process and superior articulating process at the target medial branch nerves. The bilateral L5 dorsal ramus nerve was targeted at the junction of the sacral ala and the sacral articular process. Testing was performed at each level with motor 2 Hz stimulation to ensure absence of nerve root stimulation. Then 2 ml of 0.5% bupivacaine was injected to anesthetize each medial branch nerve. Continuous lesions were then performed at 80?C for 90 seconds at each location. One lesion was performed at each medial branch nerve location. The needles were withdrawn. The patient’s skin was cleaned with alcohol and the injection sites covered with bandages.     EBL: None     COMPLICATIONS: None       The patient was monitored for at least 30 minutes prior to discharge. Vital signs remained  stable throughout the procedure and in the recovery area. There were no immediate complications and the patient tolerated the procedure well. Sensory and motor exam was unchanged from baseline. The patient received written discharge instructions prior to discharge.     FOLLOW UP: As scheduled     ADDITIONAL NOTES:       [facility]  Bryce Gates MD

## 2023-01-20 DIAGNOSIS — E66.01 CLASS 2 SEVERE OBESITY WITH SERIOUS COMORBIDITY AND BODY MASS INDEX (BMI) OF 39.0 TO 39.9 IN ADULT, UNSPECIFIED OBESITY TYPE: ICD-10-CM

## 2023-01-20 DIAGNOSIS — K21.9 GASTROESOPHAGEAL REFLUX DISEASE WITHOUT ESOPHAGITIS: ICD-10-CM

## 2023-01-20 RX ORDER — PHENTERMINE HYDROCHLORIDE 37.5 MG/1
37.5 TABLET ORAL
Qty: 30 TABLET | Refills: 2 | Status: SHIPPED | OUTPATIENT
Start: 2023-01-20

## 2023-01-20 RX ORDER — OMEPRAZOLE 40 MG/1
CAPSULE, DELAYED RELEASE ORAL
Qty: 60 CAPSULE | Refills: 5 | Status: SHIPPED | OUTPATIENT
Start: 2023-01-20

## 2023-01-23 ENCOUNTER — PATIENT MESSAGE (OUTPATIENT)
Dept: PAIN MEDICINE | Facility: CLINIC | Age: 53
End: 2023-01-23
Payer: COMMERCIAL

## 2023-01-23 RX ORDER — METHYLPREDNISOLONE 4 MG/1
TABLET ORAL
Qty: 21 TABLET | Refills: 0 | Status: SHIPPED | OUTPATIENT
Start: 2023-01-23

## 2023-01-23 NOTE — TELEPHONE ENCOUNTER
From: Susie Rangel  To: Bryce Gates  Sent: 1/23/2023 12:38 PM EST  Subject: Rhizotomy    formerly [facility] Dr. Gates, I had the Rhizotomy done on January the 17th and I'm still in a lot of pain is that normal?

## 2023-02-08 ENCOUNTER — OFFICE VISIT (OUTPATIENT)
Dept: PAIN MEDICINE | Facility: CLINIC | Age: 53
End: 2023-02-08
Payer: COMMERCIAL

## 2023-02-08 VITALS
RESPIRATION RATE: 12 BRPM | BODY MASS INDEX: 34.59 KG/M2 | HEIGHT: 67 IN | HEART RATE: 71 BPM | OXYGEN SATURATION: 99 % | SYSTOLIC BLOOD PRESSURE: 146 MMHG | WEIGHT: 220.4 LBS | DIASTOLIC BLOOD PRESSURE: 92 MMHG | TEMPERATURE: 96 F

## 2023-02-08 DIAGNOSIS — M47.816 SPONDYLOSIS OF LUMBAR REGION WITHOUT MYELOPATHY OR RADICULOPATHY: Primary | ICD-10-CM

## 2023-02-08 PROCEDURE — 99213 OFFICE O/P EST LOW 20 MIN: CPT | Performed by: STUDENT IN AN ORGANIZED HEALTH CARE EDUCATION/TRAINING PROGRAM

## 2023-02-08 RX ORDER — DICLOFENAC SODIUM 75 MG/1
75 TABLET, DELAYED RELEASE ORAL 2 TIMES DAILY PRN
COMMUNITY
Start: 2023-01-20

## 2023-02-08 RX ORDER — TIRZEPATIDE 12.5 MG/.5ML
INJECTION, SOLUTION SUBCUTANEOUS
COMMUNITY
Start: 2023-02-03

## 2023-02-08 NOTE — PROGRESS NOTES
Primary Physician: Hoang Palacios MD    CHIEF COMPLAINT or REASON FOR VISIT: Follow-up and Back Pain      Initial HPI 11/3/2022:   Ms. Susie Rangel is 52 y.o. female who presents as a new patient referral for evaluation and treatment of chronic low back pain and L>R sacroiliac joint pain.  She does have a past medical history rheumatoid arthritis, treated by a rheumatologist.  She has had this issue for several years, previously came to [facility] and underwent sacroiliac joint injections.  Subsequently was managed by Dr. Huber, pain management, starting in April 2021 with serial left-sided sacroiliac joint injections.  She does complain of some right sacroiliac joint pain as well.  She recently underwent left sacroiliac joint injection in August 2022 and continues to have benefit from that.  She is interested in relocating her pain management to [facility] due to relocation of her former clinic site.    Today primary pain complaint is low back pain without any radiation into lower extremities.  Pain is exacerbated by extension and twisting and turning.  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  She previously completed physical therapy without benefit.  She has trialed meloxicam, prednisone but continues have pain.  Patient denies any bowel or bladder dysfunction, lower extremity weakness, new onset saddle anesthesia or unexplained weight loss.     She has been engaged in a weight loss regimen, recently lost 30 pounds which has somewhat helped her pain.    Interval history: Patient returns to clinic after undergoing bilateral L4/L5 and L5/S1 RFA 1/17/2023.  She did have increased postprocedural pain for which a Medrol Dosepak was sent.  However since that time she has had very minimal pain.  She has been able to return to her dance class and increase her walking, walk 2 miles yesterday.  She did notice today when she woke up she had slight discomfort (3 out of  10 pain) which she attributes to her increased activity level.  She denies any sacroiliac type pain today but did have a tweak of the joint about a week ago which resolved spontaneously.  She denies any additional issues.    Interventions:  11/21/2022: Bilateral L4/L5 and L5/S1 medial branch blocks with 100% relief for 24 hours  12/20/2022: Bilateral L4/L5 and L5/S1 medial branch blocks with 90% relief for 24 hours  1/17/2023: Bilateral L4/5 and L5/S1 RFA pain reduced from a 9 preprocedurally to now 2 or 3 (67 to 77% relief)        Objective Pain Scoring:   BRIEF PAIN INVENTORY:  Total score:   Pain Score    02/08/23 1354   PainSc:   3   PainLoc: Back      PHQ-2: PHQ-2 Total Score: 0  PHQ-9: PHQ-9: Brief Depression Severity Measure Score: 0  Opioid Risk Tool:         Review of Systems:   ROS negative except as otherwise noted     Past Medical History:   Past Medical History:   Diagnosis Date   • Arthritis    • Class 2 obesity with serious comorbidity and body mass index (BMI) of 38.0 to 38.9 in adult 06/16/2016    Hx laparoscopic sleeve gastrectomy    • Dyslipidemia 06/16/2016   • GERD (gastroesophageal reflux disease)    • Glaucoma 01/25/2023   • Low back pain    • Macular degeneration 01/25/2023   • Obesity    • Vitamin D deficiency          Past Surgical History:   Past Surgical History:   Procedure Laterality Date   • BREAST SURGERY     • COLONOSCOPY N/A 4/27/2021    Procedure: COLONOSCOPY WITH BIOPSY;  Surgeon: Tamara Mehta MD;  Location: [facility] OR;  Service: Gastroenterology;  Laterality: N/A;   • ENDOSCOPY N/A 4/27/2021    Procedure: ESOPHAGOGASTRODUODENOSCOPY WITH BIOPSY;  Surgeon: Tamara Mehta MD;  Location: [facility] OR;  Service: Gastroenterology;  Laterality: N/A;   • GASTRIC SLEEVE LAPAROSCOPIC     • HYSTERECTOMY     • PANNICULECTOMY     • REDUCTION MAMMAPLASTY      2013         Family History   Family History   Problem Relation Age of Onset   • Hypertension Mother    • Heart  "disease Father    • Breast cancer Paternal Cousin          Social History   Social History     Socioeconomic History   • Marital status:      Spouse name: oren   • Number of children: 1   • Years of education: 14   Tobacco Use   • Smoking status: Never   • Smokeless tobacco: Never   Vaping Use   • Vaping Use: Never used   Substance and Sexual Activity   • Alcohol use: No   • Drug use: No   • Sexual activity: Yes        Medications:     Current Outpatient Medications:   •  diclofenac (VOLTAREN) 75 MG EC tablet, Take 75 mg by mouth 2 (Two) Times a Day As Needed. for pain, Disp: , Rfl:   •  leflunomide (ARAVA) 20 MG tablet, Take 20 mg by mouth Daily., Disp: , Rfl:   •  Mounjaro 12.5 MG/0.5ML solution pen-injector, ADMINISTER 0.5 ML UNDER THE SKIN 1 TIME A WEEK, Disp: , Rfl:   •  omeprazole (priLOSEC) 40 MG capsule, TAKE 1 CAPSULE BY MOUTH TWICE DAILY, Disp: 60 capsule, Rfl: 5  •  phentermine (ADIPEX-P) 37.5 MG tablet, Take 1 tablet by mouth Every Morning Before Breakfast., Disp: 30 tablet, Rfl: 2  •  predniSONE (DELTASONE) 10 MG tablet, TAKE 1 TABLET BY MOUTH EVERY DAY FOR 2 TO 5 DAYS AS NEEDED FOR FLARE UP. MAY REPEAT 1 TIME A WEEK, Disp: , Rfl:   •  tiZANidine (ZANAFLEX) 4 MG tablet, Take 4 mg by mouth At Night As Needed for Muscle Spasms., Disp: , Rfl:   •  methylPREDNISolone (MEDROL) 4 MG dose pack, Take as directed on package instructions., Disp: 21 tablet, Rfl: 0  •  Zoster Vac Recomb Adjuvanted (Shingrix) 50 MCG/0.5ML reconstituted suspension, Inject 0.5 mL into the appropriate muscle as directed by prescriber See Admin Instructions. Repeat in 2-6 months, Disp: 1 each, Rfl: 1        Physical Exam:     Vitals:    02/08/23 1354   BP: 146/92   BP Location: Left arm   Patient Position: Sitting   Cuff Size: Adult   Pulse: 71   Resp: 12   Temp: 96 °F (35.6 °C)   TempSrc: Infrared   SpO2: 99%   Weight: 100 kg (220 lb 6.4 oz)   Height: 170.2 cm (67\")   PainSc:   3   PainLoc: Back        General: Alert and " oriented, No acute distress.   HEENT: Normocephalic, atraumatic.   Cardiovascular: No gross edema  Respiratory: Respirations are non-labored  Psychiatric: Cooperative.   Gait: Normal   Assistive Devices: None    Imaging Studies:   No results found for this or any previous visit.      Impression & Plan:   11/3/2022: Susie Rangel is a 52 y.o. female with past medical history significant for rheumatoid arthritis who presents to the pain clinic for evaluation and treatment of chronic low back pain, bilateral sacroiliitis.  Pain generator today on clinical examination is primarily lumbar facet pain.  I do not have access to her images however I did review radiologist's report of lumbar MRI which demonstrated multilevel facet degenerative changes.    I discussed the relevant risk benefits and alternatives to proceeding with lumbar medial branch blocks and ablation.  Patient elected to proceed therefore we will schedule her for L3/L4/L5 medial branch blocks addressing the L4/L5/S1 joints bilaterally.  If these blocks greater than 80% benefit we will proceed with ablation.  Can also repeat sacroiliac joint injection as needed; patient doing well today.    2/8/2023: Significant improvement in both function and pain with RFA.  We discussed repeating as needed.  Patient structured to call if pain in sacroiliac joint returns.  Otherwise follow-up 6 months.    1. Spondylosis of lumbar region without myelopathy or radiculopathy        PLAN:  1. Medication Recommendations: Recommend Voltaren topical, NSAIDs, Tylenol.  Can trial turmeric 500 mg twice daily if NSAID contraindicated.    2. Physical Therapy: Continue HEP, dance class    3. Psychological: defer    4. Complementary and alternative (CAM) Therapies: Consider TENS unit, back brace    5. Labs: None indicated     6. Imaging: None indicated at this time    7. Interventions: None indicated.  Repeat RFA as needed.    8. Referrals: None indicated     9. Records requested:  n/a    10. Lifestyle goals: Continue weight loss    Follow-up 6 months    [facility]  Bryce Gates MD

## 2023-03-23 DIAGNOSIS — F41.8 DEPRESSION WITH ANXIETY: ICD-10-CM

## 2023-03-24 RX ORDER — HYDROXYZINE HYDROCHLORIDE 10 MG/1
TABLET, FILM COATED ORAL
Qty: 90 TABLET | Refills: 5 | Status: SHIPPED | OUTPATIENT
Start: 2023-03-24

## 2023-03-24 NOTE — TELEPHONE ENCOUNTER
Pt was last seen on 12/13/2022.   Dr. Palacios is out of the office this week. Could you refill this?

## 2023-05-09 ENCOUNTER — OFFICE VISIT (OUTPATIENT)
Dept: FAMILY MEDICINE CLINIC | Facility: CLINIC | Age: 53
End: 2023-05-09
Payer: COMMERCIAL

## 2023-05-09 DIAGNOSIS — K76.0 NON-ALCOHOLIC FATTY LIVER DISEASE: ICD-10-CM

## 2023-05-09 DIAGNOSIS — F41.8 DEPRESSION WITH ANXIETY: ICD-10-CM

## 2023-05-09 DIAGNOSIS — I87.2 CHRONIC VENOUS INSUFFICIENCY: ICD-10-CM

## 2023-05-09 DIAGNOSIS — E66.9 CLASS 1 OBESITY WITH SERIOUS COMORBIDITY AND BODY MASS INDEX (BMI) OF 34.0 TO 34.9 IN ADULT, UNSPECIFIED OBESITY TYPE: ICD-10-CM

## 2023-05-09 DIAGNOSIS — Z98.84 HISTORY OF BARIATRIC SURGERY: ICD-10-CM

## 2023-05-09 DIAGNOSIS — G56.03 BILATERAL CARPAL TUNNEL SYNDROME: ICD-10-CM

## 2023-05-09 DIAGNOSIS — M54.59 MECHANICAL LOW BACK PAIN: ICD-10-CM

## 2023-05-09 DIAGNOSIS — J30.9 CHRONIC ALLERGIC RHINITIS: Primary | ICD-10-CM

## 2023-05-09 DIAGNOSIS — K21.9 GASTROESOPHAGEAL REFLUX DISEASE WITHOUT ESOPHAGITIS: ICD-10-CM

## 2023-05-09 DIAGNOSIS — M05.79 RHEUMATOID ARTHRITIS INVOLVING MULTIPLE SITES WITH POSITIVE RHEUMATOID FACTOR: ICD-10-CM

## 2023-05-09 DIAGNOSIS — E78.2 MIXED HYPERLIPIDEMIA: ICD-10-CM

## 2023-05-09 DIAGNOSIS — E66.01 CLASS 2 SEVERE OBESITY WITH SERIOUS COMORBIDITY AND BODY MASS INDEX (BMI) OF 39.0 TO 39.9 IN ADULT, UNSPECIFIED OBESITY TYPE: ICD-10-CM

## 2023-05-09 DIAGNOSIS — K59.09 CHRONIC CONSTIPATION: ICD-10-CM

## 2023-05-09 DIAGNOSIS — Z00.00 HEALTHCARE MAINTENANCE: ICD-10-CM

## 2023-05-09 DIAGNOSIS — R03.0 ELEVATED BLOOD PRESSURE READING: ICD-10-CM

## 2023-05-09 RX ORDER — ALENDRONATE SODIUM 70 MG/1
70 TABLET ORAL
Qty: 4 TABLET | Refills: 5 | Status: SHIPPED | OUTPATIENT
Start: 2023-05-09

## 2023-05-09 NOTE — PROGRESS NOTES
Subjective   Susie Rangel is a 52 y.o. female.     Chief Complaint  She returns for a scheduled reassessment of multiple medical problems including rheumatoid arthritis, chronic low back pain, chronic constipation, hyperlipidemia, depression with anxiety, and recent numbness and tingling of the hands    History of Present Illness     Numbness and Tingling of the Hands  She gives a 1 to 2 month history of intermittent numbness and tingling of one or both hands.  This has been occurring with activities such as driving or talking on the phone and also wakes her at night.  Within several minutes of shaking her hands, the symptoms resolved.  There is no history of any weakness.  She has had a flare of her rheumatoid arthritis over this time and admits to bilateral wrist pain and stiffness.    Rheumatoid Arthritis  She has had a flare of her symptoms over the last 1 to 2 months.  She was taken off leflunomide recently after an apparent elevation of her hepatic transaminases.  She is to resume this and start on enbrel this week.  She is prescribed prednisone for flares and is taking 10 mg daily most days.  There is no history of any joint swelling or redness and she has had no rash, fever, or chills.    Low Back Pain  She complains of persistent low back pain. There has been no change in the quality nor any new associated symptoms. She continues to be followed by pain management    Left Lower Extremity Edema  She has noted a continued improvement in the swelling about her left foot.  This has been unassociated with any other symptoms and she continues to deny any pain or discoloration.  The swelling tends to develop toward the end of the day and improves overnight.    Constipation  She reports a continued improvement in her constipation. She denies any recent abdominal distention or difficulty swallowing. There is no history of any nausea, vomiting, or heartburn and she continues to deny any diarrhea, hematochezia,  or melena.  There is no history of any fever, chills, night sweats or weight loss.  EGD performed on 4/27/2021 revealed evidence of a previous sleeve gastrectomy along with mild gastritis.  Colonoscopy performed the same day revealed small internal hemorrhoids but was otherwise unremarkable.  She is taking omeprazole 40 daily    Dyslipidemia  Her compliance with treatment has been fair.      Stress  She and her  have reconciled since last here.  He has a history of chronic alcoholism but appears to be doing better.  He is about to retire and she feels that this will also help. She denies any depression, anxiety, loss interest in activities, or suicidal ideation.  She has a very supportive family.  She is on no psychiatric medication at present     The following portions of the patient's history were reviewed and updated as appropriate: allergies, current medications, past medical history, past social history and problem list.    Review of Systems   Constitutional: Positive for fatigue. Negative for appetite change, chills, fever and unexpected weight change.   HENT: Positive for rhinorrhea and sneezing. Negative for congestion, postnasal drip, sinus pressure, sore throat and voice change.    Eyes: Negative for visual disturbance.   Respiratory: Negative for cough, shortness of breath and wheezing.    Cardiovascular: Positive for leg swelling (left). Negative for chest pain and palpitations.   Gastrointestinal: Negative for abdominal pain, blood in stool, constipation, diarrhea, nausea and vomiting.   Endocrine:        Hot flashes   Genitourinary: Negative for difficulty urinating, dysuria, frequency, hematuria and urgency.   Musculoskeletal: Positive for arthralgias and back pain. Negative for neck pain.   Skin: Negative for rash.        Hair loss   Neurological: Positive for numbness. Negative for weakness and headaches.   Psychiatric/Behavioral: Positive for decreased concentration and sleep disturbance.  Negative for dysphoric mood. The patient is not nervous/anxious.      Objective   Physical Exam  Constitutional:       General: She is not in acute distress.     Appearance: Normal appearance. She is well-developed. She is not diaphoretic.      Comments: Bright and in fair spirits. No apparent distress. No pallor, jaundice, diaphoresis, or cyanosis.   HENT:      Head: Atraumatic.      Right Ear: Tympanic membrane, ear canal and external ear normal.      Left Ear: Tympanic membrane, ear canal and external ear normal.      Mouth/Throat:      Lips: No lesions.      Mouth: Mucous membranes are moist. No oral lesions.      Pharynx: No oropharyngeal exudate or posterior oropharyngeal erythema.   Eyes:      General: Lids are normal.      Extraocular Movements: Extraocular movements intact.      Conjunctiva/sclera: Conjunctivae normal.      Pupils: Pupils are equal.   Neck:      Thyroid: No thyroid mass or thyromegaly.      Vascular: No carotid bruit or JVD.      Trachea: Trachea normal. No tracheal deviation.   Cardiovascular:      Rate and Rhythm: Normal rate and regular rhythm.      Heart sounds: Normal heart sounds, S1 normal and S2 normal. No murmur heard.    No gallop.   Pulmonary:      Effort: Pulmonary effort is normal.      Breath sounds: Normal breath sounds.   Abdominal:      General: Bowel sounds are normal. There is no distension.   Musculoskeletal:      Right lower leg: No edema.      Left lower leg: Edema (trace) present.      Comments: No peripheral joint redness or warmth.   Lymphadenopathy:      Head:      Right side of head: No submental, submandibular, tonsillar, preauricular, posterior auricular or occipital adenopathy.      Left side of head: No submental, submandibular, tonsillar, preauricular, posterior auricular or occipital adenopathy.      Cervical: No cervical adenopathy.      Upper Body:      Right upper body: No supraclavicular adenopathy.      Left upper body: No supraclavicular adenopathy.    Skin:     General: Skin is warm.      Coloration: Skin is not cyanotic, jaundiced or pale.      Findings: No rash.      Nails: There is no clubbing.   Neurological:      Mental Status: She is alert and oriented to person, place, and time.      Cranial Nerves: No cranial nerve deficit, dysarthria or facial asymmetry.      Sensory: No sensory deficit.      Motor: No tremor.      Coordination: Coordination normal.      Gait: Gait normal.      Comments: Positive Phalen's sign bilaterally.  No muscle wasting of either hand   Psychiatric:         Attention and Perception: Attention normal.         Mood and Affect: Mood normal.         Speech: Speech normal.         Behavior: Behavior normal.         Thought Content: Thought content normal. Thought content does not include homicidal or suicidal ideation.       Assessment & Plan   Problems Addressed this Visit        Allergies and Adverse Reactions    Chronic allergic rhinitis        Cardiac and Vasculature    Chronic venous insufficiency  Reminded regarding lifestyle modification    Relevant Orders    Comprehensive Metabolic Panel                Mixed hyperlipidemia  Encouraged to continue to work on her diet and exercise plan.  Scheduled for updated labs    Relevant Orders    Comprehensive Metabolic Panel    Lipid Panel    TSH       Endocrine and Metabolic    Class 1 obesity with serious comorbidity and body mass index (BMI) of 34.0 to 34.9 in adult  As above.   Continue current medication.    Relevant Medications    phentermine (ADIPEX-P) 37.5 MG tablet    Other Relevant Orders    Hemoglobin A1c    History of bariatric surgery       Gastrointestinal Abdominal     Chronic constipation    Relevant Orders    CBC & Differential    Gastroesophageal reflux disease without esophagitis   Symptoms are currently well controlled.  Reminded regarding lifestyle modification.  Continue current medication.    Relevant Medications    omeprazole (priLOSEC) 40 MG capsule    Other  Relevant Orders    CBC & Differential    Non-alcoholic fatty liver disease    Relevant Orders    Comprehensive Metabolic Panel    Hemoglobin A1c       Health Encounters    Healthcare maintenance  Reminded that she is due for Shingrix  We will arrange an updated mammogram at her return    Relevant Orders    Hepatitis C Antibody       Mental Health    Depression with anxiety    Significant situational component.   Supportive therapy.   Encouraged to report if any worse or if any new symptoms or concerns.           Musculoskeletal and Injuries    Mechanical low back pain  Reminded regarding symptomatic treatment.   Continue current medication  Follow up with pain management    Rheumatoid arthritis involving multiple sites with positive rheumatoid factor  Continue current medication  Follow up with  rheumatology    Relevant Medications    alendronate (FOSAMAX) 70 MG tablet    Other Relevant Orders    CBC & Differential    Comprehensive Metabolic Panel    Vitamin D,25-Hydroxy    Vitamin B12       Neuro    Bilateral carpal tunnel syndrome  Associated with recent RA flare  Reviewed options.  Patient will report if her symptoms do not improve as her flare resolves  Prescription written for splints in the meantime  Encouraged to report if any worse or if any new symptoms or concerns.         Diagnoses       Codes Comments    Chronic allergic rhinitis    -  Primary ICD-10-CM: J30.9  ICD-9-CM: 477.9     Chronic venous insufficiency     ICD-10-CM: I87.2  ICD-9-CM: 459.81     Mixed hyperlipidemia     ICD-10-CM: E78.2  ICD-9-CM: 272.2     Elevated blood pressure reading     ICD-10-CM: R03.0  ICD-9-CM: 796.2     History of bariatric surgery     ICD-10-CM: Z98.84  ICD-9-CM: V45.86     Class 1 obesity with serious comorbidity and body mass index (BMI) of 34.0 to 34.9 in adult, unspecified obesity type     ICD-10-CM: E66.9, Z68.34  ICD-9-CM: 278.00, V85.34     Non-alcoholic fatty liver disease     ICD-10-CM: K76.0  ICD-9-CM: 571.8      Gastroesophageal reflux disease without esophagitis     ICD-10-CM: K21.9  ICD-9-CM: 530.81     Chronic constipation     ICD-10-CM: K59.09  ICD-9-CM: 564.00     Healthcare maintenance     ICD-10-CM: Z00.00  ICD-9-CM: V70.0     Depression with anxiety     ICD-10-CM: F41.8  ICD-9-CM: 300.4     Mechanical low back pain     ICD-10-CM: M54.59  ICD-9-CM: 724.2     Rheumatoid arthritis involving multiple sites with positive rheumatoid factor     ICD-10-CM: M05.79  ICD-9-CM: 714.0     Bilateral carpal tunnel syndrome     ICD-10-CM: G56.03  ICD-9-CM: 354.0     Class 2 severe obesity with serious comorbidity and body mass index (BMI) of 39.0 to 39.9 in adult, unspecified obesity type     ICD-10-CM: E66.01, Z68.39  ICD-9-CM: 278.01, V85.39           I spent 44 minutes caring for Susie Rangel on this date of service. This time includes time spent by me in the following activities:reviewing tests, performing a medically appropriate examination and/or evaluation , counseling and educating the patient/family/caregiver, ordering medications, tests, or procedures and documenting information in the medical record

## 2023-05-09 NOTE — LETTER
May 9, 2023    Susie Rangel  [address] [place] [id number]        Medical Condition Certification     Patient: Susie Rangel YOB: 1970   Patient Address:   [address] [place] [address] Patient Phone:   Home Phone [phone]   [place] [phone]      Provider Name:   Hoang Palacios MD [place] Medical License:  MD,  [id number]   Provider Location:  [facility]  [address] [place] [id number]  Dept: [phone]  Dept Fax: [phone]      Susie Rangel is a 52 y.o. female requests certification for her medical condition of severe and chronic pain, severe arthritis, intractable pain, and rheumatoid arthritis .    I verify that I have a nash laith healthcare provider- relationship with Susie Rangel.    I verify that in my professional opinion Susie Rangel suffers from the condition identified above.    Electronically signed by Hoang Palacios MD  05/09/23, 5:52 PM EDT                            Hoang Palacios MD

## 2023-05-10 VITALS
SYSTOLIC BLOOD PRESSURE: 126 MMHG | DIASTOLIC BLOOD PRESSURE: 64 MMHG | WEIGHT: 221 LBS | TEMPERATURE: 98.6 F | HEART RATE: 89 BPM | BODY MASS INDEX: 34.69 KG/M2 | HEIGHT: 67 IN | RESPIRATION RATE: 14 BRPM | OXYGEN SATURATION: 100 %

## 2023-05-10 PROBLEM — R03.0 ELEVATED BLOOD PRESSURE READING: Status: RESOLVED | Noted: 2022-09-24 | Resolved: 2023-05-10

## 2023-05-10 RX ORDER — PHENTERMINE HYDROCHLORIDE 37.5 MG/1
37.5 TABLET ORAL
Qty: 30 TABLET | Refills: 2 | Status: SHIPPED | OUTPATIENT
Start: 2023-05-10

## 2023-05-10 RX ORDER — OMEPRAZOLE 40 MG/1
40 CAPSULE, DELAYED RELEASE ORAL 2 TIMES DAILY
Qty: 60 CAPSULE | Refills: 5 | Status: SHIPPED | OUTPATIENT
Start: 2023-05-10

## 2023-05-25 ENCOUNTER — LAB (OUTPATIENT)
Dept: FAMILY MEDICINE CLINIC | Facility: CLINIC | Age: 53
End: 2023-05-25

## 2023-05-25 DIAGNOSIS — K76.0 NON-ALCOHOLIC FATTY LIVER DISEASE: ICD-10-CM

## 2023-05-25 DIAGNOSIS — E66.9 CLASS 1 OBESITY WITH SERIOUS COMORBIDITY AND BODY MASS INDEX (BMI) OF 34.0 TO 34.9 IN ADULT, UNSPECIFIED OBESITY TYPE: ICD-10-CM

## 2023-05-25 DIAGNOSIS — M05.79 RHEUMATOID ARTHRITIS INVOLVING MULTIPLE SITES WITH POSITIVE RHEUMATOID FACTOR: ICD-10-CM

## 2023-05-25 DIAGNOSIS — I87.2 CHRONIC VENOUS INSUFFICIENCY: ICD-10-CM

## 2023-05-25 DIAGNOSIS — R03.0 ELEVATED BLOOD PRESSURE READING: ICD-10-CM

## 2023-05-25 DIAGNOSIS — E78.2 MIXED HYPERLIPIDEMIA: ICD-10-CM

## 2023-05-25 DIAGNOSIS — K59.09 CHRONIC CONSTIPATION: ICD-10-CM

## 2023-05-25 DIAGNOSIS — K21.9 GASTROESOPHAGEAL REFLUX DISEASE WITHOUT ESOPHAGITIS: ICD-10-CM

## 2023-05-25 DIAGNOSIS — Z00.00 HEALTHCARE MAINTENANCE: ICD-10-CM

## 2023-05-25 PROCEDURE — 80061 LIPID PANEL: CPT | Performed by: GENERAL PRACTICE

## 2023-05-25 PROCEDURE — 85025 COMPLETE CBC W/AUTO DIFF WBC: CPT | Performed by: GENERAL PRACTICE

## 2023-05-25 PROCEDURE — 82306 VITAMIN D 25 HYDROXY: CPT | Performed by: GENERAL PRACTICE

## 2023-05-25 PROCEDURE — 86803 HEPATITIS C AB TEST: CPT | Performed by: GENERAL PRACTICE

## 2023-05-25 PROCEDURE — 83036 HEMOGLOBIN GLYCOSYLATED A1C: CPT | Performed by: GENERAL PRACTICE

## 2023-05-25 PROCEDURE — 80053 COMPREHEN METABOLIC PANEL: CPT | Performed by: GENERAL PRACTICE

## 2023-05-25 PROCEDURE — 84443 ASSAY THYROID STIM HORMONE: CPT | Performed by: GENERAL PRACTICE

## 2023-05-25 PROCEDURE — 82607 VITAMIN B-12: CPT | Performed by: GENERAL PRACTICE

## 2023-05-26 LAB
25(OH)D3 SERPL-MCNC: 37.1 NG/ML (ref 30–100)
ALBUMIN SERPL-MCNC: 4.4 G/DL (ref 3.5–5.2)
ALBUMIN/GLOB SERPL: 1.7 G/DL
ALP SERPL-CCNC: 109 U/L (ref 39–117)
ALT SERPL W P-5'-P-CCNC: 21 U/L (ref 1–33)
ANION GAP SERPL CALCULATED.3IONS-SCNC: 8 MMOL/L (ref 5–15)
AST SERPL-CCNC: 25 U/L (ref 1–32)
BASOPHILS # BLD AUTO: 0.09 10*3/MM3 (ref 0–0.2)
BASOPHILS NFR BLD AUTO: 1.3 % (ref 0–1.5)
BILIRUB SERPL-MCNC: 0.5 MG/DL (ref 0–1.2)
BUN SERPL-MCNC: 11 MG/DL (ref 6–20)
BUN/CREAT SERPL: 16.4 (ref 7–25)
CALCIUM SPEC-SCNC: 8.7 MG/DL (ref 8.6–10.5)
CHLORIDE SERPL-SCNC: 106 MMOL/L (ref 98–107)
CHOLEST SERPL-MCNC: 267 MG/DL (ref 0–200)
CO2 SERPL-SCNC: 28 MMOL/L (ref 22–29)
CREAT SERPL-MCNC: 0.67 MG/DL (ref 0.57–1)
DEPRECATED RDW RBC AUTO: 42 FL (ref 37–54)
EGFRCR SERPLBLD CKD-EPI 2021: 106.6 ML/MIN/1.73
EOSINOPHIL # BLD AUTO: 0.19 10*3/MM3 (ref 0–0.4)
EOSINOPHIL NFR BLD AUTO: 2.7 % (ref 0.3–6.2)
ERYTHROCYTE [DISTWIDTH] IN BLOOD BY AUTOMATED COUNT: 12.4 % (ref 12.3–15.4)
GLOBULIN UR ELPH-MCNC: 2.6 GM/DL
GLUCOSE SERPL-MCNC: 68 MG/DL (ref 65–99)
HBA1C MFR BLD: 4.8 % (ref 4.8–5.6)
HCT VFR BLD AUTO: 43.5 % (ref 34–46.6)
HCV AB SER DONR QL: NORMAL
HDLC SERPL-MCNC: 62 MG/DL (ref 40–60)
HGB BLD-MCNC: 14.8 G/DL (ref 12–15.9)
IMM GRANULOCYTES # BLD AUTO: 0.01 10*3/MM3 (ref 0–0.05)
IMM GRANULOCYTES NFR BLD AUTO: 0.1 % (ref 0–0.5)
LDLC SERPL CALC-MCNC: 198 MG/DL (ref 0–100)
LDLC/HDLC SERPL: 3.15 {RATIO}
LYMPHOCYTES # BLD AUTO: 2.74 10*3/MM3 (ref 0.7–3.1)
LYMPHOCYTES NFR BLD AUTO: 38.9 % (ref 19.6–45.3)
MCH RBC QN AUTO: 31.1 PG (ref 26.6–33)
MCHC RBC AUTO-ENTMCNC: 34 G/DL (ref 31.5–35.7)
MCV RBC AUTO: 91.4 FL (ref 79–97)
MONOCYTES # BLD AUTO: 0.56 10*3/MM3 (ref 0.1–0.9)
MONOCYTES NFR BLD AUTO: 7.9 % (ref 5–12)
NEUTROPHILS NFR BLD AUTO: 3.46 10*3/MM3 (ref 1.7–7)
NEUTROPHILS NFR BLD AUTO: 49.1 % (ref 42.7–76)
NRBC BLD AUTO-RTO: 0 /100 WBC (ref 0–0.2)
PLATELET # BLD AUTO: 322 10*3/MM3 (ref 140–450)
PMV BLD AUTO: 10.4 FL (ref 6–12)
POTASSIUM SERPL-SCNC: 4 MMOL/L (ref 3.5–5.2)
PROT SERPL-MCNC: 7 G/DL (ref 6–8.5)
RBC # BLD AUTO: 4.76 10*6/MM3 (ref 3.77–5.28)
SODIUM SERPL-SCNC: 142 MMOL/L (ref 136–145)
TRIGL SERPL-MCNC: 47 MG/DL (ref 0–150)
TSH SERPL DL<=0.05 MIU/L-ACNC: 0.37 UIU/ML (ref 0.27–4.2)
VIT B12 BLD-MCNC: 524 PG/ML (ref 211–946)
VLDLC SERPL-MCNC: 7 MG/DL (ref 5–40)
WBC NRBC COR # BLD: 7.05 10*3/MM3 (ref 3.4–10.8)

## 2023-08-04 DIAGNOSIS — N64.4 BREAST PAIN: Primary | ICD-10-CM

## 2023-08-10 ENCOUNTER — OFFICE VISIT (OUTPATIENT)
Dept: FAMILY MEDICINE CLINIC | Facility: CLINIC | Age: 53
End: 2023-08-10
Payer: COMMERCIAL

## 2023-08-10 VITALS
HEIGHT: 67 IN | WEIGHT: 219.6 LBS | DIASTOLIC BLOOD PRESSURE: 85 MMHG | TEMPERATURE: 97.5 F | OXYGEN SATURATION: 96 % | RESPIRATION RATE: 14 BRPM | HEART RATE: 89 BPM | BODY MASS INDEX: 34.47 KG/M2 | SYSTOLIC BLOOD PRESSURE: 130 MMHG

## 2023-08-10 DIAGNOSIS — M54.59 MECHANICAL LOW BACK PAIN: ICD-10-CM

## 2023-08-10 DIAGNOSIS — N64.4 BREAST TENDERNESS IN FEMALE: ICD-10-CM

## 2023-08-10 DIAGNOSIS — K59.09 CHRONIC CONSTIPATION: ICD-10-CM

## 2023-08-10 DIAGNOSIS — E66.01 CLASS 2 SEVERE OBESITY WITH SERIOUS COMORBIDITY AND BODY MASS INDEX (BMI) OF 39.0 TO 39.9 IN ADULT, UNSPECIFIED OBESITY TYPE: ICD-10-CM

## 2023-08-10 DIAGNOSIS — Z00.00 HEALTHCARE MAINTENANCE: ICD-10-CM

## 2023-08-10 DIAGNOSIS — F41.8 DEPRESSION WITH ANXIETY: ICD-10-CM

## 2023-08-10 DIAGNOSIS — J30.9 CHRONIC ALLERGIC RHINITIS: Primary | ICD-10-CM

## 2023-08-10 DIAGNOSIS — K21.9 GASTROESOPHAGEAL REFLUX DISEASE WITHOUT ESOPHAGITIS: ICD-10-CM

## 2023-08-10 DIAGNOSIS — I87.2 CHRONIC VENOUS INSUFFICIENCY: ICD-10-CM

## 2023-08-10 DIAGNOSIS — Z98.84 HISTORY OF BARIATRIC SURGERY: ICD-10-CM

## 2023-08-10 DIAGNOSIS — E78.2 MIXED HYPERLIPIDEMIA: ICD-10-CM

## 2023-08-10 DIAGNOSIS — K76.0 NON-ALCOHOLIC FATTY LIVER DISEASE: ICD-10-CM

## 2023-08-10 DIAGNOSIS — G56.03 BILATERAL CARPAL TUNNEL SYNDROME: ICD-10-CM

## 2023-08-10 DIAGNOSIS — M05.79 RHEUMATOID ARTHRITIS INVOLVING MULTIPLE SITES WITH POSITIVE RHEUMATOID FACTOR: ICD-10-CM

## 2023-08-10 DIAGNOSIS — E66.9 CLASS 1 OBESITY WITH SERIOUS COMORBIDITY AND BODY MASS INDEX (BMI) OF 34.0 TO 34.9 IN ADULT, UNSPECIFIED OBESITY TYPE: ICD-10-CM

## 2023-08-10 PROBLEM — L63.9 ALOPECIA AREATA: Status: RESOLVED | Noted: 2017-05-19 | Resolved: 2023-08-10

## 2023-08-10 RX ORDER — PHENTERMINE HYDROCHLORIDE 37.5 MG/1
37.5 TABLET ORAL
Qty: 30 TABLET | Refills: 2 | Status: SHIPPED | OUTPATIENT
Start: 2023-08-10

## 2023-08-10 RX ORDER — ZOSTER VACCINE RECOMBINANT, ADJUVANTED 50 MCG/0.5
50 KIT INTRAMUSCULAR SEE ADMIN INSTRUCTIONS
Qty: 1 EACH | Refills: 1 | Status: SHIPPED | OUTPATIENT
Start: 2023-08-10

## 2023-08-10 NOTE — PROGRESS NOTES
Subjective   Susie Rangel is a 52 y.o. female.     Chief Complaint  She returns for a scheduled reassessment of multiple medical problems including bilateral carpal tunnel syndrome, rheumatoid arthritis, chronic low back pain, chronic constipation, hyperlipidemia, and stress    History of Present Illness     Numbness and Tingling of the Hands  She gives a 4-5 month history of intermittent numbness and tingling of one or both hands.  She has been wearing her splints at night with a significant improvement.  She continues to have intermittent episodes with activities such as driving or talking on the phone. There is no history of any weakness.  She has had a sense flare of her rheumatoid arthritis     Rheumatoid Arthritis  She continues to be followed by rheumatology and reports some improvement in her peripheral joint pain since last here.  There is no history of any joint swelling or redness,and she has had no rash, fever, or chills.  Lab Results   Component Value Date    WBC 7.05 05/25/2023    HGB 14.8 05/25/2023    HCT 43.5 05/25/2023    MCV 91.4 05/25/2023     05/25/2023     Lab Results   Component Value Date    GLUCOSE 68 05/25/2023    BUN 11 05/25/2023    CREATININE 0.67 05/25/2023    EGFR 106.6 05/25/2023    BCR 16.4 05/25/2023    K 4.0 05/25/2023    CO2 28.0 05/25/2023    CALCIUM 8.7 05/25/2023    ALBUMIN 4.4 05/25/2023    BILITOT 0.5 05/25/2023    AST 25 05/25/2023    ALT 21 05/25/2023     Lab Results   Component Value Date    ALKPHOS 109 05/25/2023     Low Back Pain  She complains of persistent low back pain. There has been no change in the quality nor any new associated symptoms.     Left Lower Extremity Edema  She has noted a continued improvement in the swelling about her left foot.  This has been unassociated with any other symptoms and she continues to deny any pain or discoloration.  The swelling tends to develop toward the end of the day and improves overnight.    Constipation  She  reports a continued improvement in her constipation. She denies any difficulty swallowing, nausea, vomiting, or heartburn, and there is no history of any diarrhea, hematochezia, or melena. EGD performed on 4/27/2021 revealed evidence of a previous sleeve gastrectomy along with mild gastritis.  Colonoscopy performed the same day revealed small internal hemorrhoids but was otherwise unremarkable.  She is taking omeprazole 40 daily  Lab Results   Component Value Date    TSH 0.372 05/25/2023     Hyperlipidemia  She is following an appropriate diet, but her activities remain limited due to her back pain  Lab Results   Component Value Date    CHOL 267 (H) 05/25/2023    TRIG 47 05/25/2023    HDL 62 (H) 05/25/2023     (H) 05/25/2023     Stress  She and her  have 1 fairly well since last here.  He has a history of chronic alcoholism but appears to be doing better. She denies any depression, anxiety, loss interest in activities, or suicidal ideation.  She has a very supportive family.  She is on no psychiatric medication at present     Labs  Most recent vitamin D 37.1 with a B12 of 524  Lab Results   Component Value Date    HGBA1C 4.80 05/25/2023     The following portions of the patient's history were reviewed and updated as appropriate: allergies, current medications, past medical history, past social history, and problem list.    Review of Systems   Constitutional:  Positive for fatigue. Negative for appetite change, chills, fever and unexpected weight change.   HENT:  Positive for rhinorrhea and sneezing. Negative for congestion, postnasal drip, sinus pressure, sore throat and voice change.    Eyes:  Negative for visual disturbance.   Respiratory:  Negative for cough, shortness of breath and wheezing.    Cardiovascular:  Positive for leg swelling (left). Negative for chest pain and palpitations.   Gastrointestinal:  Negative for abdominal pain, blood in stool, constipation, diarrhea, nausea and vomiting.    Endocrine:        Hot flashes   Genitourinary:  Negative for difficulty urinating, dysuria, frequency, hematuria and urgency.        Left breast tenderness over the last month   Musculoskeletal:  Positive for arthralgias and back pain. Negative for neck pain.   Skin:  Negative for rash.        Hair loss   Neurological:  Positive for numbness. Negative for weakness and headaches.   Psychiatric/Behavioral:  Positive for decreased concentration and sleep disturbance. Negative for dysphoric mood. The patient is not nervous/anxious.      Objective   Physical Exam  Constitutional:       General: She is not in acute distress.     Appearance: Normal appearance. She is well-developed. She is not diaphoretic.      Comments: Bright and in fair spirits. No apparent distress. No pallor, jaundice, diaphoresis, or cyanosis.   HENT:      Head: Atraumatic.      Right Ear: Tympanic membrane, ear canal and external ear normal.      Left Ear: Tympanic membrane, ear canal and external ear normal.      Mouth/Throat:      Lips: No lesions.      Mouth: Mucous membranes are moist. No oral lesions.      Pharynx: No oropharyngeal exudate or posterior oropharyngeal erythema.   Eyes:      General: Lids are normal.      Extraocular Movements: Extraocular movements intact.      Conjunctiva/sclera: Conjunctivae normal.      Pupils: Pupils are equal.   Neck:      Thyroid: No thyroid mass or thyromegaly.      Vascular: No carotid bruit or JVD.      Trachea: Trachea normal. No tracheal deviation.   Cardiovascular:      Rate and Rhythm: Normal rate and regular rhythm.      Heart sounds: Normal heart sounds, S1 normal and S2 normal. No murmur heard.    No gallop.   Pulmonary:      Effort: Pulmonary effort is normal.      Breath sounds: Normal breath sounds.   Abdominal:      General: Bowel sounds are normal. There is no distension.   Musculoskeletal:      Right lower leg: No edema.      Left lower leg: Edema (trace) present.      Comments: No  peripheral joint redness or warmth.   Lymphadenopathy:      Head:      Right side of head: No submental, submandibular, tonsillar, preauricular, posterior auricular or occipital adenopathy.      Left side of head: No submental, submandibular, tonsillar, preauricular, posterior auricular or occipital adenopathy.      Cervical: No cervical adenopathy.      Upper Body:      Right upper body: No supraclavicular adenopathy.      Left upper body: No supraclavicular adenopathy.   Skin:     General: Skin is warm.      Coloration: Skin is not cyanotic, jaundiced or pale.      Findings: No rash.      Nails: There is no clubbing.   Neurological:      Mental Status: She is alert and oriented to person, place, and time.      Cranial Nerves: No cranial nerve deficit, dysarthria or facial asymmetry.      Sensory: No sensory deficit.      Motor: No tremor.      Coordination: Coordination normal.      Gait: Gait normal.      Comments: No muscle wasting of either hand   Psychiatric:         Attention and Perception: Attention normal.         Mood and Affect: Mood normal.         Speech: Speech normal.         Behavior: Behavior normal.         Thought Content: Thought content normal. Thought content does not include homicidal or suicidal ideation.     Assessment & Plan   Problems Addressed this Visit          Allergies and Adverse Reactions    Chronic allergic rhinitis        Cardiac and Vasculature    Chronic venous insufficiency  Reminded regarding lifestyle modification  Encouraged to report if any worse or if any new symptoms or concerns.    Mixed hyperlipidemia  Encouraged to continue to work on her diet and exercise plan.  Reviewed the potential benefits of statin therapy.  Patient like to consider       Endocrine and Metabolic    Class 1 obesity with serious comorbidity and body mass index (BMI) of 34.0 to 34.9 in adult  As above.   Continue current medication.    Relevant Medications    phentermine (ADIPEX-P) 37.5 MG tablet     History of bariatric surgery       Gastrointestinal Abdominal     Chronic constipation  Reminded regarding lifestyle modification    Gastroesophageal reflux disease without esophagitis  As above.   Continue current medication.    Non-alcoholic fatty liver disease       Genitourinary and Reproductive     Breast tenderness in female  Recent screening mammogram unremarkable  She has an appointment with Dr. Madrid next week       Health Encounters    Healthcare maintenance  Recommended a flu shot along with an updated COVID-19 shot this fall.  Reminded that she is due for Shingrix    Relevant Medications    Zoster Vac Recomb Adjuvanted (Shingrix) 50 MCG/0.5ML reconstituted suspension       Mental Health    Depression with anxiety    Significant situational component.   Supportive therapy.   Encouraged to report if any worse or if any new symptoms or concerns.           Musculoskeletal and Injuries    Mechanical low back pain  Reminded regarding symptomatic treatment.   Encouraged to report if any worse or if any new symptoms or concerns.    Rheumatoid arthritis involving multiple sites with positive rheumatoid factor  As above.   Continue current medication.  Follow up with  rheumatology       Neuro    Bilateral carpal tunnel syndrome  Continue splints  Encouraged to report if any worse or if any new symptoms or concerns.     Diagnoses         Codes Comments    Chronic allergic rhinitis    -  Primary ICD-10-CM: J30.9  ICD-9-CM: 477.9     Chronic venous insufficiency     ICD-10-CM: I87.2  ICD-9-CM: 459.81     Mixed hyperlipidemia     ICD-10-CM: E78.2  ICD-9-CM: 272.2     History of bariatric surgery     ICD-10-CM: Z98.84  ICD-9-CM: V45.86     Class 1 obesity with serious comorbidity and body mass index (BMI) of 34.0 to 34.9 in adult, unspecified obesity type     ICD-10-CM: E66.9, Z68.34  ICD-9-CM: 278.00, V85.34     Non-alcoholic fatty liver disease     ICD-10-CM: K76.0  ICD-9-CM: 571.8     Gastroesophageal reflux  disease without esophagitis     ICD-10-CM: K21.9  ICD-9-CM: 530.81     Chronic constipation     ICD-10-CM: K59.09  ICD-9-CM: 564.00     Healthcare maintenance     ICD-10-CM: Z00.00  ICD-9-CM: V70.0     Depression with anxiety     ICD-10-CM: F41.8  ICD-9-CM: 300.4     Mechanical low back pain     ICD-10-CM: M54.59  ICD-9-CM: 724.2     Rheumatoid arthritis involving multiple sites with positive rheumatoid factor     ICD-10-CM: M05.79  ICD-9-CM: 714.0     Bilateral carpal tunnel syndrome     ICD-10-CM: G56.03  ICD-9-CM: 354.0     Class 2 severe obesity with serious comorbidity and body mass index (BMI) of 39.0 to 39.9 in adult, unspecified obesity type     ICD-10-CM: E66.01, Z68.39  ICD-9-CM: 278.01, V85.39     Breast tenderness in female     ICD-10-CM: N64.4  ICD-9-CM: 611.71

## 2023-08-17 ENCOUNTER — OFFICE VISIT (OUTPATIENT)
Dept: SURGERY | Facility: CLINIC | Age: 53
End: 2023-08-17
Payer: COMMERCIAL

## 2023-08-17 VITALS
HEART RATE: 64 BPM | HEIGHT: 67 IN | DIASTOLIC BLOOD PRESSURE: 84 MMHG | BODY MASS INDEX: 34.56 KG/M2 | WEIGHT: 220.2 LBS | SYSTOLIC BLOOD PRESSURE: 136 MMHG

## 2023-08-17 DIAGNOSIS — N64.4 BREAST PAIN: Primary | ICD-10-CM

## 2023-08-17 RX ORDER — ETANERCEPT 50 MG/ML
SOLUTION SUBCUTANEOUS
COMMUNITY
Start: 2023-08-16

## 2023-08-17 NOTE — PROGRESS NOTES
Subjective   Susie Rangel is a 52 y.o. female here today for left breast pain.    History of Present Illness  Ms. Rangel was seen in the office today for evaluation of left breast pain.  She reports the pain has been intermittent over the last 6 weeks.  It is more localized to the lower portion of the breast..  She denies palpable mass or nipple discharge.  Susie had a screening mammogram on 7/21/2023 which demonstrated no suspicious findings.  There is no prior history of breast biopsy or cyst aspiration.  There is no family history of breast or ovarian cancer.  Patient is premenopausal.  No Known Allergies  Current Outpatient Medications   Medication Sig Dispense Refill    alendronate (FOSAMAX) 70 MG tablet Take 1 tablet by mouth Every 7 (Seven) Days. 4 tablet 5    diclofenac (VOLTAREN) 75 MG EC tablet Take 1 tablet by mouth 2 (Two) Times a Day As Needed. for pain      Enbrel Mini 50 MG/ML solution cartridge       hydrOXYzine (ATARAX) 10 MG tablet TAKE 1 TABLET BY MOUTH THREE TIMES DAILY 90 tablet 5    leflunomide (ARAVA) 20 MG tablet Take 1 tablet by mouth Daily.      omeprazole (priLOSEC) 40 MG capsule Take 1 capsule by mouth 2 (Two) Times a Day. 60 capsule 5    phentermine (ADIPEX-P) 37.5 MG tablet Take 1 tablet by mouth Every Morning Before Breakfast. 30 tablet 2    predniSONE (DELTASONE) 10 MG tablet       tiZANidine (ZANAFLEX) 4 MG tablet Take 1 tablet by mouth At Night As Needed for Muscle Spasms.       No current facility-administered medications for this visit.     Past Medical History:   Diagnosis Date    Arthritis     Class 2 obesity with serious comorbidity and body mass index (BMI) of 38.0 to 38.9 in adult 06/16/2016    Hx laparoscopic sleeve gastrectomy     Dyslipidemia 06/16/2016    GERD (gastroesophageal reflux disease)     Glaucoma 01/25/2023    Low back pain     Macular degeneration 01/25/2023    Obesity     Vitamin D deficiency      Past Surgical History:   Procedure Laterality Date     "BREAST SURGERY      COLONOSCOPY N/A 4/27/2021    Procedure: COLONOSCOPY WITH BIOPSY;  Surgeon: Tamara Mehta MD;  Location:  [facility];  Service: Gastroenterology;  Laterality: N/A;    ENDOSCOPY N/A 4/27/2021    Procedure: ESOPHAGOGASTRODUODENOSCOPY WITH BIOPSY;  Surgeon: Tamara Mehta MD;  Location:  [facility];  Service: Gastroenterology;  Laterality: N/A;    GASTRIC SLEEVE LAPAROSCOPIC      HYSTERECTOMY      PANNICULECTOMY      REDUCTION MAMMAPLASTY      2013       Pertinent Review of Systems:  Respiratory: no shortness of breath  Cardiovascular: no chest pain  Other pertinent:      Objective   /84 (BP Location: Left arm)   Pulse 64   Ht 170.2 cm (67\")   Wt 99.9 kg (220 lb 3.2 oz)   BMI 34.49 kg/mý   Physical Exam  Well-developed well-nourished female  Neck:  No supraclavicular adenopathy  Lungs:  Respiratory effort normal. Auscultation: Clear, without wheezes, rhonchi, rales  Heart:  Regular rate and rhythm, without murmur, gallop, rub.  No pedal edema  Breasts: On visual inspection the breasts are symmetrical.  Examination of the right breast demonstrates no discrete mass, skin change, or axillary adenopathy.  Examination of the left breast demonstrates no discrete mass, skin change, or axillary adenopathy.  the patient does not report tenderness to palpation in any particular areas.  The tenderness also does not appear to be in the underlying chest wall/ pectoralis muscle.       Procedures     Results/Data:  Imaging: Mammogram reports and images were reviewed.  I agree with the assessment  Notes:   Lab:   Other:     Assessment & Plan   Breast pain of uncertain etiology.  It does not appear to be musculoskeletal.  Patient has an essentially fatty replaced breast without significant fibrocystic tissue so it is not clear whether this is the etiology or not.  There is no indication of underlying breast malignancy based upon imaging or examination.    Plan: Advised patient to " discuss with her primary doctor whether there would be any benefit into a cardiac work-up as the etiology of the breast tenderness is not clear.  Do not see a need for any additional breast imaging.         Discussion/Summary    Time spent:     BMI is >= 30 and <35. (Class 1 Obesity). The following options were offered after discussion;: exercise counseling/recommendations       Future Appointments   Date Time Provider Department Center   11/13/2023  4:00 PM Hoang Palacios MD [facility]         Please note that portions of this note were completed with a voice recognition program.

## 2023-08-21 ENCOUNTER — OFFICE VISIT (OUTPATIENT)
Dept: FAMILY MEDICINE CLINIC | Facility: CLINIC | Age: 53
End: 2023-08-21
Payer: COMMERCIAL

## 2023-08-21 VITALS
TEMPERATURE: 98.6 F | BODY MASS INDEX: 34.53 KG/M2 | HEIGHT: 67 IN | WEIGHT: 220 LBS | RESPIRATION RATE: 14 BRPM | HEART RATE: 76 BPM | DIASTOLIC BLOOD PRESSURE: 72 MMHG | SYSTOLIC BLOOD PRESSURE: 128 MMHG | OXYGEN SATURATION: 99 %

## 2023-08-21 DIAGNOSIS — F41.8 DEPRESSION WITH ANXIETY: ICD-10-CM

## 2023-08-21 DIAGNOSIS — N64.4 BREAST PAIN: ICD-10-CM

## 2023-08-21 DIAGNOSIS — Z00.00 HEALTHCARE MAINTENANCE: ICD-10-CM

## 2023-08-21 DIAGNOSIS — E78.2 MIXED HYPERLIPIDEMIA: ICD-10-CM

## 2023-08-21 DIAGNOSIS — R03.0 ELEVATED BLOOD PRESSURE READING: ICD-10-CM

## 2023-08-21 DIAGNOSIS — R20.0 FACIAL NUMBNESS: ICD-10-CM

## 2023-08-21 DIAGNOSIS — M05.79 RHEUMATOID ARTHRITIS INVOLVING MULTIPLE SITES WITH POSITIVE RHEUMATOID FACTOR: ICD-10-CM

## 2023-08-21 DIAGNOSIS — I87.2 CHRONIC VENOUS INSUFFICIENCY: Primary | ICD-10-CM

## 2023-08-21 DIAGNOSIS — E66.9 CLASS 1 OBESITY WITH SERIOUS COMORBIDITY AND BODY MASS INDEX (BMI) OF 34.0 TO 34.9 IN ADULT, UNSPECIFIED OBESITY TYPE: ICD-10-CM

## 2023-08-21 DIAGNOSIS — G56.03 BILATERAL CARPAL TUNNEL SYNDROME: ICD-10-CM

## 2023-08-21 PROCEDURE — 85705 THROMBOPLASTIN INHIBITION: CPT | Performed by: GENERAL PRACTICE

## 2023-08-21 PROCEDURE — 85670 THROMBIN TIME PLASMA: CPT | Performed by: GENERAL PRACTICE

## 2023-08-21 PROCEDURE — 85613 RUSSELL VIPER VENOM DILUTED: CPT | Performed by: GENERAL PRACTICE

## 2023-08-21 PROCEDURE — 36415 COLL VENOUS BLD VENIPUNCTURE: CPT | Performed by: GENERAL PRACTICE

## 2023-08-21 PROCEDURE — 86147 CARDIOLIPIN ANTIBODY EA IG: CPT | Performed by: GENERAL PRACTICE

## 2023-08-21 PROCEDURE — 85652 RBC SED RATE AUTOMATED: CPT | Performed by: GENERAL PRACTICE

## 2023-08-21 PROCEDURE — 99215 OFFICE O/P EST HI 40 MIN: CPT | Performed by: GENERAL PRACTICE

## 2023-08-21 PROCEDURE — 85732 THROMBOPLASTIN TIME PARTIAL: CPT | Performed by: GENERAL PRACTICE

## 2023-08-21 PROCEDURE — 86146 BETA-2 GLYCOPROTEIN ANTIBODY: CPT | Performed by: GENERAL PRACTICE

## 2023-08-21 PROCEDURE — 86140 C-REACTIVE PROTEIN: CPT | Performed by: GENERAL PRACTICE

## 2023-08-21 RX ORDER — ASPIRIN 81 MG/1
81 TABLET ORAL DAILY
Qty: 30 TABLET | Refills: 5
Start: 2023-08-21

## 2023-08-22 LAB
CRP SERPL-MCNC: <0.3 MG/DL (ref 0–0.5)
ERYTHROCYTE [SEDIMENTATION RATE] IN BLOOD: 8 MM/HR (ref 0–20)

## 2023-08-22 NOTE — PROGRESS NOTES
Subjective   Susie Rangel is a 52 y.o. female.     Chief Complaint  ER follow-up    History of Present Illness     ER Follow-Up  She was seen at [facility] yesterday with a several hour history of left lateral facial numbness.  CBC, CMP, and cardiac enzymes were unremarkable as was a noncontrast CT of the head and a CTA of the head and neck.  Her blood pressure was found to be elevated and she was discharged on lisinopril 10 twice daily.  She continues to have intermittent numbness of her left lateral face and to a lesser extent one arm or the other.  She feels that her vision is a bit blurry bilaterally today.  She denies any other visual disturbances nor any new headaches.  She denies any weakness and has had no difficulty talking or understanding what is said to her.  She denies any new joint or muscle pain and there is no history of any rash, fever, or chills.  She is unaware of any family history of coagulopathy    Left Breast Pain  She continues to have intermittent left breast pain and tenderness. This does not radiate elsewhere and has been unassociated with any other symptoms.  There is no history of any palpable mass and she denies any palpitations, lightheadedness, shortness of breath, or cough.  To date, she has been unable to identify any exacerbating factors including eating or exertion.  She underwent a general surgery assessment with Dr. Madrid on 8/17/2023 who felt that her breast exam was unremarkable and recommended consideration been to a cardiac work-up    Rheumatoid Arthritis  She continues to be followed by rheumatology and reports some improvement in her peripheral joint pain since last here.  There is no history of any joint swelling or redness,and she has had no rash, fever, or chills.    The following portions of the patient's history were reviewed and updated as appropriate: allergies, current medications, past family history, past medical history, past social history, past  surgical history, and problem list.    Review of Systems   Constitutional:  Positive for fatigue. Negative for appetite change, chills, fever and unexpected weight change.   HENT:  Positive for rhinorrhea and sneezing. Negative for congestion, postnasal drip, sinus pressure, sore throat and voice change.    Eyes:  Negative for visual disturbance.   Respiratory:  Negative for cough, shortness of breath and wheezing.    Cardiovascular:  Positive for leg swelling (left). Negative for chest pain and palpitations.   Gastrointestinal:  Negative for abdominal pain, blood in stool, constipation, diarrhea, nausea and vomiting.   Endocrine:        Hot flashes   Genitourinary:  Negative for difficulty urinating, dysuria, frequency, hematuria and urgency.        Left breast tenderness over the last month   Musculoskeletal:  Positive for arthralgias and back pain. Negative for neck pain.   Skin:  Negative for rash.        Hair loss   Neurological:  Positive for numbness. Negative for weakness and headaches.   Psychiatric/Behavioral:  Positive for decreased concentration and sleep disturbance. Negative for dysphoric mood. The patient is not nervous/anxious.      Objective   Physical Exam  Constitutional:       General: She is not in acute distress.     Appearance: Normal appearance. She is well-developed. She is not diaphoretic.      Comments: Looked tired and a bit anxious.. No apparent distress. No pallor, jaundice, diaphoresis, or cyanosis.   HENT:      Head: Atraumatic.      Right Ear: Tympanic membrane, ear canal and external ear normal.      Left Ear: Tympanic membrane, ear canal and external ear normal.      Mouth/Throat:      Lips: No lesions.      Mouth: Mucous membranes are moist. No oral lesions.      Pharynx: No oropharyngeal exudate or posterior oropharyngeal erythema.   Eyes:      General: Lids are normal. No visual field deficit.     Extraocular Movements: Extraocular movements intact.      Conjunctiva/sclera:  Conjunctivae normal.      Pupils: Pupils are equal.   Neck:      Thyroid: No thyroid mass or thyromegaly.      Vascular: No carotid bruit or JVD.      Trachea: Trachea normal. No tracheal deviation.   Cardiovascular:      Rate and Rhythm: Normal rate and regular rhythm.      Heart sounds: Normal heart sounds, S1 normal and S2 normal. No murmur heard.    No gallop.   Pulmonary:      Effort: Pulmonary effort is normal.      Breath sounds: Normal breath sounds.   Abdominal:      General: Bowel sounds are normal. There is no distension.   Musculoskeletal:      Right lower leg: No edema.      Left lower leg: Edema (trace) present.      Comments: No peripheral joint redness or warmth.   Lymphadenopathy:      Head:      Right side of head: No submental, submandibular, tonsillar, preauricular, posterior auricular or occipital adenopathy.      Left side of head: No submental, submandibular, tonsillar, preauricular, posterior auricular or occipital adenopathy.      Cervical: No cervical adenopathy.      Upper Body:      Right upper body: No supraclavicular adenopathy.      Left upper body: No supraclavicular adenopathy.   Skin:     General: Skin is warm.      Coloration: Skin is not cyanotic, jaundiced or pale.      Findings: No rash.      Nails: There is no clubbing.   Neurological:      Mental Status: She is alert and oriented to person, place, and time.      Cranial Nerves: No cranial nerve deficit, dysarthria or facial asymmetry.      Sensory: No sensory deficit.      Motor: No weakness or tremor.      Coordination: Coordination normal.      Gait: Gait normal.      Deep Tendon Reflexes:      Reflex Scores:       Bicep reflexes are 1+ on the right side and 1+ on the left side.       Brachioradialis reflexes are 0 on the right side and 0 on the left side.       Patellar reflexes are 1+ on the right side and 1+ on the left side.       Achilles reflexes are 1+ on the right side and 1+ on the left side.  Psychiatric:          Attention and Perception: Attention normal.         Mood and Affect: Mood normal.         Speech: Speech normal.         Behavior: Behavior normal.         Thought Content: Thought content normal. Thought content does not include homicidal or suicidal ideation.     Assessment & Plan   Problems Addressed this Visit          Cardiac and Vasculature    Chronic venous insufficiency     Elevated blood pressure reading  Normotensive here in the office today  Encouraged to continue to work on her diet and exercise plan.  Continue current medication for now  We will see back in 1 week    Mixed hyperlipidemia  As above.       Endocrine and Metabolic    Class 1 obesity with serious comorbidity and body mass index (BMI) of 34.0 to 34.9 in adult  As above.       Genitourinary and Reproductive     Breast pain  Etiology unclear but she has no features suggestive of a cardiac cause at present  Will continue to monitor       Health Encounters    Healthcare maintenance  Recommended a flu shot along with an updated COVID-19 shot this fall.       Mental Health    Depression with anxiety       Musculoskeletal and Injuries    Rheumatoid arthritis involving multiple sites with positive rheumatoid factor  Continue current medication  Follow up with  rheumatology    Relevant Orders    Sedimentation Rate    C-reactive Protein    Anticardiolipin Antibody, IgG / M, Qn    Beta-2 Glycoprotein Antibodies    Lupus Anticoagulant       Neuro    Bilateral carpal tunnel syndrome       Symptoms and Signs    Facial numbness  Left facial numbness  Antiphospholipid testing performed  Advised to start on low-dose ASA for now  Encouraged to seek immediate assessment if any worse or if any new symptoms or concerns  We will otherwise see back in 1 week    Relevant Orders    Sedimentation Rate    C-reactive Protein    Anticardiolipin Antibody, IgG / M, Qn    Beta-2 Glycoprotein Antibodies    Lupus Anticoagulant     Diagnoses         Codes Comments    Chronic  venous insufficiency    -  Primary ICD-10-CM: I87.2  ICD-9-CM: 459.81     Mixed hyperlipidemia     ICD-10-CM: E78.2  ICD-9-CM: 272.2     Elevated blood pressure reading     ICD-10-CM: R03.0  ICD-9-CM: 796.2     Class 1 obesity with serious comorbidity and body mass index (BMI) of 34.0 to 34.9 in adult, unspecified obesity type     ICD-10-CM: E66.9, Z68.34  ICD-9-CM: 278.00, V85.34     Healthcare maintenance     ICD-10-CM: Z00.00  ICD-9-CM: V70.0     Rheumatoid arthritis involving multiple sites with positive rheumatoid factor     ICD-10-CM: M05.79  ICD-9-CM: 714.0     Facial numbness     ICD-10-CM: R20.0  ICD-9-CM: 782.0     Breast pain     ICD-10-CM: N64.4  ICD-9-CM: 611.71     Depression with anxiety     ICD-10-CM: F41.8  ICD-9-CM: 300.4     Bilateral carpal tunnel syndrome     ICD-10-CM: G56.03  ICD-9-CM: 354.0               I spent 41 minutes caring for Susie Rangel on this date of service. This time includes time spent by me in the following activities:reviewing tests, performing a medically appropriate examination and/or evaluation , counseling and educating the patient/family/caregiver, ordering medications, tests, or procedures and documenting information in the medical record

## 2023-08-23 LAB
CARDIOLIPIN IGG SER IA-ACNC: <9 GPL U/ML (ref 0–14)
CARDIOLIPIN IGM SER IA-ACNC: <9 MPL U/ML (ref 0–12)

## 2023-08-25 LAB
B2 GLYCOPROT1 IGA SER-ACNC: <9 GPI IGA UNITS (ref 0–25)
B2 GLYCOPROT1 IGG SER-ACNC: <9 GPI IGG UNITS (ref 0–20)
B2 GLYCOPROT1 IGM SER-ACNC: <9 GPI IGM UNITS (ref 0–32)

## 2023-08-27 LAB
APTT SCREEN TO CONFIRM RATIO: 1.08 RATIO (ref 0–1.34)
CONFIRM APTT/NORMAL: 33.2 SEC (ref 0–47.6)
LA 2 SCREEN W REFLEX-IMP: NORMAL
SCREEN APTT: 28.3 SEC (ref 0–43.5)
SCREEN DRVVT: 31 SEC (ref 0–47)
THROMBIN TIME: 17.6 SEC (ref 0–23)

## 2023-08-29 ENCOUNTER — HOSPITAL ENCOUNTER (OUTPATIENT)
Dept: RESPIRATORY THERAPY | Facility: HOSPITAL | Age: 53
Discharge: HOME | End: 2023-08-29
Admitting: GENERAL PRACTICE
Payer: COMMERCIAL

## 2023-08-29 ENCOUNTER — OFFICE VISIT (OUTPATIENT)
Dept: FAMILY MEDICINE CLINIC | Facility: CLINIC | Age: 53
End: 2023-08-29
Payer: COMMERCIAL

## 2023-08-29 DIAGNOSIS — I87.2 CHRONIC VENOUS INSUFFICIENCY: Primary | ICD-10-CM

## 2023-08-29 DIAGNOSIS — M54.59 MECHANICAL LOW BACK PAIN: ICD-10-CM

## 2023-08-29 DIAGNOSIS — R03.0 ELEVATED BLOOD PRESSURE READING: ICD-10-CM

## 2023-08-29 DIAGNOSIS — G56.03 BILATERAL CARPAL TUNNEL SYNDROME: ICD-10-CM

## 2023-08-29 DIAGNOSIS — E78.2 MIXED HYPERLIPIDEMIA: ICD-10-CM

## 2023-08-29 DIAGNOSIS — E66.9 CLASS 1 OBESITY WITH SERIOUS COMORBIDITY AND BODY MASS INDEX (BMI) OF 34.0 TO 34.9 IN ADULT, UNSPECIFIED OBESITY TYPE: ICD-10-CM

## 2023-08-29 DIAGNOSIS — K76.0 NON-ALCOHOLIC FATTY LIVER DISEASE: ICD-10-CM

## 2023-08-29 DIAGNOSIS — R20.0 FACIAL NUMBNESS: ICD-10-CM

## 2023-08-29 DIAGNOSIS — N64.4 BREAST PAIN: ICD-10-CM

## 2023-08-29 DIAGNOSIS — Z00.00 HEALTHCARE MAINTENANCE: ICD-10-CM

## 2023-08-29 DIAGNOSIS — M05.79 RHEUMATOID ARTHRITIS INVOLVING MULTIPLE SITES WITH POSITIVE RHEUMATOID FACTOR: ICD-10-CM

## 2023-08-29 PROCEDURE — 93246 EXT ECG>7D<15D RECORDING: CPT

## 2023-08-29 NOTE — PROGRESS NOTES
Subjective   Susie Rangel is a 52 y.o. female.     Chief Complaint  Left facial numbness    History of Present Illness     Left Facial Numbness  She was seen at [facility] on 8/20/2023 with a several hour history of left lateral facial numbness.  CBC, CMP, and cardiac enzymes were unremarkable as was a noncontrast CT of the head and a CTA of the head and neck.  Her blood pressure was found to be elevated and she was discharged on lisinopril 10 twice daily.  She continues to have intermittent numbness of her left lateral face and to a lesser extent one arm or the other.  She denies any visual disturbances present, nor any new headaches.  She continues to deny any weakness, and has had no difficulty talking or understanding what is said to her.  She continues to deny any new joint or muscle pain and there is no history of any rash, fever, or chills.  She is unaware of any family history of coagulopathy.  ESR and CRP drawn on 8/21/2023 were normal and lupus anticoagulant, beta-2 glycoprotein antibodies, and anticardiolipin antibodies were negative    Left Breast Pain  She continues to have intermittent left breast pain and tenderness. This does not radiate elsewhere and has been unassociated with any other symptoms.  There is no history of any palpable mass and she denies any palpitations, lightheadedness, shortness of breath, or cough.  To date, she has been unable to identify any exacerbating factors including eating or exertion.  She underwent a general surgery assessment with Dr. Madrid on 8/17/2023 who felt that her breast exam was unremarkable and recommended consideration be given to a cardiac work-up    Rheumatoid Arthritis  She continues to be followed by rheumatology and reports some improvement in her peripheral joint pain since last here.  There is no history of any joint swelling or redness,and she has had no rash, fever, or chills.    The following portions of the patient's history were  reviewed and updated as appropriate: allergies, current medications, past medical history, past social history, and problem list.    Review of Systems   Constitutional:  Positive for fatigue. Negative for appetite change, chills, fever and unexpected weight change.   HENT:  Positive for rhinorrhea and sneezing. Negative for congestion, postnasal drip, sinus pressure, sore throat and voice change.    Eyes:  Negative for visual disturbance.   Respiratory:  Negative for cough, shortness of breath and wheezing.    Cardiovascular:  Positive for leg swelling (left). Negative for chest pain and palpitations.   Gastrointestinal:  Negative for abdominal pain, blood in stool, constipation, diarrhea, nausea and vomiting.   Endocrine:        Hot flashes   Genitourinary:  Negative for difficulty urinating, dysuria, frequency, hematuria and urgency.        Left breast tenderness over the last month   Musculoskeletal:  Positive for arthralgias and back pain. Negative for neck pain.   Skin:  Negative for rash.        Hair loss   Neurological:  Positive for numbness. Negative for weakness and headaches.   Psychiatric/Behavioral:  Positive for decreased concentration and sleep disturbance. Negative for dysphoric mood. The patient is not nervous/anxious.      Objective   Physical Exam  Constitutional:       General: She is not in acute distress.     Appearance: Normal appearance. She is well-developed. She is not diaphoretic.      Comments: Alert and in fair spirits. No apparent distress. No pallor, jaundice, diaphoresis, or cyanosis.   HENT:      Head: Atraumatic.      Right Ear: Tympanic membrane, ear canal and external ear normal.      Left Ear: Tympanic membrane, ear canal and external ear normal.      Mouth/Throat:      Lips: No lesions.      Mouth: Mucous membranes are moist. No oral lesions.      Pharynx: No oropharyngeal exudate or posterior oropharyngeal erythema.   Eyes:      General: Lids are normal. No visual field  deficit.     Extraocular Movements: Extraocular movements intact.      Conjunctiva/sclera: Conjunctivae normal.      Pupils: Pupils are equal.   Neck:      Thyroid: No thyroid mass or thyromegaly.      Vascular: No carotid bruit or JVD.      Trachea: Trachea normal. No tracheal deviation.   Cardiovascular:      Rate and Rhythm: Normal rate and regular rhythm.      Heart sounds: Normal heart sounds, S1 normal and S2 normal. No murmur heard.    No gallop.   Pulmonary:      Effort: Pulmonary effort is normal.      Breath sounds: Normal breath sounds.   Abdominal:      General: Bowel sounds are normal. There is no distension.   Musculoskeletal:      Right lower leg: No edema.      Left lower leg: Edema (trace) present.      Comments: No peripheral joint redness or warmth.   Lymphadenopathy:      Head:      Right side of head: No submental, submandibular, tonsillar, preauricular, posterior auricular or occipital adenopathy.      Left side of head: No submental, submandibular, tonsillar, preauricular, posterior auricular or occipital adenopathy.      Cervical: No cervical adenopathy.      Upper Body:      Right upper body: No supraclavicular adenopathy.      Left upper body: No supraclavicular adenopathy.   Skin:     General: Skin is warm.      Coloration: Skin is not cyanotic, jaundiced or pale.      Findings: No rash.      Nails: There is no clubbing.   Neurological:      Mental Status: She is alert and oriented to person, place, and time.      Cranial Nerves: No cranial nerve deficit, dysarthria or facial asymmetry.      Sensory: No sensory deficit.      Motor: No weakness or tremor.      Coordination: Coordination normal.      Gait: Gait normal.   Psychiatric:         Attention and Perception: Attention normal.         Mood and Affect: Mood normal.         Speech: Speech normal.         Behavior: Behavior normal.         Thought Content: Thought content normal. Thought content does not include homicidal or suicidal  ideation.     Assessment & Plan   Problems Addressed this Visit          Cardiac and Vasculature    Chronic venous insufficiency     Elevated blood pressure reading  Encouraged to continue to work on her diet and exercise plan.  Continue current medication for now    Relevant Orders    Adult Transthoracic Echo Complete W/ Cont if Necessary Per Protocol    Mixed hyperlipidemia  As above.  Will continue to monitor       Endocrine and Metabolic    Class 1 obesity with serious comorbidity and body mass index (BMI) of 34.0 to 34.9 in adult       Gastrointestinal Abdominal     Non-alcoholic fatty liver disease       Genitourinary and Reproductive     Breast pain  Encouraged to report if any worse or if any new symptoms or concerns.       Health Encounters    Healthcare maintenance  Recommended a flu shot along with an updated COVID-19 shot this fall.       Musculoskeletal and Injuries    Mechanical low back pain    Rheumatoid arthritis involving multiple sites with positive rheumatoid factor  Reminded regarding symptomatic treatment.   Continue current medication  Follow up with  rheumatology       Neuro    Bilateral carpal tunnel syndrome       Symptoms and Signs    Facial numbness  Reviewed options going forward and agreed on an echocardiogram and Holter monitoring  Patient is aware to proceed to the ER if any further neurologic deficit    Relevant Orders    Adult Transthoracic Echo Complete W/ Cont if Necessary Per Protocol    Holter Monitor - 72 Hour Up To 15 Days     Diagnoses         Codes Comments    Chronic venous insufficiency    -  Primary ICD-10-CM: I87.2  ICD-9-CM: 459.81     Elevated blood pressure reading     ICD-10-CM: R03.0  ICD-9-CM: 796.2     Mixed hyperlipidemia     ICD-10-CM: E78.2  ICD-9-CM: 272.2     Class 1 obesity with serious comorbidity and body mass index (BMI) of 34.0 to 34.9 in adult, unspecified obesity type     ICD-10-CM: E66.9, Z68.34  ICD-9-CM: 278.00, V85.34     Breast pain     ICD-10-CM:  N64.4  ICD-9-CM: 611.71     Non-alcoholic fatty liver disease     ICD-10-CM: K76.0  ICD-9-CM: 571.8     Healthcare maintenance     ICD-10-CM: Z00.00  ICD-9-CM: V70.0     Rheumatoid arthritis involving multiple sites with positive rheumatoid factor     ICD-10-CM: M05.79  ICD-9-CM: 714.0     Mechanical low back pain     ICD-10-CM: M54.59  ICD-9-CM: 724.2     Bilateral carpal tunnel syndrome     ICD-10-CM: G56.03  ICD-9-CM: 354.0     Facial numbness     ICD-10-CM: R20.0  ICD-9-CM: 782.0

## 2023-08-30 VITALS
BODY MASS INDEX: 34.53 KG/M2 | HEIGHT: 67 IN | WEIGHT: 220 LBS | SYSTOLIC BLOOD PRESSURE: 138 MMHG | RESPIRATION RATE: 14 BRPM | TEMPERATURE: 98.6 F | DIASTOLIC BLOOD PRESSURE: 85 MMHG | HEART RATE: 78 BPM | OXYGEN SATURATION: 96 %

## 2023-09-29 DIAGNOSIS — R03.0 ELEVATED BLOOD PRESSURE READING: Primary | ICD-10-CM

## 2023-09-29 RX ORDER — LISINOPRIL 10 MG/1
10 TABLET ORAL DAILY
Qty: 30 TABLET | Refills: 5 | Status: SHIPPED | OUTPATIENT
Start: 2023-09-29

## 2023-09-29 RX ORDER — LISINOPRIL 10 MG/1
10 TABLET ORAL DAILY
COMMUNITY
Start: 2023-08-20 | End: 2023-09-29 | Stop reason: SDUPTHER

## 2023-11-13 ENCOUNTER — OFFICE VISIT (OUTPATIENT)
Dept: FAMILY MEDICINE CLINIC | Facility: CLINIC | Age: 53
End: 2023-11-13
Payer: COMMERCIAL

## 2023-11-13 VITALS
BODY MASS INDEX: 36.26 KG/M2 | RESPIRATION RATE: 14 BRPM | DIASTOLIC BLOOD PRESSURE: 62 MMHG | TEMPERATURE: 98.6 F | HEART RATE: 92 BPM | OXYGEN SATURATION: 97 % | HEIGHT: 67 IN | WEIGHT: 231 LBS | SYSTOLIC BLOOD PRESSURE: 124 MMHG

## 2023-11-13 DIAGNOSIS — Z98.84 HISTORY OF BARIATRIC SURGERY: ICD-10-CM

## 2023-11-13 DIAGNOSIS — R03.0 ELEVATED BLOOD PRESSURE READING: ICD-10-CM

## 2023-11-13 DIAGNOSIS — N64.4 BREAST PAIN: ICD-10-CM

## 2023-11-13 DIAGNOSIS — E66.01 CLASS 2 SEVERE OBESITY WITH SERIOUS COMORBIDITY AND BODY MASS INDEX (BMI) OF 39.0 TO 39.9 IN ADULT, UNSPECIFIED OBESITY TYPE: ICD-10-CM

## 2023-11-13 DIAGNOSIS — Z00.00 HEALTHCARE MAINTENANCE: ICD-10-CM

## 2023-11-13 DIAGNOSIS — E66.01 CLASS 2 SEVERE OBESITY WITH SERIOUS COMORBIDITY AND BODY MASS INDEX (BMI) OF 36.0 TO 36.9 IN ADULT, UNSPECIFIED OBESITY TYPE: ICD-10-CM

## 2023-11-13 DIAGNOSIS — G56.03 BILATERAL CARPAL TUNNEL SYNDROME: ICD-10-CM

## 2023-11-13 DIAGNOSIS — F41.8 DEPRESSION WITH ANXIETY: ICD-10-CM

## 2023-11-13 DIAGNOSIS — Z23 ENCOUNTER FOR IMMUNIZATION: ICD-10-CM

## 2023-11-13 DIAGNOSIS — M05.79 RHEUMATOID ARTHRITIS INVOLVING MULTIPLE SITES WITH POSITIVE RHEUMATOID FACTOR: ICD-10-CM

## 2023-11-13 DIAGNOSIS — R20.0 FACIAL NUMBNESS: ICD-10-CM

## 2023-11-13 DIAGNOSIS — K76.0 NON-ALCOHOLIC FATTY LIVER DISEASE: ICD-10-CM

## 2023-11-13 DIAGNOSIS — I87.2 CHRONIC VENOUS INSUFFICIENCY: ICD-10-CM

## 2023-11-13 DIAGNOSIS — K21.9 GASTROESOPHAGEAL REFLUX DISEASE WITHOUT ESOPHAGITIS: ICD-10-CM

## 2023-11-13 DIAGNOSIS — J30.9 CHRONIC ALLERGIC RHINITIS: Primary | ICD-10-CM

## 2023-11-13 DIAGNOSIS — K59.09 CHRONIC CONSTIPATION: ICD-10-CM

## 2023-11-13 DIAGNOSIS — E78.2 MIXED HYPERLIPIDEMIA: ICD-10-CM

## 2023-11-13 DIAGNOSIS — M54.59 MECHANICAL LOW BACK PAIN: ICD-10-CM

## 2023-11-13 RX ORDER — ALENDRONATE SODIUM 70 MG/1
70 TABLET ORAL
Qty: 4 TABLET | Refills: 5 | Status: SHIPPED | OUTPATIENT
Start: 2023-11-13

## 2023-11-13 RX ORDER — OMEPRAZOLE 40 MG/1
40 CAPSULE, DELAYED RELEASE ORAL 2 TIMES DAILY
Qty: 60 CAPSULE | Refills: 5 | Status: SHIPPED | OUTPATIENT
Start: 2023-11-13

## 2023-11-13 RX ORDER — HYDROXYZINE HYDROCHLORIDE 10 MG/1
10 TABLET, FILM COATED ORAL 3 TIMES DAILY
Qty: 90 TABLET | Refills: 5 | Status: SHIPPED | OUTPATIENT
Start: 2023-11-13

## 2023-11-13 RX ORDER — PHENTERMINE HYDROCHLORIDE 37.5 MG/1
37.5 TABLET ORAL
Qty: 30 TABLET | Refills: 2 | Status: SHIPPED | OUTPATIENT
Start: 2023-11-13

## 2023-11-13 NOTE — PROGRESS NOTES
Subjective   Susie Rangel is a 53 y.o. female.     Chief Complaint  She returns for a scheduled reassessment of multiple medical problems including a previous episode of left facial numbness, left breast pain, rheumatoid arthritis, gastroesophageal reflux disease, and a recent scalp laceration    History of Present Illness     Scalp Laceration  She was seen at [facility] ER 3 days ago after her chair gave way and she fell backward hitting her head on her fireplace.  She did not lose consciousness.  She required closure of a scalp laceration.  She has had no problems with the site, and denies any new headaches, nausea, or vomiting.    Left Facial Numbness  She was seen at [facility] on 8/20/2023 with a several hour history of left lateral facial numbness.  CBC, CMP, and cardiac enzymes were unremarkable as was a noncontrast CT of the head and a CTA of the head and neck.  Her blood pressure was found to be elevated and she was discharged on lisinopril.  She remains on this once daily.  She denies any further numbness.  She continues to deny any new headaches, visual disturbances, weakness, or difficulty talking or understanding what is said to her.  She continues to deny any new joint or muscle pain and there is no history of any rash, fever, or chills.  She is unaware of any family history of coagulopathy.  ESR and CRP drawn on 8/21/2023 were normal and lupus anticoagulant, beta-2 glycoprotein antibodies, and anticardiolipin antibodies were negative.  Holter monitoring completed on 9/18/2023 revealed no significant abnormalities    Left Breast Pain  She denies any further left breast pain or tenderness. There is no history of any palpable mass.  Screening mammography performed on 7/21/2023 was unremarkable.  She underwent a general surgery assessment with Dr. Madrid on 8/17/2023 who felt that her breast exam was unremarkable and recommended consideration be given to a cardiac work-up    Rheumatoid  Arthritis  She continues to be followed by rheumatology and reports a further improvement in her peripheral joint pain since last here.  There is no history of any joint swelling or redness,and she has had no rash, fever, or chills.  She is currently on Enbrel and Arava but still requires corticosteroids once or twice every month or two    The following portions of the patient's history were reviewed and updated as appropriate: allergies, current medications, past medical history, past social history, and problem list.    Review of Systems   Constitutional:  Positive for fatigue. Negative for appetite change, chills, fever and unexpected weight change.   HENT:  Positive for rhinorrhea and sneezing. Negative for congestion, postnasal drip, sinus pressure, sore throat and voice change.    Eyes:  Negative for visual disturbance.   Respiratory:  Negative for cough, shortness of breath and wheezing.    Cardiovascular:  Positive for leg swelling (left). Negative for chest pain and palpitations.   Gastrointestinal:  Negative for abdominal pain, blood in stool, constipation, diarrhea, nausea and vomiting.   Endocrine:        Hot flashes   Genitourinary:  Negative for difficulty urinating, dysuria, frequency, hematuria and urgency.   Musculoskeletal:  Positive for arthralgias and back pain. Negative for neck pain.   Skin:  Positive for wound (scalp). Negative for rash.        Hair loss   Neurological:  Negative for weakness, numbness and headaches.   Psychiatric/Behavioral:  Positive for decreased concentration and sleep disturbance. Negative for dysphoric mood. The patient is not nervous/anxious.      Objective   Physical Exam  Constitutional:       General: She is not in acute distress.     Appearance: Normal appearance. She is well-developed. She is not diaphoretic.      Comments: Bright and in good spirits. No apparent distress. No pallor, jaundice, diaphoresis, or cyanosis.   HENT:      Head:      Comments: Clean well  approximated mid parieto-occipital scalp laceration     Right Ear: Tympanic membrane, ear canal and external ear normal.      Left Ear: Tympanic membrane, ear canal and external ear normal.      Mouth/Throat:      Lips: No lesions.      Mouth: Mucous membranes are moist. No oral lesions.      Pharynx: No oropharyngeal exudate or posterior oropharyngeal erythema.   Eyes:      General: Lids are normal. No visual field deficit.     Extraocular Movements: Extraocular movements intact.      Conjunctiva/sclera: Conjunctivae normal.      Pupils: Pupils are equal.   Neck:      Thyroid: No thyroid mass or thyromegaly.      Vascular: No carotid bruit or JVD.      Trachea: Trachea normal. No tracheal deviation.   Cardiovascular:      Rate and Rhythm: Normal rate and regular rhythm.      Heart sounds: Normal heart sounds, S1 normal and S2 normal. No murmur heard.     No gallop.   Pulmonary:      Effort: Pulmonary effort is normal.      Breath sounds: Normal breath sounds.   Abdominal:      General: Bowel sounds are normal. There is no distension.   Musculoskeletal:      Right lower leg: No edema.      Left lower leg: Edema (trace) present.      Comments: No peripheral joint redness or warmth.   Lymphadenopathy:      Head:      Right side of head: No submental, submandibular, tonsillar, preauricular, posterior auricular or occipital adenopathy.      Left side of head: No submental, submandibular, tonsillar, preauricular, posterior auricular or occipital adenopathy.      Cervical: No cervical adenopathy.      Upper Body:      Right upper body: No supraclavicular adenopathy.      Left upper body: No supraclavicular adenopathy.   Skin:     General: Skin is warm.      Coloration: Skin is not cyanotic, jaundiced or pale.      Findings: No rash.      Nails: There is no clubbing.   Neurological:      Mental Status: She is alert and oriented to person, place, and time.      Cranial Nerves: No cranial nerve deficit, dysarthria or facial  asymmetry.      Sensory: No sensory deficit.      Motor: No weakness or tremor.      Coordination: Coordination normal.      Gait: Gait normal.   Psychiatric:         Attention and Perception: Attention normal.         Mood and Affect: Mood normal.         Speech: Speech normal.         Behavior: Behavior normal.         Thought Content: Thought content normal. Thought content does not include homicidal or suicidal ideation.       Assessment & Plan   Problems Addressed this Visit          Allergies and Adverse Reactions    Chronic allergic rhinitis       Cardiac and Vasculature    Chronic venous insufficiency    Relevant Orders    CBC & Differential    Elevated blood pressure reading  Remains normotensive here  Encouraged to continue to work on her diet and exercise plan.  Will continue to monitor    Relevant Orders    CBC & Differential    Comprehensive Metabolic Panel    TSH    Mixed hyperlipidemia  As above.  Scheduled for updated labs    Relevant Orders    Comprehensive Metabolic Panel    Lipid Panel    TSH       Endocrine and Metabolic    Class 2 severe obesity with serious comorbidity and body mass index (BMI) of 36.0 to 36.9 in adult  As above.   Continue current medication.    Relevant Medications    phentermine (ADIPEX-P) 37.5 MG tablet    History of bariatric surgery       Gastrointestinal Abdominal     Chronic constipation    Relevant Orders    CBC & Differential    Gastroesophageal reflux disease without esophagitis   Symptoms are currently well controlled.  Continue current medication.    Relevant Medications    omeprazole (priLOSEC) 40 MG capsule    Other Relevant Orders    CBC & Differential    Non-alcoholic fatty liver disease  Will continue to monitor    Relevant Orders    CBC & Differential    Comprehensive Metabolic Panel       Genitourinary and Reproductive     Breast pain  Resolved  Encouraged report if this should recur       Health Encounters    Healthcare maintenance  Flu shot  administered.  Recommended an updated COVID-19 vaccination.    Relevant Orders    Fluzone (or Fluarix & Flulaval for VFC) >6mos (Completed)       Mental Health    Depression with anxiety  Significant situational component.   Supportive therapy.   Continue current medication.  Encouraged to report if any worse or if any new symptoms or concerns.    Relevant Medications    hydrOXYzine (ATARAX) 10 MG tablet    phentermine (ADIPEX-P) 37.5 MG tablet    Other Relevant Orders    TSH       Musculoskeletal and Injuries    Mechanical low back pain    Rheumatoid arthritis involving multiple sites with positive rheumatoid factor  Reminded regarding symptomatic treatment.   Continue current medication  We will continue alendronate while she requires regular courses of corticosteroids  Follow up with  rheumatology    Relevant Medications    alendronate (FOSAMAX) 70 MG tablet    Other Relevant Orders    CBC & Differential    Comprehensive Metabolic Panel    Vitamin D,25-Hydroxy    Vitamin B12       Neuro    Bilateral carpal tunnel syndrome       Symptoms and Signs    Facial numbness  Resolved  Patient is aware to seek immediate attention if any further neurologic deficit               Diagnoses         Codes Comments    Chronic allergic rhinitis    -  Primary ICD-10-CM: J30.9  ICD-9-CM: 477.9     Mixed hyperlipidemia     ICD-10-CM: E78.2  ICD-9-CM: 272.2     Elevated blood pressure reading     ICD-10-CM: R03.0  ICD-9-CM: 796.2     Chronic venous insufficiency     ICD-10-CM: I87.2  ICD-9-CM: 459.81     History of bariatric surgery     ICD-10-CM: Z98.84  ICD-9-CM: V45.86     Class 2 severe obesity with serious comorbidity and body mass index (BMI) of 36.0 to 36.9 in adult, unspecified obesity type     ICD-10-CM: E66.01, Z68.36  ICD-9-CM: 278.01, V85.36     Non-alcoholic fatty liver disease     ICD-10-CM: K76.0  ICD-9-CM: 571.8     Gastroesophageal reflux disease without esophagitis     ICD-10-CM: K21.9  ICD-9-CM: 530.81     Chronic  constipation     ICD-10-CM: K59.09  ICD-9-CM: 564.00     Breast pain     ICD-10-CM: N64.4  ICD-9-CM: 611.71     Healthcare maintenance     ICD-10-CM: Z00.00  ICD-9-CM: V70.0     Depression with anxiety     ICD-10-CM: F41.8  ICD-9-CM: 300.4     Rheumatoid arthritis involving multiple sites with positive rheumatoid factor     ICD-10-CM: M05.79  ICD-9-CM: 714.0     Mechanical low back pain     ICD-10-CM: M54.59  ICD-9-CM: 724.2     Bilateral carpal tunnel syndrome     ICD-10-CM: G56.03  ICD-9-CM: 354.0     Facial numbness     ICD-10-CM: R20.0  ICD-9-CM: 782.0     Encounter for immunization     ICD-10-CM: Z23  ICD-9-CM: V03.89     Class 2 severe obesity with serious comorbidity and body mass index (BMI) of 39.0 to 39.9 in adult, unspecified obesity type     ICD-10-CM: E66.01, Z68.39  ICD-9-CM: 278.01, V85.39           I spent 43 minutes caring for Susie Rangel on this date of service. This time includes time spent by me in the following activities:reviewing tests, performing a medically appropriate examination and/or evaluation , counseling and educating the patient/family/caregiver, ordering medications, tests, or procedures and documenting information in the medical record

## 2023-11-21 DIAGNOSIS — M54.59 MECHANICAL LOW BACK PAIN: Primary | ICD-10-CM

## 2023-11-27 ENCOUNTER — OFFICE (OUTPATIENT)
Dept: URBAN - METROPOLITAN AREA CLINIC 34 | Facility: CLINIC | Age: 53
End: 2023-11-27
Payer: COMMERCIAL

## 2023-11-27 ENCOUNTER — OFFICE (OUTPATIENT)
Dept: URBAN - METROPOLITAN AREA CLINIC 34 | Facility: CLINIC | Age: 53
End: 2023-11-27

## 2023-11-27 VITALS
HEIGHT: 64 IN | HEART RATE: 84 BPM | DIASTOLIC BLOOD PRESSURE: 92 MMHG | SYSTOLIC BLOOD PRESSURE: 142 MMHG | WEIGHT: 167 LBS | TEMPERATURE: 97.6 F

## 2023-11-27 DIAGNOSIS — Z12.11 ENCOUNTER FOR SCREENING FOR MALIGNANT NEOPLASM OF COLON: ICD-10-CM

## 2023-11-27 PROCEDURE — S0285 CNSLT BEFORE SCREEN COLONOSC: HCPCS | Performed by: INTERNAL MEDICINE

## 2023-11-27 PROCEDURE — 00031: CPT | Performed by: INTERNAL MEDICINE

## 2024-02-01 DIAGNOSIS — E66.01 CLASS 2 SEVERE OBESITY WITH SERIOUS COMORBIDITY AND BODY MASS INDEX (BMI) OF 36.0 TO 36.9 IN ADULT, UNSPECIFIED OBESITY TYPE: Primary | ICD-10-CM

## 2024-02-05 ENCOUNTER — DISEASE STATE MANAGEMENT VISIT (OUTPATIENT)
Dept: PHARMACY | Facility: HOSPITAL | Age: 54
End: 2024-02-05
Payer: COMMERCIAL

## 2024-02-05 VITALS
HEIGHT: 67 IN | WEIGHT: 246.6 LBS | HEART RATE: 84 BPM | SYSTOLIC BLOOD PRESSURE: 138 MMHG | BODY MASS INDEX: 38.71 KG/M2 | DIASTOLIC BLOOD PRESSURE: 79 MMHG

## 2024-02-05 RX ORDER — SEMAGLUTIDE 0.25 MG/.5ML
0.25 INJECTION, SOLUTION SUBCUTANEOUS WEEKLY
Qty: 2 ML | Refills: 0 | Status: SHIPPED | OUTPATIENT
Start: 2024-02-05

## 2024-02-05 NOTE — PROGRESS NOTES
[facility]  Weight Management Program      Susie Rangel is a 53 y.o. female referred to the [facility] by Dr. Hoang Palacios for clinical pharmacy and specialty pharmacy management of GLP1 for weight management.  The patient enies a personal history or family history of thyroid cancer and denies a personal history of pancreatitis.     Susie Rangel has previously tried Adipex, Mounjaro (on and off for about a year due to shortage. Lost ~15 lbs), exercising, and gastric sleeve in 2014 for weight loss. Current weight loss efforts include walking and resistance training. She has also been eating around 1700 calories a day.      Relevant Past Medical History and Co-morbidities  Past Medical History:   Diagnosis Date    Arthritis     Class 2 obesity with serious comorbidity and body mass index (BMI) of 38.0 to 38.9 in adult 06/16/2016    Hx laparoscopic sleeve gastrectomy     Dyslipidemia 06/16/2016    GERD (gastroesophageal reflux disease)     Glaucoma 01/25/2023    Low back pain     Macular degeneration 01/25/2023    Obesity     Vitamin D deficiency      Social History     Socioeconomic History    Marital status:      Spouse name: oren    Number of children: 1    Years of education: 14   Tobacco Use    Smoking status: Never     Passive exposure: Never    Smokeless tobacco: Never   Vaping Use    Vaping Use: Never used   Substance and Sexual Activity    Alcohol use: No    Drug use: No    Sexual activity: Yes       Allergies  Patient has no known allergies.    Current Medication List    Current Outpatient Medications:     alendronate (FOSAMAX) 70 MG tablet, Take 1 tablet by mouth Every 7 (Seven) Days., Disp: 4 tablet, Rfl: 5    aspirin 81 MG EC tablet, Take 1 tablet by mouth Daily., Disp: 30 tablet, Rfl: 5    diclofenac (VOLTAREN) 75 MG EC tablet, Take 1 tablet by mouth 2 (Two) Times a Day As Needed. for pain, Disp: , Rfl:     Enbrel Mini 50 MG/ML solution  "cartridge, 50 mg 1 (One) Time Per Week., Disp: , Rfl:     hydrOXYzine (ATARAX) 10 MG tablet, Take 1 tablet by mouth 3 (Three) Times a Day., Disp: 90 tablet, Rfl: 5    leflunomide (ARAVA) 20 MG tablet, Take 1 tablet by mouth Daily., Disp: , Rfl:     lisinopril (PRINIVIL,ZESTRIL) 10 MG tablet, Take 1 tablet by mouth Daily., Disp: 30 tablet, Rfl: 5    omeprazole (priLOSEC) 40 MG capsule, Take 1 capsule by mouth 2 (Two) Times a Day., Disp: 60 capsule, Rfl: 5    tiZANidine (ZANAFLEX) 4 MG tablet, Take 1 tablet by mouth At Night As Needed for Muscle Spasms., Disp: , Rfl:     predniSONE (DELTASONE) 10 MG tablet, , Disp: , Rfl:     Semaglutide-Weight Management (Wegovy) 0.25 MG/0.5ML solution auto-injector, Inject 0.5 mL under the skin into the appropriate area as directed 1 (One) Time Per Week., Disp: 2 mL, Rfl: 0    Drug Interactions  None with Wegovy    Relevant Laboratory Values  Lab Results   Component Value Date    CHOL 267 (H) 05/25/2023    TRIG 47 05/25/2023    HDL 62 (H) 05/25/2023     (H) 05/25/2023     There is no height or weight on file to calculate BMI.    Vaccinations:   Patient recommended to keep up with routine vaccinations.     Goals of Therapy  Clinical Goals or Therapeutic Targets: Prevent weight associated co-morbidities and complications      Date   2/5/23       Weight (lb) 246.6 lb       BMI kg/ 38.62       Waist Circumference (in)  49\"           Medication Assessment & Plan    Patient has current BMI of 38.62, which is considered Class II Obesity.    Will order Wegovy 0.25 mg SC weekly.      The following medications will need dose adjustments/closer monitoring once the GLP1 is started: None    Discussed lifestyle modifications, including diet and exercise.  Patient education provided.     Worked with patient to create SMART Goal(s):   Walk for 30 minutes 4 days a week  Hand weights 3 days a week  Eat < 2000 calories/day    Will follow-up with patient in clinic in 4 weeks.     Stephanie Herndon" PharmD  2/5/2024  13:23 EST

## 2024-02-05 NOTE — PROGRESS NOTES
Wegovy® (semaglutide)  Medication Expectations   Why am I taking this medication? You are taking Wegovy for weight loss because you have excess weight and also have weight-related medical problems or obesity. It should be used along with a reduced calorie diet and increased physical activity.    What should I expect while on this medication? You should expect to lose at least 5% of your body weight after 3 months of therapy. It can also help keep weight lost off.    How does the medication work? Wegovy is an injection that addresses one of your body's natural responses to weight loss. It works like a naturally produced hormone in the body called GLP-1 that regulates appetite which can lead to eating fewer calories and losing weight. This medication also slows down food from leaving your stomach, making you feel france for longer.   How long will I be on this medication for? The amount of time you will be on this medication will be determined by your doctor. Do not abruptly stop this medication without talking to your doctor first.    How do I take this medication? Take as directed on your prescription label. Wegovy is injected under the skin (subcutaneously) of your stomach, thigh, or upper arm. This medication should be taken once weekly and can be given with or without food. Rotate injection sites weekly. If changing the day of administration is necessary, allow at least 48 hours between 2 doses.     After removal of the pen cap, the needles will be hidden inside the needle cover.  To begin injection, press the needle cover firmly against the skin.  Once injected, continue to press the device against the skin until the yellow bar has stopped moving.  Then, remove the needle from the skin.    What are some possible side effects? You may notice you don't feel as hungry, especially when you first start using Wegovy. Some common side effects include nausea, vomiting, diarrhea, vomiting, indigestion, constipation,  tiredness, and headache. Redness, itching, and/or swelling can occur where the shot was given. You should also monitor for low blood sugar (hypoglycemia), especially if you are taking Wegovy with other medications that cause low blood sugar. Stop using Wegovy and call your doctor immediately if you have severe pain in your stomach area that will not go away as this could be a sign of pancreatitis (inflammation of your pancreas).     What happens if I miss a dose? If you miss a dose, take it as soon as you remember within 5 days. Resume usual schedule thereafter.  If > 5 days have elapsed, skip the missed dose and resume administration at the next scheduled weekly dose.      Medication Safety   What are things I should warn my doctor immediately about? Do not use Wegovy if you or a family member have ever had medullary thyroid cancer (MTC) or Multiple Endocrine Neoplasia syndrome type 2 (MEN 2). Tell your doctor if you get a lump or swelling in your neck, hoarseness, difficulty swallowing, or feel short of breath (these may be symptoms of thyroid cancer). Talk to your doctor if you have or have had problems with your pancreas, kidneys, or liver. Tell your doctor if you have problems with digesting food or slowed emptying of your stomach (gastroparesis). Talk to your doctor if you are pregnant, planning to become pregnant, or breastfeeding. Also tell your doctor if you notice any signs/symptoms of an allergic reaction (rash, hives, difficulty breathing, etc.).   What are things that I should be cautious of? Be cautious of any side effects from this medication. Talk to your doctor if any new ones develop or aren't getting better.   What are some medications that can interact with this one? Taking Wegovy with other medications that lower your blood sugar such as insulins and glipizide/glimepiride/glyburide may increase the risk of low blood sugar. It should not be taken with other medicines called GLP-1 receptor  agonists, as these work the same way as Wegovy. Because Wegovy slows stomach emptying, it can affect the way some medicines work. Always tell your doctor or pharmacist immediately if you start taking any new medications, including over-the-counter medications, vitamins, and herbal supplements.      Medication Storage/Handling   How should I handle this medication? Keep this medication out of reach of pets/children and keep the pen capped when not in use. Do not share your medicine pens with others.    How does this medication need to be stored? Store your new, unused pens in the original carton in the refrigerator. Protect it from light. Do not freeze. Prior to cap removal, the pen can be kept at room temperature up to 28 days.    How should I dispose of this medication? Used Wegovy pens should be thrown after each use. Place your used pen and needle in an approved sharps container. If you do not have a sharps container, you may use a household container made of heavy-duty plastic with a tight-fitting lid that is leak resistant (e.g., heavy-duty plastic laundry detergent bottle). If your doctor decides to stop this medication, take to your local police station for proper disposal. Some pharmacies also have take-back bins for medication drop-off.       Resources/Support   How can I remind myself to take this medication? You can download reminder apps to help you manage your refills. You may also set an alarm on your phone to remind you.    Is financial support available?  [facility] can provide co-pay cards if you have commercial insurance.    Which vaccines are recommended for me? Talk to your doctor about these vaccines: Flu, Coronavirus (COVID-19), Pneumococcal (pneumonia), Tdap, Zoster (shingles)

## 2024-02-27 ENCOUNTER — OFFICE VISIT (OUTPATIENT)
Dept: FAMILY MEDICINE CLINIC | Facility: CLINIC | Age: 54
End: 2024-02-27
Payer: OTHER GOVERNMENT

## 2024-02-27 DIAGNOSIS — E78.2 MIXED HYPERLIPIDEMIA: ICD-10-CM

## 2024-02-27 DIAGNOSIS — Z98.84 HISTORY OF BARIATRIC SURGERY: ICD-10-CM

## 2024-02-27 DIAGNOSIS — R20.2 NUMBNESS AND TINGLING OF LEFT ARM AND LEG: ICD-10-CM

## 2024-02-27 DIAGNOSIS — M54.59 MECHANICAL LOW BACK PAIN: ICD-10-CM

## 2024-02-27 DIAGNOSIS — R20.0 FACIAL NUMBNESS: ICD-10-CM

## 2024-02-27 DIAGNOSIS — Z00.00 HEALTHCARE MAINTENANCE: ICD-10-CM

## 2024-02-27 DIAGNOSIS — R03.0 ELEVATED BLOOD PRESSURE READING: ICD-10-CM

## 2024-02-27 DIAGNOSIS — F41.8 DEPRESSION WITH ANXIETY: ICD-10-CM

## 2024-02-27 DIAGNOSIS — K76.0 NON-ALCOHOLIC FATTY LIVER DISEASE: ICD-10-CM

## 2024-02-27 DIAGNOSIS — K59.09 CHRONIC CONSTIPATION: ICD-10-CM

## 2024-02-27 DIAGNOSIS — I87.2 CHRONIC VENOUS INSUFFICIENCY: ICD-10-CM

## 2024-02-27 DIAGNOSIS — J30.9 CHRONIC ALLERGIC RHINITIS: Primary | ICD-10-CM

## 2024-02-27 DIAGNOSIS — K21.9 GASTROESOPHAGEAL REFLUX DISEASE WITHOUT ESOPHAGITIS: ICD-10-CM

## 2024-02-27 DIAGNOSIS — E66.01 CLASS 2 SEVERE OBESITY WITH SERIOUS COMORBIDITY AND BODY MASS INDEX (BMI) OF 38.0 TO 38.9 IN ADULT, UNSPECIFIED OBESITY TYPE: ICD-10-CM

## 2024-02-27 DIAGNOSIS — R20.0 NUMBNESS AND TINGLING OF LEFT ARM AND LEG: ICD-10-CM

## 2024-02-27 DIAGNOSIS — G56.03 BILATERAL CARPAL TUNNEL SYNDROME: ICD-10-CM

## 2024-02-27 DIAGNOSIS — M05.79 RHEUMATOID ARTHRITIS INVOLVING MULTIPLE SITES WITH POSITIVE RHEUMATOID FACTOR: ICD-10-CM

## 2024-02-27 RX ORDER — HYDROXYZINE HYDROCHLORIDE 25 MG/1
25 TABLET, FILM COATED ORAL 3 TIMES DAILY PRN
Qty: 90 TABLET | Refills: 5 | Status: SHIPPED | OUTPATIENT
Start: 2024-02-27

## 2024-02-27 NOTE — PROGRESS NOTES
Subjective   Susie Rangel is a 53 y.o. female.     Chief Complaint  She returns for a scheduled reassessment of multiple medical problems including intermittent numbness,    History of Present Illness     Intermittent Numbness  She continues to have intermittent left facial numbness.  Since last here, she has had intermittent numbness and tingling of her left upper extremity.  This extends from her shoulder to her ring and little fingers.  While the numbness and tingling in her arm can occur alone, the numbness in her face is almost always associated with that in her arm. She continues to deny any new headaches, visual disturbances, weakness, or difficulty talking or understanding what is said to her.  She continues to deny any new joint or muscle pain and there is no history of any rash, fever, or chills.  Non-contrast CT of the head and a CTA of the head and neck performed at [facility] on 8/20/2023 were unremarkable. She is unaware of any family history of coagulopathy.  ESR and CRP drawn on 8/21/2023 were normal and lupus anticoagulant, beta-2 glycoprotein antibodies, and anticardiolipin antibodies were negative.  Holter monitoring completed on 9/18/2023 revealed no significant abnormalities.     Rheumatoid Arthritis  She complains of persistent joint pain and stiffness.  There is no history of any joint swelling or redness,and she has had no rash, fever, or chills.  She continues to be followed by rheumatology, and is maintained is currently on Enbrel and Arava but still requires corticosteroids once or twice every month or two.  She is taking alendronate as prescribed    Lumbar Degenerative Disc Disease  She complains of persistent low back pain. There has been no change in the quality nor any new associated symptoms.  MRI of the lumbar spine performed on 2/25/2022 revealed degenerative disc disease and osteoarthritis    Left Lower Extremity Edema  She has noted a continued improvement in the  swelling about her left foot.  This has been unassociated with any other symptoms and she continues to deny any pain or discoloration.  The swelling tends to develop toward the end of the day and improves overnight.    Constipation  She reports a continued improvement in her constipation. She denies any difficulty swallowing, nausea, vomiting, or heartburn, and there is no history of any diarrhea, hematochezia, or melena. EGD performed on 4/27/2021 revealed evidence of a previous sleeve gastrectomy along with mild gastritis.  Colonoscopy performed the same day revealed small internal hemorrhoids but was otherwise unremarkable.  She is taking omeprazole 40 daily    Hyperlipidemia  She is following an appropriate diet, but her activities remain limited due to her back pain    Stress  She remains under a considerable amount of stress, and admits to intermittent nervousness, worrying, difficulty sleeping. She continues to deny any depression, anxiety, loss interest in activities, or suicidal ideation.  She has a very supportive family.  She is on no psychiatric medication at present    Left Breast Pain  She denies any further left breast pain or tenderness. There is no history of any palpable mass.  Screening mammography performed on 7/21/2023 was unremarkable.  She underwent a general surgery assessment with Dr. Madrid on 8/17/2023 who felt that her breast exam was unremarkable     The following portions of the patient's history were reviewed and updated as appropriate: allergies, current medications, past medical history, past social history, and problem list.    Review of Systems   Constitutional:  Positive for fatigue. Negative for chills and fever.   HENT:  Positive for rhinorrhea and sneezing. Negative for congestion, ear pain and sore throat.    Eyes:  Negative for visual disturbance.   Respiratory:  Negative for cough, shortness of breath and wheezing.    Cardiovascular:  Positive for leg swelling (left).  Negative for chest pain and palpitations.   Gastrointestinal:  Negative for abdominal pain, blood in stool, constipation, diarrhea, nausea and vomiting.   Endocrine:        Hot flashes   Genitourinary:  Negative for dysuria and hematuria.   Musculoskeletal:  Positive for arthralgias and back pain. Negative for joint swelling and myalgias.   Skin:  Negative for rash.   Neurological:  Positive for numbness. Negative for dizziness, tremors and weakness.   Psychiatric/Behavioral:  Positive for decreased concentration and sleep disturbance. Negative for suicidal ideas and depressed mood. The patient is nervous/anxious.      Objective   Physical Exam  Constitutional:       General: She is not in acute distress.     Appearance: Normal appearance. She is well-developed. She is not diaphoretic.      Comments: Bright and in good spirits. No apparent distress. No pallor, jaundice, diaphoresis, or cyanosis.   HENT:      Right Ear: Tympanic membrane, ear canal and external ear normal.      Left Ear: Tympanic membrane, ear canal and external ear normal.      Mouth/Throat:      Lips: No lesions.      Mouth: Mucous membranes are moist. No oral lesions.      Pharynx: No oropharyngeal exudate or posterior oropharyngeal erythema.   Eyes:      General: Lids are normal. No visual field deficit.     Extraocular Movements: Extraocular movements intact.      Conjunctiva/sclera: Conjunctivae normal.      Pupils: Pupils are equal.   Neck:      Thyroid: No thyroid mass or thyromegaly.      Vascular: No carotid bruit or JVD.      Trachea: Trachea normal. No tracheal deviation.   Cardiovascular:      Rate and Rhythm: Normal rate and regular rhythm.      Heart sounds: Normal heart sounds, S1 normal and S2 normal. No murmur heard.     No gallop.   Pulmonary:      Effort: Pulmonary effort is normal.      Breath sounds: Normal breath sounds.   Abdominal:      General: Bowel sounds are normal. There is no distension.   Musculoskeletal:      Right  lower leg: No edema.      Left lower leg: Edema (trace) present.      Comments: No peripheral joint redness or warmth.   Lymphadenopathy:      Head:      Right side of head: No submental, submandibular, tonsillar, preauricular, posterior auricular or occipital adenopathy.      Left side of head: No submental, submandibular, tonsillar, preauricular, posterior auricular or occipital adenopathy.      Cervical: No cervical adenopathy.      Upper Body:      Right upper body: No supraclavicular adenopathy.      Left upper body: No supraclavicular adenopathy.   Skin:     General: Skin is warm.      Coloration: Skin is not cyanotic, jaundiced or pale.      Findings: No rash.      Nails: There is no clubbing.   Neurological:      Mental Status: She is alert and oriented to person, place, and time.      Cranial Nerves: No cranial nerve deficit, dysarthria or facial asymmetry.      Sensory: No sensory deficit.      Motor: No weakness, tremor or abnormal muscle tone.      Coordination: Coordination normal.      Gait: Gait normal.      Deep Tendon Reflexes:      Reflex Scores:       Tricep reflexes are 1+ on the right side and 1+ on the left side.       Bicep reflexes are 2+ on the right side and 2+ on the left side.       Brachioradialis reflexes are 1+ on the right side and 1+ on the left side.  Psychiatric:         Attention and Perception: Attention normal.         Mood and Affect: Mood normal.         Speech: Speech normal.         Behavior: Behavior normal.         Thought Content: Thought content normal. Thought content does not include homicidal or suicidal ideation.       Assessment & Plan   Problems Addressed this Visit          Allergies and Adverse Reactions    Chronic allergic rhinitis        Cardiac and Vasculature    Chronic venous insufficiency  Reminded regarding lifestyle modification    Elevated blood pressure reading  Encouraged to continue to work on her diet and exercise plan.  Continue current medication     Relevant Orders    Comprehensive Metabolic Panel    TSH    Mixed hyperlipidemia  As above.   Continue current medication.  Scheduled for updated labs    Relevant Orders    Comprehensive Metabolic Panel    Lipid Panel    TSH       Endocrine and Metabolic    Class 2 severe obesity with serious comorbidity and body mass index (BMI) of 38.0 to 38.9 in adult  Follow up with obesity clinic    History of bariatric surgery       Gastrointestinal Abdominal     Chronic constipation  Reminded regarding lifestyle modification  Continue current medication  Encouraged to report if any worse or if any new symptoms or concerns.    Relevant Orders    CBC & Differential    Gastroesophageal reflux disease without esophagitis  As above.  Continue current medication    Relevant Orders    CBC & Differential    Non-alcoholic fatty liver disease       Health Encounters    Healthcare maintenance  Recommended an updated COVID-19 vaccination.  Patient has received Shingrix #1 and is aware to return for a second dose.  Will discuss an updated mammogram and DEXA scan at her return    Relevant Orders    FSH & LH       Mental Health    Depression with anxiety  Significant situational component.   Supportive therapy.   Continue current medication.  Encouraged to report if any worse or if any new symptoms or concerns.    Relevant Medications    hydrOXYzine (ATARAX) 25 MG tablet    Other Relevant Orders    TSH       Musculoskeletal and Injuries    Mechanical low back pain    Rheumatoid arthritis involving multiple sites with positive rheumatoid factor  Reminded regarding symptomatic treatment.   Continue current medication  Follow up with  rheumatology    Relevant Orders    CBC & Differential    Comprehensive Metabolic Panel    Hemoglobin A1c    Vitamin D,25-Hydroxy    MRI cervical spine w wo contrast    Ambulatory Referral to Neurology       Neuro    Bilateral carpal tunnel syndrome    Relevant Orders    Vitamin B12       Symptoms and Signs     Facial numbness  Etiology remains unclear  Will arrange an MRI of the brain along with a neurology opinion    Relevant Orders    MRI Brain With & Without Contrast    Ambulatory Referral to Neurology    Numbness and tingling of left arm and leg  As above    Relevant Orders    MRI Brain With & Without Contrast    MRI cervical spine w wo contrast    Ambulatory Referral to Neurology     Diagnoses         Codes Comments    Chronic allergic rhinitis    -  Primary ICD-10-CM: J30.9  ICD-9-CM: 477.9     Mixed hyperlipidemia     ICD-10-CM: E78.2  ICD-9-CM: 272.2     Elevated blood pressure reading     ICD-10-CM: R03.0  ICD-9-CM: 796.2     History of bariatric surgery     ICD-10-CM: Z98.84  ICD-9-CM: V45.86     Class 2 severe obesity with serious comorbidity and body mass index (BMI) of 38.0 to 38.9 in adult, unspecified obesity type     ICD-10-CM: E66.01, Z68.38  ICD-9-CM: 278.01, V85.38     Chronic venous insufficiency     ICD-10-CM: I87.2  ICD-9-CM: 459.81     Non-alcoholic fatty liver disease     ICD-10-CM: K76.0  ICD-9-CM: 571.8     Gastroesophageal reflux disease without esophagitis     ICD-10-CM: K21.9  ICD-9-CM: 530.81     Chronic constipation     ICD-10-CM: K59.09  ICD-9-CM: 564.00     Healthcare maintenance     ICD-10-CM: Z00.00  ICD-9-CM: V70.0     Depression with anxiety     ICD-10-CM: F41.8  ICD-9-CM: 300.4     Rheumatoid arthritis involving multiple sites with positive rheumatoid factor     ICD-10-CM: M05.79  ICD-9-CM: 714.0     Mechanical low back pain     ICD-10-CM: M54.59  ICD-9-CM: 724.2     Bilateral carpal tunnel syndrome     ICD-10-CM: G56.03  ICD-9-CM: 354.0     Facial numbness     ICD-10-CM: R20.0  ICD-9-CM: 782.0     Numbness and tingling of left arm and leg     ICD-10-CM: R20.0, R20.2  ICD-9-CM: 782.0                       Hypertension

## 2024-02-28 ENCOUNTER — LAB (OUTPATIENT)
Dept: LAB | Facility: HOSPITAL | Age: 54
End: 2024-02-28
Payer: OTHER GOVERNMENT

## 2024-02-28 ENCOUNTER — TELEPHONE (OUTPATIENT)
Dept: FAMILY MEDICINE CLINIC | Facility: CLINIC | Age: 54
End: 2024-02-28
Payer: OTHER GOVERNMENT

## 2024-02-28 VITALS
HEIGHT: 67 IN | TEMPERATURE: 97.9 F | OXYGEN SATURATION: 100 % | RESPIRATION RATE: 14 BRPM | SYSTOLIC BLOOD PRESSURE: 132 MMHG | DIASTOLIC BLOOD PRESSURE: 82 MMHG | HEART RATE: 96 BPM | WEIGHT: 248 LBS | BODY MASS INDEX: 38.92 KG/M2

## 2024-02-28 DIAGNOSIS — E78.2 MIXED HYPERLIPIDEMIA: ICD-10-CM

## 2024-02-28 DIAGNOSIS — K21.9 GASTROESOPHAGEAL REFLUX DISEASE WITHOUT ESOPHAGITIS: ICD-10-CM

## 2024-02-28 DIAGNOSIS — F41.8 DEPRESSION WITH ANXIETY: ICD-10-CM

## 2024-02-28 DIAGNOSIS — R03.0 ELEVATED BLOOD PRESSURE READING: ICD-10-CM

## 2024-02-28 DIAGNOSIS — K59.09 CHRONIC CONSTIPATION: ICD-10-CM

## 2024-02-28 DIAGNOSIS — M05.79 RHEUMATOID ARTHRITIS INVOLVING MULTIPLE SITES WITH POSITIVE RHEUMATOID FACTOR: ICD-10-CM

## 2024-02-28 DIAGNOSIS — Z00.00 HEALTHCARE MAINTENANCE: ICD-10-CM

## 2024-02-28 DIAGNOSIS — I87.2 CHRONIC VENOUS INSUFFICIENCY: ICD-10-CM

## 2024-02-28 DIAGNOSIS — K76.0 NON-ALCOHOLIC FATTY LIVER DISEASE: ICD-10-CM

## 2024-02-28 DIAGNOSIS — G56.03 BILATERAL CARPAL TUNNEL SYNDROME: ICD-10-CM

## 2024-02-28 PROBLEM — R20.2 NUMBNESS AND TINGLING OF LEFT ARM AND LEG: Status: ACTIVE | Noted: 2024-02-28

## 2024-02-28 PROBLEM — R20.0 NUMBNESS AND TINGLING OF LEFT ARM AND LEG: Status: ACTIVE | Noted: 2024-02-28

## 2024-02-28 LAB
25(OH)D3 SERPL-MCNC: 31.5 NG/ML (ref 30–100)
ALBUMIN SERPL-MCNC: 4 G/DL (ref 3.5–5.2)
ALBUMIN/GLOB SERPL: 1.5 G/DL
ALP SERPL-CCNC: 110 U/L (ref 39–117)
ALT SERPL W P-5'-P-CCNC: 18 U/L (ref 1–33)
ANION GAP SERPL CALCULATED.3IONS-SCNC: 9.4 MMOL/L (ref 5–15)
AST SERPL-CCNC: 17 U/L (ref 1–32)
BASOPHILS # BLD AUTO: 0.08 10*3/MM3 (ref 0–0.2)
BASOPHILS NFR BLD AUTO: 0.8 % (ref 0–1.5)
BILIRUB SERPL-MCNC: 0.4 MG/DL (ref 0–1.2)
BUN SERPL-MCNC: 14 MG/DL (ref 6–20)
BUN/CREAT SERPL: 18.2 (ref 7–25)
CALCIUM SPEC-SCNC: 8.6 MG/DL (ref 8.6–10.5)
CHLORIDE SERPL-SCNC: 109 MMOL/L (ref 98–107)
CHOLEST SERPL-MCNC: 261 MG/DL (ref 0–200)
CO2 SERPL-SCNC: 26.6 MMOL/L (ref 22–29)
CREAT SERPL-MCNC: 0.77 MG/DL (ref 0.57–1)
DEPRECATED RDW RBC AUTO: 40.2 FL (ref 37–54)
EGFRCR SERPLBLD CKD-EPI 2021: 93.5 ML/MIN/1.73
EOSINOPHIL # BLD AUTO: 0.26 10*3/MM3 (ref 0–0.4)
EOSINOPHIL NFR BLD AUTO: 2.7 % (ref 0.3–6.2)
ERYTHROCYTE [DISTWIDTH] IN BLOOD BY AUTOMATED COUNT: 12.3 % (ref 12.3–15.4)
FSH SERPL-ACNC: 33.1 MIU/ML
GLOBULIN UR ELPH-MCNC: 2.7 GM/DL
GLUCOSE SERPL-MCNC: 82 MG/DL (ref 65–99)
HBA1C MFR BLD: 5.4 % (ref 4.8–5.6)
HCT VFR BLD AUTO: 42 % (ref 34–46.6)
HDLC SERPL-MCNC: 68 MG/DL (ref 40–60)
HGB BLD-MCNC: 14.3 G/DL (ref 12–15.9)
IMM GRANULOCYTES # BLD AUTO: 0.01 10*3/MM3 (ref 0–0.05)
IMM GRANULOCYTES NFR BLD AUTO: 0.1 % (ref 0–0.5)
LDLC SERPL CALC-MCNC: 184 MG/DL (ref 0–100)
LDLC/HDLC SERPL: 2.66 {RATIO}
LH SERPL-ACNC: 22.4 MIU/ML
LYMPHOCYTES # BLD AUTO: 3.04 10*3/MM3 (ref 0.7–3.1)
LYMPHOCYTES NFR BLD AUTO: 32.1 % (ref 19.6–45.3)
MCH RBC QN AUTO: 30.9 PG (ref 26.6–33)
MCHC RBC AUTO-ENTMCNC: 34 G/DL (ref 31.5–35.7)
MCV RBC AUTO: 90.7 FL (ref 79–97)
MONOCYTES # BLD AUTO: 0.77 10*3/MM3 (ref 0.1–0.9)
MONOCYTES NFR BLD AUTO: 8.1 % (ref 5–12)
NEUTROPHILS NFR BLD AUTO: 5.31 10*3/MM3 (ref 1.7–7)
NEUTROPHILS NFR BLD AUTO: 56.2 % (ref 42.7–76)
NRBC BLD AUTO-RTO: 0 /100 WBC (ref 0–0.2)
PLATELET # BLD AUTO: 372 10*3/MM3 (ref 140–450)
PMV BLD AUTO: 10.1 FL (ref 6–12)
POTASSIUM SERPL-SCNC: 4.4 MMOL/L (ref 3.5–5.2)
PROT SERPL-MCNC: 6.7 G/DL (ref 6–8.5)
RBC # BLD AUTO: 4.63 10*6/MM3 (ref 3.77–5.28)
SODIUM SERPL-SCNC: 145 MMOL/L (ref 136–145)
TRIGL SERPL-MCNC: 59 MG/DL (ref 0–150)
TSH SERPL DL<=0.05 MIU/L-ACNC: 0.38 UIU/ML (ref 0.27–4.2)
VIT B12 BLD-MCNC: 397 PG/ML (ref 211–946)
VLDLC SERPL-MCNC: 9 MG/DL (ref 5–40)
WBC NRBC COR # BLD AUTO: 9.47 10*3/MM3 (ref 3.4–10.8)

## 2024-02-28 PROCEDURE — 84443 ASSAY THYROID STIM HORMONE: CPT

## 2024-02-28 PROCEDURE — 83002 ASSAY OF GONADOTROPIN (LH): CPT

## 2024-02-28 PROCEDURE — 80061 LIPID PANEL: CPT

## 2024-02-28 PROCEDURE — 82607 VITAMIN B-12: CPT

## 2024-02-28 PROCEDURE — 80053 COMPREHEN METABOLIC PANEL: CPT

## 2024-02-28 PROCEDURE — 83036 HEMOGLOBIN GLYCOSYLATED A1C: CPT

## 2024-02-28 PROCEDURE — 82306 VITAMIN D 25 HYDROXY: CPT

## 2024-02-28 PROCEDURE — 83001 ASSAY OF GONADOTROPIN (FSH): CPT

## 2024-02-28 PROCEDURE — 85025 COMPLETE CBC W/AUTO DIFF WBC: CPT

## 2024-02-28 NOTE — TELEPHONE ENCOUNTER
----- Message from Hoang Palacios MD sent at 2/28/2024 11:41 AM EST -----  Cody Martinez    ----- Message -----  From: Ariadna Gillespie MA  Sent: 2/28/2024  11:38 AM EST  To: Hoang Palacios MD    I called and spoke to the MRI department at [facility]. She said no [facility] facility has a open MRI machine.    ----- Message -----  From: Hoang Palacios MD  Sent: 2/28/2024  11:29 AM EST  To: Ariadna Gillespie MA    Any idea where the closest [facility] open MRI is? [place]? [place]?

## 2024-03-04 ENCOUNTER — DISEASE STATE MANAGEMENT VISIT (OUTPATIENT)
Dept: PHARMACY | Facility: HOSPITAL | Age: 54
End: 2024-03-04
Payer: COMMERCIAL

## 2024-03-04 VITALS
BODY MASS INDEX: 39.03 KG/M2 | DIASTOLIC BLOOD PRESSURE: 70 MMHG | HEART RATE: 85 BPM | WEIGHT: 249.2 LBS | SYSTOLIC BLOOD PRESSURE: 126 MMHG

## 2024-03-04 RX ORDER — SEMAGLUTIDE 0.5 MG/.5ML
0.5 INJECTION, SOLUTION SUBCUTANEOUS WEEKLY
Qty: 2 ML | Refills: 0 | Status: SHIPPED | OUTPATIENT
Start: 2024-03-04

## 2024-03-04 NOTE — PROGRESS NOTES
[facility]  Weight Management Program      Susie Rangel is a 53 y.o. female referred to the [facility] by Dr. Hoang Palacios for clinical pharmacy and specialty pharmacy management of GLP1 for weight management.  The patient enies a personal history or family history of thyroid cancer and denies a personal history of pancreatitis.     Susie Rangel has previously tried Adipex, Mounjaro (on and off for about a year due to shortage. Lost ~15 lbs), exercising, and gastric sleeve in 2014 for weight loss. Current weight loss efforts include walking and resistance training. She has also been eating around 1700 calories a day.    Currently on Wegovy 0.25 mg weekly. Patient denies lumps/swelling/hoarseness, abdominal pain. Patient reports some nausea that is normal for her when she is in pain. Patient reports that she does exercise 4 times weekly with range of motion exercise and some small weight exercises. She states that she is not meeting her calorie goal and has some sweet cravings. Over the past week has been having more appetite suppression compared to the first 1-3 weeks. Interested in increasing dose today.       Relevant Past Medical History and Co-morbidities  Past Medical History:   Diagnosis Date    Arthritis     Class 2 obesity with serious comorbidity and body mass index (BMI) of 38.0 to 38.9 in adult 06/16/2016    Hx laparoscopic sleeve gastrectomy     Dyslipidemia 06/16/2016    GERD (gastroesophageal reflux disease)     Glaucoma 01/25/2023    Low back pain     Macular degeneration 01/25/2023    Obesity     Vitamin D deficiency      Social History     Socioeconomic History    Marital status:      Spouse name: oren    Number of children: 1    Years of education: 14   Tobacco Use    Smoking status: Never     Passive exposure: Never    Smokeless tobacco: Never   Vaping Use    Vaping status: Never Used   Substance and Sexual Activity    Alcohol use: No     "Drug use: No    Sexual activity: Yes       Allergies  Patient has no known allergies.    Current Medication List    Current Outpatient Medications:     alendronate (FOSAMAX) 70 MG tablet, Take 1 tablet by mouth Every 7 (Seven) Days., Disp: 4 tablet, Rfl: 5    aspirin 81 MG EC tablet, Take 1 tablet by mouth Daily., Disp: 30 tablet, Rfl: 5    diclofenac (VOLTAREN) 75 MG EC tablet, Take 1 tablet by mouth 2 (Two) Times a Day As Needed. for pain, Disp: , Rfl:     Enbrel Mini 50 MG/ML solution cartridge, 50 mg 1 (One) Time Per Week., Disp: , Rfl:     hydrOXYzine (ATARAX) 25 MG tablet, Take 1 tablet by mouth 3 (Three) Times a Day As Needed for Itching., Disp: 90 tablet, Rfl: 5    leflunomide (ARAVA) 20 MG tablet, Take 1 tablet by mouth Daily., Disp: , Rfl:     lisinopril (PRINIVIL,ZESTRIL) 10 MG tablet, Take 1 tablet by mouth Daily., Disp: 30 tablet, Rfl: 5    omeprazole (priLOSEC) 40 MG capsule, Take 1 capsule by mouth 2 (Two) Times a Day., Disp: 60 capsule, Rfl: 5    predniSONE (DELTASONE) 10 MG tablet, , Disp: , Rfl:     Semaglutide-Weight Management (Wegovy) 0.25 MG/0.5ML solution auto-injector, Inject 0.5 mL under the skin into the appropriate area as directed 1 (One) Time Per Week., Disp: 2 mL, Rfl: 0    tiZANidine (ZANAFLEX) 4 MG tablet, Take 1 tablet by mouth At Night As Needed for Muscle Spasms., Disp: , Rfl:     Drug Interactions  None with Wegovy    Relevant Laboratory Values  Lab Results   Component Value Date    CHOL 261 (H) 02/28/2024    TRIG 59 02/28/2024    HDL 68 (H) 02/28/2024     (H) 02/28/2024     There is no height or weight on file to calculate BMI.    Vaccinations:   Patient recommended to keep up with routine vaccinations.     Goals of Therapy  Clinical Goals or Therapeutic Targets: Prevent weight associated co-morbidities and complications      Date   2/5/23   3/4/24        Weight (lb) 246.6 lb 249.2 lb      BMI kg/ 38.62 39.03      Waist Circumference (in)  49\" 49\"          Medication " Assessment & Plan    Patient has current BMI of 39.03, which is considered Class II Obesity. Patient gained 2.6 lb since starting.     Will order Wegovy 0.5 mg SC weekly.      The following medications will need dose adjustments/closer monitoring once the GLP1 is started: None    Discussed lifestyle modifications, including diet and exercise.  Patient education provided.     Worked with patient to create SMART Goal(s):   Walk for 30 minutes 4 days a week  Hand weights 3 days a week  Eat < 2000 calories/day    Will follow-up with patient in clinic in 4 weeks.     Beau Levin RPH  3/4/2024  13:03 EST

## 2024-03-11 DIAGNOSIS — R03.0 ELEVATED BLOOD PRESSURE READING: ICD-10-CM

## 2024-03-11 RX ORDER — LISINOPRIL 10 MG/1
10 TABLET ORAL DAILY
Qty: 30 TABLET | Refills: 5 | Status: SHIPPED | OUTPATIENT
Start: 2024-03-11 | End: 2024-03-15 | Stop reason: SDUPTHER

## 2024-03-13 ENCOUNTER — OFFICE VISIT (OUTPATIENT)
Dept: NEUROLOGY | Facility: CLINIC | Age: 54
End: 2024-03-13
Payer: COMMERCIAL

## 2024-03-13 VITALS
OXYGEN SATURATION: 99 % | WEIGHT: 245.4 LBS | BODY MASS INDEX: 38.52 KG/M2 | DIASTOLIC BLOOD PRESSURE: 90 MMHG | SYSTOLIC BLOOD PRESSURE: 148 MMHG | TEMPERATURE: 98.7 F | HEIGHT: 67 IN | HEART RATE: 67 BPM | RESPIRATION RATE: 14 BRPM

## 2024-03-13 DIAGNOSIS — R20.2 NUMBNESS AND TINGLING OF LEFT SIDE OF FACE: Primary | ICD-10-CM

## 2024-03-13 DIAGNOSIS — R20.0 NUMBNESS AND TINGLING OF LEFT SIDE OF FACE: Primary | ICD-10-CM

## 2024-03-13 DIAGNOSIS — R20.0 NUMBNESS AND TINGLING OF LEFT ARM AND LEG: ICD-10-CM

## 2024-03-13 DIAGNOSIS — R20.2 NUMBNESS AND TINGLING OF LEFT ARM AND LEG: ICD-10-CM

## 2024-03-13 NOTE — PROGRESS NOTES
New Patient Office Visit      Patient Name: Susie Rangel  : 1970   MRN: [id number]     Referring Physician: Hoang Palacios*    Chief Complaint:    Chief Complaint   Patient presents with    Establish Care     Numbness in left face, arm, and leg. This has been occurring since 2024.       History of Present Illness: Susie Rangel is a 53 y.o. female who is here today to establish care with Neurology.  She says she has feng having tingling and numbness in the left side of the face from the forehead to below the jawline and into her left arm. She says the left arm tingling and numbness that extends to the finger tips. She says she has been having some new tingling and numbness in the outer left leg to below the knee. She says it can be intermittent but for the most part she says it stays. She says this became more continuous in 2024 but says she had numbness in August in the face and went to ER and they did stroke work up which was negative. She says she has some mild weakness in the left hand.   She has fm Hx of CVA.  She saw rheumatology 24 and says she had steroid shot in her left shoulder to see if this helped with her arm numbness but it did not she says.   Yesi is right hand dominant. She has not worked in over a year.   -Labs were reviewed from 2024.  CBC, CMP, B12, vitamin D noncontributory.  Her A1c was 5.4; lipids were elevated  Pertinent Medical History: RA with positive rheumatoid factor, hyperlipidemia, venous insufficiency, nonalcoholic fatty liver disease, anxiety and depression, history of bariatric surgery    Subjective      Review of Systems:   Review of Systems   Neurological:  Positive for weakness and numbness.       Past Medical History:   Past Medical History:   Diagnosis Date    Arthritis     Class 2 obesity with serious comorbidity and body mass index (BMI) of 38.0 to 38.9 in adult 2016    Hx laparoscopic sleeve gastrectomy      Dyslipidemia 06/16/2016    GERD (gastroesophageal reflux disease)     Glaucoma 01/25/2023    Low back pain     Macular degeneration 01/25/2023    Obesity     Vitamin D deficiency        Past Surgical History:   Past Surgical History:   Procedure Laterality Date    BREAST SURGERY      COLONOSCOPY N/A 4/27/2021    Procedure: COLONOSCOPY WITH BIOPSY;  Surgeon: Tamara Mehta MD;  Location: [facility] OR;  Service: Gastroenterology;  Laterality: N/A;    ENDOSCOPY N/A 4/27/2021    Procedure: ESOPHAGOGASTRODUODENOSCOPY WITH BIOPSY;  Surgeon: Tamara Mehta MD;  Location: [facility] OR;  Service: Gastroenterology;  Laterality: N/A;    GASTRIC SLEEVE LAPAROSCOPIC      HYSTERECTOMY      PANNICULECTOMY      REDUCTION MAMMAPLASTY      2013       Family History:   Family History   Problem Relation Age of Onset    Hypertension Mother     Neuropathy Mother     Heart disease Father     Breast cancer Paternal Cousin        Social History:   Social History     Socioeconomic History    Marital status:      Spouse name: oren    Number of children: 1    Years of education: 14   Tobacco Use    Smoking status: Never     Passive exposure: Never    Smokeless tobacco: Never   Vaping Use    Vaping status: Never Used   Substance and Sexual Activity    Alcohol use: No    Drug use: No    Sexual activity: Yes       Medications:     Current Outpatient Medications:     alendronate (FOSAMAX) 70 MG tablet, Take 1 tablet by mouth Every 7 (Seven) Days., Disp: 4 tablet, Rfl: 5    aspirin 81 MG EC tablet, Take 1 tablet by mouth Daily., Disp: 30 tablet, Rfl: 5    diclofenac (VOLTAREN) 75 MG EC tablet, Take 1 tablet by mouth 2 (Two) Times a Day As Needed. for pain, Disp: , Rfl:     Enbrel Mini 50 MG/ML solution cartridge, 50 mg 1 (One) Time Per Week., Disp: , Rfl:     hydrOXYzine (ATARAX) 25 MG tablet, Take 1 tablet by mouth 3 (Three) Times a Day As Needed for Itching., Disp: 90 tablet, Rfl: 5    leflunomide (ARAVA) 20 MG tablet,  "Take 1 tablet by mouth Daily., Disp: , Rfl:     lisinopril (PRINIVIL,ZESTRIL) 10 MG tablet, TAKE 1 TABLET BY MOUTH DAILY, Disp: 30 tablet, Rfl: 5    omeprazole (priLOSEC) 40 MG capsule, Take 1 capsule by mouth 2 (Two) Times a Day., Disp: 60 capsule, Rfl: 5    predniSONE (DELTASONE) 10 MG tablet, As Needed., Disp: , Rfl:     Semaglutide-Weight Management (Wegovy) 0.5 MG/0.5ML solution auto-injector, Inject 0.5 mL under the skin into the appropriate area as directed 1 (One) Time Per Week., Disp: 2 mL, Rfl: 0    tiZANidine (ZANAFLEX) 4 MG tablet, Take 1 tablet by mouth At Night As Needed for Muscle Spasms., Disp: , Rfl:     Allergies:   No Known Allergies    Objective     Physical Exam:  Vital Signs:   Vitals:    03/13/24 1051   BP: 148/90   BP Location: Right arm   Patient Position: Sitting   Cuff Size: Adult   Pulse: 67   Resp: 14   Temp: 98.7 °F (37.1 °C)   TempSrc: Infrared   SpO2: 99%   Weight: 111 kg (245 lb 6.4 oz)   Height: 170.2 cm (67.01\")   PainSc: 0-No pain     Body mass index is 38.43 kg/m².     Physical Exam  Constitutional:       Appearance: Normal appearance.   HENT:      Head: Normocephalic.   Eyes:      Extraocular Movements: EOM normal.      Conjunctiva/sclera: Conjunctivae normal.      Pupils: Pupils are equal, round, and reactive to light.   Pulmonary:      Effort: Pulmonary effort is normal.   Musculoskeletal:         General: Normal range of motion.   Skin:     General: Skin is warm and dry.   Neurological:      General: No focal deficit present.      Mental Status: She is alert and oriented to person, place, and time.      Cranial Nerves: Cranial nerves 2-12 are intact. No dysarthria.      Motor: Motor strength is normal.Motor function is intact. No tremor or pronator drift.      Coordination: Coordination is intact. Romberg sign negative. Finger-Nose-Finger Test and Romberg Test normal. Rapid alternating movements normal.      Gait: Gait is intact. Tandem walk normal.      Deep Tendon Reflexes: "      Reflex Scores:       Bicep reflexes are 2+ on the right side and 2+ on the left side.       Brachioradialis reflexes are 2+ on the right side and 2+ on the left side.       Patellar reflexes are 1+ on the right side and 1+ on the left side.  Psychiatric:         Attention and Perception: Attention normal.         Mood and Affect: Mood and affect normal.         Speech: Speech normal.         Behavior: Behavior normal. Behavior is cooperative.         Thought Content: Thought content normal.         Cognition and Memory: Cognition normal.         Judgment: Judgment normal.         Neurologic Exam     Mental Status   Oriented to person, place, and time.   Speech: speech is normal   Level of consciousness: alert  Knowledge: good.     Cranial Nerves   Cranial nerves II through XII intact.     CN III, IV, VI   Pupils are equal, round, and reactive to light.  Extraocular motions are normal.   Right pupil: Size: 5 mm. Shape: regular. Reactivity: brisk.   Left pupil: Size: 5 mm. Shape: regular. Reactivity: brisk.   Nystagmus: none     CN V   Facial sensation intact.   Left facial sensation deficit: Able to feel light touch but says it feels different.    CN VII   Facial expression full, symmetric.     CN VIII   CN VIII normal.     CN XI   CN XI normal.   Right sternocleidomastoid strength: normal  Left sternocleidomastoid strength: normal  Right trapezius strength: normal  Left trapezius strength: normal    CN XII   CN XII normal.   Tongue: not atrophic  Fasciculations: absent  Tongue deviation: none    Motor Exam   Muscle bulk: normal  Overall muscle tone: normal  Right arm pronator drift: absent  Left arm pronator drift: absent    Strength   Strength 5/5 throughout.   Right deltoid: 5/5  Left deltoid: 5/5  Right biceps: 5/5  Left biceps: 5/5  Right triceps: 5/5  Left triceps: 5/5  Right quadriceps: 5/5  Left quadriceps: 5/5  Right hamstrin/5  Left hamstrin/5  Right glutei: 5/5  Left glutei: 5/5  Right  "anterior tibial: 5/5  Left anterior tibial: 5/5  Right posterior tibial: 5/5  Left posterior tibial: 5/5    Sensory Exam   Light touch normal.   She is able to distinguish light touch throughout her body but does note that it has a different sensation on the left versus the right \"feels funny \"     Gait, Coordination, and Reflexes     Gait  Gait: normal    Coordination   Romberg: negative  Finger to nose coordination: normal  Tandem walking coordination: normal    Tremor   Resting tremor: absent  Intention tremor: absent  Action tremor: absent    Reflexes   Right brachioradialis: 2+  Left brachioradialis: 2+  Right biceps: 2+  Left biceps: 2+  Right patellar: 1+  Left patellar: 1+      Assessment / Plan      Assessment/Plan:   Diagnoses and all orders for this visit:    1. Numbness and tingling of left side of face (Primary)    2. Numbness and tingling of left arm and leg       This is a pleasant 53-year-old female patient who was referred for new onset of tingling and numbness of the left face extending to her left arm and now in the left outer leg to just below the knee.  She has an MRI with and with out contrast of the brain as well as a MRI of the C-spine and both are ordered to be done tomorrow per patient report.  She is currently followed by rheumatology for RA.  She had a negative workup for lupus.  Anticipatory guidance given.  Patient understands that diseases that destroyed connective tissue as well as autoimmune or conditions that put pressure or decrease blood flow to the nerves as well as anything causing swelling or inflammation of the nerves can all contribute to her symptoms.  No deficit noted on exam today.  Facial expression equal bilaterally.  No facial droop.  Her strength is equal bilaterally.  She will follow-up in about 6 weeks after testing.  Patient will call in the interim if she has any questions or concerns or changes.    Follow Up:   Return in about 6 weeks (around 4/24/2024).    Coral " HANNA King, FNP-[facility] Neurology and Sleep Medicine

## 2024-03-14 ENCOUNTER — OFFICE (OUTPATIENT)
Dept: URBAN - METROPOLITAN AREA CLINIC 30 | Facility: CLINIC | Age: 54
End: 2024-03-14
Payer: COMMERCIAL

## 2024-03-14 VITALS
RESPIRATION RATE: 14 BRPM | HEART RATE: 75 BPM | SYSTOLIC BLOOD PRESSURE: 112 MMHG | DIASTOLIC BLOOD PRESSURE: 67 MMHG | DIASTOLIC BLOOD PRESSURE: 94 MMHG | HEART RATE: 76 BPM | HEART RATE: 77 BPM | HEART RATE: 99 BPM | SYSTOLIC BLOOD PRESSURE: 142 MMHG | TEMPERATURE: 97.5 F | HEIGHT: 64 IN | DIASTOLIC BLOOD PRESSURE: 90 MMHG | SYSTOLIC BLOOD PRESSURE: 134 MMHG | SYSTOLIC BLOOD PRESSURE: 167 MMHG | DIASTOLIC BLOOD PRESSURE: 65 MMHG | SYSTOLIC BLOOD PRESSURE: 123 MMHG | RESPIRATION RATE: 12 BRPM | DIASTOLIC BLOOD PRESSURE: 78 MMHG | OXYGEN SATURATION: 98 % | OXYGEN SATURATION: 96 % | DIASTOLIC BLOOD PRESSURE: 58 MMHG | SYSTOLIC BLOOD PRESSURE: 145 MMHG | OXYGEN SATURATION: 95 % | WEIGHT: 159 LBS | OXYGEN SATURATION: 99 % | RESPIRATION RATE: 16 BRPM | TEMPERATURE: 98.2 F | HEART RATE: 74 BPM

## 2024-03-14 DIAGNOSIS — K57.30 DIVERTICULOSIS OF LARGE INTESTINE WITHOUT PERFORATION OR ABS: ICD-10-CM

## 2024-03-14 DIAGNOSIS — K63.5 POLYP OF COLON: ICD-10-CM

## 2024-03-14 DIAGNOSIS — Z12.11 ENCOUNTER FOR SCREENING FOR MALIGNANT NEOPLASM OF COLON: ICD-10-CM

## 2024-03-14 LAB
ANATOMIC PATHOLOGY REPORT: (no result)
PDF REPORT: PDF REPORT1: (no result)

## 2024-03-15 DIAGNOSIS — R03.0 ELEVATED BLOOD PRESSURE READING: ICD-10-CM

## 2024-03-15 RX ORDER — LISINOPRIL 10 MG/1
10 TABLET ORAL DAILY
Qty: 30 TABLET | Refills: 5 | Status: SHIPPED | OUTPATIENT
Start: 2024-03-15

## 2024-03-18 ENCOUNTER — PATIENT ROUNDING (BHMG ONLY) (OUTPATIENT)
Dept: NEUROLOGY | Facility: CLINIC | Age: 54
End: 2024-03-18
Payer: COMMERCIAL

## 2024-04-01 ENCOUNTER — DISEASE STATE MANAGEMENT VISIT (OUTPATIENT)
Dept: PHARMACY | Facility: HOSPITAL | Age: 54
End: 2024-04-01
Payer: COMMERCIAL

## 2024-04-01 VITALS
DIASTOLIC BLOOD PRESSURE: 82 MMHG | BODY MASS INDEX: 38.89 KG/M2 | WEIGHT: 247.8 LBS | HEIGHT: 67 IN | HEART RATE: 69 BPM | SYSTOLIC BLOOD PRESSURE: 152 MMHG

## 2024-04-01 DIAGNOSIS — E66.01 MORBID (SEVERE) OBESITY DUE TO EXCESS CALORIES: Primary | ICD-10-CM

## 2024-04-01 RX ORDER — SEMAGLUTIDE 1 MG/.5ML
1 INJECTION, SOLUTION SUBCUTANEOUS WEEKLY
Qty: 2 ML | Refills: 0 | Status: SHIPPED | OUTPATIENT
Start: 2024-04-01

## 2024-04-01 NOTE — PROGRESS NOTES
[facility] Management [facility]  Weight Management Program      Susie Rangel is a 53 y.o. female referred to the [facility]ication Management Clinic by Dr. Hoang Palacios for clinical pharmacy and specialty pharmacy management of GLP1 for weight management.  The patient enies a personal history or family history of thyroid cancer and denies a personal history of pancreatitis.     Susie Rangel has previously tried Adipex, Mounjaro (on and off for about a year due to shortage. Lost ~15 lbs), exercising, and gastric sleeve in 2014 for weight loss. Current weight loss efforts include walking and resistance training. She has also been eating around 1700 calories a day.    Patient is currently on Wegovy 0.5 mg weekly. Patient denies any lumps/swelling/hoarseness in neck/throat or abdominal pain. Patient reports tolerating medication well without any side effects. Patient denies any trouble giving injection or any missed doses. Patient reports that she can tell a little difference in appetite suppression since increasing dose. She reports that she hasn't worked out since 3/8/24 because she had an RA flare-up. She had to take a course of prednisone and feels like it made her more hungry than usual. Patient wishes to titrate dose at this time.      Relevant Past Medical History and Co-morbidities  Past Medical History:   Diagnosis Date    Arthritis     Class 2 obesity with serious comorbidity and body mass index (BMI) of 38.0 to 38.9 in adult 06/16/2016    Hx laparoscopic sleeve gastrectomy     Dyslipidemia 06/16/2016    GERD (gastroesophageal reflux disease)     Glaucoma 01/25/2023    Low back pain     Macular degeneration 01/25/2023    Obesity     Vitamin D deficiency      Social History     Socioeconomic History    Marital status:      Spouse name: oren    Number of children: 1    Years of education: 14   Tobacco Use    Smoking status: Never     Passive exposure: Never    Smokeless tobacco: Never   Vaping Use     Vaping status: Never Used   Substance and Sexual Activity    Alcohol use: No    Drug use: No    Sexual activity: Yes       Allergies  Patient has no known allergies.    Current Medication List    Current Outpatient Medications:     alendronate (FOSAMAX) 70 MG tablet, Take 1 tablet by mouth Every 7 (Seven) Days., Disp: 4 tablet, Rfl: 5    aspirin 81 MG EC tablet, Take 1 tablet by mouth Daily., Disp: 30 tablet, Rfl: 5    diclofenac (VOLTAREN) 75 MG EC tablet, Take 1 tablet by mouth 2 (Two) Times a Day As Needed. for pain, Disp: , Rfl:     Enbrel Mini 50 MG/ML solution cartridge, 50 mg 1 (One) Time Per Week., Disp: , Rfl:     hydrOXYzine (ATARAX) 25 MG tablet, Take 1 tablet by mouth 3 (Three) Times a Day As Needed for Itching., Disp: 90 tablet, Rfl: 5    leflunomide (ARAVA) 20 MG tablet, Take 1 tablet by mouth Daily., Disp: , Rfl:     lisinopril (PRINIVIL,ZESTRIL) 10 MG tablet, Take 1 tablet by mouth Daily., Disp: 30 tablet, Rfl: 5    omeprazole (priLOSEC) 40 MG capsule, Take 1 capsule by mouth 2 (Two) Times a Day., Disp: 60 capsule, Rfl: 5    predniSONE (DELTASONE) 10 MG tablet, As Needed., Disp: , Rfl:     Semaglutide-Weight Management (Wegovy) 0.5 MG/0.5ML solution auto-injector, Inject 0.5 mL under the skin into the appropriate area as directed 1 (One) Time Per Week., Disp: 2 mL, Rfl: 0    tiZANidine (ZANAFLEX) 4 MG tablet, Take 1 tablet by mouth At Night As Needed for Muscle Spasms., Disp: , Rfl:     Drug Interactions  None with Wegovy    Relevant Laboratory Values  Lab Results   Component Value Date    CHOL 261 (H) 02/28/2024    TRIG 59 02/28/2024    HDL 68 (H) 02/28/2024     (H) 02/28/2024     There is no height or weight on file to calculate BMI.    Vaccinations:   Patient recommended to keep up with routine vaccinations.     Goals of Therapy  Clinical Goals or Therapeutic Targets: Prevent weight associated co-morbidities and complications      Date   2/5/23   3/4/24   4/1/24     Weight (lb) 246.6 lb  "249.2 lb 247.8 lb     BMI kg/ 38.62 39.03 38.80     Waist Circumference (in)  49\" 49\" 50\"         Medication Assessment & Plan    Patient has current BMI of 38.80, which is considered Class II Obesity. Patient has lost 1.4 lb since previous visit.    Will order Wegovy 1 mg SC weekly.      The following medications will need dose adjustments/closer monitoring once the GLP1 is started: None    Discussed lifestyle modifications, including diet and exercise.  Patient education provided.     Advised patient to get labs before next visit: Placed order for CMP, Thyroid Panel, Lipid Panel, Hgb A1C    Worked with patient to create SMART Goal(s):   Walk for 30 minutes 4 days a week  Hand weights 3 days a week  Eat < 2000 calories/day    Will follow-up with patient in clinic in 4 weeks.     Stephanie Herndon, PharmD  4/1/2024  09:35 EDT                      "

## 2024-04-24 ENCOUNTER — OFFICE VISIT (OUTPATIENT)
Dept: NEUROLOGY | Facility: CLINIC | Age: 54
End: 2024-04-24
Payer: COMMERCIAL

## 2024-04-24 VITALS
HEART RATE: 68 BPM | HEIGHT: 67 IN | DIASTOLIC BLOOD PRESSURE: 80 MMHG | TEMPERATURE: 98.7 F | WEIGHT: 242 LBS | OXYGEN SATURATION: 99 % | BODY MASS INDEX: 37.98 KG/M2 | SYSTOLIC BLOOD PRESSURE: 138 MMHG

## 2024-04-24 DIAGNOSIS — R20.0 NUMBNESS OF FACE: ICD-10-CM

## 2024-04-24 DIAGNOSIS — R20.2 PARESTHESIA OF LEFT ARM: Primary | ICD-10-CM

## 2024-04-24 RX ORDER — UPADACITINIB 15 MG/1
TABLET, EXTENDED RELEASE ORAL
COMMUNITY
Start: 2024-04-17

## 2024-04-24 RX ORDER — PREGABALIN 50 MG/1
50 CAPSULE ORAL 2 TIMES DAILY
Qty: 60 CAPSULE | Refills: 1 | Status: SHIPPED | OUTPATIENT
Start: 2024-04-24

## 2024-04-24 NOTE — PROGRESS NOTES
Follow Up Office Visit      Patient Name: Susie Rangel  : 1970   MRN: [id number]     Chief Complaint:    Chief Complaint   Patient presents with    Follow-up     Numbness; facial numbness has decreased; no change to numbness in left arm; reports numbness in right hand x 3 weeks       History of Present Illness: Susie Rangel is a 53 y.o. female who is here today to follow up with numbness. She says she has lessening of her left facial numbness. She says this comes and goes. She says she can feel light touch but it starts feeling funny. Today it is more located near her outer left cheek. She says her numbness and pain in her left arm. She says this is interfering with her activity as she is no longer able to braid her hair. She says she has tried Gabapentin in the past but had some swelling. Would be willing to try Lyrica.     She says she has had episodes of kaleidoscope vision in either eye. She says she went to the eye doctor and was told her eyes were ok but has early glaucoma.  She does have a history of migraines as a child but says as an adult she has not been having these.     She had MRI of C-spine without contrast on 3/14/2024 which showed degenerative changes of the cervical spine as detailed level by level above.  There are several disc osteophyte complex formations, the largest of which is at C5-C6 where there is mild effacement of the ventral cord surface.  No discrete cord signal lesions are evident.  No abnormal enhancing mass lesions are evident.  -MRI brain with and without contrast:on 3/14/2024  There is no evidence of acute ischemia or intracranial hemorrhage.  There are no abnormal enhancing mass lesions.  There is mild paranasal sinus disease      History of Present Illness has been carried forward from her 3/13/2024 office note as follows: Susie Rangel is a 53 y.o. female who is here today to establish care with Neurology.  She says she has feng having tingling  and numbness in the left side of the face from the forehead to below the jawline and into her left arm. She says the left arm tingling and numbness that extends to the finger tips. She says she has been having some new tingling and numbness in the outer left leg to below the knee. She says it can be intermittent but for the most part she says it stays. She says this became more continuous in Feb 2024 but says she had numbness in August in the face and went to ER and they did stroke work up which was negative. She says she has some mild weakness in the left hand.   She has fm Hx of CVA.  She saw rheumatology 2/14/24 and says she had steroid shot in her left shoulder to see if this helped with her arm numbness but it did not she says.   Yesi is right hand dominant. She has not worked in over a year.   -Labs were reviewed from 2/28/2024.  CBC, CMP, B12, vitamin D noncontributory.  Her A1c was 5.4; lipids were elevated  Pertinent Medical History: RA with positive rheumatoid factor, hyperlipidemia, venous insufficiency, nonalcoholic fatty liver disease, anxiety and depression, history of bariatric surgery  Subjective      Review of Systems:   Review of Systems   Neurological:  Positive for dizziness, facial asymmetry and numbness.       I have reviewed and the following portions of the patient's history were updated as appropriate: past family history, past medical history, past social history, past surgical history and problem list.    Medications:     Current Outpatient Medications:     alendronate (FOSAMAX) 70 MG tablet, Take 1 tablet by mouth Every 7 (Seven) Days., Disp: 4 tablet, Rfl: 5    aspirin 81 MG EC tablet, Take 1 tablet by mouth Daily., Disp: 30 tablet, Rfl: 5    diclofenac (VOLTAREN) 75 MG EC tablet, Take 1 tablet by mouth 2 (Two) Times a Day As Needed. for pain, Disp: , Rfl:     hydrOXYzine (ATARAX) 25 MG tablet, Take 1 tablet by mouth 3 (Three) Times a Day As Needed for Itching., Disp: 90 tablet, Rfl: 5    " leflunomide (ARAVA) 20 MG tablet, Take 1 tablet by mouth Daily., Disp: , Rfl:     lisinopril (PRINIVIL,ZESTRIL) 10 MG tablet, Take 1 tablet by mouth Daily., Disp: 30 tablet, Rfl: 5    omeprazole (priLOSEC) 40 MG capsule, Take 1 capsule by mouth 2 (Two) Times a Day., Disp: 60 capsule, Rfl: 5    predniSONE (DELTASONE) 10 MG tablet, As Needed., Disp: , Rfl:     Rinvoq 15 MG tablet sustained-release 24 hour, , Disp: , Rfl:     Semaglutide-Weight Management (Wegovy) 1 MG/0.5ML solution auto-injector, Inject 0.5 mL under the skin 1 (One) Time Per Week., Disp: 2 mL, Rfl: 0    tiZANidine (ZANAFLEX) 4 MG tablet, Take 1 tablet by mouth At Night As Needed for Muscle Spasms., Disp: , Rfl:     pregabalin (Lyrica) 50 MG capsule, Take 1 capsule by mouth 2 (Two) Times a Day., Disp: 60 capsule, Rfl: 1    Allergies:   No Known Allergies    Objective     Physical Exam:  Vital Signs:   Vitals:    04/24/24 1014   BP: 138/80   Pulse: 68   Temp: 98.7 °F (37.1 °C)   SpO2: 99%   Weight: 110 kg (242 lb)   Height: 170.2 cm (67\")   PainSc:   4   PainLoc: Generalized  Comment: joint/arthritis     Body mass index is 37.9 kg/m².    Physical Exam  Constitutional:       Appearance: Normal appearance.   HENT:      Head: Normocephalic.   Eyes:      Conjunctiva/sclera: Conjunctivae normal.   Pulmonary:      Effort: Pulmonary effort is normal.   Musculoskeletal:         General: Normal range of motion.   Skin:     General: Skin is warm and dry.   Neurological:      General: No focal deficit present.      Mental Status: She is alert and oriented to person, place, and time.      Cranial Nerves: Cranial nerves 2-12 are intact. No dysarthria.      Motor: Motor function is intact. No tremor or pronator drift.      Coordination: Coordination is intact.      Gait: Gait is intact.   Psychiatric:         Attention and Perception: Attention normal.         Mood and Affect: Mood and affect normal.         Speech: Speech normal.         Behavior: Behavior normal. " Behavior is cooperative.         Thought Content: Thought content normal.         Cognition and Memory: Cognition normal.         Judgment: Judgment normal.         Neurologic Exam     Mental Status   Oriented to person, place, and time.   Attention: normal. Concentration: normal.   Speech: speech is normal   Knowledge: good.     Cranial Nerves   Cranial nerves II through XII intact.     CN V   Facial sensation intact.     CN VII   Left taste: abnormal (Slight drooping of left eyelid.  Slight facial droop.  No flattening of the nasolabial  fold)    CN IX, X   Palate: symmetric    CN XI   Right sternocleidomastoid strength: normal  Left sternocleidomastoid strength: normal    CN XII   Tongue: not atrophic  Fasciculations: absent  Tongue deviation: none    Motor Exam   Muscle bulk: normal  Overall muscle tone: normal  Right arm tone: normal  Left arm tone: normal  Right leg tone: normal  Left leg tone: normal    Strength   Right deltoid: 4/5  Left deltoid: 4/5  Right biceps: 4/5  Left biceps: 4/5  Right triceps: 4/5  Left triceps: 4/5  Right quadriceps: 4/5  Left quadriceps: 4/5  Right hamstrin/5  Left hamstrin/5  Right glutei: 4/5  Left glutei: 4/5  Right anterior tibial: 4/5  Left anterior tibial: 4/5  Right posterior tibial: 4/5  Left posterior tibial: 4/5  Right peroneal: 4/5  Left peroneal: 4/5  Right gastroc: 4/5  Left gastroc: 4/5    Sensory Exam   Light touch normal.     Gait, Coordination, and Reflexes     Gait  Gait: normal       Assessment / Plan      Assessment/Plan:   Diagnoses and all orders for this visit:    1. Paresthesia of left arm (Primary)  -     Nerve Conduction Test; Future  -     pregabalin (Lyrica) 50 MG capsule; Take 1 capsule by mouth 2 (Two) Times a Day.  Dispense: 60 capsule; Refill: 1    2. Numbness of face  -     pregabalin (Lyrica) 50 MG capsule; Take 1 capsule by mouth 2 (Two) Times a Day.  Dispense: 60 capsule; Refill: 1         This is a pleasant 53-year-old female patient  here to follow-up today with numbness of the left face which has become better per her report.  She points out that when she was seen in the ER a few months ago that she had flattening of the nasolabial fold and this is now normal.  She continues to have some slight left sided facial droop and left eyelid droop.  Full range of motion with expression.  She is most bothered by the tingling and numbness and pain in her left arm.  Her MRI was reviewed with her today.  She is willing to undergo EMG for further evaluation.  We also discussed referral to neurosurgeon which will be decided after her EMG results.  She does have radicular pain and paresthesias in the C5 VI nerve root dermatome.  She continues to follow with rheumatology.  They did try steroid injection at a prior office visit to see if this helped with her symptoms and she reports it did not.  They recently took her off of Enbrel and put her on Rinvoq.   Will try low-dose pregabalin.  Patient had swelling in the past with gabapentin.  She will start with 1/day x 1 week and then may increase to twice daily dosing.  Indications and side effects discussed with patient she verbalizes understanding.  Patient will call in the interim if she has any questions or concerns or changes.  Current [facility] report #[id number] has been reviewed and is appropriate.  Patient has signed controlled substance agreement.    Follow Up:   Return in about 6 weeks (around 6/5/2024).    HANNA Emery, FNP-BC  [facility] Neurology and Sleep Medicine

## 2024-04-29 ENCOUNTER — DISEASE STATE MANAGEMENT VISIT (OUTPATIENT)
Dept: PHARMACY | Facility: HOSPITAL | Age: 54
End: 2024-04-29
Payer: COMMERCIAL

## 2024-04-29 ENCOUNTER — LAB (OUTPATIENT)
Dept: LAB | Facility: HOSPITAL | Age: 54
End: 2024-04-29
Payer: COMMERCIAL

## 2024-04-29 VITALS
WEIGHT: 243.8 LBS | DIASTOLIC BLOOD PRESSURE: 80 MMHG | HEART RATE: 78 BPM | SYSTOLIC BLOOD PRESSURE: 130 MMHG | BODY MASS INDEX: 38.27 KG/M2 | HEIGHT: 67 IN

## 2024-04-29 DIAGNOSIS — F41.8 DEPRESSION WITH ANXIETY: ICD-10-CM

## 2024-04-29 LAB
ALBUMIN SERPL-MCNC: 4 G/DL (ref 3.5–5.2)
ALBUMIN/GLOB SERPL: 1.5 G/DL
ALP SERPL-CCNC: 102 U/L (ref 39–117)
ALT SERPL W P-5'-P-CCNC: 23 U/L (ref 1–33)
ANION GAP SERPL CALCULATED.3IONS-SCNC: 7.2 MMOL/L (ref 5–15)
AST SERPL-CCNC: 19 U/L (ref 1–32)
BILIRUB SERPL-MCNC: 0.4 MG/DL (ref 0–1.2)
BUN SERPL-MCNC: 11 MG/DL (ref 6–20)
BUN/CREAT SERPL: 15.1 (ref 7–25)
CALCIUM SPEC-SCNC: 9.2 MG/DL (ref 8.6–10.5)
CHLORIDE SERPL-SCNC: 111 MMOL/L (ref 98–107)
CHOLEST SERPL-MCNC: 261 MG/DL (ref 0–200)
CO2 SERPL-SCNC: 24.8 MMOL/L (ref 22–29)
CREAT SERPL-MCNC: 0.73 MG/DL (ref 0.57–1)
EGFRCR SERPLBLD CKD-EPI 2021: 99.7 ML/MIN/1.73
GLOBULIN UR ELPH-MCNC: 2.6 GM/DL
GLUCOSE SERPL-MCNC: 86 MG/DL (ref 65–99)
HBA1C MFR BLD: 5.1 % (ref 4.8–5.6)
HDLC SERPL-MCNC: 63 MG/DL (ref 40–60)
LDLC SERPL CALC-MCNC: 186 MG/DL (ref 0–100)
LDLC/HDLC SERPL: 2.92 {RATIO}
POTASSIUM SERPL-SCNC: 4.4 MMOL/L (ref 3.5–5.2)
PROT SERPL-MCNC: 6.6 G/DL (ref 6–8.5)
SODIUM SERPL-SCNC: 143 MMOL/L (ref 136–145)
T-UPTAKE NFR SERPL: 1.02 TBI (ref 0.8–1.3)
T4 SERPL-MCNC: 6.57 MCG/DL (ref 4.5–11.7)
TRIGL SERPL-MCNC: 71 MG/DL (ref 0–150)
TSH SERPL DL<=0.05 MIU/L-ACNC: 0.26 UIU/ML (ref 0.27–4.2)
VLDLC SERPL-MCNC: 12 MG/DL (ref 5–40)

## 2024-04-29 RX ORDER — SEMAGLUTIDE 1.7 MG/.75ML
1.7 INJECTION, SOLUTION SUBCUTANEOUS WEEKLY
Qty: 3 ML | Refills: 0 | Status: SHIPPED | OUTPATIENT
Start: 2024-04-29

## 2024-04-29 NOTE — PROGRESS NOTES
[facility]  Weight Management Program      Susie Rangel is a 53 y.o. female referred to the [facility] by Dr. Hoang Palacios for clinical pharmacy and specialty pharmacy management of GLP1 for weight management.  The patient enies a personal history or family history of thyroid cancer and denies a personal history of pancreatitis.     Susie Rangel has previously tried Adipex, Mounjaro (on and off for about a year due to shortage. Lost ~15 lbs), exercising, and gastric sleeve in 2014 for weight loss. Current weight loss efforts include walking and resistance training. She has also been eating around 1700 calories a day.    Patient is currently on Wegovy 1 mg weekly. Patient denies any lumps/swelling/hoarseness in neck/throat or abdominal pain. Patient reports tolerating medication well without any side effects. Patient denies any trouble giving injection or any missed doses. Patient reports doing well targeting her current SMART goals. Patient would like to cut down her daily calorie intake from 2000 mateusz/day to 1700 mateusz/day. Patient wishes to titrate dose at this time.      Relevant Past Medical History and Co-morbidities  Past Medical History:   Diagnosis Date    Arthritis     Class 2 obesity with serious comorbidity and body mass index (BMI) of 38.0 to 38.9 in adult 06/16/2016    Hx laparoscopic sleeve gastrectomy     Dyslipidemia 06/16/2016    GERD (gastroesophageal reflux disease)     Glaucoma 01/25/2023    Low back pain     Macular degeneration 01/25/2023    Obesity     Vitamin D deficiency      Social History     Socioeconomic History    Marital status:      Spouse name: oren    Number of children: 1    Years of education: 14   Tobacco Use    Smoking status: Never     Passive exposure: Never    Smokeless tobacco: Never   Vaping Use    Vaping status: Never Used   Substance and Sexual Activity    Alcohol use: No    Drug use: No    Sexual activity: Yes        Allergies  Patient has no known allergies.    Current Medication List    Current Outpatient Medications:     alendronate (FOSAMAX) 70 MG tablet, Take 1 tablet by mouth Every 7 (Seven) Days., Disp: 4 tablet, Rfl: 5    aspirin 81 MG EC tablet, Take 1 tablet by mouth Daily., Disp: 30 tablet, Rfl: 5    diclofenac (VOLTAREN) 75 MG EC tablet, Take 1 tablet by mouth 2 (Two) Times a Day As Needed. for pain, Disp: , Rfl:     hydrOXYzine (ATARAX) 25 MG tablet, Take 1 tablet by mouth 3 (Three) Times a Day As Needed for Itching., Disp: 90 tablet, Rfl: 5    leflunomide (ARAVA) 20 MG tablet, Take 1 tablet by mouth Daily., Disp: , Rfl:     lisinopril (PRINIVIL,ZESTRIL) 10 MG tablet, Take 1 tablet by mouth Daily., Disp: 30 tablet, Rfl: 5    omeprazole (priLOSEC) 40 MG capsule, Take 1 capsule by mouth 2 (Two) Times a Day., Disp: 60 capsule, Rfl: 5    predniSONE (DELTASONE) 10 MG tablet, As Needed., Disp: , Rfl:     pregabalin (Lyrica) 50 MG capsule, Take 1 capsule by mouth 2 (Two) Times a Day., Disp: 60 capsule, Rfl: 1    Rinvoq 15 MG tablet sustained-release 24 hour, , Disp: , Rfl:     Semaglutide-Weight Management (Wegovy) 1 MG/0.5ML solution auto-injector, Inject 0.5 mL under the skin 1 (One) Time Per Week., Disp: 2 mL, Rfl: 0    tiZANidine (ZANAFLEX) 4 MG tablet, Take 1 tablet by mouth At Night As Needed for Muscle Spasms., Disp: , Rfl:     Drug Interactions  None with Wegovy    Relevant Laboratory Values  Lab Results   Component Value Date    CHOL 261 (H) 02/28/2024    TRIG 59 02/28/2024    HDL 68 (H) 02/28/2024     (H) 02/28/2024     There is no height or weight on file to calculate BMI.    Vaccinations:   Patient recommended to keep up with routine vaccinations.     Goals of Therapy  Clinical Goals or Therapeutic Targets: Prevent weight associated co-morbidities and complications      Date 2/5/23 3/4/24 4/1/24 4/29/24    Weight (lb) 246.6 lb 249.2 lb 247.8 lb 243.8 lb    BMI kg/ 38.62 39.03 38.80 38.18    Waist  "Circumference (in)  49\" 49\" 50\" 50.25        Medication Assessment & Plan    Patient has current BMI of 38.18, which is considered Class II Obesity. Patient has lost 4 lb since previous visit.    Will order Wegovy 1.7 mg SC weekly.      The following medications will need dose adjustments/closer monitoring once the GLP1 is started: None    Discussed lifestyle modifications, including diet and exercise.  Patient education provided.     Patient has not yet had labs drawn, but plans to do so following visit as she is currently fasting. Review at next visit.    Worked with patient to create SMART Goal(s):   Walk for 30 minutes 4 days a week  Hand weights 3 days a week  Eat < 1700 calories/day    Will follow-up with patient in clinic in 4 weeks.     Ariadna Mosley RPH  4/29/2024  08:43 EDT    "

## 2024-04-30 RX ORDER — HYDROXYZINE HYDROCHLORIDE 10 MG/1
TABLET, FILM COATED ORAL
Qty: 270 TABLET | OUTPATIENT
Start: 2024-04-30

## 2024-05-01 ENCOUNTER — PROCEDURE VISIT (OUTPATIENT)
Dept: ORTHOPEDIC SURGERY | Facility: CLINIC | Age: 54
End: 2024-05-01
Payer: COMMERCIAL

## 2024-05-01 DIAGNOSIS — G56.03 CARPAL TUNNEL SYNDROME, BILATERAL: Primary | ICD-10-CM

## 2024-05-01 DIAGNOSIS — R20.2 PARESTHESIA OF LEFT ARM: ICD-10-CM

## 2024-05-06 DIAGNOSIS — G56.00 CARPAL TUNNEL SYNDROME, UNSPECIFIED LATERALITY: ICD-10-CM

## 2024-05-06 DIAGNOSIS — R94.131 ABNORMAL EMG: Primary | ICD-10-CM

## 2024-05-07 DIAGNOSIS — G56.03 CARPAL TUNNEL SYNDROME, BILATERAL: ICD-10-CM

## 2024-05-07 DIAGNOSIS — R20.2 PARESTHESIA OF LEFT ARM: Primary | ICD-10-CM

## 2024-05-08 ENCOUNTER — OFFICE VISIT (OUTPATIENT)
Dept: ORTHOPEDIC SURGERY | Facility: CLINIC | Age: 54
End: 2024-05-08
Payer: COMMERCIAL

## 2024-05-08 VITALS — TEMPERATURE: 96.7 F | WEIGHT: 247 LBS | HEIGHT: 67 IN | BODY MASS INDEX: 38.77 KG/M2

## 2024-05-08 DIAGNOSIS — G56.03 CARPAL TUNNEL SYNDROME, BILATERAL: Primary | ICD-10-CM

## 2024-05-08 DIAGNOSIS — G56.22 CUBITAL TUNNEL SYNDROME, LEFT: ICD-10-CM

## 2024-05-08 NOTE — H&P (VIEW-ONLY)
Office Note     Name: Susie Rangel    : 1970     MRN: [id number]     Chief Complaint  Pain and Numbness of the Right Wrist (States she has been having pain in her wrists and numbness in her hands for about a year, no known injury.) and Pain and Numbness of the Left Wrist    Subjective     History of Present Illness:  Susie Rangel is a 53 y.o. female presenting today for evaluation of bilateral hand paresthesias and pain ongoing for about a year with no known recent or previous injury.  Patient states that currently the paresthesias on her left hand are worse than the right.  On the left hand she has paresthesias in all fingers and also notes some drawing of the left fourth and fifth digit.  On the right hand the paresthesias occur through digits 1 through 4.  She had a recent EMG/NCS which revealed moderate carpal tunnel on the right and mild carpal tunnel on the left.  The nerve study did not reveal any compression of bilateral ulnar nerves or cervical nerve root impingement.  She has been treated with night splints and has been using them for approximately 3 months with some alleviation with nighttime symptoms though she continues to have significant daytime symptoms.  She states that she would be okay proceeding with surgery if it is needed.  Patient does have history of rheumatoid arthritis in both hands as well as cervical degenerative disc disease.    Review of Systems   Constitutional:  Negative for fever.   HENT:  Negative for dental problem and voice change.    Eyes:  Negative for visual disturbance.   Respiratory:  Negative for shortness of breath.    Cardiovascular:  Negative for chest pain.   Gastrointestinal:  Negative for abdominal pain.   Genitourinary:  Negative for dysuria.   Musculoskeletal:  Positive for arthralgias (bilateral wrist). Negative for gait problem and joint swelling.   Skin:  Negative for rash.   Neurological:  Positive for weakness (bilateral hand) and  numbness (bilateral hand). Negative for speech difficulty.   Hematological:  Does not bruise/bleed easily.   Psychiatric/Behavioral:  Negative for confusion.         Past Medical History:   Diagnosis Date    Arthritis     Class 2 obesity with serious comorbidity and body mass index (BMI) of 38.0 to 38.9 in adult 06/16/2016    Hx laparoscopic sleeve gastrectomy     Dyslipidemia 06/16/2016    GERD (gastroesophageal reflux disease)     Glaucoma 01/25/2023    Low back pain     Macular degeneration 01/25/2023    Obesity     Vitamin D deficiency         Past Surgical History:   Procedure Laterality Date    BREAST SURGERY      COLONOSCOPY N/A 4/27/2021    Procedure: COLONOSCOPY WITH BIOPSY;  Surgeon: Tamara Mehta MD;  Location: [facility] OR;  Service: Gastroenterology;  Laterality: N/A;    ENDOSCOPY N/A 4/27/2021    Procedure: ESOPHAGOGASTRODUODENOSCOPY WITH BIOPSY;  Surgeon: Tamara Mehta MD;  Location: [facility] OR;  Service: Gastroenterology;  Laterality: N/A;    GASTRIC SLEEVE LAPAROSCOPIC      HYSTERECTOMY      PANNICULECTOMY      REDUCTION MAMMAPLASTY      2013       Family History   Problem Relation Age of Onset    Hypertension Mother     Neuropathy Mother     Heart disease Father     Breast cancer Paternal Cousin        Social History     Socioeconomic History    Marital status:      Spouse name: oren    Number of children: 1    Years of education: 14   Tobacco Use    Smoking status: Never     Passive exposure: Never    Smokeless tobacco: Never   Vaping Use    Vaping status: Never Used   Substance and Sexual Activity    Alcohol use: No    Drug use: No    Sexual activity: Yes         Current Outpatient Medications:     alendronate (FOSAMAX) 70 MG tablet, Take 1 tablet by mouth Every 7 (Seven) Days., Disp: 4 tablet, Rfl: 5    aspirin 81 MG EC tablet, Take 1 tablet by mouth Daily., Disp: 30 tablet, Rfl: 5    diclofenac (VOLTAREN) 75 MG EC tablet, Take 1 tablet by mouth 2 (Two) Times a Day  "As Needed. for pain, Disp: , Rfl:     hydrOXYzine (ATARAX) 25 MG tablet, Take 1 tablet by mouth 3 (Three) Times a Day As Needed for Itching., Disp: 90 tablet, Rfl: 5    leflunomide (ARAVA) 20 MG tablet, Take 1 tablet by mouth Daily., Disp: , Rfl:     lisinopril (PRINIVIL,ZESTRIL) 10 MG tablet, Take 1 tablet by mouth Daily., Disp: 30 tablet, Rfl: 5    omeprazole (priLOSEC) 40 MG capsule, Take 1 capsule by mouth 2 (Two) Times a Day., Disp: 60 capsule, Rfl: 5    predniSONE (DELTASONE) 10 MG tablet, As Needed., Disp: , Rfl:     pregabalin (Lyrica) 50 MG capsule, Take 1 capsule by mouth 2 (Two) Times a Day., Disp: 60 capsule, Rfl: 1    Rinvoq 15 MG tablet sustained-release 24 hour, , Disp: , Rfl:     Semaglutide-Weight Management (Wegovy) 1.7 MG/0.75ML solution auto-injector, Inject 0.75 mL under the skin into the appropriate area as directed 1 (One) Time Per Week., Disp: 3 mL, Rfl: 0    tiZANidine (ZANAFLEX) 4 MG tablet, Take 1 tablet by mouth At Night As Needed for Muscle Spasms., Disp: , Rfl:     No Known Allergies        Objective   Temp 96.7 °F (35.9 °C)   Ht 170.2 cm (67.01\")   Wt 112 kg (247 lb)   BMI 38.68 kg/m²            Physical Exam  Constitutional:       Appearance: Normal appearance.   Cardiovascular:      Rate and Rhythm: Normal rate and regular rhythm.      Heart sounds: Normal heart sounds.   Pulmonary:      Effort: Pulmonary effort is normal.      Breath sounds: Normal breath sounds.   Abdominal:      General: Abdomen is flat. Bowel sounds are normal.      Palpations: Abdomen is soft.   Psychiatric:         Mood and Affect: Mood normal.         Behavior: Behavior normal.         Thought Content: Thought content normal.         Judgment: Judgment normal.       Right Hand Exam     Tenderness   The patient is experiencing no tenderness.     Range of Motion   The patient has normal right wrist ROM.     Muscle Strength   The patient has normal right wrist strength.    Tests   Phalen’s sign: " positive  Tinel's sign (median nerve): positive    Other   Erythema: absent  Sensation: normal  Pulse: present      Left Hand Exam     Tenderness   The patient is experiencing no tenderness.     Range of Motion   The patient has normal left wrist ROM.    Muscle Strength   The patient has normal left wrist strength.    Tests   Phalen’s sign: positive  Tinel's sign (median nerve): positive    Other   Erythema: absent  Sensation: normal  Pulse: present    Comments:  Positive Tinel's over the left cubital tunnel.  Negative Tinel's at the [place].           Extremity DVT signs are negative by clinical screen.     Independent Review of Radiographic Studies:    Three-view plain films of both wrist were done in office today for the evaluation of paresthesias, no comparison views available.  There are no acute osseous abnormalities noted.  No significant degenerative changes noted in the wrist.    Procedures    Assessment and Plan   Diagnoses and all orders for this visit:    1. Carpal tunnel syndrome, bilateral (Primary)    2. Cubital tunnel syndrome, left       Discussion of orthopedic goals  Orthopedic activities reviewed and patient expressed appreciation  Regular exercise as tolerated  Risk, benefits, and merits of treatment alternatives reviewed with the patient and questions answered  Patient guided on mobility and conditioning exercises  The nature of the proposed surgery reviewed with the patient including risks, benefits, rehabilitation, recovery timeframe, and outcome expectations  Ice, heat, and/or modalities as beneficial  Elevate hand for residual swelling  Reduced physical activity as appropriate and avoid offending activity  Use brace as instructed  Counseling on diet, nutrition, fitness exercise, and weight reduction goals    Recommendations/Plan:  Exercise, medications, injections, other patient advice, and return appointment as noted.  Surgery: Surgery proposed at this visit as noted.  Patient is  encouraged to call or return for any issues or concerns.    Plan:  Carpal tunnel release left wrist.    I discussed conservative treatment as well as surgical treatment.  Treatment options including continuing nighttime splints, cortisone injection, and carpal tunnel release.  At this time I believe surgical treatment is most appropriate due to failure of conservative treatment.  Patient is okay to proceed with surgery for carpal tunnel release of the left wrist at next available appointment.  I did advise wearing Towel brace on the left elbow to alleviate numbness and tingling in the pinky and ulnar aspect of the ring finger.  Her symptoms are somewhat complicated due to concurrent rheumatoid arthritis.  I believe the rheumatoid arthritis is responsible for causing the majority of the pain however the paresthesias are most likely due to compression of the median nerve at the carpal tunnel.  Advised nighttime braces in the interim for both wrists and over-the-counter analgesics, ice and heat as needed beneficial.  Also discussed ergonomic activity modifications to alleviate symptoms.    Tentative surgical date of 5/16/2024.  Patient will require PAT.    Return for 2-week postop appointment.  Patient agreeable to call or return sooner for any concerns.

## 2024-05-09 ENCOUNTER — PREP FOR SURGERY (OUTPATIENT)
Dept: OTHER | Facility: HOSPITAL | Age: 54
End: 2024-05-09
Payer: COMMERCIAL

## 2024-05-09 DIAGNOSIS — Z01.818 PRE-OP TESTING: Primary | ICD-10-CM

## 2024-05-09 RX ORDER — FAMOTIDINE 10 MG/ML
20 INJECTION, SOLUTION INTRAVENOUS
OUTPATIENT
Start: 2024-05-10 | End: 2024-05-11

## 2024-05-10 ENCOUNTER — TELEPHONE (OUTPATIENT)
Dept: PREADMISSION TESTING | Facility: HOSPITAL | Age: 54
End: 2024-05-10

## 2024-05-16 ENCOUNTER — TELEPHONE (OUTPATIENT)
Dept: ORTHOPEDIC SURGERY | Facility: CLINIC | Age: 54
End: 2024-05-16
Payer: COMMERCIAL

## 2024-05-16 NOTE — TELEPHONE ENCOUNTER
LVM for patient regarding surgery information.    Arrival time on 5/30/24 is 11:00 AM  Preadmission testing on 5/21/24 at 11:00 AM

## 2024-05-20 ENCOUNTER — TELEPHONE (OUTPATIENT)
Dept: PREADMISSION TESTING | Facility: HOSPITAL | Age: 54
End: 2024-05-20

## 2024-05-21 ENCOUNTER — HOSPITAL ENCOUNTER (OUTPATIENT)
Dept: GENERAL RADIOLOGY | Facility: HOSPITAL | Age: 54
Discharge: HOME | End: 2024-05-21
Payer: COMMERCIAL

## 2024-05-21 ENCOUNTER — PRE-ADMISSION TESTING (OUTPATIENT)
Dept: PREADMISSION TESTING | Facility: HOSPITAL | Age: 54
End: 2024-05-21
Payer: COMMERCIAL

## 2024-05-21 VITALS — BODY MASS INDEX: 37.89 KG/M2 | WEIGHT: 242 LBS

## 2024-05-21 DIAGNOSIS — Z01.818 PRE-OP TESTING: ICD-10-CM

## 2024-05-21 LAB
ALBUMIN SERPL-MCNC: 4.3 G/DL (ref 3.5–5.2)
ALBUMIN/GLOB SERPL: 1.8 G/DL
ALP SERPL-CCNC: 88 U/L (ref 39–117)
ALT SERPL W P-5'-P-CCNC: 25 U/L (ref 1–33)
ANION GAP SERPL CALCULATED.3IONS-SCNC: 12.1 MMOL/L (ref 5–15)
AST SERPL-CCNC: 18 U/L (ref 1–32)
BASOPHILS # BLD AUTO: 0.08 10*3/MM3 (ref 0–0.2)
BASOPHILS NFR BLD AUTO: 1 % (ref 0–1.5)
BILIRUB SERPL-MCNC: 0.6 MG/DL (ref 0–1.2)
BILIRUB UR QL STRIP: ABNORMAL
BUN SERPL-MCNC: 12 MG/DL (ref 6–20)
BUN/CREAT SERPL: 16.2 (ref 7–25)
CALCIUM SPEC-SCNC: 9.2 MG/DL (ref 8.6–10.5)
CHLORIDE SERPL-SCNC: 107 MMOL/L (ref 98–107)
CLARITY UR: CLEAR
CO2 SERPL-SCNC: 23.9 MMOL/L (ref 22–29)
COLOR UR: ABNORMAL
CREAT SERPL-MCNC: 0.74 MG/DL (ref 0.57–1)
DEPRECATED RDW RBC AUTO: 40.5 FL (ref 37–54)
EGFRCR SERPLBLD CKD-EPI 2021: 98.1 ML/MIN/1.73
EOSINOPHIL # BLD AUTO: 0.09 10*3/MM3 (ref 0–0.4)
EOSINOPHIL NFR BLD AUTO: 1.1 % (ref 0.3–6.2)
ERYTHROCYTE [DISTWIDTH] IN BLOOD BY AUTOMATED COUNT: 12.7 % (ref 12.3–15.4)
GLOBULIN UR ELPH-MCNC: 2.4 GM/DL
GLUCOSE SERPL-MCNC: 84 MG/DL (ref 65–99)
GLUCOSE UR STRIP-MCNC: NEGATIVE MG/DL
HCT VFR BLD AUTO: 38.9 % (ref 34–46.6)
HGB BLD-MCNC: 13.8 G/DL (ref 12–15.9)
HGB UR QL STRIP.AUTO: NEGATIVE
IMM GRANULOCYTES # BLD AUTO: 0.01 10*3/MM3 (ref 0–0.05)
IMM GRANULOCYTES NFR BLD AUTO: 0.1 % (ref 0–0.5)
KETONES UR QL STRIP: ABNORMAL
LEUKOCYTE ESTERASE UR QL STRIP.AUTO: NEGATIVE
LYMPHOCYTES # BLD AUTO: 2.5 10*3/MM3 (ref 0.7–3.1)
LYMPHOCYTES NFR BLD AUTO: 31.1 % (ref 19.6–45.3)
MCH RBC QN AUTO: 31.2 PG (ref 26.6–33)
MCHC RBC AUTO-ENTMCNC: 35.5 G/DL (ref 31.5–35.7)
MCV RBC AUTO: 88 FL (ref 79–97)
MONOCYTES # BLD AUTO: 0.5 10*3/MM3 (ref 0.1–0.9)
MONOCYTES NFR BLD AUTO: 6.2 % (ref 5–12)
NEUTROPHILS NFR BLD AUTO: 4.86 10*3/MM3 (ref 1.7–7)
NEUTROPHILS NFR BLD AUTO: 60.5 % (ref 42.7–76)
NITRITE UR QL STRIP: NEGATIVE
NRBC BLD AUTO-RTO: 0 /100 WBC (ref 0–0.2)
PH UR STRIP.AUTO: 5.5 [PH] (ref 5–8)
PLATELET # BLD AUTO: 318 10*3/MM3 (ref 140–450)
PMV BLD AUTO: 9.7 FL (ref 6–12)
POTASSIUM SERPL-SCNC: 3.8 MMOL/L (ref 3.5–5.2)
PROT SERPL-MCNC: 6.7 G/DL (ref 6–8.5)
PROT UR QL STRIP: NEGATIVE
QT INTERVAL: 438 MS
QTC INTERVAL: 462 MS
RBC # BLD AUTO: 4.42 10*6/MM3 (ref 3.77–5.28)
SODIUM SERPL-SCNC: 143 MMOL/L (ref 136–145)
SP GR UR STRIP: 1.03 (ref 1–1.03)
UROBILINOGEN UR QL STRIP: ABNORMAL
WBC NRBC COR # BLD AUTO: 8.04 10*3/MM3 (ref 3.4–10.8)

## 2024-05-21 PROCEDURE — 85025 COMPLETE CBC W/AUTO DIFF WBC: CPT

## 2024-05-21 PROCEDURE — 71046 X-RAY EXAM CHEST 2 VIEWS: CPT

## 2024-05-21 PROCEDURE — 87081 CULTURE SCREEN ONLY: CPT

## 2024-05-21 PROCEDURE — 93005 ELECTROCARDIOGRAM TRACING: CPT

## 2024-05-21 PROCEDURE — 80053 COMPREHEN METABOLIC PANEL: CPT

## 2024-05-21 PROCEDURE — 81003 URINALYSIS AUTO W/O SCOPE: CPT

## 2024-05-21 PROCEDURE — 36415 COLL VENOUS BLD VENIPUNCTURE: CPT

## 2024-05-21 RX ORDER — PHENTERMINE HYDROCHLORIDE 30 MG/1
30 CAPSULE ORAL EVERY MORNING
COMMUNITY

## 2024-05-21 NOTE — DISCHARGE INSTRUCTIONS
Pre-Admission testing appointment completed today for patient's upcoming procedure at [facility].    PAT PASS reviewed with patient and they verbalize understanding of the following:     Do not eat or drink anything after midnight the night before procedure unless otherwise instructed by physician/surgeon's office, this includes no gum, candy, mints, tobacco products or e-cigarettes.  Do not shave the area to be operated on at least 48 hours prior to procedure.  Do not wear makeup, lotion, hair products, or nail polish.  Do not wear any jewelry and remove all piercings.  Do not wear any adhesive if you wear dentures.  Do not wear contacts; bring in glasses if needed.  Only take medications on the morning of procedure as instructed by PAT nurse per anesthesia guidelines or as instructed by physician's office.  If you are on any blood thinners reach out to the physician/surgeon's office for instructions on when/if they will need to be stopped prior to procedure.  Bring in picture ID and insurance card, advanced directive copies if applicable, CPAP/BIPAP/Inhalers if indicated morning of procedure, leave any other valuables at home.  Ensure you have arranged for someone to drive you home the day of your procedure and someone to care for you at home afterwards. It is recommended that you do not drive, drink alcohol, or make any major legal decisions for at least 24 hours after your procedure is complete.   Chlorhexidine sponge along with instruction/information sheet given to patient. Readybath wipes given in lieu of CHG if patient has CHG allergy. Instructed patient to date, time, and initial the verification sheet once skin prep has been completed, and to return to [facility] the day of the procedure.       Introduction to anesthesia video watched by patient during appointment and anesthesia FAQ tip sheet given to patient.  Instructions and information given to patient about parking, hospital entrance, and  registration location.

## 2024-05-22 LAB — MRSA SPEC QL CULT: NORMAL

## 2024-05-24 ENCOUNTER — DISEASE STATE MANAGEMENT VISIT (OUTPATIENT)
Dept: PHARMACY | Facility: HOSPITAL | Age: 54
End: 2024-05-24
Payer: COMMERCIAL

## 2024-05-24 VITALS
DIASTOLIC BLOOD PRESSURE: 72 MMHG | HEART RATE: 67 BPM | SYSTOLIC BLOOD PRESSURE: 122 MMHG | BODY MASS INDEX: 38.2 KG/M2 | HEIGHT: 67 IN | WEIGHT: 243.4 LBS

## 2024-05-24 RX ORDER — SEMAGLUTIDE 2.4 MG/.75ML
2.4 INJECTION, SOLUTION SUBCUTANEOUS WEEKLY
Qty: 3 ML | Refills: 0 | Status: SHIPPED | OUTPATIENT
Start: 2024-05-24

## 2024-05-24 NOTE — PROGRESS NOTES
[facility]  Weight Management Program      Susie Rangel is a 53 y.o. female referred to the [facility] by Dr. Hoang Palacios for clinical pharmacy and specialty pharmacy management of GLP1 for weight management.  The patient enies a personal history or family history of thyroid cancer and denies a personal history of pancreatitis.     Susie Rangel has previously tried Adipex, Mounjaro (on and off for about a year due to shortage. Lost ~15 lbs), exercising, and gastric sleeve in 2014 for weight loss. Current weight loss efforts include walking and resistance training. She has also been eating around 1700 calories a day.    Patient is currently on Wegovy 1.7 mg weekly. Patient denies any lumps/swelling/hoarseness in neck/throat or abdominal pain. Patient reports tolerating medication well without any side effects. Patient denies any trouble giving injection or any missed doses.  Patient reports that she has not had much appetite suppression thus far on Wegovy. She does report in one sitting she notices a decrease in the food she is eating but that she still craves sweets and snacks a lot.   Patient reports doing well targeting her current SMART goals. Patient reports that she has been in a bad flare with her RA and required steroid usage and has not been able to do much exercise. She reports that holidays, birthdays, etc has brought in sweets into the house more than what she likes to have and she is trying to watch calorie intake.     Patient reports that she has upcoming ortho surgery and reports she discussed Wegovy with her team and that she is going have a 7 day hold.       Relevant Past Medical History and Co-morbidities  Past Medical History:   Diagnosis Date    Anxiety     Arthritis     Class 2 obesity with serious comorbidity and body mass index (BMI) of 38.0 to 38.9 in adult 06/16/2016    Hx laparoscopic sleeve gastrectomy     Dyslipidemia 06/16/2016     GERD (gastroesophageal reflux disease)     Glaucoma 01/25/2023    Hypertension     Low back pain     Macular degeneration 01/25/2023    blurry vision    Obesity     Rheumatoid arthritis     Vitamin D deficiency      Social History     Socioeconomic History    Marital status:      Spouse name: oren    Number of children: 1    Years of education: 14   Tobacco Use    Smoking status: Never     Passive exposure: Never    Smokeless tobacco: Never   Vaping Use    Vaping status: Never Used   Substance and Sexual Activity    Alcohol use: No    Drug use: Not Currently     Comment: occasional gummies with THC for RA pain/bedtime    Sexual activity: Yes       Allergies  Patient has no known allergies.    Current Medication List    Current Outpatient Medications:     alendronate (FOSAMAX) 70 MG tablet, Take 1 tablet by mouth Every 7 (Seven) Days., Disp: 4 tablet, Rfl: 5    aspirin 81 MG EC tablet, Take 1 tablet by mouth Daily., Disp: 30 tablet, Rfl: 5    diclofenac (VOLTAREN) 75 MG EC tablet, Take 1 tablet by mouth 2 (Two) Times a Day As Needed. for pain, Disp: , Rfl:     hydrOXYzine (ATARAX) 25 MG tablet, Take 1 tablet by mouth 3 (Three) Times a Day As Needed for Itching. (Patient taking differently: Take 1 tablet by mouth 3 (Three) Times a Day As Needed for Anxiety.), Disp: 90 tablet, Rfl: 5    leflunomide (ARAVA) 20 MG tablet, Take 1 tablet by mouth Daily., Disp: , Rfl:     lisinopril (PRINIVIL,ZESTRIL) 10 MG tablet, Take 1 tablet by mouth Daily., Disp: 30 tablet, Rfl: 5    omeprazole (priLOSEC) 40 MG capsule, Take 1 capsule by mouth 2 (Two) Times a Day., Disp: 60 capsule, Rfl: 5    phentermine 30 MG capsule, Take 1 capsule by mouth Every Morning. (Patient not taking: Reported on 5/21/2024), Disp: , Rfl:     predniSONE (DELTASONE) 10 MG tablet, Take 0.5 tablets by mouth Daily., Disp: , Rfl:     pregabalin (Lyrica) 50 MG capsule, Take 1 capsule by mouth 2 (Two) Times a Day., Disp: 60 capsule, Rfl: 1    Rinvoq 15 MG  "tablet sustained-release 24 hour, Take  by mouth Daily., Disp: , Rfl:     Semaglutide-Weight Management (Wegovy) 1.7 MG/0.75ML solution auto-injector, Inject 0.75 mL under the skin into the appropriate area as directed 1 (One) Time Per Week., Disp: 3 mL, Rfl: 0    tiZANidine (ZANAFLEX) 4 MG tablet, Take 1 tablet by mouth At Night As Needed for Muscle Spasms., Disp: , Rfl:     Drug Interactions  None with Wegovy    Relevant Laboratory Values  Lab Results   Component Value Date    CHOL 261 (H) 04/29/2024    TRIG 71 04/29/2024    HDL 63 (H) 04/29/2024     (H) 04/29/2024     There is no height or weight on file to calculate BMI.    Vaccinations:   Patient recommended to keep up with routine vaccinations.     Goals of Therapy  Clinical Goals or Therapeutic Targets: Prevent weight associated co-morbidities and complications      Date 2/5/23 3/4/24 4/1/24 4/29/24 5/23/24   Weight (lb) 246.6 lb 249.2 lb 247.8 lb 243.8 lb 243.4 lb   BMI kg/ 38.62 39.03 38.80 38.18 38.11   Waist Circumference (in)  49\" 49\" 50\" 50.25 48.5\"       Medication Assessment & Plan    Patient has current BMI of 38.11, which is considered Class II Obesity. Patient has lost 4.4 lb since previous visit. Patient was congratulated on success thus far.     Will order Wegovy 2.4 mg SC weekly.  Patient prefers to try and continue Wegovy at this time to see if the maximum dose brings on any more appetite suppression than what she has been feeling.   Patient was encouraged to try to get rid of sweets from the house and supplement with healthier options to help with sweet cravings.     The following medications will need dose adjustments/closer monitoring once the GLP1 is started: None    Discussed lifestyle modifications, including diet and exercise.  Patient education provided.     Patient completed lab work. TSH slightly low and cholesterol elevated. Will route to make sure Dr. Palacios is aware and can address at follow-up in June or before if he " feels clinically needed.     Worked with patient to create SMART Goal(s):   Walk for 30 minutes 4 days a week  Hand weights 3 days a week  Eat < 1700 calories/day    Will follow-up with patient in clinic in 4 weeks.     Candis Hi. Markie, PharmD  5/24/2024  11:08 EDT

## 2024-05-29 DIAGNOSIS — G56.22 CUBITAL TUNNEL SYNDROME, LEFT: Primary | ICD-10-CM

## 2024-05-29 RX ORDER — HYDROCODONE BITARTRATE AND ACETAMINOPHEN 5; 325 MG/1; MG/1
1 TABLET ORAL EVERY 12 HOURS PRN
Qty: 14 TABLET | Refills: 0 | Status: SHIPPED | OUTPATIENT
Start: 2024-05-29

## 2024-05-30 ENCOUNTER — HOSPITAL ENCOUNTER (OUTPATIENT)
Facility: HOSPITAL | Age: 54
Setting detail: HOSPITAL OUTPATIENT SURGERY
Discharge: HOME | End: 2024-05-30
Attending: ORTHOPAEDIC SURGERY | Admitting: ORTHOPAEDIC SURGERY
Payer: COMMERCIAL

## 2024-05-30 ENCOUNTER — ANESTHESIA EVENT (OUTPATIENT)
Dept: PERIOP | Facility: HOSPITAL | Age: 54
End: 2024-05-30
Payer: COMMERCIAL

## 2024-05-30 ENCOUNTER — ANESTHESIA (OUTPATIENT)
Dept: PERIOP | Facility: HOSPITAL | Age: 54
End: 2024-05-30
Payer: COMMERCIAL

## 2024-05-30 VITALS
DIASTOLIC BLOOD PRESSURE: 90 MMHG | HEART RATE: 63 BPM | SYSTOLIC BLOOD PRESSURE: 169 MMHG | TEMPERATURE: 97 F | RESPIRATION RATE: 16 BRPM | OXYGEN SATURATION: 95 %

## 2024-05-30 DIAGNOSIS — Z01.818 PRE-OP TESTING: ICD-10-CM

## 2024-05-30 PROBLEM — Z98.890 STATUS POST CARPAL TUNNEL RELEASE: Status: ACTIVE | Noted: 2024-05-30

## 2024-05-30 PROCEDURE — 25010000002 CEFAZOLIN PER 500 MG: Performed by: ORTHOPAEDIC SURGERY

## 2024-05-30 PROCEDURE — 25010000002 MIDAZOLAM PER 1MG: Performed by: NURSE ANESTHETIST, CERTIFIED REGISTERED

## 2024-05-30 PROCEDURE — 25010000002 CEFAZOLIN PER 500 MG: Performed by: STUDENT IN AN ORGANIZED HEALTH CARE EDUCATION/TRAINING PROGRAM

## 2024-05-30 PROCEDURE — 25010000002 PROPOFOL 10 MG/ML EMULSION: Performed by: NURSE ANESTHETIST, CERTIFIED REGISTERED

## 2024-05-30 PROCEDURE — 64721 CARPAL TUNNEL SURGERY: CPT | Performed by: ORTHOPAEDIC SURGERY

## 2024-05-30 PROCEDURE — 25010000002 FENTANYL CITRATE (PF) 50 MCG/ML SOLUTION PREFILLED SYRINGE: Performed by: NURSE ANESTHETIST, CERTIFIED REGISTERED

## 2024-05-30 PROCEDURE — 25810000003 LACTATED RINGERS PER 1000 ML: Performed by: ORTHOPAEDIC SURGERY

## 2024-05-30 PROCEDURE — 25010000002 HYDROMORPHONE 1 MG/ML SOLUTION: Performed by: NURSE ANESTHETIST, CERTIFIED REGISTERED

## 2024-05-30 RX ORDER — LIDOCAINE HCL/PF 100 MG/5ML
SYRINGE (ML) INJECTION AS NEEDED
Status: DISCONTINUED | OUTPATIENT
Start: 2024-05-30 | End: 2024-05-30 | Stop reason: SURG

## 2024-05-30 RX ORDER — SODIUM CHLORIDE, SODIUM LACTATE, POTASSIUM CHLORIDE, CALCIUM CHLORIDE 600; 310; 30; 20 MG/100ML; MG/100ML; MG/100ML; MG/100ML
1000 INJECTION, SOLUTION INTRAVENOUS CONTINUOUS
Status: DISCONTINUED | OUTPATIENT
Start: 2024-05-30 | End: 2024-05-30 | Stop reason: HOSPADM

## 2024-05-30 RX ORDER — FENTANYL CITRATE 50 UG/ML
INJECTION, SOLUTION INTRAMUSCULAR; INTRAVENOUS AS NEEDED
Status: DISCONTINUED | OUTPATIENT
Start: 2024-05-30 | End: 2024-05-30 | Stop reason: SURG

## 2024-05-30 RX ORDER — FAMOTIDINE 10 MG/ML
20 INJECTION, SOLUTION INTRAVENOUS
Qty: 2 ML | Refills: 0 | Status: COMPLETED | OUTPATIENT
Start: 2024-05-30 | End: 2024-05-30

## 2024-05-30 RX ORDER — PROPOFOL 10 MG/ML
VIAL (ML) INTRAVENOUS AS NEEDED
Status: DISCONTINUED | OUTPATIENT
Start: 2024-05-30 | End: 2024-05-30 | Stop reason: SURG

## 2024-05-30 RX ORDER — MIDAZOLAM HYDROCHLORIDE 2 MG/2ML
INJECTION, SOLUTION INTRAMUSCULAR; INTRAVENOUS AS NEEDED
Status: DISCONTINUED | OUTPATIENT
Start: 2024-05-30 | End: 2024-05-30 | Stop reason: SURG

## 2024-05-30 RX ADMIN — Medication 75 MG: at 11:27

## 2024-05-30 RX ADMIN — HYDROMORPHONE HYDROCHLORIDE 0.5 MG: 1 INJECTION, SOLUTION INTRAMUSCULAR; INTRAVENOUS; SUBCUTANEOUS at 12:36

## 2024-05-30 RX ADMIN — SODIUM CHLORIDE 2 G: 9 INJECTION, SOLUTION INTRAVENOUS at 11:36

## 2024-05-30 RX ADMIN — MIDAZOLAM HYDROCHLORIDE 2 MG: 1 INJECTION, SOLUTION INTRAMUSCULAR; INTRAVENOUS at 11:36

## 2024-05-30 RX ADMIN — FAMOTIDINE 20 MG: 10 INJECTION, SOLUTION INTRAVENOUS at 09:35

## 2024-05-30 RX ADMIN — HYDROMORPHONE HYDROCHLORIDE 0.5 MG: 1 INJECTION, SOLUTION INTRAMUSCULAR; INTRAVENOUS; SUBCUTANEOUS at 12:21

## 2024-05-30 RX ADMIN — SODIUM CHLORIDE, POTASSIUM CHLORIDE, SODIUM LACTATE AND CALCIUM CHLORIDE 1000 ML: 600; 310; 30; 20 INJECTION, SOLUTION INTRAVENOUS at 09:34

## 2024-05-30 RX ADMIN — FENTANYL CITRATE 50 MCG: 50 INJECTION, SOLUTION INTRAMUSCULAR; INTRAVENOUS at 11:36

## 2024-05-30 RX ADMIN — PROPOFOL 180 MG: 10 INJECTION, EMULSION INTRAVENOUS at 11:27

## 2024-05-30 RX ADMIN — HYDROMORPHONE HYDROCHLORIDE 0.5 MG: 1 INJECTION, SOLUTION INTRAMUSCULAR; INTRAVENOUS; SUBCUTANEOUS at 13:33

## 2024-05-30 NOTE — OP NOTE
[address], [address]  [place]  [address] [phone]      OPERATIVE REPORT           PATIENT NAME:   Susie Rangel                            YOB: 1970      PREOP DIAGNOSIS:   Left Carpal Tunnel Syndrome.    POSTOP DIAGNOSIS:  Left Carpal Tunnel Syndrome.    PROCEDURE:     Left Carpal Tunnel Release    SURGEON:    Fam Nevarez MD    OPERATIVE TEAM:   Circulator: Arely Cabrera RN  Scrub Person: Juan Gonzales; Jigar Ambrosio    ANESTHETIST:   MARY: Capo Trevino CRNA    ANESTHESIA:   General.    ESTIM BLOOD LOSS:  2 ml    TOURNIQUET TIME:   6 minutes @ 250 mm Hg    SPECIMENS:     None.    DRAINS:    None.    COMPLICATIONS:     None.    DISPOSITION:     Stable to recovery.    INDICATIONS AND NARRATIVE:    The patient presents for planned elective carpal tunnel release surgery to address the ongoing wrist and hand condition.  Risks and benefits of the surgery were discussed and an informed consent obtained.  Risks were discussed including, but not limited to anesthesia, infection, nerve/vessel/tendon injury and persistent symptoms or limitations of the wrist and hand.  Goals include pain relief, improved sensation, strength, mobility and potential for improved function of the wrist and hand.      OPERATIVE PROCEDURE:  Antibiotic prophylaxis given.  Surgeon site marking and a time out were performed.  Anesthesia was effective and well tolerated.  The arm and hand was prepped and draped in the usual sterile fashion.      After sterile prep and drape and with tourniquet, a curved incision was made along the ulna border of the thenar crease of the hand.  Careful soft tissue dissection and blunt Ragnell retractors were used to protect the soft tissues and expose the transverse carpal tunnel ligament.  While keeping a blunt freer under the ligament, the ligament was released with a 15 blade scalpel and a tenotomy scissors both proximally and  distally, decompressing the median nerve and flexor tendons.  These structures could be easily visualized, and were intact within the carpal tunnel.  The tourniquet was taken down and there was excellent hemostasis.  The wound was irrigated copiously with antibiotic solution.  Skin closure consisted of interrupted 3-0 nylon mattress sutures.  A sterile padded Aquacel dressing was applied.  Anesthesia was effective and well tolerated.  There were no complications of the procedure.  The patient was transferred in stable condition to the recovery room.

## 2024-05-30 NOTE — ANESTHESIA POSTPROCEDURE EVALUATION
Patient: Susie Rangel    Procedure Summary       Date: 05/30/24 Room / Location: [facility] OR  /  [facility]    Anesthesia Start: 1124 Anesthesia Stop: 1218    Procedure: Left hand carpal tunnel release (Left: Wrist) Diagnosis:       Pre-op testing      (Pre-op testing [Z01.818])    Surgeons: Fam Nevarez MD Provider: Capo Trevino CRNA    Anesthesia Type: MAC, general ASA Status: 3            Anesthesia Type: MAC, general    Vitals  Vitals Value Taken Time   BP     Temp     Pulse 72 05/30/24 1217   Resp     SpO2 100 % 05/30/24 1217   Vitals shown include unfiled device data.        Post Anesthesia Care and Evaluation    Patient location during evaluation: PACU  Patient participation: complete - patient participated  Level of consciousness: awake and alert and sleepy but conscious  Pain score: 2  Pain management: adequate    Airway patency: patent  Anesthetic complications: No anesthetic complications  PONV Status: none  Cardiovascular status: acceptable  Respiratory status: acceptable and face mask  Hydration status: acceptable

## 2024-05-30 NOTE — INTERVAL H&P NOTE
H&P reviewed. The patient was examined and there are no changes to the H&P.    Vitals:    05/30/24 0910   BP: 157/95   Pulse: 66   Resp: 16   Temp: 97 °F (36.1 °C)   SpO2: 98%     Reviewed with patient the 'moderate' grade of carpal tunnel on her right hand (versus 'mild' on the left) and that her left had symptomatology is complex and with some features not characteristic of carpal tunnel syndrome (occasional drawing of the ring and small fingers, ulna sided numbness, and general wrist and hand arthritis symptoms for which she sees a rheumatologist.  Patient understands the procedure today can address thumb to ring finger numbness, palm soreness and any  weakness, thought not her other described left hand symptoms.  Patient understands and still wants to proceed for the potential partial benefits for her left wrist and hand.      Fam Nevarez MD  5/30/2024  11:11 EDT

## 2024-05-30 NOTE — DISCHARGE INSTRUCTIONS
Rest today  No pushing,pulling,tugging,heavy lifting, or strenuous activity   No major decision making,driving,or drinking alcoholic beverages for 24 hours due to the sedation you received  Always use good hand hygiene/washing technique  No driving on pain medication.To assist you in voiding:  Drink plenty of fluids  Listen to running water while attempting to void.    If you are unable to urinate and you have an uncomfortable urge to void or it has been   6 hours since you were discharged, return to the Emergency RoomNo pushing, pulling, tugging,  heavy lifting, or strenuous activity.  No major decision making, driving, or drinking alcoholic beverages for 24 hours. ( due to the medications you have  received)  Always use good hand hygiene/washing techniques.  NO driving while taking pain medications.    * if you have an incision:  Check your incision area every day for signs of infection.   Check for:  * more redness, swelling, or pain  *more fluid or blood  *warmth  *pus or bad smell

## 2024-05-30 NOTE — ANESTHESIA PREPROCEDURE EVALUATION
Anesthesia Evaluation     Patient summary reviewed and Nursing notes reviewed   NPO Solid Status: > 8 hours  NPO Liquid Status: > 8 hours           Airway   Mallampati: II  TM distance: >3 FB  Neck ROM: full  Possible difficult intubation and Large neck circumference  Dental      Pulmonary    Cardiovascular     (+) hypertension, hyperlipidemia      Neuro/Psych  (+) numbness, psychiatric history  GI/Hepatic/Renal/Endo    (+) obesity, morbid obesity, GERD well controlled, liver disease    Musculoskeletal     Abdominal    Substance History      OB/GYN          Other   arthritis,     ROS/Med Hx Other: Bilateral carpal tunnel syndrome  Breast pain  Chronic allergic rhinitis  Chronic constipation  Chronic venous insufficiency  Class 2 severe obesity with serious comorbidity and body mass index (BMI) of 38.0 to 38.9 in adult  Depression with anxiety  Elevated blood pressure reading  Encounter for immunization  Encounter for screening mammogram for breast cancer  Facial numbness  Gastroesophageal reflux disease without esophagitis  Healthcare maintenance  History of bariatric surgery  Hot flashes  Mechanical low back pain  Mixed hyperlipidemia  Non-alcoholic fatty liver disease  Numbness and tingling of left arm and leg  Pre-op testing  Rheumatoid arthritis involving multiple sites with positive rheumatoid factor                  Anesthesia Plan    ASA 3     MAC and general     intravenous induction     Anesthetic plan, risks, benefits, and alternatives have been provided, discussed and informed consent has been obtained with: patient.  Pre-procedure education provided  Plan discussed with CRNA.    CODE STATUS:

## 2024-06-05 ENCOUNTER — TELEMEDICINE (OUTPATIENT)
Dept: NEUROLOGY | Facility: CLINIC | Age: 54
End: 2024-06-05
Payer: COMMERCIAL

## 2024-06-05 DIAGNOSIS — R20.2 NUMBNESS AND TINGLING OF LEFT SIDE OF FACE: ICD-10-CM

## 2024-06-05 DIAGNOSIS — R20.2 PARESTHESIA OF LEFT ARM: Primary | ICD-10-CM

## 2024-06-05 DIAGNOSIS — R20.0 NUMBNESS AND TINGLING OF LEFT SIDE OF FACE: ICD-10-CM

## 2024-06-05 PROCEDURE — 99213 OFFICE O/P EST LOW 20 MIN: CPT | Performed by: NURSE PRACTITIONER

## 2024-06-05 RX ORDER — PREGABALIN 75 MG/1
75 CAPSULE ORAL 2 TIMES DAILY
Qty: 60 CAPSULE | Refills: 5 | Status: SHIPPED | OUTPATIENT
Start: 2024-06-05

## 2024-06-05 NOTE — PROGRESS NOTES
Follow Up Office Visit      Patient Name: Susie Rangel  : 1970   MRN: [id number]     Chief Complaint:  No chief complaint on file.      History of Present Illness: Susie Rangel is a 53 y.o. female who is here today to follow up with numbness in her left upper extremity and left cheek area. This is a televideo visit via [employer] as pt unable to see or hear provider on [facility].  She says the facial numbness has gotten better but she still has some numbness and odd sensation under her eye and cheek area that comes and goes.  It is definitely less then it was initially and she can tell the Lyrica has shown benefit.  She is able to feel light touch.  Denies any visual disturbances, no facial asymmetry.  Last Thursday she had carpal tunnel release.  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  She said they felt like doing the left side may benefit her tingling and numbness in her arm so they proceeded with the left side first.  -She is followed by rheumatology and is on Rinvoq for her RA    - She  has tried Gabapentin in the past but had some swelling. Would be willing to try Lyrica.   -She is also followed by ophthalmology as she reports she has early glaucoma and they are watching this.  -She had MRI of C-spine without contrast on 3/14/2024 which showed degenerative changes of the cervical spine as detailed level by level above:  There are several disc osteophyte complex formations, the largest of which is at C5-C6 where there is mild effacement of the ventral cord surface.  No discrete cord signal lesions are evident.  No abnormal enhancing mass lesions are evident.  -MRI brain with and without contrast:on 3/14/2024  There is no evidence of acute ischemia or intracranial hemorrhage.  There are no abnormal enhancing mass lesions.  There is mild paranasal sinus disease  -She is right  hand dominant. She has not worked in over a year.   -Labs were reviewed from 2/28/2024.  TSH, CBC, CMP, B12, vitamin D noncontributory.  Her A1c was 5.4; lipids were elevated    Pertinent Medical History: RA with positive rheumatoid factor, hyperlipidemia, venous insufficiency, nonalcoholic fatty liver disease, anxiety and depression, history of bariatric surgery    Subjective      Review of Systems:   Review of Systems   Neurological:  Positive for numbness.       I have reviewed and the following portions of the patient's history were updated as appropriate: past family history, past medical history, past social history, past surgical history and problem list.    Medications:     Current Outpatient Medications:     alendronate (FOSAMAX) 70 MG tablet, Take 1 tablet by mouth Every 7 (Seven) Days., Disp: 4 tablet, Rfl: 5    aspirin 81 MG EC tablet, Take 1 tablet by mouth Daily., Disp: 30 tablet, Rfl: 5    diclofenac (VOLTAREN) 75 MG EC tablet, Take 1 tablet by mouth 2 (Two) Times a Day As Needed. for pain, Disp: , Rfl:     HYDROcodone-acetaminophen (NORCO) 5-325 MG per tablet, Take 1 tablet by mouth Every 12 (Twelve) Hours As Needed For Pain, Disp: 14 tablet, Rfl: 0    hydrOXYzine (ATARAX) 25 MG tablet, Take 1 tablet by mouth 3 (Three) Times a Day As Needed for Itching. (Patient taking differently: Take 1 tablet by mouth 3 (Three) Times a Day As Needed for Anxiety.), Disp: 90 tablet, Rfl: 5    leflunomide (ARAVA) 20 MG tablet, Take 1 tablet by mouth Daily., Disp: , Rfl:     lisinopril (PRINIVIL,ZESTRIL) 10 MG tablet, Take 1 tablet by mouth Daily. (Patient taking differently: Take 80 tablets by mouth Daily.), Disp: 30 tablet, Rfl: 5    omeprazole (priLOSEC) 40 MG capsule, Take 1 capsule by mouth 2 (Two) Times a Day., Disp: 60 capsule, Rfl: 5    phentermine 30 MG capsule, Take 1 capsule by mouth Every Morning., Disp: , Rfl:     predniSONE (DELTASONE) 10 MG tablet, Take 0.5 tablets by mouth Daily., Disp: , Rfl:      pregabalin (Lyrica) 75 MG capsule, Take 1 capsule by mouth 2 (Two) Times a Day., Disp: 60 capsule, Rfl: 5    Rinvoq 15 MG tablet sustained-release 24 hour, Take  by mouth Daily., Disp: , Rfl:     Semaglutide-Weight Management (Wegovy) 2.4 MG/0.75ML solution auto-injector, Inject 2.4 mg under the skin into the appropriate area as directed 1 (One) Time Per Week., Disp: 3 mL, Rfl: 0    tiZANidine (ZANAFLEX) 4 MG tablet, Take 1 tablet by mouth At Night As Needed for Muscle Spasms., Disp: , Rfl:     Allergies:   No Known Allergies    Objective     Physical Exam:  Vital Signs: There were no vitals filed for this visit.  There is no height or weight on file to calculate BMI.    Physical Exam  Constitutional:       Appearance: Normal appearance.   HENT:      Head: Normocephalic.   Eyes:      Conjunctiva/sclera: Conjunctivae normal.   Pulmonary:      Effort: Pulmonary effort is normal.   Musculoskeletal:         General: Normal range of motion.   Neurological:      General: No focal deficit present.      Mental Status: She is alert and oriented to person, place, and time.      Cranial Nerves: No dysarthria.      Comments: This was a televideo visit.  No facial asymmetry.  Patient able to raise her eyebrows evenly, close her eyes tightly, blow air in both cheeks symmetrically, smile was symmetric     Psychiatric:         Attention and Perception: Attention normal.         Mood and Affect: Mood and affect normal.         Speech: Speech normal.         Behavior: Behavior normal. Behavior is cooperative.         Thought Content: Thought content normal.         Cognition and Memory: Cognition normal.         Judgment: Judgment normal.         Neurologic Exam     Mental Status   Oriented to person, place, and time.   Speech: speech is normal   Level of consciousness: alert  Knowledge: good.     Cranial Nerves     CN VII   Facial expression full, symmetric.        Assessment / Plan      Assessment/Plan:   Diagnoses and all orders  for this visit:    1. Paresthesia of left arm (Primary)  -     pregabalin (Lyrica) 75 MG capsule; Take 1 capsule by mouth 2 (Two) Times a Day.  Dispense: 60 capsule; Refill: 5    2. Numbness and tingling of left side of face  -     pregabalin (Lyrica) 75 MG capsule; Take 1 capsule by mouth 2 (Two) Times a Day.  Dispense: 60 capsule; Refill: 5           Patient has continued to see improvement with the numbness on her left face which is now for the most part just the left cheek area and does not extend above the eye as it did in the past.  She is currently on Lyrica 50 mg twice daily would like to increase this to 75 mg twice daily.  She says she will monitor closely to see if she needs 3 times daily dosing and let me know as she is not sure if her symptoms worsen throughout the late afternoon and evening.  She recently underwent left carpal tunnel release last week and is hoping to see improvement in the tingling and numbness in her left arm.  She will continue with orthopedic for follow-up.  She will continue with rheumatology for management of her RA as she is on Rinvoq.    Indications and side effects of Lyrica discussed with patient she verbalizes understanding.  Patient will call in the interim if she has any questions or concerns or changes.  Current [facility] report # [id number] has been reviewed and is appropriate.  She last filled this on 4/24/2024.  -This is a telehealth visit, the patient was at her home and I was in my office at [facility] neurology.  The video call lasted 16 minutes  Follow Up:   No follow-ups on file.    HANNA Emery, FNP-[facility] Neurology [facility] Sleep Medicine

## 2024-06-13 ENCOUNTER — OFFICE VISIT (OUTPATIENT)
Dept: ORTHOPEDIC SURGERY | Facility: CLINIC | Age: 54
End: 2024-06-13
Payer: COMMERCIAL

## 2024-06-13 ENCOUNTER — OFFICE VISIT (OUTPATIENT)
Dept: FAMILY MEDICINE CLINIC | Facility: CLINIC | Age: 54
End: 2024-06-13
Payer: COMMERCIAL

## 2024-06-13 VITALS
OXYGEN SATURATION: 95 % | WEIGHT: 245 LBS | HEART RATE: 83 BPM | BODY MASS INDEX: 38.45 KG/M2 | RESPIRATION RATE: 14 BRPM | HEIGHT: 67 IN | SYSTOLIC BLOOD PRESSURE: 126 MMHG | TEMPERATURE: 97.9 F | DIASTOLIC BLOOD PRESSURE: 70 MMHG

## 2024-06-13 VITALS — TEMPERATURE: 98.4 F | HEIGHT: 67 IN | WEIGHT: 243 LBS | BODY MASS INDEX: 38.14 KG/M2

## 2024-06-13 DIAGNOSIS — K76.0 NON-ALCOHOLIC FATTY LIVER DISEASE: ICD-10-CM

## 2024-06-13 DIAGNOSIS — F41.8 DEPRESSION WITH ANXIETY: ICD-10-CM

## 2024-06-13 DIAGNOSIS — Z98.890 STATUS POST CARPAL TUNNEL RELEASE: ICD-10-CM

## 2024-06-13 DIAGNOSIS — Z98.84 HISTORY OF BARIATRIC SURGERY: ICD-10-CM

## 2024-06-13 DIAGNOSIS — K59.09 CHRONIC CONSTIPATION: ICD-10-CM

## 2024-06-13 DIAGNOSIS — E78.2 MIXED HYPERLIPIDEMIA: ICD-10-CM

## 2024-06-13 DIAGNOSIS — Z98.890 S/P CARPAL TUNNEL RELEASE: Primary | ICD-10-CM

## 2024-06-13 DIAGNOSIS — R20.0 NUMBNESS AND TINGLING: ICD-10-CM

## 2024-06-13 DIAGNOSIS — Z00.00 HEALTHCARE MAINTENANCE: ICD-10-CM

## 2024-06-13 DIAGNOSIS — J30.9 CHRONIC ALLERGIC RHINITIS: Primary | ICD-10-CM

## 2024-06-13 DIAGNOSIS — M54.59 MECHANICAL LOW BACK PAIN: ICD-10-CM

## 2024-06-13 DIAGNOSIS — K21.9 GASTROESOPHAGEAL REFLUX DISEASE WITHOUT ESOPHAGITIS: ICD-10-CM

## 2024-06-13 DIAGNOSIS — Z12.31 ENCOUNTER FOR SCREENING MAMMOGRAM FOR BREAST CANCER: ICD-10-CM

## 2024-06-13 DIAGNOSIS — E66.01 CLASS 2 SEVERE OBESITY WITH SERIOUS COMORBIDITY AND BODY MASS INDEX (BMI) OF 38.0 TO 38.9 IN ADULT, UNSPECIFIED OBESITY TYPE: ICD-10-CM

## 2024-06-13 DIAGNOSIS — R20.2 NUMBNESS AND TINGLING: ICD-10-CM

## 2024-06-13 DIAGNOSIS — I10 ESSENTIAL HYPERTENSION: ICD-10-CM

## 2024-06-13 DIAGNOSIS — I87.2 CHRONIC VENOUS INSUFFICIENCY: ICD-10-CM

## 2024-06-13 DIAGNOSIS — M05.79 RHEUMATOID ARTHRITIS INVOLVING MULTIPLE SITES WITH POSITIVE RHEUMATOID FACTOR: ICD-10-CM

## 2024-06-13 PROBLEM — N64.4 BREAST PAIN: Status: RESOLVED | Noted: 2023-08-10 | Resolved: 2024-06-13

## 2024-06-13 PROCEDURE — 99024 POSTOP FOLLOW-UP VISIT: CPT | Performed by: STUDENT IN AN ORGANIZED HEALTH CARE EDUCATION/TRAINING PROGRAM

## 2024-06-13 PROCEDURE — 99214 OFFICE O/P EST MOD 30 MIN: CPT | Performed by: GENERAL PRACTICE

## 2024-06-13 RX ORDER — ALENDRONATE SODIUM 70 MG/1
70 TABLET ORAL
Qty: 4 TABLET | Refills: 5 | Status: SHIPPED | OUTPATIENT
Start: 2024-06-13

## 2024-06-13 RX ORDER — OMEPRAZOLE 40 MG/1
40 CAPSULE, DELAYED RELEASE ORAL 2 TIMES DAILY
Qty: 60 CAPSULE | Refills: 5 | Status: SHIPPED | OUTPATIENT
Start: 2024-06-13

## 2024-06-13 NOTE — PROGRESS NOTES
Post Op Note     Name: Susie Rangel    : 1970     MRN: [id number]     Chief Complaint  Post-op of the Left Hand (Status post carpal tunnel release on 24.) and Suture / Staple Removal    Subjective     History of Present Illness:  Susie Rangel is a 53 y.o. female who presents today for 2-week follow-up status post carpal tunnel release of the left hand done on 2024.  Patient states overall her symptoms have improved significantly though she does continue to have mild paresthesias in the tips of her fingers 1 through 4.  She also occasionally has sharp pains in the thenar eminence especially with quick movements.  She denies any signs of systemic illness.  Notes that she has had very mild drainage especially after her sutures were removed today but there was no pus or erythema.    Review of Systems   Constitutional:  Negative for fever.   HENT:  Negative for dental problem and voice change.    Eyes:  Negative for visual disturbance.   Respiratory:  Negative for shortness of breath.    Cardiovascular:  Negative for chest pain.   Gastrointestinal:  Negative for abdominal pain.   Genitourinary:  Negative for dysuria.   Musculoskeletal:  Positive for arthralgias (left hand) and joint swelling (left hand). Negative for gait problem.   Skin:  Negative for rash.   Neurological:  Negative for speech difficulty.   Hematological:  Does not bruise/bleed easily.   Psychiatric/Behavioral:  Negative for confusion.         Pain controlled: [] no   [x] yes   Medication refill requested: [x] no   [] yes    Patient compliant with instructions: [] no   [x] yes   Other: Reports good progress since surgery.     Past Medical History:   Diagnosis Date    Anxiety     Arthritis     Class 2 obesity with serious comorbidity and body mass index (BMI) of 38.0 to 38.9 in adult 2016    Hx laparoscopic sleeve gastrectomy     Dyslipidemia 2016    GERD (gastroesophageal reflux disease)     Glaucoma  "01/25/2023    Hypertension     Low back pain     Macular degeneration 01/25/2023    blurry vision    Obesity     Rheumatoid arthritis     Vitamin D deficiency         Past Surgical History:   Procedure Laterality Date    CARPAL TUNNEL RELEASE Left 5/30/2024    Procedure: Left hand carpal tunnel release;  Surgeon: Fam Nevarez MD;  Location: [facility];  Service: Orthopedics;  Laterality: Left;    COLONOSCOPY N/A 04/27/2021    Procedure: COLONOSCOPY WITH BIOPSY;  Surgeon: Tamara Mehta MD;  Location: [facility] OR;  Service: Gastroenterology;  Laterality: N/A;    ENDOSCOPY N/A 04/27/2021    Procedure: ESOPHAGOGASTRODUODENOSCOPY WITH BIOPSY;  Surgeon: Tamara Mehta MD;  Location: [facility] OR;  Service: Gastroenterology;  Laterality: N/A;    GASTRIC SLEEVE LAPAROSCOPIC      [facility] approx 2014 or 2016    HYSTERECTOMY      [facility]    PANNICULECTOMY      REDUCTION MAMMAPLASTY      2013       No Known Allergies    Objective   Temp 98.4 °F (36.9 °C)   Ht 170.2 cm (67.01\")   Wt 110 kg (243 lb)   BMI 38.05 kg/m²             Signs of infection: [x] no                    [] yes   Drainage: [x] no                    [] yes   Incision: [x] healing well     []healed well   Motor exam intact: [] no                    [x] yes   Neurovascular exam intact: [] no                    [x] yes   Signs of compartment syndrome: [x] no                    [] yes   Signs of DVT: [x] no                    [] yes   Other:      Physical Exam  Left Hand Exam     Comments:  The incision appears to be healing appropriately and there are no signs of infection at this time.  ROM is grossly normal.  Sensation slightly decreased in the finger tips of digits 1-4.  Carpal pulses 2+, cap refill brisk.  No thenar atrophy.             Extremity DVT signs are negative by clinical screen.    dependent Review of Radiographic Studies:    No new imaging done today.    Laboratory and Other Studies:  No new " results reviewed today.     Medical Decision Making:    Stable post-operative exam and expected early progress.    Procedures    Assessment and Plan     Diagnoses and all orders for this visit:    1. S/P carpal tunnel release, left (Primary)        Recommendations/Plan:     Sutures Staples or Pins [x] Removed today  [] At prior visit  [] Plan removal later   Physical therapy: []rehab facility  []outpatient referral  [] therapy ongoing   Ultrasound: [x]not ordered         []order given to patient   Labs: [x]not ordered         []order given to patient   Weight Bearing status: [x]Full []WBAT []PWB []NWB []Other     Discussion of orthopaedic goals and activities and patient and/or guardian expressed appreciation.  Regular exercise as tolerated  Guided on proper techniques for mobility, strength, agility and/or conditioning exercises  Weight bearing parameters reviewed  Take prescribed medications as instructed only as tolerated     Exercise, medications, injections, other patient advice, and return appointment as noted.  Brace: No brace was given at today's visit.  Referral: No referrals made at today's visit.  Test/Studies: No additional studies ordered at this time.  Work/Activity Status: Usual activities, routine exercise as tolerated, light physical work as tolerated, no strenuous activity.    Advised her current symptoms will most likely resolve in the next few weeks although there is risk that she may have mild paresthesias going forward due to the nature of nerve insult.  Overall she states that her symptoms are much improved and she is very satisfied with her surgery.  Discussed proper wound care with the patient advised her to follow-up only as needed.    Return if symptoms worsen or fail to improve.  Patient is encouraged and agreeable to call or return sooner for any issues or concerns.

## 2024-06-13 NOTE — PROGRESS NOTES
Subjective   Susie Rangel is a 53 y.o. female.     Chief Complaint  She returns for a scheduled reassessment of multiple medical problems including intermittent numbness, rheumatoid arthritis, lumbar degenerative disc disease, lower extremity edema, constipation, essential hypertension, hyperlipidemia, and stress    History of Present Illness     Intermittent Numbness  MRI of the cervical spine performed on 2/28/2024 revealed evidence of degenerative disc disease but no compression of the cord or nerve roots was seen.  MRI of the brain performed the same day was unremarkable.  NCS/EMG of the upper extremities performed on 5/1/2024 revealed a bilateral median neuropathy and she underwent a left carpal tunnel release on 5/30/2024.  She denies any further left upper extremity numbness.  She continues to have intermittent numbness and tingling of her left face and since last here right upper extremity.  She continues to deny any new headaches, visual disturbances, weakness, or difficulty talking or understanding what is said to her.  She continues to deny any new joint or muscle pain and there is no history of any rash, fever, or chills.  She is scheduled to undergo a neurology reassessment with HANNA Emery on 12/5/2024    Rheumatoid Arthritis  She complains of persistent joint pain and stiffness.  There is no history of any joint swelling or redness,and she has had no rash, fever, or chills.  She continues to be followed by rheumatology, and is currently maintained on leflunomide and rinvoq but still requires corticosteroids once or twice every month or two.  She is taking alendronate as prescribed  Lab Results   Component Value Date    WBC 8.04 05/21/2024    HGB 13.8 05/21/2024    HCT 38.9 05/21/2024    MCV 88.0 05/21/2024     05/21/2024     Lumbar Degenerative Disc Disease  She complains of persistent low back pain. There has been no change in the quality nor any new associated symptoms.  MRI of  the lumbar spine performed on 2/25/2022 revealed degenerative disc disease and osteoarthritis    Left Lower Extremity Edema  She has noted a continued improvement in the swelling about her left foot.  This has been unassociated with any other symptoms and she continues to deny any pain or discoloration.  The swelling tends to develop toward the end of the day and improves overnight.    Constipation  She reports a continued improvement in her constipation. She denies any difficulty swallowing, nausea, vomiting, or heartburn, and there is no history of any diarrhea, hematochezia, or melena. EGD performed on 4/27/2021 revealed evidence of a previous sleeve gastrectomy along with mild gastritis.  Colonoscopy performed the same day revealed small internal hemorrhoids but was otherwise unremarkable.  She is taking omeprazole 40 daily    Essential Hypertension  She is taking lisinopril as prescribed.    Hyperlipidemia  She is following an appropriate diet, but her activities remain limited due to her back pain  Lab Results   Component Value Date    CHOL 261 (H) 04/29/2024    TRIG 71 04/29/2024    HDL 63 (H) 04/29/2024     (H) 04/29/2024     Stress  She remains under a considerable amount of stress, and admits to intermittent nervousness, worrying, difficulty sleeping. She continues to deny any depression, anxiety, loss interest in activities, or suicidal ideation.  She has a very supportive family.  She is on no psychiatric medication at present  Lab Results   Component Value Date    TSH 0.259 (L) 04/29/2024     Labs  Most recent vitamin D 31.5  with a B12 of 397  Lab Results   Component Value Date    GLUCOSE 84 05/21/2024    BUN 12 05/21/2024    CREATININE 0.74 05/21/2024    EGFR 98.1 05/21/2024    BCR 16.2 05/21/2024    K 3.8 05/21/2024    CO2 23.9 05/21/2024    CALCIUM 9.2 05/21/2024    ALBUMIN 4.3 05/21/2024    BILITOT 0.6 05/21/2024    AST 18 05/21/2024    ALT 25 05/21/2024     Lab Results   Component Value  Date    ALKPHOS 88 05/21/2024     Lab Results   Component Value Date    HGBA1C 5.10 04/29/2024     The following portions of the patient's history were reviewed and updated as appropriate: allergies, current medications, past medical history, past social history, and problem list.    Review of Systems   Constitutional:  Positive for fatigue. Negative for chills and fever.   HENT:  Positive for rhinorrhea and sneezing. Negative for congestion, ear pain and sore throat.    Eyes:  Negative for visual disturbance.   Respiratory:  Negative for cough, shortness of breath and wheezing.    Cardiovascular:  Positive for leg swelling (left). Negative for chest pain and palpitations.   Gastrointestinal:  Negative for abdominal pain, blood in stool, constipation, diarrhea, nausea and vomiting.   Endocrine:        Hot flashes   Genitourinary:  Negative for dysuria and hematuria.   Musculoskeletal:  Positive for arthralgias and back pain. Negative for joint swelling and myalgias.   Skin:  Negative for rash.   Neurological:  Positive for numbness. Negative for dizziness, tremors and weakness.   Psychiatric/Behavioral:  Positive for decreased concentration and sleep disturbance. Negative for suicidal ideas and depressed mood. The patient is nervous/anxious.      Objective   Physical Exam  Constitutional:       General: She is not in acute distress.     Appearance: Normal appearance. She is well-developed. She is not diaphoretic.      Comments: Bright and in good spirits. No apparent distress. No pallor, jaundice, diaphoresis, or cyanosis.   HENT:      Right Ear: Tympanic membrane, ear canal and external ear normal.      Left Ear: Tympanic membrane, ear canal and external ear normal.      Mouth/Throat:      Lips: No lesions.      Mouth: Mucous membranes are moist. No oral lesions.      Pharynx: No oropharyngeal exudate or posterior oropharyngeal erythema.   Eyes:      General: Lids are normal. No visual field deficit.      Extraocular Movements: Extraocular movements intact.      Conjunctiva/sclera: Conjunctivae normal.      Pupils: Pupils are equal.   Neck:      Thyroid: No thyroid mass or thyromegaly.      Vascular: No carotid bruit or JVD.      Trachea: Trachea normal. No tracheal deviation.   Cardiovascular:      Rate and Rhythm: Normal rate and regular rhythm.      Heart sounds: Normal heart sounds, S1 normal and S2 normal. No murmur heard.     No gallop.   Pulmonary:      Effort: Pulmonary effort is normal.      Breath sounds: Normal breath sounds.   Abdominal:      General: Bowel sounds are normal. There is no distension.   Musculoskeletal:      Right lower leg: No edema.      Left lower leg: Edema (trace) present.      Comments: No peripheral joint redness or warmth.   Lymphadenopathy:      Head:      Right side of head: No submental, submandibular, tonsillar, preauricular, posterior auricular or occipital adenopathy.      Left side of head: No submental, submandibular, tonsillar, preauricular, posterior auricular or occipital adenopathy.      Cervical: No cervical adenopathy.      Upper Body:      Right upper body: No supraclavicular adenopathy.      Left upper body: No supraclavicular adenopathy.   Skin:     General: Skin is warm.      Coloration: Skin is not cyanotic, jaundiced or pale.      Findings: No rash.      Nails: There is no clubbing.      Comments: Steri-Stripped incision volar aspect left wrist   Neurological:      Mental Status: She is alert and oriented to person, place, and time.      Cranial Nerves: No cranial nerve deficit, dysarthria or facial asymmetry.      Sensory: No sensory deficit.      Motor: No weakness, tremor or abnormal muscle tone.      Coordination: Coordination normal.      Gait: Gait normal.   Psychiatric:         Attention and Perception: Attention normal.         Mood and Affect: Mood normal.         Speech: Speech normal.         Behavior: Behavior normal.         Thought Content: Thought  content normal. Thought content does not include homicidal or suicidal ideation.       Assessment & Plan   Problems Addressed this Visit          Allergies and Adverse Reactions    Chronic allergic rhinitis       Cardiac and Vasculature    Chronic venous insufficiency    Essential hypertension   Hypertension: at goal. Evidence of target organ damage: none.  Encouraged to continue to work on diet and exercise plan.   Continue current medication    Mixed hyperlipidemia  As above.  Updated labs will be arranged at return.       Endocrine and Metabolic    Class 2 severe obesity with serious comorbidity and body mass index (BMI) of 38.0 to 38.9 in adult    History of bariatric surgery       Gastrointestinal Abdominal     Chronic constipation  Reminded regarding lifestyle modification  Encouraged to report if any worse or if any new symptoms or concerns.    Gastroesophageal reflux disease without esophagitis  As above.   Continue current medication.    Relevant Medications    omeprazole (priLOSEC) 40 MG capsule    Non-alcoholic fatty liver disease       Genitourinary and Reproductive     Encounter for screening mammogram for breast cancer    Relevant Orders    Mammo Screening Digital Tomosynthesis Bilateral With CAD       Health Encounters    Healthcare maintenance  Recommended an updated COVID-19 vaccination.  Reminded to get a flu shot when available  Will arrange an updated mammogram and DEXA scan    Relevant Orders    Mammo Screening Digital Tomosynthesis Bilateral With CAD    DEXA Bone Density Axial       Mental Health    Depression with anxiety  Significant situational component.   Supportive therapy.   Continue current medication.  Encouraged to report if any worse or if any new symptoms or concerns.       Musculoskeletal and Injuries    Mechanical low back pain    Rheumatoid arthritis involving multiple sites with positive rheumatoid factor  Reminded regarding symptomatic treatment.   Continue current  medication  Follow up with  rheumatology    Relevant Medications    alendronate (FOSAMAX) 70 MG tablet    Other Relevant Orders    DEXA Bone Density Axial       Symptoms and Signs    Numbness and tingling  S/P left carpal tunnel release  Follow up with neurology      Diagnoses         Codes Comments    Chronic allergic rhinitis    -  Primary ICD-10-CM: J30.9  ICD-9-CM: 477.9     Mixed hyperlipidemia     ICD-10-CM: E78.2  ICD-9-CM: 272.2     Essential hypertension     ICD-10-CM: I10  ICD-9-CM: 401.9     Chronic venous insufficiency     ICD-10-CM: I87.2  ICD-9-CM: 459.81     History of bariatric surgery     ICD-10-CM: Z98.84  ICD-9-CM: V45.86     Class 2 severe obesity with serious comorbidity and body mass index (BMI) of 38.0 to 38.9 in adult, unspecified obesity type     ICD-10-CM: E66.01, Z68.38  ICD-9-CM: 278.01, V85.38     Non-alcoholic fatty liver disease     ICD-10-CM: K76.0  ICD-9-CM: 571.8     Gastroesophageal reflux disease without esophagitis     ICD-10-CM: K21.9  ICD-9-CM: 530.81     Healthcare maintenance     ICD-10-CM: Z00.00  ICD-9-CM: V70.0     Depression with anxiety     ICD-10-CM: F41.8  ICD-9-CM: 300.4     Rheumatoid arthritis involving multiple sites with positive rheumatoid factor     ICD-10-CM: M05.79  ICD-9-CM: 714.0     Mechanical low back pain     ICD-10-CM: M54.59  ICD-9-CM: 724.2     Numbness and tingling of left arm and leg     ICD-10-CM: R20.0, R20.2  ICD-9-CM: 782.0     Encounter for screening mammogram for breast cancer     ICD-10-CM: Z12.31  ICD-9-CM: V76.12     Chronic constipation     ICD-10-CM: K59.09  ICD-9-CM: 564.00

## 2024-06-21 ENCOUNTER — DISEASE STATE MANAGEMENT VISIT (OUTPATIENT)
Dept: PHARMACY | Facility: HOSPITAL | Age: 54
End: 2024-06-21
Payer: OTHER GOVERNMENT

## 2024-06-21 VITALS
HEART RATE: 77 BPM | WEIGHT: 243 LBS | DIASTOLIC BLOOD PRESSURE: 96 MMHG | HEIGHT: 67 IN | BODY MASS INDEX: 38.14 KG/M2 | SYSTOLIC BLOOD PRESSURE: 149 MMHG

## 2024-06-21 RX ORDER — SEMAGLUTIDE 2.4 MG/.75ML
2.4 INJECTION, SOLUTION SUBCUTANEOUS WEEKLY
Qty: 3 ML | Refills: 0 | Status: SHIPPED | OUTPATIENT
Start: 2024-06-21

## 2024-06-21 NOTE — PROGRESS NOTES
[facility]  Weight Management Program      Susie Rangel is a 53 y.o. female referred to the [facility] by Dr. Hoang Palacios for clinical pharmacy and specialty pharmacy management of GLP1 for weight management.  The patient enies a personal history or family history of thyroid cancer and denies a personal history of pancreatitis.     Susie Rangel has previously tried Adipex, Mounjaro (on and off for about a year due to shortage. Lost ~15 lbs), exercising, and gastric sleeve in 2014 for weight loss. Current weight loss efforts include walking and resistance training. She has also been eating around 1700 calories a day.    Patient is currently on Wegovy 2.4 mg weekly. Patient denies any lumps/swelling/hoarseness in neck/throat or abdominal pain. Patient reports tolerating medication well without any side effects. Patient denies any trouble giving injection or any missed doses.  Patient reports that she has not had much appetite suppression thus far on Wegovy. She does report in one sitting she notices a decrease in the food she is eating but that she still craves sweets and snacks a lot.     Patient reports doing well targeting her current SMART goals. Patient reports that she has been in a bad flare with her RA and required steroid usage and has not been able to do much exercise. She reports that holidays, birthdays, etc has brought in sweets into the house more than what she likes to have and she is trying to watch calorie intake.     Patient reports that she has upcoming ortho surgery and reports she discussed Wegovy with her team and that she is going have a 7 day hold.     Patient states she's been on steroids (for RA flares) 2-3 times since starting the Wegovy, and feels this might be contributing to not losing as much weight as she would like. She has RA and OA. We discussed asking rheumatologist about RA treatment so that she is not having to use steroids  for flare ups. She has been on Rinvoq for approximately 2 months. There was a delay when she was changed from Enbrel to Rinvoq which resulted in the need for steroid use.      Relevant Past Medical History and Co-morbidities  Past Medical History:   Diagnosis Date    Anxiety     Arthritis     Class 2 obesity with serious comorbidity and body mass index (BMI) of 38.0 to 38.9 in adult 06/16/2016    Hx laparoscopic sleeve gastrectomy     Dyslipidemia 06/16/2016    GERD (gastroesophageal reflux disease)     Glaucoma 01/25/2023    Hypertension     Low back pain     Macular degeneration 01/25/2023    blurry vision    Obesity     Rheumatoid arthritis     Vitamin D deficiency      Social History     Socioeconomic History    Marital status:      Spouse name: oren    Number of children: 1    Years of education: 14   Tobacco Use    Smoking status: Never     Passive exposure: Never    Smokeless tobacco: Never   Vaping Use    Vaping status: Never Used   Substance and Sexual Activity    Alcohol use: No    Drug use: Not Currently     Comment: occasional gummies with THC for RA pain/bedtime    Sexual activity: Yes       Allergies  Patient has no known allergies.    Current Medication List    Current Outpatient Medications:     alendronate (FOSAMAX) 70 MG tablet, Take 1 tablet by mouth Every 7 (Seven) Days., Disp: 4 tablet, Rfl: 5    aspirin 81 MG EC tablet, Take 1 tablet by mouth Daily., Disp: 30 tablet, Rfl: 5    diclofenac (VOLTAREN) 75 MG EC tablet, Take 1 tablet by mouth 2 (Two) Times a Day As Needed. for pain, Disp: , Rfl:     HYDROcodone-acetaminophen (NORCO) 5-325 MG per tablet, Take 1 tablet by mouth Every 12 (Twelve) Hours As Needed For Pain, Disp: 14 tablet, Rfl: 0    hydrOXYzine (ATARAX) 25 MG tablet, Take 1 tablet by mouth 3 (Three) Times a Day As Needed for Itching. (Patient taking differently: Take 1 tablet by mouth 3 (Three) Times a Day As Needed for Anxiety.), Disp: 90 tablet, Rfl: 5    leflunomide (ARAVA) 20  "MG tablet, Take 1 tablet by mouth Daily., Disp: , Rfl:     lisinopril (PRINIVIL,ZESTRIL) 10 MG tablet, Take 1 tablet by mouth Daily. (Patient taking differently: Take 80 tablets by mouth Daily.), Disp: 30 tablet, Rfl: 5    omeprazole (priLOSEC) 40 MG capsule, Take 1 capsule by mouth 2 (Two) Times a Day., Disp: 60 capsule, Rfl: 5    pregabalin (Lyrica) 75 MG capsule, Take 1 capsule by mouth 2 (Two) Times a Day., Disp: 60 capsule, Rfl: 5    Rinvoq 15 MG tablet sustained-release 24 hour, Take  by mouth Daily., Disp: , Rfl:     Semaglutide-Weight Management (Wegovy) 2.4 MG/0.75ML solution auto-injector, Inject 2.4 mg under the skin into the appropriate area as directed 1 (One) Time Per Week., Disp: 3 mL, Rfl: 0  No current facility-administered medications for this visit.    Drug Interactions  None with Wegovy    Relevant Laboratory Values  Lab Results   Component Value Date    CHOL 261 (H) 04/29/2024    TRIG 71 04/29/2024    HDL 63 (H) 04/29/2024     (H) 04/29/2024     Body mass index is 38.05 kg/m².    Vaccinations:   Patient recommended to keep up with routine vaccinations.     Goals of Therapy  Clinical Goals or Therapeutic Targets: Prevent weight associated co-morbidities and complications      Date 2/5/23 3/4/24 4/1/24 4/29/24 5/23/24 6/21/24   Weight (lb) 246.6 lb 249.2 lb 247.8 lb 243.8 lb 243.4 lb 243 lbs   BMI kg/ 38.62 39.03 38.80 38.18 38.11 38.05   Waist Circumference (in)  49\" 49\" 50\" 50.25 48.5\" 48.5\"       Medication Assessment & Plan    Patient has current BMI of 38.11, which is considered Class II Obesity. Patient has lost 4.4 lb since previous visit. Patient was congratulated on success thus far.     Will order Wegovy 2.4 mg SC weekly.  Patient prefers to try and continue Wegovy at this time to see if the maximum dose brings on any more appetite suppression than what she has been feeling.   Patient was encouraged to try to get rid of sweets from the house and supplement with healthier options " to help with sweet cravings.     The following medications will need dose adjustments/closer monitoring once the GLP1 is started: None    Discussed lifestyle modifications, including diet and exercise.  Patient education provided.     Patient completed lab work. TSH slightly low and cholesterol elevated. Will route to make sure Dr. Palacios is aware and can address at follow-up in June or before if he feels clinically needed.     Worked with patient to create SMART Goal(s):   Walk for 30 minutes 4 days a week  Hand weights 3 days a week  Eat < 1700 calories/day    Will follow-up with patient in clinic in 4 weeks.     Janay Arciniega, PharmD  6/21/2024  11:15 EDT

## 2024-07-23 ENCOUNTER — DISEASE STATE MANAGEMENT VISIT (OUTPATIENT)
Dept: PHARMACY | Facility: HOSPITAL | Age: 54
End: 2024-07-23
Payer: OTHER GOVERNMENT

## 2024-07-23 VITALS
HEIGHT: 67 IN | BODY MASS INDEX: 38.3 KG/M2 | HEART RATE: 70 BPM | WEIGHT: 244 LBS | SYSTOLIC BLOOD PRESSURE: 151 MMHG | DIASTOLIC BLOOD PRESSURE: 94 MMHG

## 2024-07-23 DIAGNOSIS — K59.09 CHRONIC CONSTIPATION: Primary | ICD-10-CM

## 2024-07-23 RX ORDER — SEMAGLUTIDE 2.4 MG/.75ML
2.4 INJECTION, SOLUTION SUBCUTANEOUS WEEKLY
Qty: 3 ML | Refills: 0 | Status: SHIPPED | OUTPATIENT
Start: 2024-07-23

## 2024-07-23 NOTE — PROGRESS NOTES
[facility]  Weight Management Program      Susie Rangel is a 53 y.o. female referred to the [facility] by Dr. Hoang Palacios for clinical pharmacy and specialty pharmacy management of GLP1 for weight management.  The patient enies a personal history or family history of thyroid cancer and denies a personal history of pancreatitis.     Susie Rangel has previously tried Adipex, Mounjaro (on and off for about a year due to shortage. Lost ~15 lbs), exercising, and gastric sleeve in 2014 for weight loss. Current weight loss efforts include walking and resistance training. She has also been eating around 1700 calories a day.    Patient is currently on Wegovy 2.4 mg weekly. Patient denies any lumps/swelling/hoarseness in neck/throat or abdominal pain. Patient reports tolerating medication well without any side effects. Patient denies any trouble giving injection or any missed doses.  Patient reports that she has not had much appetite suppression thus far on Wegovy. She does report in one sitting she notices a decrease in the food she is eating but that she still craves sweets and snacks a lot.     Patient reports doing well targeting her current SMART goals. Patient reports that she has been in a bad flare with her RA and required steroid usage and has not been able to do much exercise. She reports that holidays, birthdays, etc has brought in sweets into the house more than what she likes to have and she is trying to watch calorie intake.     Patient reports that she has upcoming ortho surgery and reports she discussed Wegovy with her team and that she is going have a 7 day hold.       Patient reports that she has not had to have steroid in the last few months and RA has been a little more controlled with initiation of Rinvoq. Previously she felt the steroids she had been on for RA were contributing to difficulty in losing weight.     Patient denies any changes to  medications or allergies since last visit.       Relevant Past Medical History and Co-morbidities  Past Medical History:   Diagnosis Date    Anxiety     Arthritis     Class 2 obesity with serious comorbidity and body mass index (BMI) of 38.0 to 38.9 in adult 06/16/2016    Hx laparoscopic sleeve gastrectomy     Dyslipidemia 06/16/2016    GERD (gastroesophageal reflux disease)     Glaucoma 01/25/2023    Hypertension     Low back pain     Macular degeneration 01/25/2023    blurry vision    Obesity     Rheumatoid arthritis     Vitamin D deficiency      Social History     Socioeconomic History    Marital status:      Spouse name: oren    Number of children: 1    Years of education: 14   Tobacco Use    Smoking status: Never     Passive exposure: Never    Smokeless tobacco: Never   Vaping Use    Vaping status: Never Used   Substance and Sexual Activity    Alcohol use: No    Drug use: Not Currently     Comment: occasional gummies with THC for RA pain/bedtime    Sexual activity: Yes       Allergies  Patient has no known allergies.    Current Medication List    Current Outpatient Medications:     alendronate (FOSAMAX) 70 MG tablet, Take 1 tablet by mouth Every 7 (Seven) Days., Disp: 4 tablet, Rfl: 5    aspirin 81 MG EC tablet, Take 1 tablet by mouth Daily., Disp: 30 tablet, Rfl: 5    diclofenac (VOLTAREN) 75 MG EC tablet, Take 1 tablet by mouth 2 (Two) Times a Day As Needed. for pain, Disp: , Rfl:     HYDROcodone-acetaminophen (NORCO) 5-325 MG per tablet, Take 1 tablet by mouth Every 12 (Twelve) Hours As Needed For Pain, Disp: 14 tablet, Rfl: 0    hydrOXYzine (ATARAX) 25 MG tablet, Take 1 tablet by mouth 3 (Three) Times a Day As Needed for Itching. (Patient taking differently: Take 1 tablet by mouth 3 (Three) Times a Day As Needed for Anxiety.), Disp: 90 tablet, Rfl: 5    leflunomide (ARAVA) 20 MG tablet, Take 1 tablet by mouth Daily., Disp: , Rfl:     lisinopril (PRINIVIL,ZESTRIL) 10 MG tablet, Take 1 tablet by mouth  "Daily. (Patient taking differently: Take 80 tablets by mouth Daily.), Disp: 30 tablet, Rfl: 5    omeprazole (priLOSEC) 40 MG capsule, Take 1 capsule by mouth 2 (Two) Times a Day., Disp: 60 capsule, Rfl: 5    pregabalin (Lyrica) 75 MG capsule, Take 1 capsule by mouth 2 (Two) Times a Day., Disp: 60 capsule, Rfl: 5    Rinvoq 15 MG tablet sustained-release 24 hour, Take  by mouth Daily., Disp: , Rfl:     Semaglutide-Weight Management (Wegovy) 2.4 MG/0.75ML solution auto-injector, Inject 2.4 mg under the skin into the appropriate area as directed 1 (One) Time Per Week., Disp: 3 mL, Rfl: 0    Drug Interactions  None with Wegovy    Relevant Laboratory Values  Lab Results   Component Value Date    CHOL 261 (H) 04/29/2024    TRIG 71 04/29/2024    HDL 63 (H) 04/29/2024     (H) 04/29/2024     There is no height or weight on file to calculate BMI.    Vaccinations:   Patient recommended to keep up with routine vaccinations.     Goals of Therapy  Clinical Goals or Therapeutic Targets: Prevent weight associated co-morbidities and complications      Date 2/5/23 3/4/24 4/1/24 4/29/24 5/23/24 6/21/24 7/23/24   Weight (lb) 246.6 lb 249.2 lb 247.8 lb 243.8 lb 243.4 lb 243 lbs 244 lb   BMI kg/ 38.62 39.03 38.80 38.18 38.11 38.05 38.20   Waist Circumference (in)  49\" 49\" 50\" 50.25 48.5\" 48.5\" 47.25\"       Medication Assessment & Plan    Patient has current BMI of 38.20, which is considered Class II Obesity. Patient has lost 5.2 lbs overall;. Patient was congratulated on success thus far. Patient did not have weight loss this visit but had 1 lb weight gain.     Will order Wegovy 2.4 mg SC weekly.  Patient does want to move forward with attempting to get Zepbound approved. PA is required. Malena to start working on it and in the meantime patient will continue on Wegovy the next 4 weeks. If approved, change patient at next visit to Zepbound.      I have let patient know that all Zepbound doses have been difficult to obtain and if " she does switch that dosing available will be dependent day by day.     The following medications will need dose adjustments/closer monitoring once the GLP1 is started: None    Pt BP elevated in clinic, Patient reports she has not taken her BP meds yet today. Encouraged her to take routine medication and continue to monitor and reach out to PCP if BP remains elevated.     Discussed lifestyle modifications, including diet and exercise.  Patient education provided.     Patient had follow up with Dr. Palacios in June.  Lipids were addressed in his note. Did not see that TSH was discussed and patient did not recall that they discussed it. Will forward to Dr. Palacios to make sure no concern with continuing Wegovy or changing to Zepbound.     Worked with patient to create SMART Goal(s):   Walk for 30 minutes 4 days a week  Hand weights 3 days a week  Eat < 1700 calories/day    Will follow-up with patient in clinic in 4 weeks.     Candis Hi. Markie, PharmD  7/23/2024  08:31 EDT

## 2024-07-25 ENCOUNTER — OFFICE VISIT (OUTPATIENT)
Dept: ORTHOPEDIC SURGERY | Facility: CLINIC | Age: 54
End: 2024-07-25
Payer: OTHER GOVERNMENT

## 2024-07-25 VITALS — BODY MASS INDEX: 38.3 KG/M2 | WEIGHT: 244 LBS | TEMPERATURE: 98.2 F | HEIGHT: 67 IN

## 2024-07-25 DIAGNOSIS — R29.898 WEAKNESS OF LEFT HAND: ICD-10-CM

## 2024-07-25 DIAGNOSIS — Z98.890 S/P CARPAL TUNNEL RELEASE: Primary | ICD-10-CM

## 2024-07-25 RX ORDER — PREDNISONE 10 MG/1
TABLET ORAL
COMMUNITY
Start: 2024-07-03

## 2024-07-25 RX ORDER — TIZANIDINE 4 MG/1
1 TABLET ORAL DAILY
COMMUNITY
Start: 2024-07-02

## 2024-07-25 NOTE — PROGRESS NOTES
Post Op Note     Name: Susie Rangel    : 1970     MRN: [id number]     Chief Complaint  Post-op of the Left Hand (Status post carpal tunnel release on 24. States she has been having pain and swelling in her incision, she thinks it may be scar tissue. )    Subjective     History of Present Illness:  Susie Rangel is a 53 y.o. female who presents today for follow-up visit for the left hand due to continued pain and weakness of the left hand status post carpal tunnel release on 2024.  Patient complains of sharp pain around the incision site.  She denies any signs of obvious infection or systemic illness.  She notes continued weakness of her hands.  She states her paresthesias are significantly improved however she was concerned about the pain around the incision.  She notes that she has been using her old carpal tunnel brace at night which seems to help.  She does request to new braces as the ones she has are very old and worn.    Review of Systems   Constitutional:  Negative for fever.   HENT:  Negative for dental problem and voice change.    Eyes:  Negative for visual disturbance.   Respiratory:  Negative for shortness of breath.    Cardiovascular:  Negative for chest pain.   Gastrointestinal:  Negative for abdominal pain.   Genitourinary:  Negative for dysuria.   Musculoskeletal:  Positive for arthralgias (left hand) and joint swelling (left hand). Negative for gait problem.   Skin:  Negative for rash.   Neurological:  Negative for speech difficulty.   Hematological:  Does not bruise/bleed easily.   Psychiatric/Behavioral:  Negative for confusion.         Pain controlled: [] no   [x] yes   Medication refill requested: [x] no   [] yes    Patient compliant with instructions: [] no   [x] yes   Other: Reports good progress since surgery.     Past Medical History:   Diagnosis Date    Anxiety     Arthritis     Class 2 obesity with serious comorbidity and body mass index (BMI) of 38.0 to  "38.9 in adult 06/16/2016    Hx laparoscopic sleeve gastrectomy     Dyslipidemia 06/16/2016    GERD (gastroesophageal reflux disease)     Glaucoma 01/25/2023    Hypertension     Low back pain     Macular degeneration 01/25/2023    blurry vision    Obesity     Rheumatoid arthritis     Vitamin D deficiency         Past Surgical History:   Procedure Laterality Date    CARPAL TUNNEL RELEASE Left 5/30/2024    Procedure: Left hand carpal tunnel release;  Surgeon: Fam Nevarez MD;  Location: [facility] OR;  Service: Orthopedics;  Laterality: Left;    COLONOSCOPY N/A 04/27/2021    Procedure: COLONOSCOPY WITH BIOPSY;  Surgeon: Tamara Mehta MD;  Location:  [facility] OR;  Service: Gastroenterology;  Laterality: N/A;    ENDOSCOPY N/A 04/27/2021    Procedure: ESOPHAGOGASTRODUODENOSCOPY WITH BIOPSY;  Surgeon: Tamara Mehta MD;  Location: [facility] OR;  Service: Gastroenterology;  Laterality: N/A;    GASTRIC SLEEVE LAPAROSCOPIC      [facility] approx 2014 or 2016    HYSTERECTOMY      [facility]    PANNICULECTOMY      REDUCTION MAMMAPLASTY      2013       No Known Allergies    Objective   Temp 98.2 °F (36.8 °C)   Ht 170.2 cm (67.01\")   Wt 111 kg (244 lb)   BMI 38.21 kg/m²             Signs of infection: [x] no                    [] yes   Drainage: [x] no                    [] yes   Incision: [x] healing well     []healed well   Motor exam intact: [] no                    [x] yes   Neurovascular exam intact: [] no                    [x] yes   Signs of compartment syndrome: [x] no                    [] yes   Signs of DVT: [x] no                    [] yes   Other:      Physical Exam  Left Hand Exam     Comments:  The incision site is healed and has scarred over.  There is firmness around the incision site suggesting scar tissue development under the skin.  She has grossly full range of motion in the wrist and all fingers.  Full flexion and extension of the left wrist does increase pain.  She " is able to make a composite fist and has a normal cascade of motion.  Strength testing is approximately 3-4 out of 5 on the left, compared to 4 out of 5 on the right.  Gross sensation is intact to light touch throughout the left hand and fingers.  Carpal pulses 2+.            Extremity DVT signs are negative by clinical screen.    dependent Review of Radiographic Studies:    No new imaging done today.    Laboratory and Other Studies:  No new results reviewed today.     Medical Decision Making:    Patient has made good progress thus far 2-month status post carpal tunnel release.  She does continue to have pain around the incision site, weakness of her hands and mild paresthesias.  She states overall she is very satisfied with her surgery and the paresthesias are significantly improved.  I believe her pain is related to scar tissue formation underneath the skin.  She was sent for a course of physical therapy including scar desensitization.  She was also provided with 2 new carpal tunnel braces to wear at night.  I advised her to follow-up with me after her physical therapy is completed if symptoms are not improved.  Patient is agreeable with the plan.    Procedures    Assessment and Plan     Diagnoses and all orders for this visit:    1. S/P carpal tunnel release, left (Primary)  -     Ambulatory Referral to Physical Therapy  -     Ambulatory Referral to Physical Therapy    2. Weakness of left hand  -     Ambulatory Referral to Physical Therapy  -     Ambulatory Referral to Physical Therapy        Recommendations/Plan:     Sutures Staples or Pins [] Removed today  [x] At prior visit  [] Plan removal later   Physical therapy: []rehab facility  [x]outpatient referral  [] therapy ongoing   Ultrasound: [x]not ordered         []order given to patient   Labs: [x]not ordered         []order given to patient   Weight Bearing status: [x]Full []WBAT []PWB []NWB []Other     Discussion of orthopaedic goals and activities and  patient and/or guardian expressed appreciation.  Regular exercise as tolerated  Guided on proper techniques for mobility, strength, agility and/or conditioning exercises  Weight bearing parameters reviewed  Take prescribed medications as instructed only as tolerated     Exercise, medications, injections, other patient advice, and return appointment as noted.  Brace: Wrist brace.  Referral: Physical and Occupational Therapy referral.  Test/Studies: No additional studies ordered at this time.  Work/Activity Status: Usual activities, routine exercise as tolerated, light physical work as tolerated, no strenuous activity.    Return in about 6 weeks (around 9/5/2024) for Recheck.  Patient is encouraged and agreeable to call or return sooner for any issues or concerns.

## 2024-07-30 ENCOUNTER — HOSPITAL ENCOUNTER (OUTPATIENT)
Dept: MAMMOGRAPHY | Facility: HOSPITAL | Age: 54
Discharge: HOME | End: 2024-07-30
Payer: OTHER GOVERNMENT

## 2024-07-30 ENCOUNTER — HOSPITAL ENCOUNTER (OUTPATIENT)
Dept: BONE DENSITY | Facility: HOSPITAL | Age: 54
Discharge: HOME | End: 2024-07-30
Payer: OTHER GOVERNMENT

## 2024-07-30 DIAGNOSIS — Z12.31 ENCOUNTER FOR SCREENING MAMMOGRAM FOR BREAST CANCER: ICD-10-CM

## 2024-07-30 DIAGNOSIS — M05.79 RHEUMATOID ARTHRITIS INVOLVING MULTIPLE SITES WITH POSITIVE RHEUMATOID FACTOR: ICD-10-CM

## 2024-07-30 DIAGNOSIS — Z00.00 HEALTHCARE MAINTENANCE: ICD-10-CM

## 2024-07-30 PROCEDURE — 77067 SCR MAMMO BI INCL CAD: CPT | Performed by: RADIOLOGY

## 2024-07-30 PROCEDURE — 77080 DXA BONE DENSITY AXIAL: CPT | Performed by: RADIOLOGY

## 2024-07-30 PROCEDURE — 77080 DXA BONE DENSITY AXIAL: CPT

## 2024-07-30 PROCEDURE — 77067 SCR MAMMO BI INCL CAD: CPT

## 2024-07-30 PROCEDURE — 77063 BREAST TOMOSYNTHESIS BI: CPT

## 2024-07-30 PROCEDURE — 77063 BREAST TOMOSYNTHESIS BI: CPT | Performed by: RADIOLOGY

## 2024-08-19 ENCOUNTER — DISEASE STATE MANAGEMENT VISIT (OUTPATIENT)
Dept: PHARMACY | Facility: HOSPITAL | Age: 54
End: 2024-08-19
Payer: OTHER GOVERNMENT

## 2024-08-19 VITALS
HEIGHT: 67 IN | HEART RATE: 72 BPM | BODY MASS INDEX: 38.48 KG/M2 | WEIGHT: 245.2 LBS | DIASTOLIC BLOOD PRESSURE: 80 MMHG | SYSTOLIC BLOOD PRESSURE: 134 MMHG

## 2024-08-19 RX ORDER — DULOXETIN HYDROCHLORIDE 30 MG/1
1 CAPSULE, DELAYED RELEASE ORAL DAILY
COMMUNITY
Start: 2024-08-05

## 2024-08-19 RX ORDER — FOLIC ACID 1 MG/1
1 TABLET ORAL DAILY
COMMUNITY
Start: 2024-08-05

## 2024-08-19 NOTE — PROGRESS NOTES
[facility]  Weight Management Program      Susie Rangel is a 53 y.o. female referred to the [facility] by Dr. Hoang Palacios for clinical pharmacy and specialty pharmacy management of GLP1 for weight management.  The patient enies a personal history or family history of thyroid cancer and denies a personal history of pancreatitis.     Susie Rangel has previously tried Adipex, Mounjaro (on and off for about a year due to shortage. Lost ~15 lbs), exercising, and gastric sleeve in 2014 for weight loss. Current weight loss efforts include walking and resistance training. She has also been eating around 1700 calories a day.    Patient is currently on Wegovy 2.4 mg weekly. Patient denies any lumps/swelling/hoarseness in neck/throat or abdominal pain. Patient reports tolerating medication well without any side effects. Patient denies any trouble giving injection or any missed doses.  Patient reports that she has not had much appetite suppression thus far on Wegovy. She does report in one sitting she notices a decrease in the food she is eating but that she still craves sweets and snacks a lot.     Patient reports doing well targeting her current SMART goals. Patient reports that she has been in a bad flare with her RA and required steroid usage and has not been able to do much exercise. She reports that holidays, birthdays, etc has brought in sweets into the house more than what she likes to have and she is trying to watch calorie intake.      Patient started on cymbalta for fibromyalgia. She also has been cutting carbs and walking 3 days per week, but this has not reflected in her weight. Patient is wanting to change to Zepbound at this appointment. Order will be sent and Malena submitting PA today.    Patient denies any changes to medications or allergies since last visit.       Relevant Past Medical History and Co-morbidities  Past Medical History:   Diagnosis Date     Anxiety     Arthritis     Class 2 obesity with serious comorbidity and body mass index (BMI) of 38.0 to 38.9 in adult 06/16/2016    Hx laparoscopic sleeve gastrectomy     Dyslipidemia 06/16/2016    GERD (gastroesophageal reflux disease)     Glaucoma 01/25/2023    Hypertension     Low back pain     Macular degeneration 01/25/2023    blurry vision    Obesity     Rheumatoid arthritis     Vitamin D deficiency      Social History     Socioeconomic History    Marital status:      Spouse name: oren    Number of children: 1    Years of education: 14   Tobacco Use    Smoking status: Never     Passive exposure: Never    Smokeless tobacco: Never   Vaping Use    Vaping status: Never Used   Substance and Sexual Activity    Alcohol use: No    Drug use: Not Currently     Comment: occasional gummies with THC for RA pain/bedtime    Sexual activity: Yes       Allergies  Patient has no known allergies.    Current Medication List    Current Outpatient Medications:     DULoxetine (CYMBALTA) 30 MG capsule, Take 1 capsule by mouth Daily., Disp: , Rfl:     folic acid (FOLVITE) 1 MG tablet, Take 1 tablet by mouth Daily., Disp: , Rfl:     alendronate (FOSAMAX) 70 MG tablet, Take 1 tablet by mouth Every 7 (Seven) Days., Disp: 4 tablet, Rfl: 5    aspirin 81 MG EC tablet, Take 1 tablet by mouth Daily., Disp: 30 tablet, Rfl: 5    diclofenac (VOLTAREN) 75 MG EC tablet, Take 1 tablet by mouth 2 (Two) Times a Day As Needed. for pain, Disp: , Rfl:     HYDROcodone-acetaminophen (NORCO) 5-325 MG per tablet, Take 1 tablet by mouth Every 12 (Twelve) Hours As Needed For Pain, Disp: 14 tablet, Rfl: 0    hydrOXYzine (ATARAX) 25 MG tablet, Take 1 tablet by mouth 3 (Three) Times a Day As Needed for Itching. (Patient taking differently: Take 1 tablet by mouth 3 (Three) Times a Day As Needed for Anxiety.), Disp: 90 tablet, Rfl: 5    leflunomide (ARAVA) 20 MG tablet, Take 1 tablet by mouth Daily., Disp: , Rfl:     linaclotide (LINZESS) 290 MCG capsule  "capsule, Take 1 capsule by mouth Every Morning Before Breakfast., Disp: 30 capsule, Rfl: 5    lisinopril (PRINIVIL,ZESTRIL) 10 MG tablet, Take 1 tablet by mouth Daily. (Patient taking differently: Take 80 tablets by mouth Daily.), Disp: 30 tablet, Rfl: 5    omeprazole (priLOSEC) 40 MG capsule, Take 1 capsule by mouth 2 (Two) Times a Day., Disp: 60 capsule, Rfl: 5    predniSONE (DELTASONE) 10 MG tablet, TAKE 1 TABLET BY MOUTH EVERY DAY FOR 2 TO 5 DAYS AS NEEDED FOR FLARE UP. MAY REPEAT 1 TIME A WEEK, Disp: , Rfl:     pregabalin (Lyrica) 75 MG capsule, Take 1 capsule by mouth 2 (Two) Times a Day., Disp: 60 capsule, Rfl: 5    Rinvoq 15 MG tablet sustained-release 24 hour, Take  by mouth Daily., Disp: , Rfl:     Semaglutide-Weight Management (Wegovy) 2.4 MG/0.75ML solution auto-injector, Inject 2.4 mg under the skin into the appropriate area as directed 1 (One) Time Per Week., Disp: 3 mL, Rfl: 0    tiZANidine (ZANAFLEX) 4 MG tablet, Take 1 tablet by mouth Daily., Disp: , Rfl:     Drug Interactions  None with Wegovy    Relevant Laboratory Values  Lab Results   Component Value Date    CHOL 261 (H) 04/29/2024    TRIG 71 04/29/2024    HDL 63 (H) 04/29/2024     (H) 04/29/2024     Body mass index is 38.39 kg/m².    Vaccinations:   Patient recommended to keep up with routine vaccinations.     Goals of Therapy  Clinical Goals or Therapeutic Targets: Prevent weight associated co-morbidities and complications      Date 2/5/23 3/4/24 4/1/24 4/29/24 5/23/24 6/21/24 7/23/24 8/19/24   Weight (lb) 246.6 lb 249.2 lb 247.8 lb 243.8 lb 243.4 lb 243 lbs 244 lb 245.2 lb   BMI kg/ 38.62 39.03 38.80 38.18 38.11 38.05 38.20 38.39   Waist Circumference (in)  49\" 49\" 50\" 50.25 48.5\" 48.5\" 47.25\" 49.5       Medication Assessment & Plan    Patient has current BMI of 38.20, which is considered Class II Obesity. Patient has lost 5.2 lbs overall;. Patient was congratulated on success thus far. Patient did not have weight loss this visit but " had 1 lb weight gain.     Will order Zepbound 5mg SC weekly.      The following medications will need dose adjustments/closer monitoring once the GLP1 is started: None    Pt BP elevated in clinic, Patient reports she has not taken her BP meds yet today. Encouraged her to take routine medication and continue to monitor and reach out to PCP if BP remains elevated.     Discussed lifestyle modifications, including diet and exercise.  Patient education provided.     Patient had follow up with Dr. Palacios in June.  Lipids were addressed in his note. Did not see that TSH was discussed and patient did not recall that they discussed it. Will forward to Dr. Palacios to make sure no concern with continuing Wegovy or changing to Zepbound.     Worked with patient to create SMART Goal(s):   Walk for 30 minutes 4 days a week  Hand weights 3 days a week  Eat < 1700 calories/day    Will follow-up with patient in clinic in 4 weeks.     Janay Arciniega, PharmD  8/19/2024  13:55 EDT

## 2024-08-26 DIAGNOSIS — E66.01 CLASS 2 SEVERE OBESITY WITH SERIOUS COMORBIDITY AND BODY MASS INDEX (BMI) OF 39.0 TO 39.9 IN ADULT, UNSPECIFIED OBESITY TYPE: ICD-10-CM

## 2024-08-26 RX ORDER — PHENTERMINE HYDROCHLORIDE 37.5 MG/1
37.5 CAPSULE ORAL EVERY MORNING
Qty: 30 CAPSULE | Refills: 0 | Status: SHIPPED | OUTPATIENT
Start: 2024-08-26 | End: 2024-08-26 | Stop reason: SDUPTHER

## 2024-08-26 RX ORDER — PHENTERMINE HYDROCHLORIDE 37.5 MG/1
37.5 TABLET ORAL
Qty: 30 TABLET | Refills: 0 | Status: SHIPPED | OUTPATIENT
Start: 2024-08-26

## 2024-09-05 ENCOUNTER — OFFICE VISIT (OUTPATIENT)
Dept: ORTHOPEDIC SURGERY | Facility: CLINIC | Age: 54
End: 2024-09-05
Payer: OTHER GOVERNMENT

## 2024-09-05 VITALS
HEIGHT: 67 IN | DIASTOLIC BLOOD PRESSURE: 74 MMHG | BODY MASS INDEX: 37.51 KG/M2 | WEIGHT: 239 LBS | SYSTOLIC BLOOD PRESSURE: 118 MMHG

## 2024-09-05 DIAGNOSIS — L90.5 SCAR TISSUE: ICD-10-CM

## 2024-09-05 DIAGNOSIS — G56.22 CUBITAL TUNNEL SYNDROME ON LEFT: ICD-10-CM

## 2024-09-05 DIAGNOSIS — Z98.890 S/P CARPAL TUNNEL RELEASE: Primary | ICD-10-CM

## 2024-09-05 PROBLEM — R20.2 NUMBNESS AND TINGLING: Status: RESOLVED | Noted: 2024-02-28 | Resolved: 2024-09-05

## 2024-09-05 PROBLEM — I67.4 ENCEPHALOPATHY, HYPERTENSIVE: Status: ACTIVE | Noted: 2024-09-05

## 2024-09-05 PROBLEM — R20.0 NUMBNESS AND TINGLING: Status: RESOLVED | Noted: 2024-02-28 | Resolved: 2024-09-05

## 2024-09-05 PROBLEM — S06.0X1A CONCUSSION WITH LOSS OF CONSCIOUSNESS <= 30 MIN: Status: ACTIVE | Noted: 2024-09-05

## 2024-09-05 PROBLEM — R20.0 LEFT FACIAL NUMBNESS: Status: ACTIVE | Noted: 2024-09-05

## 2024-09-05 RX ORDER — ONDANSETRON 4 MG/1
4 TABLET, ORALLY DISINTEGRATING ORAL
COMMUNITY
Start: 2024-09-03

## 2024-09-05 NOTE — PROGRESS NOTES
Office Note     Name: Susie Rangel    : 1970     MRN: [id number]     Chief Complaint  Follow-up of the Left Hand (Status post CTR 24.)    Subjective     History of Present Illness:  Susie Rangel is a 53 y.o. female presenting today for approximate 3-month postop visit status post carpal tunnel release of the left wrist done on 2024.  Patient states that overall her symptoms have been improving significantly with therapy.  She does continue to have mild soreness over the incision which is most likely related to the scar tissue formation.  She denies any significant paresthesias through fingers 1 through 3 but does continue to have paresthesias in the left fourth and fifth digit.  These paresthesias tend to only be present at night however she has had a couple of instances of paresthesias throughout the day usually with therapy.  She denies any new or worsening symptoms.     Review of Systems   Musculoskeletal:  Positive for arthralgias (left hand).        Past Medical History:   Diagnosis Date    Anxiety     Arthritis     Class 2 obesity with serious comorbidity and body mass index (BMI) of 38.0 to 38.9 in adult 2016    Hx laparoscopic sleeve gastrectomy     Dyslipidemia 2016    GERD (gastroesophageal reflux disease)     Glaucoma 2023    Hypertension     Low back pain     Macular degeneration 2023    blurry vision    Obesity     Rheumatoid arthritis     Vitamin D deficiency         Past Surgical History:   Procedure Laterality Date    CARPAL TUNNEL RELEASE Left 2024    Procedure: Left hand carpal tunnel release;  Surgeon: Fam Nevarez MD;  Location: [facility] OR;  Service: Orthopedics;  Laterality: Left;    COLONOSCOPY N/A 2021    Procedure: COLONOSCOPY WITH BIOPSY;  Surgeon: Tamara Mehta MD;  Location: [facility] OR;  Service: Gastroenterology;  Laterality: N/A;    ENDOSCOPY N/A 2021    Procedure: ESOPHAGOGASTRODUODENOSCOPY  WITH BIOPSY;  Surgeon: Tamara Mehta MD;  Location: [facility];  Service: Gastroenterology;  Laterality: N/A;    GASTRIC SLEEVE LAPAROSCOPIC      [facility] approx 2014 or 2016    [facility]    PANNICULECTOMY      REDUCTION MAMMAPLASTY      2013       Family History   Problem Relation Age of Onset    Hypertension Mother     Neuropathy Mother     Heart disease Father     Breast cancer Paternal Cousin        Social History     Socioeconomic History    Marital status:      Spouse name: oren    Number of children: 1    Years of education: 14   Tobacco Use    Smoking status: Never     Passive exposure: Never    Smokeless tobacco: Never   Vaping Use    Vaping status: Never Used   Substance and Sexual Activity    Alcohol use: No    Drug use: Not Currently     Comment: occasional gummies with THC for RA pain/bedtime    Sexual activity: Yes         Current Outpatient Medications:     ondansetron ODT (ZOFRAN-ODT) 4 MG disintegrating tablet, 1 tablet., Disp: , Rfl:     alendronate (FOSAMAX) 70 MG tablet, Take 1 tablet by mouth Every 7 (Seven) Days., Disp: 4 tablet, Rfl: 5    aspirin 81 MG EC tablet, Take 1 tablet by mouth Daily., Disp: 30 tablet, Rfl: 5    diclofenac (VOLTAREN) 75 MG EC tablet, Take 1 tablet by mouth 2 (Two) Times a Day As Needed. for pain, Disp: , Rfl:     DULoxetine (CYMBALTA) 30 MG capsule, Take 1 capsule by mouth Daily., Disp: , Rfl:     folic acid (FOLVITE) 1 MG tablet, Take 1 tablet by mouth Daily., Disp: , Rfl:     HYDROcodone-acetaminophen (NORCO) 5-325 MG per tablet, Take 1 tablet by mouth Every 12 (Twelve) Hours As Needed For Pain, Disp: 14 tablet, Rfl: 0    hydrOXYzine (ATARAX) 25 MG tablet, Take 1 tablet by mouth 3 (Three) Times a Day As Needed for Itching. (Patient taking differently: Take 1 tablet by mouth 3 (Three) Times a Day As Needed for Anxiety.), Disp: 90 tablet, Rfl: 5    leflunomide (ARAVA) 20 MG tablet, Take 1 tablet by mouth Daily.,  "Disp: , Rfl:     linaclotide (LINZESS) 290 MCG capsule capsule, Take 1 capsule by mouth Every Morning Before Breakfast., Disp: 30 capsule, Rfl: 5    lisinopril (PRINIVIL,ZESTRIL) 10 MG tablet, Take 1 tablet by mouth Daily. (Patient taking differently: Take 80 tablets by mouth Daily.), Disp: 30 tablet, Rfl: 5    omeprazole (priLOSEC) 40 MG capsule, Take 1 capsule by mouth 2 (Two) Times a Day., Disp: 60 capsule, Rfl: 5    phentermine (ADIPEX-P) 37.5 MG tablet, TAKE 1 TABLET BY MOUTH EVERY MORNING BEFORE BREAKFAST, Disp: 30 tablet, Rfl: 0    predniSONE (DELTASONE) 10 MG tablet, TAKE 1 TABLET BY MOUTH EVERY DAY FOR 2 TO 5 DAYS AS NEEDED FOR FLARE UP. MAY REPEAT 1 TIME A WEEK, Disp: , Rfl:     pregabalin (Lyrica) 75 MG capsule, Take 1 capsule by mouth 2 (Two) Times a Day., Disp: 60 capsule, Rfl: 5    Rinvoq 15 MG tablet sustained-release 24 hour, Take  by mouth Daily., Disp: , Rfl:     Tirzepatide-Weight Management (ZEPBOUND) 5 MG/0.5ML solution auto-injector, Inject 0.5 mL under the skin into the appropriate area as directed 1 (One) Time Per Week., Disp: 2 mL, Rfl: 0    tiZANidine (ZANAFLEX) 4 MG tablet, Take 1 tablet by mouth Daily., Disp: , Rfl:     No Known Allergies        Objective   /74   Ht 170.2 cm (67.01\")   Wt 108 kg (239 lb)   BMI 37.42 kg/m²            Physical Exam  Left Hand Exam     Comments:  The patient's incision is healed and scarred over well.  There is 1 area of induration just medial to the incision which most likely represents scar tissue formation below the skin.  I believe this is the source of her continued pain.  She does have full range of motion in the wrist as well as full range of motion in all fingers.  She does have mild increase in pain at endrange range of motion.  Gross sensation is intact to light touch throughout the hand and all digits, slightly reduced in the left fourth and fifth fingertips.  Cap refill brisk, carpal pulses 2+.               Independent Review of " Radiographic Studies:    No new imaging done today.    Procedures    Assessment and Plan   Diagnoses and all orders for this visit:    1. S/P carpal tunnel release, left (Primary)    2. Scar tissue, painful, left wrist.    3. Cubital tunnel syndrome on left       Discussion of orthopedic goals  Orthopedic activities reviewed and patient expressed appreciation  Regular exercise as tolerated  Risk, benefits, and merits of treatment alternatives reviewed with the patient and questions answered  Patient guided on mobility and conditioning exercises  Ice, heat, and/or modalities as beneficial  Physical therapy ongoing  Use brace as instructed  Counseling on diet, nutrition, fitness exercise, and weight reduction goals    Recommendations/Plan:  Exercise, medications, injections, other patient advice, and return appointment as noted.  Patient is encouraged to call or return for any issues or concerns.    Advised continuing occupational therapy for left hand status post carpal tunnel release as well as continuing home exercise plan.  Reiterated the importance of towel brace over the left elbow at night to alleviate symptoms of numbness and tingling in the left fourth and fifth digit.  Patient is doing well but does continue to have relatively mild symptoms from her surgery.  I advised that the symptoms will most likely resolve over time and I advised her to follow-up with me as needed.    Return if symptoms worsen or fail to improve.  Patient agreeable to call or return sooner for any concerns.

## 2024-09-17 ENCOUNTER — DISEASE STATE MANAGEMENT VISIT (OUTPATIENT)
Dept: PHARMACY | Facility: HOSPITAL | Age: 54
End: 2024-09-17
Payer: OTHER GOVERNMENT

## 2024-10-14 ENCOUNTER — OFFICE VISIT (OUTPATIENT)
Dept: FAMILY MEDICINE CLINIC | Facility: CLINIC | Age: 54
End: 2024-10-14
Payer: OTHER GOVERNMENT

## 2024-10-14 VITALS
RESPIRATION RATE: 14 BRPM | SYSTOLIC BLOOD PRESSURE: 122 MMHG | DIASTOLIC BLOOD PRESSURE: 70 MMHG | OXYGEN SATURATION: 98 % | BODY MASS INDEX: 36.88 KG/M2 | WEIGHT: 235 LBS | HEART RATE: 83 BPM | TEMPERATURE: 98.1 F | HEIGHT: 67 IN

## 2024-10-14 DIAGNOSIS — Z98.84 HISTORY OF BARIATRIC SURGERY: ICD-10-CM

## 2024-10-14 DIAGNOSIS — J30.9 CHRONIC ALLERGIC RHINITIS: Primary | ICD-10-CM

## 2024-10-14 DIAGNOSIS — K59.09 CHRONIC CONSTIPATION: ICD-10-CM

## 2024-10-14 DIAGNOSIS — I87.2 CHRONIC VENOUS INSUFFICIENCY: ICD-10-CM

## 2024-10-14 DIAGNOSIS — G56.03 BILATERAL CARPAL TUNNEL SYNDROME: ICD-10-CM

## 2024-10-14 DIAGNOSIS — K21.9 GASTROESOPHAGEAL REFLUX DISEASE WITHOUT ESOPHAGITIS: ICD-10-CM

## 2024-10-14 DIAGNOSIS — Z23 ENCOUNTER FOR IMMUNIZATION: ICD-10-CM

## 2024-10-14 DIAGNOSIS — E66.01 CLASS 2 SEVERE OBESITY WITH SERIOUS COMORBIDITY AND BODY MASS INDEX (BMI) OF 39.0 TO 39.9 IN ADULT, UNSPECIFIED OBESITY TYPE: ICD-10-CM

## 2024-10-14 DIAGNOSIS — M05.79 RHEUMATOID ARTHRITIS INVOLVING MULTIPLE SITES WITH POSITIVE RHEUMATOID FACTOR: ICD-10-CM

## 2024-10-14 DIAGNOSIS — I10 ESSENTIAL HYPERTENSION: ICD-10-CM

## 2024-10-14 DIAGNOSIS — E66.01 CLASS 2 SEVERE OBESITY WITH SERIOUS COMORBIDITY AND BODY MASS INDEX (BMI) OF 36.0 TO 36.9 IN ADULT, UNSPECIFIED OBESITY TYPE: ICD-10-CM

## 2024-10-14 DIAGNOSIS — M54.59 MECHANICAL LOW BACK PAIN: ICD-10-CM

## 2024-10-14 DIAGNOSIS — E66.812 CLASS 2 SEVERE OBESITY WITH SERIOUS COMORBIDITY AND BODY MASS INDEX (BMI) OF 39.0 TO 39.9 IN ADULT, UNSPECIFIED OBESITY TYPE: ICD-10-CM

## 2024-10-14 DIAGNOSIS — K76.0 NON-ALCOHOLIC FATTY LIVER DISEASE: ICD-10-CM

## 2024-10-14 DIAGNOSIS — E66.812 CLASS 2 SEVERE OBESITY WITH SERIOUS COMORBIDITY AND BODY MASS INDEX (BMI) OF 36.0 TO 36.9 IN ADULT, UNSPECIFIED OBESITY TYPE: ICD-10-CM

## 2024-10-14 DIAGNOSIS — R03.0 ELEVATED BLOOD PRESSURE READING: ICD-10-CM

## 2024-10-14 DIAGNOSIS — E78.2 MIXED HYPERLIPIDEMIA: ICD-10-CM

## 2024-10-14 DIAGNOSIS — Z00.00 HEALTHCARE MAINTENANCE: ICD-10-CM

## 2024-10-14 DIAGNOSIS — F41.8 DEPRESSION WITH ANXIETY: ICD-10-CM

## 2024-10-14 PROBLEM — S06.0X1A CONCUSSION WITH LOSS OF CONSCIOUSNESS <= 30 MIN: Status: RESOLVED | Noted: 2024-09-05 | Resolved: 2024-10-14

## 2024-10-14 PROBLEM — I67.4 ENCEPHALOPATHY, HYPERTENSIVE: Status: RESOLVED | Noted: 2024-09-05 | Resolved: 2024-10-14

## 2024-10-14 PROCEDURE — 90471 IMMUNIZATION ADMIN: CPT | Performed by: GENERAL PRACTICE

## 2024-10-14 PROCEDURE — 99215 OFFICE O/P EST HI 40 MIN: CPT | Performed by: GENERAL PRACTICE

## 2024-10-14 PROCEDURE — 90656 IIV3 VACC NO PRSV 0.5 ML IM: CPT | Performed by: GENERAL PRACTICE

## 2024-10-14 RX ORDER — ALENDRONATE SODIUM 70 MG/1
70 TABLET ORAL
Qty: 4 TABLET | Refills: 5 | Status: SHIPPED | OUTPATIENT
Start: 2024-10-14

## 2024-10-14 RX ORDER — OMEPRAZOLE 40 MG/1
40 CAPSULE, DELAYED RELEASE ORAL 2 TIMES DAILY
Qty: 60 CAPSULE | Refills: 5 | Status: SHIPPED | OUTPATIENT
Start: 2024-10-14

## 2024-10-14 RX ORDER — PHENTERMINE HYDROCHLORIDE 37.5 MG/1
37.5 TABLET ORAL
Qty: 30 TABLET | Refills: 3 | Status: SHIPPED | OUTPATIENT
Start: 2024-10-14

## 2024-10-14 RX ORDER — LISINOPRIL 10 MG/1
10 TABLET ORAL DAILY
Qty: 30 TABLET | Refills: 5 | Status: SHIPPED | OUTPATIENT
Start: 2024-10-14

## 2024-10-14 RX ORDER — HYDROXYZINE HYDROCHLORIDE 25 MG/1
25 TABLET, FILM COATED ORAL 3 TIMES DAILY PRN
Qty: 90 TABLET | Refills: 5 | Status: SHIPPED | OUTPATIENT
Start: 2024-10-14

## 2024-10-14 NOTE — PROGRESS NOTES
Subjective   Susie Rangel is a 54 y.o. female.     Chief Complaint  She returns for a scheduled reassessment of multiple medical problems including intermittent numbness, rheumatoid arthritis, lumbar degenerative disc disease, constipation, essential hypertension, hyperlipidemia, and stress    History of Present Illness     Intermittent Numbness  MRI of the cervical spine performed on 2/28/2024 revealed evidence of degenerative disc disease but no compression of the cord or nerve roots was seen.  MRI of the brain performed the same day was unremarkable.  NCS/EMG of the upper extremities performed on 5/1/2024 revealed a bilateral median neuropathy and she underwent a left carpal tunnel release on 5/30/2024.  She denies any further left upper extremity numbness.  She continues to have intermittent numbness and tingling of her left face and right hand.  She continues to deny any new headaches, visual disturbances, weakness, or difficulty talking or understanding what is said to her.  She continues to deny any new joint or muscle pain and there is no history of any rash, fever, or chills.  She is scheduled to undergo a neurology reassessment with HANNA Emery on 12/5/2024    Rheumatoid Arthritis  She complains of persistent joint pain and stiffness.  There is no history of any joint swelling or redness,and she has had no rash, fever, or chills.  She continues to be followed by rheumatology, and is currently maintained on leflunomide and rinvoq.  She has not required prednisone for over 2 months.  She is taking alendronate as prescribed.  DEXA scan performed on 7/30/2024 returned with T-scores of 1.2 and 0.4 at the spine and left femoral neck respectively    Lumbar Degenerative Disc Disease  She complains of persistent low back pain. There has been no change in the quality nor any new associated symptoms.  MRI of the lumbar spine performed on 2/25/2022 revealed degenerative disc disease and  osteoarthritis    Left Lower Extremity Edema  She has noted a continued improvement in the swelling about her left foot.  This has been unassociated with any other symptoms and she continues to deny any pain or discoloration.  The swelling tends to develop toward the end of the day and improves overnight.    Constipation  She reports a continued improvement in her constipation. She denies any difficulty swallowing, nausea, vomiting, or heartburn, and there is no history of any diarrhea, hematochezia, or melena. EGD performed on 4/27/2021 revealed evidence of a previous sleeve gastrectomy along with mild gastritis.  Colonoscopy performed the same day revealed small internal hemorrhoids but was otherwise unremarkable.  She remains on omeprazole 40 daily    Class II Obesity  She remains on phentermine with no apparent side effects.  She recently started on tirzepatide and denies any apparent side effects.  She has had a decrease in her BMI from 38.4 to 36.8 over the last 2 months    Essential Hypertension  She is taking lisinopril as prescribed.    Hyperlipidemia  She is following an appropriate diet, but her activities remain limited due to her back pain    Stress  There has been some decrease in her stress with an improvement in her nervousness, worrying, difficulty sleeping. She continues to deny any depression, anxiety, loss interest in activities, or suicidal ideation.  She has a very supportive family.  She is on no psychiatric medication at present    The following portions of the patient's history were reviewed and updated as appropriate: allergies, current medications, past medical history, past social history, and problem list.    Review of Systems   Constitutional:  Positive for fatigue. Negative for chills and fever.   HENT:  Positive for rhinorrhea and sneezing. Negative for congestion, ear pain and sore throat.    Eyes:  Negative for visual disturbance.   Respiratory:  Negative for cough, shortness of  breath and wheezing.    Cardiovascular:  Positive for leg swelling (left). Negative for chest pain and palpitations.   Gastrointestinal:  Negative for abdominal pain, blood in stool, constipation, diarrhea, nausea and vomiting.   Endocrine:        Hot flashes   Genitourinary:  Negative for dysuria and hematuria.   Musculoskeletal:  Positive for arthralgias and back pain. Negative for joint swelling and myalgias.   Skin:  Negative for rash.   Neurological:  Positive for numbness. Negative for dizziness, tremors and weakness.   Psychiatric/Behavioral:  Positive for decreased concentration and sleep disturbance. Negative for suicidal ideas and depressed mood. The patient is nervous/anxious.      Objective   Physical Exam  Constitutional:       General: She is not in acute distress.     Appearance: Normal appearance. She is well-developed. She is not diaphoretic.      Comments: Bright and in good spirits. No apparent distress. No pallor, jaundice, diaphoresis, or cyanosis.   HENT:      Right Ear: Tympanic membrane, ear canal and external ear normal.      Left Ear: Tympanic membrane, ear canal and external ear normal.      Mouth/Throat:      Lips: No lesions.      Mouth: Mucous membranes are moist. No oral lesions.      Pharynx: No oropharyngeal exudate or posterior oropharyngeal erythema.   Eyes:      General: Lids are normal. No visual field deficit.     Extraocular Movements: Extraocular movements intact.      Conjunctiva/sclera: Conjunctivae normal.      Pupils: Pupils are equal.   Neck:      Thyroid: No thyroid mass or thyromegaly.      Vascular: No carotid bruit or JVD.      Trachea: Trachea normal. No tracheal deviation.   Cardiovascular:      Rate and Rhythm: Normal rate and regular rhythm.      Heart sounds: Normal heart sounds, S1 normal and S2 normal. No murmur heard.     No gallop.   Pulmonary:      Effort: Pulmonary effort is normal.      Breath sounds: Normal breath sounds.   Abdominal:      General: Bowel  sounds are normal. There is no distension.   Musculoskeletal:      Right lower leg: No edema.      Left lower leg: Edema (trace) present.      Comments: No peripheral joint redness or warmth.   Lymphadenopathy:      Head:      Right side of head: No submental, submandibular, tonsillar, preauricular, posterior auricular or occipital adenopathy.      Left side of head: No submental, submandibular, tonsillar, preauricular, posterior auricular or occipital adenopathy.      Cervical: No cervical adenopathy.      Upper Body:      Right upper body: No supraclavicular adenopathy.      Left upper body: No supraclavicular adenopathy.   Skin:     General: Skin is warm.      Coloration: Skin is not cyanotic, jaundiced or pale.      Findings: No rash.      Nails: There is no clubbing.      Comments: Steri-Stripped incision volar aspect left wrist   Neurological:      Mental Status: She is alert and oriented to person, place, and time.      Cranial Nerves: No cranial nerve deficit, dysarthria or facial asymmetry.      Sensory: No sensory deficit.      Motor: No weakness, tremor or abnormal muscle tone.      Coordination: Coordination normal.      Gait: Gait normal.   Psychiatric:         Attention and Perception: Attention normal.         Mood and Affect: Mood normal.         Speech: Speech normal.         Behavior: Behavior normal.         Thought Content: Thought content normal. Thought content does not include homicidal or suicidal ideation.       Assessment & Plan   Problems Addressed this Visit          Allergies and Adverse Reactions    Chronic allergic rhinitis        Cardiac and Vasculature    Chronic venous insufficiency  Reminded regarding lifestyle modification    Essential hypertension  Encouraged to continue to work on her diet and exercise plan.  Continue current medication  If she continues to lose weight and her blood pressure remains at goal we will consider reducing the dose of lisinopril    Relevant Medications     lisinopril (PRINIVIL,ZESTRIL) 10 MG tablet    Mixed hyperlipidemia  As above.  Will continue to monitor  Updated labs will be drawn at her return.       Endocrine and Metabolic    Class 2 severe obesity with serious comorbidity and body mass index (BMI) of 36.0 to 36.9 in adult  As above.   Continue current medication.    Relevant Medications    phentermine (ADIPEX-P) 37.5 MG tablet    History of bariatric surgery       Gastrointestinal Abdominal     Chronic constipation  Reminded regarding lifestyle modification  Continue current medication  Encouraged to report if any worse or if any new symptoms or concerns.    Relevant Medications    linaclotide (LINZESS) 290 MCG capsule capsule    Gastroesophageal reflux disease without esophagitis  As above.   Continue current medication.  Encouraged to report if any worse or if any new symptoms or concerns.    Relevant Medications    omeprazole (priLOSEC) 40 MG capsule    Non-alcoholic fatty liver disease       Health Encounters    Healthcare maintenance  Flu shot administered.  Recommended an updated COVID-19 shot    Relevant Orders    Fluzone >6mos (Completed)       Infectious Diseases    Encounter for immunization    Relevant Orders    Fluzone >6mos (Completed)       Mental Health    Depression with anxiety  Significant situational component.   Supportive therapy.   Continue current medication.  Encouraged to report if any worse or if any new symptoms or concerns.    Relevant Medications    phentermine (ADIPEX-P) 37.5 MG tablet    hydrOXYzine (ATARAX) 25 MG tablet       Musculoskeletal and Injuries    Mechanical low back pain    Rheumatoid arthritis involving multiple sites with positive rheumatoid factor  Reminded regarding symptomatic treatment.   Will continue current treatment.  Follow up with  rheumatology  If she remains off prednisone at her return we will consider discontinuing alendronate    Relevant Medications    alendronate (FOSAMAX) 70 MG tablet       Neuro     Bilateral carpal tunnel syndrome  S/P left release  Encouraged to report if any worse or if any new symptoms or concerns.          Diagnoses         Codes Comments    Chronic allergic rhinitis    -  Primary ICD-10-CM: J30.9  ICD-9-CM: 477.9     Mixed hyperlipidemia     ICD-10-CM: E78.2  ICD-9-CM: 272.2     Essential hypertension     ICD-10-CM: I10  ICD-9-CM: 401.9     Chronic venous insufficiency     ICD-10-CM: I87.2  ICD-9-CM: 459.81     History of bariatric surgery     ICD-10-CM: Z98.84  ICD-9-CM: V45.86     Class 2 severe obesity with serious comorbidity and body mass index (BMI) of 36.0 to 36.9 in adult, unspecified obesity type     ICD-10-CM: E66.812, Z68.36, E66.01  ICD-9-CM: 278.01, V85.36     Non-alcoholic fatty liver disease     ICD-10-CM: K76.0  ICD-9-CM: 571.8     Gastroesophageal reflux disease without esophagitis     ICD-10-CM: K21.9  ICD-9-CM: 530.81     Chronic constipation     ICD-10-CM: K59.09  ICD-9-CM: 564.00     Healthcare maintenance     ICD-10-CM: Z00.00  ICD-9-CM: V70.0     Depression with anxiety     ICD-10-CM: F41.8  ICD-9-CM: 300.4     Rheumatoid arthritis involving multiple sites with positive rheumatoid factor     ICD-10-CM: M05.79  ICD-9-CM: 714.0     Mechanical low back pain     ICD-10-CM: M54.59  ICD-9-CM: 724.2     Bilateral carpal tunnel syndrome     ICD-10-CM: G56.03  ICD-9-CM: 354.0     Encounter for immunization     ICD-10-CM: Z23  ICD-9-CM: V03.89     Class 2 severe obesity with serious comorbidity and body mass index (BMI) of 39.0 to 39.9 in adult, unspecified obesity type     ICD-10-CM: E66.812, Z68.39, E66.01  ICD-9-CM: 278.01, V85.39     Elevated blood pressure reading     ICD-10-CM: R03.0  ICD-9-CM: 796.2               I spent 42 minutes caring for Susie Rangel on this date of service. This time includes time spent by me in the following activities:reviewing tests, performing a medically appropriate examination and/or evaluation , counseling and educating the  patient/family/caregiver, ordering medications, tests, or procedures and documenting information in the medical record

## 2024-10-15 ENCOUNTER — DISEASE STATE MANAGEMENT VISIT (OUTPATIENT)
Dept: PHARMACY | Facility: HOSPITAL | Age: 54
End: 2024-10-15
Payer: OTHER GOVERNMENT

## 2024-10-15 VITALS
DIASTOLIC BLOOD PRESSURE: 93 MMHG | WEIGHT: 236.2 LBS | SYSTOLIC BLOOD PRESSURE: 165 MMHG | HEIGHT: 67 IN | HEART RATE: 69 BPM | BODY MASS INDEX: 37.07 KG/M2

## 2024-10-15 RX ORDER — TIRZEPATIDE 10 MG/.5ML
10 INJECTION, SOLUTION SUBCUTANEOUS WEEKLY
Qty: 2 ML | Refills: 0 | Status: SHIPPED | OUTPATIENT
Start: 2024-10-15

## 2024-10-15 NOTE — PROGRESS NOTES
[facility]  [facility] Management Program      Susie Rangel is a 54 y.o. female referred to the [facility] by Dr. Hoang Palacios for clinical pharmacy and specialty pharmacy management of GLP1 for weight management.  The patient enies a personal history or family history of thyroid cancer and denies a personal history of pancreatitis.     Susie Rangel has previously tried Adipex, Mounjaro (on and off for about a year due to shortage. Lost ~15 lbs), exercising, and gastric sleeve in 2014 for weight loss. Current weight loss efforts include walking, resistance training, Zepbound, and Adipex. She has also been eating around 1700 calories a day.    Patient is currently on Zepbound 7.5 mg weekly. Patient denies any lumps/swelling/hoarseness in neck/throat or abdominal pain. Patient reports tolerating medication well without any side effects. She does not feel as constipated with Zepbound. Patient denies any trouble giving injection or any missed doses. Patient wishes to titrate dose at this time. Patient was recently started on Adipex by Dr Palacios. Patient reports that she is still working on her SMART goals. She states she has been sick for the last few weeks, and has not felt like exercising much. She feels like she is on the mend, and plans to start walking again this week. She does meet her calorie goal of < 1700 calories a day.      Relevant Past Medical History and Co-morbidities  Past Medical History:   Diagnosis Date    Anxiety     Arthritis     Class 2 obesity with serious comorbidity and body mass index (BMI) of 38.0 to 38.9 in adult 06/16/2016    Hx laparoscopic sleeve gastrectomy     Dyslipidemia 06/16/2016    GERD (gastroesophageal reflux disease)     Glaucoma 01/25/2023    Hypertension     Low back pain     Macular degeneration 01/25/2023    blurry vision    Obesity     Rheumatoid arthritis     Vitamin D deficiency      Social History     Socioeconomic  History    Marital status:      Spouse name: oren    Number of children: 1    Years of education: 14   Tobacco Use    Smoking status: Never     Passive exposure: Never    Smokeless tobacco: Never   Vaping Use    Vaping status: Never Used   Substance and Sexual Activity    Alcohol use: No    Drug use: Not Currently     Comment: occasional gummies with THC for RA pain/bedtime    Sexual activity: Yes       Allergies  Patient has no known allergies.    Current Medication List    Current Outpatient Medications:     alendronate (FOSAMAX) 70 MG tablet, Take 1 tablet by mouth Every 7 (Seven) Days., Disp: 4 tablet, Rfl: 5    aspirin 81 MG EC tablet, Take 1 tablet by mouth Daily., Disp: 30 tablet, Rfl: 5    diclofenac (VOLTAREN) 75 MG EC tablet, Take 1 tablet by mouth 2 (Two) Times a Day As Needed. for pain, Disp: , Rfl:     DULoxetine (CYMBALTA) 30 MG capsule, Take 1 capsule by mouth Daily., Disp: , Rfl:     folic acid (FOLVITE) 1 MG tablet, Take 1 tablet by mouth Daily., Disp: , Rfl:     hydrOXYzine (ATARAX) 25 MG tablet, Take 1 tablet by mouth 3 (Three) Times a Day As Needed for Anxiety., Disp: 90 tablet, Rfl: 5    leflunomide (ARAVA) 20 MG tablet, Take 1 tablet by mouth Daily., Disp: , Rfl:     linaclotide (LINZESS) 290 MCG capsule capsule, Take 1 capsule by mouth Every Morning Before Breakfast., Disp: 30 capsule, Rfl: 5    lisinopril (PRINIVIL,ZESTRIL) 10 MG tablet, Take 1 tablet by mouth Daily., Disp: 30 tablet, Rfl: 5    omeprazole (priLOSEC) 40 MG capsule, Take 1 capsule by mouth 2 (Two) Times a Day., Disp: 60 capsule, Rfl: 5    phentermine (ADIPEX-P) 37.5 MG tablet, Take 1 tablet by mouth Every Morning Before Breakfast., Disp: 30 tablet, Rfl: 3    predniSONE (DELTASONE) 10 MG tablet, TAKE 1 TABLET BY MOUTH EVERY DAY FOR 2 TO 5 DAYS AS NEEDED FOR FLARE UP. MAY REPEAT 1 TIME A WEEK, Disp: , Rfl:     pregabalin (Lyrica) 75 MG capsule, Take 1 capsule by mouth 2 (Two) Times a Day., Disp: 60 capsule, Rfl: 5     "Rinvoq 15 MG tablet sustained-release 24 hour, Take  by mouth Daily., Disp: , Rfl:     Tirzepatide-Weight Management (ZEPBOUND) 7.5 MG/0.5ML solution auto-injector, Inject 0.5 mL under the skin into the appropriate area as directed 1 (One) Time Per Week., Disp: 2 mL, Rfl: 0    tiZANidine (ZANAFLEX) 4 MG tablet, Take 1 tablet by mouth Daily., Disp: , Rfl:     Drug Interactions  None with Zepbound    Relevant Laboratory Values  Lab Results   Component Value Date    CHOL 261 (H) 04/29/2024    TRIG 71 04/29/2024    HDL 63 (H) 04/29/2024     (H) 04/29/2024     There is no height or weight on file to calculate BMI.    Vaccinations:   Patient recommended to keep up with routine vaccinations.     Goals of Therapy  Clinical Goals or Therapeutic Targets: Prevent weight associated co-morbidities and complications      Date 2/5/23 3/4/24 4/1/24 4/29/24 5/23/24 6/21/24 7/23/24 8/19/24 9/17/24 10/15/24   Weight (lb) 246.6 lb 249.2 lb 247.8 lb 243.8 lb 243.4 lb 243 lbs 244 lb 245.2 lb 236.8 lb 236.2 lb   BMI kg/ 38.62 39.03 38.80 38.18 38.11 38.05 38.20 38.39 37.42 36.98   Waist Circumference (in)  49\" 49\" 50\" 50.25 48.5\" 48.5\" 47.25\" 49.5 47.25\" 48.5\"       Medication Assessment & Plan    Patient has current BMI of 36.98, which is considered Class II Obesity. Patient has lost 10.4 lbs overall;. Patient was congratulated on success thus far.     Will order Zepbound 10 mg SC weekly.      Spoke with Dr. Palacios and he is ok with patient continuing Zepbound while on Adipex as well.    The following medications will need dose adjustments/closer monitoring once the GLP1 is started: None    Discussed lifestyle modifications, including diet and exercise.  Patient education provided.     Worked with patient to create SMART Goal(s):   Walk for 30 minutes 4 days a week  Hand weights 3 days a week  Eat < 1700 calories/day    Will follow-up with patient in clinic in 4 weeks.     Chay Mosley RPH  10/15/2024  10:35 EDT    "

## 2024-11-12 ENCOUNTER — DISEASE STATE MANAGEMENT VISIT (OUTPATIENT)
Dept: PHARMACY | Facility: HOSPITAL | Age: 54
End: 2024-11-12
Payer: OTHER GOVERNMENT

## 2024-11-12 VITALS
HEART RATE: 71 BPM | WEIGHT: 230.4 LBS | HEIGHT: 67 IN | BODY MASS INDEX: 36.16 KG/M2 | SYSTOLIC BLOOD PRESSURE: 141 MMHG | DIASTOLIC BLOOD PRESSURE: 89 MMHG

## 2024-11-12 NOTE — PROGRESS NOTES
[facility]  Weight Management Program      Susie Rangel is a 54 y.o. female referred to the [facility] by Dr. Hoang Palacios for clinical pharmacy and specialty pharmacy management of GLP1 for weight management.  The patient enies a personal history or family history of thyroid cancer and denies a personal history of pancreatitis.     Susie Rangel has previously tried Adipex, Mounjaro (on and off for about a year due to shortage. Lost ~15 lbs), exercising, and gastric sleeve in 2014 for weight loss. Current weight loss efforts include walking, resistance training, Zepbound, and Adipex. She has also been eating around 1700 calories a day.    Patient is currently on Zepbound 10 mg weekly. Patient denies any lumps/swelling/hoarseness in neck/throat or abdominal pain. Patient reports tolerating medication well without any side effects. She does not feel as constipated with Zepbound. Patient denies any trouble giving injection or any missed doses. Patient wishes to titrate dose at this time. Patient was recently started on Adipex by Dr Palacios.     Patient reports that she is still working on her SMART goals. She has increased her water to about 32 oz per day, which is a lot for her. She does not typically drink much. She had not been walking as much (1-2 days per week), but has been doing bodyweight exercises. Her arthritis has been acting up, so she removed the weights and has continued to do bodyweight exercise instead. She has been maintaining 1235-1280 calories per day. She was tracking at first and now is trying to do more on her own without logging everything.      Relevant Past Medical History and Co-morbidities  Past Medical History:   Diagnosis Date    Anxiety     Arthritis     Class 2 obesity with serious comorbidity and body mass index (BMI) of 38.0 to 38.9 in adult 06/16/2016    Hx laparoscopic sleeve gastrectomy     Dyslipidemia 06/16/2016    GERD  (gastroesophageal reflux disease)     Glaucoma 01/25/2023    Hypertension     Low back pain     Macular degeneration 01/25/2023    blurry vision    Obesity     Rheumatoid arthritis     Vitamin D deficiency      Social History     Socioeconomic History    Marital status:      Spouse name: oren    Number of children: 1    Years of education: 14   Tobacco Use    Smoking status: Never     Passive exposure: Never    Smokeless tobacco: Never   Vaping Use    Vaping status: Never Used   Substance and Sexual Activity    Alcohol use: No    Drug use: Not Currently     Comment: occasional gummies with THC for RA pain/bedtime    Sexual activity: Yes       Allergies  Patient has no known allergies.    Current Medication List    Current Outpatient Medications:     alendronate (FOSAMAX) 70 MG tablet, Take 1 tablet by mouth Every 7 (Seven) Days., Disp: 4 tablet, Rfl: 5    aspirin 81 MG EC tablet, Take 1 tablet by mouth Daily., Disp: 30 tablet, Rfl: 5    diclofenac (VOLTAREN) 75 MG EC tablet, Take 1 tablet by mouth 2 (Two) Times a Day As Needed. for pain, Disp: , Rfl:     DULoxetine (CYMBALTA) 30 MG capsule, Take 1 capsule by mouth Daily., Disp: , Rfl:     folic acid (FOLVITE) 1 MG tablet, Take 1 tablet by mouth Daily., Disp: , Rfl:     hydrOXYzine (ATARAX) 25 MG tablet, Take 1 tablet by mouth 3 (Three) Times a Day As Needed for Anxiety., Disp: 90 tablet, Rfl: 5    leflunomide (ARAVA) 20 MG tablet, Take 1 tablet by mouth Daily., Disp: , Rfl:     linaclotide (LINZESS) 290 MCG capsule capsule, Take 1 capsule by mouth Every Morning Before Breakfast., Disp: 30 capsule, Rfl: 5    lisinopril (PRINIVIL,ZESTRIL) 10 MG tablet, Take 1 tablet by mouth Daily., Disp: 30 tablet, Rfl: 5    omeprazole (priLOSEC) 40 MG capsule, Take 1 capsule by mouth 2 (Two) Times a Day., Disp: 60 capsule, Rfl: 5    phentermine (ADIPEX-P) 37.5 MG tablet, Take 1 tablet by mouth Every Morning Before Breakfast., Disp: 30 tablet, Rfl: 3    predniSONE (DELTASONE)  "10 MG tablet, TAKE 1 TABLET BY MOUTH EVERY DAY FOR 2 TO 5 DAYS AS NEEDED FOR FLARE UP. MAY REPEAT 1 TIME A WEEK, Disp: , Rfl:     pregabalin (Lyrica) 75 MG capsule, Take 1 capsule by mouth 2 (Two) Times a Day., Disp: 60 capsule, Rfl: 5    Rinvoq 15 MG tablet sustained-release 24 hour, Take  by mouth Daily., Disp: , Rfl:     Tirzepatide-Weight Management (Zepbound) 10 MG/0.5ML solution auto-injector, Inject 0.5 mL under the skin into the appropriate area as directed 1 (One) Time Per Week., Disp: 2 mL, Rfl: 0    tiZANidine (ZANAFLEX) 4 MG tablet, Take 1 tablet by mouth Daily., Disp: , Rfl:     Drug Interactions  None with Zepbound    Relevant Laboratory Values  Lab Results   Component Value Date    CHOL 261 (H) 04/29/2024    TRIG 71 04/29/2024    HDL 63 (H) 04/29/2024     (H) 04/29/2024     Body mass index is 36.07 kg/m².    Vaccinations:   Patient recommended to keep up with routine vaccinations.     Goals of Therapy  Clinical Goals or Therapeutic Targets: Prevent weight associated co-morbidities and complications      Date 2/5/23 3/4/24 4/1/24 4/29/24 5/23/24 6/21/24 7/23/24 8/19/24 9/17/24 10/15/24 11/12/24   Weight (lb) 246.6 lb 249.2 lb 247.8 lb 243.8 lb 243.4 lb 243 lbs 244 lb 245.2 lb 236.8 lb 236.2 lb 230.4   BMI kg/ 38.62 39.03 38.80 38.18 38.11 38.05 38.20 38.39 37.42 36.98 36.07   Waist Circumference (in)  49\" 49\" 50\" 50.25 48.5\" 48.5\" 47.25\" 49.5 47.25\" 48.5\" 46\"       Medication Assessment & Plan    Patient has current BMI of 36.98, which is considered Class II Obesity. Patient has lost 16.2 lbs overall;. Patient was congratulated on success thus far.     Will order Zepbound 12.5 mg SC weekly.      Spoke with Dr. Palacios and he is ok with patient continuing Zepbound while on Adipex as well.    The following medications will need dose adjustments/closer monitoring once the GLP1 is started: None    Discussed lifestyle modifications, including diet and exercise.  Patient education provided. "     Worked with patient to create SMART Goal(s):   Walk for 30 minutes 4 days a week  Hand weights 3 days a week  Eat < 1700 calories/day    Will follow-up with patient in clinic in 4 weeks.     Janay Arciniega, PharmD  11/12/2024  10:34 EST

## 2024-12-10 ENCOUNTER — DISEASE STATE MANAGEMENT VISIT (OUTPATIENT)
Dept: PHARMACY | Facility: HOSPITAL | Age: 54
End: 2024-12-10
Payer: OTHER GOVERNMENT

## 2024-12-10 VITALS
HEART RATE: 75 BPM | HEIGHT: 67 IN | WEIGHT: 227.6 LBS | SYSTOLIC BLOOD PRESSURE: 138 MMHG | DIASTOLIC BLOOD PRESSURE: 86 MMHG | BODY MASS INDEX: 35.72 KG/M2

## 2024-12-10 NOTE — PROGRESS NOTES
[facility]  Weight Management Program      Susie Rangel is a 54 y.o. female referred to the [facility] by Dr. Hoang Palacios for clinical pharmacy and specialty pharmacy management of GLP1 for weight management.  The patient enies a personal history or family history of thyroid cancer and denies a personal history of pancreatitis.     Susie Rangel has previously tried Adipex, Mounjaro (on and off for about a year due to shortage. Lost ~15 lbs), exercising, and gastric sleeve in 2014 for weight loss. Current weight loss efforts include walking, resistance training, Zepbound, and Adipex. She has also been eating around 1700 calories a day.    Patient is currently on Zepbound 12.5 mg weekly. Patient denies any lumps/swelling/hoarseness in neck/throat or abdominal pain. Patient reports she has been experiencing more nausea these past 4 weeks. She does not feel as constipated with Zepbound. Patient denies any trouble giving injection or any missed doses. Patient wishes to titrate dose at this time. Patient was recently started on Adipex by Dr. Palacios.     Patient reports that she is still working on her SMART goals. She has increased her water to about 32 oz per day, which is a lot for her. She does not typically drink much. She had not been walking as much (1-2 days per week), but has been doing bodyweight exercises. Her arthritis has been acting up, so she removed the weights and has continued to do bodyweight exercise instead. She has been maintaining 1700 calories per day. She was tracking at first and now is trying to do more on her own without logging everything. She does have a treadmill at home, but it is in a room that is not heated and with her arthritis she has difficulties walking in that condition.    Patient would like to remain at 12.5 mg dose until the nausea improves.      Relevant Past Medical History and Co-morbidities  Past Medical History:    Diagnosis Date    Anxiety     Arthritis     Class 2 obesity with serious comorbidity and body mass index (BMI) of 38.0 to 38.9 in adult 06/16/2016    Hx laparoscopic sleeve gastrectomy     Dyslipidemia 06/16/2016    GERD (gastroesophageal reflux disease)     Glaucoma 01/25/2023    Hypertension     Low back pain     Macular degeneration 01/25/2023    blurry vision    Obesity     Rheumatoid arthritis     Vitamin D deficiency      Social History     Socioeconomic History    Marital status:      Spouse name: oren    Number of children: 1    Years of education: 14   Tobacco Use    Smoking status: Never     Passive exposure: Never    Smokeless tobacco: Never   Vaping Use    Vaping status: Never Used   Substance and Sexual Activity    Alcohol use: No    Drug use: Not Currently     Comment: occasional gummies with THC for RA pain/bedtime    Sexual activity: Yes       Allergies  Patient has no known allergies.    Current Medication List    Current Outpatient Medications:     alendronate (FOSAMAX) 70 MG tablet, Take 1 tablet by mouth Every 7 (Seven) Days., Disp: 4 tablet, Rfl: 5    aspirin 81 MG EC tablet, Take 1 tablet by mouth Daily., Disp: 30 tablet, Rfl: 5    diclofenac (VOLTAREN) 75 MG EC tablet, Take 1 tablet by mouth 2 (Two) Times a Day As Needed. for pain, Disp: , Rfl:     DULoxetine (CYMBALTA) 30 MG capsule, Take 1 capsule by mouth Daily., Disp: , Rfl:     folic acid (FOLVITE) 1 MG tablet, Take 1 tablet by mouth Daily., Disp: , Rfl:     hydrOXYzine (ATARAX) 25 MG tablet, Take 1 tablet by mouth 3 (Three) Times a Day As Needed for Anxiety., Disp: 90 tablet, Rfl: 5    leflunomide (ARAVA) 20 MG tablet, Take 1 tablet by mouth Daily., Disp: , Rfl:     linaclotide (LINZESS) 290 MCG capsule capsule, Take 1 capsule by mouth Every Morning Before Breakfast., Disp: 30 capsule, Rfl: 5    lisinopril (PRINIVIL,ZESTRIL) 10 MG tablet, Take 1 tablet by mouth Daily., Disp: 30 tablet, Rfl: 5    omeprazole (priLOSEC) 40 MG  "capsule, Take 1 capsule by mouth 2 (Two) Times a Day., Disp: 60 capsule, Rfl: 5    phentermine (ADIPEX-P) 37.5 MG tablet, Take 1 tablet by mouth Every Morning Before Breakfast., Disp: 30 tablet, Rfl: 3    predniSONE (DELTASONE) 10 MG tablet, TAKE 1 TABLET BY MOUTH EVERY DAY FOR 2 TO 5 DAYS AS NEEDED FOR FLARE UP. MAY REPEAT 1 TIME A WEEK, Disp: , Rfl:     pregabalin (Lyrica) 75 MG capsule, Take 1 capsule by mouth 2 (Two) Times a Day., Disp: 60 capsule, Rfl: 5    Rinvoq 15 MG tablet sustained-release 24 hour, Take  by mouth Daily., Disp: , Rfl:     Tirzepatide-Weight Management (ZEPBOUND) 12.5 MG/0.5ML solution auto-injector, Inject 0.5 mL under the skin into the appropriate area as directed 1 (One) Time Per Week., Disp: 2 mL, Rfl: 0    tiZANidine (ZANAFLEX) 4 MG tablet, Take 1 tablet by mouth Daily., Disp: , Rfl:     Drug Interactions  None with Zepbound    Relevant Laboratory Values  Lab Results   Component Value Date    CHOL 261 (H) 04/29/2024    TRIG 71 04/29/2024    HDL 63 (H) 04/29/2024     (H) 04/29/2024     Body mass index is 35.64 kg/m².    Vaccinations:   Patient recommended to keep up with routine vaccinations.     Goals of Therapy  Clinical Goals or Therapeutic Targets: Prevent weight associated co-morbidities and complications      Date 2/5/23 3/4/24 4/1/24 4/29/24 5/23/24 6/21/24 7/23/24 8/19/24 9/17/24 10/15/24 11/12/24   Weight (lb) 246.6 lb 249.2 lb 247.8 lb 243.8 lb 243.4 lb 243 lbs 244 lb 245.2 lb 236.8 lb 236.2 lb 230.4   BMI kg/ 38.62 39.03 38.80 38.18 38.11 38.05 38.20 38.39 37.42 36.98 36.07   Waist Circumference (in)  49\" 49\" 50\" 50.25 48.5\" 48.5\" 47.25\" 49.5 47.25\" 48.5\" 46\"     Date 12/10/24     Weight (lb) 227.6 lb     BMI kg/ 35.64     Waist Circumference (in)  46.5         Medication Assessment & Plan    Patient has current BMI of 35.64, which is considered Class II Obesity. Patient has lost 21.6 lbs overall;. Patient was congratulated on success thus far.     Will order Zepbound " 12.5 mg SC weekly.      Spoke with Dr. Palacios and he is ok with patient continuing Zepbound while on Adipex as well.    The following medications will need dose adjustments/closer monitoring once the GLP1 is started: None    Discussed lifestyle modifications, including diet and exercise.  Patient education provided.     Worked with patient to create SMART Goal(s):   Walk for 30 minutes 4 days a week  Hand weights 3 days a week  Eat < 1700 calories/day    Will follow-up with patient in clinic in 4 weeks.     Janay Arciniega, PharmD  12/10/2024  10:33 EST

## 2025-01-07 ENCOUNTER — DISEASE STATE MANAGEMENT VISIT (OUTPATIENT)
Dept: PHARMACY | Facility: HOSPITAL | Age: 55
End: 2025-01-07
Payer: OTHER GOVERNMENT

## 2025-01-07 VITALS
DIASTOLIC BLOOD PRESSURE: 86 MMHG | SYSTOLIC BLOOD PRESSURE: 145 MMHG | HEIGHT: 67 IN | WEIGHT: 227.6 LBS | HEART RATE: 69 BPM | BODY MASS INDEX: 35.72 KG/M2

## 2025-01-07 NOTE — PROGRESS NOTES
[facility]  Weight Management Program      Susie Rangel is a 54 y.o. female referred to the [facility] by Dr. Hoang Palacios for clinical pharmacy and specialty pharmacy management of GLP1 for weight management.  The patient enies a personal history or family history of thyroid cancer and denies a personal history of pancreatitis.     Susie Rangel has previously tried Adipex, Mounjaro (on and off for about a year due to shortage. Lost ~15 lbs), exercising, and gastric sleeve in 2014 for weight loss. Current weight loss efforts include walking, resistance training, Zepbound, and Adipex. She has also been eating around 1700 calories a day.    Patient is currently on Zepbound 12.5 mg weekly. Patient denies any lumps/swelling/hoarseness in neck/throat or abdominal pain. She is still experiencing some nausea especially in the mornings. She has PRN Zofran and it seems to help. Patient denies any trouble giving injection or any missed doses. She still continues Adipex prescribed by Dr. Palacios. She would like to remain on Zepbound 12.5 mg weekly another month before increasing due to nausea.    Patient reports that she is still working on her SMART goals. She has increased her water to about 32 oz per day, which is a lot for her. She does not typically drink much. She had not been walking as much now that it is cold outside. She does still do arm exercises without weights since her arthritis has been acting up. With the holidays she hasn't been tracking her calories but plans to get back on track.        Relevant Past Medical History and Co-morbidities  Past Medical History:   Diagnosis Date    Anxiety     Arthritis     Class 2 obesity with serious comorbidity and body mass index (BMI) of 38.0 to 38.9 in adult 06/16/2016    Hx laparoscopic sleeve gastrectomy     Dyslipidemia 06/16/2016    GERD (gastroesophageal reflux disease)     Glaucoma 01/25/2023    Hypertension      Low back pain     Macular degeneration 01/25/2023    blurry vision    Obesity     Rheumatoid arthritis     Vitamin D deficiency      Social History     Socioeconomic History    Marital status:      Spouse name: oren    Number of children: 1    Years of education: 14   Tobacco Use    Smoking status: Never     Passive exposure: Never    Smokeless tobacco: Never   Vaping Use    Vaping status: Never Used   Substance and Sexual Activity    Alcohol use: No    Drug use: Not Currently     Comment: occasional gummies with THC for RA pain/bedtime    Sexual activity: Yes       Allergies  Patient has no known allergies.    Current Medication List    Current Outpatient Medications:     alendronate (FOSAMAX) 70 MG tablet, Take 1 tablet by mouth Every 7 (Seven) Days., Disp: 4 tablet, Rfl: 5    aspirin 81 MG EC tablet, Take 1 tablet by mouth Daily., Disp: 30 tablet, Rfl: 5    diclofenac (VOLTAREN) 75 MG EC tablet, Take 1 tablet by mouth 2 (Two) Times a Day As Needed. for pain, Disp: , Rfl:     DULoxetine (CYMBALTA) 30 MG capsule, Take 1 capsule by mouth Daily., Disp: , Rfl:     folic acid (FOLVITE) 1 MG tablet, Take 1 tablet by mouth Daily., Disp: , Rfl:     hydrOXYzine (ATARAX) 25 MG tablet, Take 1 tablet by mouth 3 (Three) Times a Day As Needed for Anxiety., Disp: 90 tablet, Rfl: 5    leflunomide (ARAVA) 20 MG tablet, Take 1 tablet by mouth Daily., Disp: , Rfl:     linaclotide (LINZESS) 290 MCG capsule capsule, Take 1 capsule by mouth Every Morning Before Breakfast., Disp: 30 capsule, Rfl: 5    lisinopril (PRINIVIL,ZESTRIL) 10 MG tablet, Take 1 tablet by mouth Daily., Disp: 30 tablet, Rfl: 5    omeprazole (priLOSEC) 40 MG capsule, Take 1 capsule by mouth 2 (Two) Times a Day., Disp: 60 capsule, Rfl: 5    phentermine (ADIPEX-P) 37.5 MG tablet, Take 1 tablet by mouth Every Morning Before Breakfast., Disp: 30 tablet, Rfl: 3    predniSONE (DELTASONE) 10 MG tablet, TAKE 1 TABLET BY MOUTH EVERY DAY FOR 2 TO 5 DAYS AS NEEDED FOR  "FLARE UP. MAY REPEAT 1 TIME A WEEK, Disp: , Rfl:     pregabalin (Lyrica) 75 MG capsule, Take 1 capsule by mouth 2 (Two) Times a Day., Disp: 60 capsule, Rfl: 5    Rinvoq 15 MG tablet sustained-release 24 hour, Take  by mouth Daily., Disp: , Rfl:     Tirzepatide-Weight Management (ZEPBOUND) 12.5 MG/0.5ML solution auto-injector, Inject 0.5 mL under the skin into the appropriate area as directed 1 (One) Time Per Week., Disp: 2 mL, Rfl: 0    tiZANidine (ZANAFLEX) 4 MG tablet, Take 1 tablet by mouth Daily., Disp: , Rfl:     Drug Interactions  None with Zepbound    Relevant Laboratory Values  Lab Results   Component Value Date    CHOL 261 (H) 04/29/2024    TRIG 71 04/29/2024    HDL 63 (H) 04/29/2024     (H) 04/29/2024     There is no height or weight on file to calculate BMI.    Vaccinations:   Patient recommended to keep up with routine vaccinations.     Goals of Therapy  Clinical Goals or Therapeutic Targets: Prevent weight associated co-morbidities and complications      Date 2/5/23 3/4/24 4/1/24 4/29/24 5/23/24 6/21/24 7/23/24 8/19/24 9/17/24 10/15/24 11/12/24   Weight (lb) 246.6 lb 249.2 lb 247.8 lb 243.8 lb 243.4 lb 243 lbs 244 lb 245.2 lb 236.8 lb 236.2 lb 230.4   BMI kg/ 38.62 39.03 38.80 38.18 38.11 38.05 38.20 38.39 37.42 36.98 36.07   Waist Circumference (in)  49\" 49\" 50\" 50.25 48.5\" 48.5\" 47.25\" 49.5 47.25\" 48.5\" 46\"     Date 12/10/24 1/7/25    Weight (lb) 227.6 lb 227.6 lb    BMI kg/ 35.64 35.64    Waist Circumference (in)  46.5 47\"        Medication Assessment & Plan    Patient has current BMI of 35.64, which is considered Class II Obesity. Patient has lost 19 lbs overall;. Patient was congratulated on success thus far.     Will re-order Zepbound 12.5 mg SC weekly.      The following medications will need dose adjustments/closer monitoring once the GLP1 is started: None    Discussed lifestyle modifications, including diet and exercise.  Patient education provided.     Worked with patient to create " SMART Goal(s):   Walk for 30 minutes 4 days a week  Hand weights 3 days a week  Eat < 1700 calories/day    Will follow-up with patient in clinic in 4 weeks.     Stephanie Herndon, PharmD  1/7/2025  10:24 EST

## 2025-01-17 DIAGNOSIS — R20.0 NUMBNESS AND TINGLING OF LEFT SIDE OF FACE: ICD-10-CM

## 2025-01-17 DIAGNOSIS — R20.2 NUMBNESS AND TINGLING OF LEFT SIDE OF FACE: ICD-10-CM

## 2025-01-17 DIAGNOSIS — R20.2 PARESTHESIA OF LEFT ARM: ICD-10-CM

## 2025-01-20 NOTE — TELEPHONE ENCOUNTER
Caller: Susie Rangel    Relationship: Self    Best call back number: [phone]    Requested Prescriptions:   Requested Prescriptions     Pending Prescriptions Disp Refills    pregabalin (LYRICA) 75 MG capsule [Pharmacy Med Name: PREGABALIN 75MG CAPSULES] 60 capsule      Sig: TAKE 1 CAPSULE BY MOUTH TWICE DAILY        Pharmacy where request should be sent: [id number] [address] AT [facility] [address] - [phone] PH - [phone] FX     Last office visit with prescribing clinician: 4/24/2024   Last telemedicine visit with prescribing clinician: Visit date not found   Next office visit with prescribing clinician: 2/5/2025     Additional details provided by patient:   PHARMACY CALLED THIS IN AS WELL AS PT WAS TOLD SHE DIDN'T HAVE ANY REFILLS AND REFILLS WERE NOT APPROVED.    Does the patient have less than a 3 day supply:  [x] Yes  [] No    Would you like a call back once the refill request has been completed: [] Yes [] No    If the office needs to give you a call back, can they leave a voicemail: [] Yes [] No    Suzanna Guerrero Rep   01/20/25 11:04 EST

## 2025-01-21 RX ORDER — PREGABALIN 75 MG/1
75 CAPSULE ORAL 2 TIMES DAILY
Qty: 60 CAPSULE | Refills: 0 | Status: SHIPPED | OUTPATIENT
Start: 2025-01-21

## 2025-02-04 ENCOUNTER — DISEASE STATE MANAGEMENT VISIT (OUTPATIENT)
Dept: PHARMACY | Facility: HOSPITAL | Age: 55
End: 2025-02-04
Payer: OTHER GOVERNMENT

## 2025-02-04 VITALS
HEART RATE: 74 BPM | SYSTOLIC BLOOD PRESSURE: 164 MMHG | HEIGHT: 67 IN | WEIGHT: 228.2 LBS | BODY MASS INDEX: 35.82 KG/M2 | DIASTOLIC BLOOD PRESSURE: 99 MMHG

## 2025-02-04 DIAGNOSIS — E66.01 MORBID (SEVERE) OBESITY DUE TO EXCESS CALORIES: Primary | ICD-10-CM

## 2025-02-04 NOTE — PROGRESS NOTES
[facility]  Weight Management Program      Susie Rangel is a 54 y.o. female referred to the [facility] by Dr. Hoang Palacios for clinical pharmacy and specialty pharmacy management of GLP1 for weight management.  The patient enies a personal history or family history of thyroid cancer and denies a personal history of pancreatitis.     Susie Rangel has previously tried Adipex, Mounjaro (on and off for about a year due to shortage. Lost ~15 lbs), exercising, and gastric sleeve in 2014 for weight loss. Current weight loss efforts include walking, resistance training, Zepbound, and Adipex. She has also been eating around 1700 calories a day.    Patient is currently on Zepbound 12.5 mg weekly. Patient denies any lumps/swelling/hoarseness in neck/throat or abdominal pain. She reports some constipation, but takes metamucil every day. She reports she has been doing it for 2 months. Patient denies any trouble giving injection or any missed doses. She still continues Adipex prescribed by Dr. Palacios. She would like to increase to Zepbound 15 mg.    Patient reports that she is still working on her SMART goals. Patient reports her mother in-law passed away recently so her diet hasn't been the best. However, she plans on working on that within the next month. She reports she drinks around 16 oz per day. She reports she will drink more water within this next month. She had not been walking as much due to her back pain. She does still do arm exercises without weights since her arthritis has been acting up.      In clinic today, blood pressure is 164/99. Patient reports that she took her blood pressure medications around 1 hour ago.    Relevant Past Medical History and Co-morbidities  Past Medical History:   Diagnosis Date    Anxiety     Arthritis     Class 2 obesity with serious comorbidity and body mass index (BMI) of 38.0 to 38.9 in adult 06/16/2016    Hx laparoscopic sleeve  gastrectomy     Dyslipidemia 06/16/2016    GERD (gastroesophageal reflux disease)     Glaucoma 01/25/2023    Hypertension     Low back pain     Macular degeneration 01/25/2023    blurry vision    Obesity     Rheumatoid arthritis     Vitamin D deficiency      Social History     Socioeconomic History    Marital status:      Spouse name: oren    Number of children: 1    Years of education: 14   Tobacco Use    Smoking status: Never     Passive exposure: Never    Smokeless tobacco: Never   Vaping Use    Vaping status: Never Used   Substance and Sexual Activity    Alcohol use: No    Drug use: Not Currently     Comment: occasional gummies with THC for RA pain/bedtime    Sexual activity: Yes       Allergies  Patient has no known allergies.    Current Medication List    Current Outpatient Medications:     alendronate (FOSAMAX) 70 MG tablet, Take 1 tablet by mouth Every 7 (Seven) Days., Disp: 4 tablet, Rfl: 5    aspirin 81 MG EC tablet, Take 1 tablet by mouth Daily., Disp: 30 tablet, Rfl: 5    diclofenac (VOLTAREN) 75 MG EC tablet, Take 1 tablet by mouth 2 (Two) Times a Day As Needed. for pain, Disp: , Rfl:     DULoxetine (CYMBALTA) 30 MG capsule, Take 1 capsule by mouth Daily., Disp: , Rfl:     folic acid (FOLVITE) 1 MG tablet, Take 1 tablet by mouth Daily., Disp: , Rfl:     hydrOXYzine (ATARAX) 25 MG tablet, Take 1 tablet by mouth 3 (Three) Times a Day As Needed for Anxiety., Disp: 90 tablet, Rfl: 5    leflunomide (ARAVA) 20 MG tablet, Take 1 tablet by mouth Daily., Disp: , Rfl:     linaclotide (LINZESS) 290 MCG capsule capsule, Take 1 capsule by mouth Every Morning Before Breakfast., Disp: 30 capsule, Rfl: 5    lisinopril (PRINIVIL,ZESTRIL) 10 MG tablet, Take 1 tablet by mouth Daily., Disp: 30 tablet, Rfl: 5    omeprazole (priLOSEC) 40 MG capsule, Take 1 capsule by mouth 2 (Two) Times a Day., Disp: 60 capsule, Rfl: 5    phentermine (ADIPEX-P) 37.5 MG tablet, Take 1 tablet by mouth Every Morning Before Breakfast.,  "Disp: 30 tablet, Rfl: 3    predniSONE (DELTASONE) 10 MG tablet, TAKE 1 TABLET BY MOUTH EVERY DAY FOR 2 TO 5 DAYS AS NEEDED FOR FLARE UP. MAY REPEAT 1 TIME A WEEK, Disp: , Rfl:     pregabalin (LYRICA) 75 MG capsule, TAKE 1 CAPSULE BY MOUTH TWICE DAILY, Disp: 60 capsule, Rfl: 0    Rinvoq 15 MG tablet sustained-release 24 hour, Take  by mouth Daily., Disp: , Rfl:     Tirzepatide-Weight Management (ZEPBOUND) 12.5 MG/0.5ML solution auto-injector, Inject 0.5 mL under the skin into the appropriate area as directed 1 (One) Time Per Week., Disp: 2 mL, Rfl: 0    tiZANidine (ZANAFLEX) 4 MG tablet, Take 1 tablet by mouth Daily., Disp: , Rfl:     Drug Interactions  None with Zepbound    Relevant Laboratory Values  Lab Results   Component Value Date    CHOL 261 (H) 04/29/2024    TRIG 71 04/29/2024    HDL 63 (H) 04/29/2024     (H) 04/29/2024     There is no height or weight on file to calculate BMI.    Vaccinations:   Patient recommended to keep up with routine vaccinations.     Goals of Therapy  Clinical Goals or Therapeutic Targets: Prevent weight associated co-morbidities and complications      Date 2/5/23 3/4/24 4/1/24 4/29/24 5/23/24 6/21/24 7/23/24 8/19/24 9/17/24 10/15/24 11/12/24   Weight (lb) 246.6 lb 249.2 lb 247.8 lb 243.8 lb 243.4 lb 243 lbs 244 lb 245.2 lb 236.8 lb 236.2 lb 230.4   BMI kg/ 38.62 39.03 38.80 38.18 38.11 38.05 38.20 38.39 37.42 36.98 36.07   Waist Circumference (in)  49\" 49\" 50\" 50.25 48.5\" 48.5\" 47.25\" 49.5 47.25\" 48.5\" 46\"     Date 12/10/24 1/7/25 2/4/25   Weight (lb) 227.6 lb 227.6 lb 228.2 lb    BMI kg/ 35.64 35.64 35.73   Waist Circumference (in)  46.5 47\" 46.5\"       Medication Assessment & Plan    Patient has current BMI of 35.73 which is considered Class II Obesity. Patient has lost 19 lbs overall;. Patient was congratulated on success thus far.     Will order Zepbound 15 mg SC weekly.      The following medications will need dose adjustments/closer monitoring once the GLP1 is started: " None    Discussed lifestyle modifications, including diet and exercise.  Patient education provided.     Worked with patient to create SMART Goal(s):   Walk for 30 minutes 4 days a week  Hand weights 3 days a week  Eat < 1700 calories/day    Will follow-up with patient in clinic in 4 weeks.     Noemi Rivera RPH  2/4/2025  10:31 EST

## 2025-02-05 ENCOUNTER — OFFICE VISIT (OUTPATIENT)
Dept: NEUROLOGY | Facility: CLINIC | Age: 55
End: 2025-02-05
Payer: OTHER GOVERNMENT

## 2025-02-05 VITALS
HEART RATE: 73 BPM | OXYGEN SATURATION: 100 % | DIASTOLIC BLOOD PRESSURE: 80 MMHG | HEIGHT: 67 IN | BODY MASS INDEX: 35.79 KG/M2 | WEIGHT: 228 LBS | TEMPERATURE: 97.5 F | SYSTOLIC BLOOD PRESSURE: 140 MMHG

## 2025-02-05 DIAGNOSIS — R20.2 NUMBNESS AND TINGLING OF LEFT SIDE OF FACE: ICD-10-CM

## 2025-02-05 DIAGNOSIS — R20.2 PARESTHESIA OF LEFT ARM: ICD-10-CM

## 2025-02-05 DIAGNOSIS — R20.0 NUMBNESS AND TINGLING OF LEFT SIDE OF FACE: ICD-10-CM

## 2025-02-05 RX ORDER — PREGABALIN 75 MG/1
75 CAPSULE ORAL 2 TIMES DAILY
Qty: 60 CAPSULE | Refills: 5 | Status: SHIPPED | OUTPATIENT
Start: 2025-02-05

## 2025-02-05 NOTE — PROGRESS NOTES
Follow Up Office Visit      Patient Name: Susie Rangel  : 1970   MRN: [id number]     Chief Complaint:    Chief Complaint   Patient presents with    Follow-up     Facial numbness and tingling; patient reports decreased symptoms; also reports right hand and left foot numbness/tingling       History of Present Illness: Susie Rangel is a 54 y.o. female who is here today to follow up with numbness in the left cheek area. She says she has one or two episodes since her last OV 25. She is on Lyrica 75 mg BID with good toleration and efficacy. She has tingling and numbness in the bottom of her foot and numbness in the rt 3rd and 4th digits but states she has DDD and problems with her SI.     -She had left CTR May 2024 and has never went back for the right side yet. She has most of the tingling and numbness on the right hand now. She says the CTR has helped the left side a lot. She is still sensitive if she puts pressure on the left hand.   -She is still followed by rheumatology for RA and is currently on invoke and leflunomide    History of Present Illness was carried forward from her 2024 office note as follows: Susie Rangel is a 53 y.o. female who is here today to follow up with numbness in her left upper extremity and left cheek area. This is a televideo visit via [place] as pt unable to see or hear provider on [place].  She says the facial numbness has gotten better but she still has some numbness and odd sensation under her eye and cheek area that comes and goes.  It is definitely less then it was initially and she can tell the Lyrica has shown benefit.  She is able to feel light touch.  Denies any visual disturbances, no facial asymmetry.  Last Thursday she had carpal tunnel release.  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  She said they felt like doing the left  side may benefit her tingling and numbness in her arm so they proceeded with the left side first.  -She is followed by rheumatology and is on Rinvoq for her RA    - She  has tried Gabapentin in the past but had some swelling. Would be willing to try Lyrica.   -She is also followed by ophthalmology as she reports she has early glaucoma and they are watching this.  -She had MRI of C-spine without contrast on 3/14/2024 which showed degenerative changes of the cervical spine as detailed level by level above:  There are several disc osteophyte complex formations, the largest of which is at C5-C6 where there is mild effacement of the ventral cord surface.  No discrete cord signal lesions are evident.  No abnormal enhancing mass lesions are evident.  -MRI brain with and without contrast:on 3/14/2024  There is no evidence of acute ischemia or intracranial hemorrhage.  There are no abnormal enhancing mass lesions.  There is mild paranasal sinus disease  -She is right hand dominant. She has not worked in over a year.   -Labs were reviewed from 2/28/2024.  TSH, CBC, CMP, B12, vitamin D noncontributory.  Her A1c was 5.4; lipids were elevated    Pertinent Medical History: RA with positive rheumatoid factor, hyperlipidemia, venous insufficiency, nonalcoholic fatty liver disease, anxiety and depression, history of bariatric surgery    Subjective      Review of Systems:   Review of Systems   Neurological:  Positive for numbness.       I have reviewed and the following portions of the patient's history were updated as appropriate: past family history, past medical history, past social history, past surgical history and problem list.    Medications:     Current Outpatient Medications:     aspirin 81 MG EC tablet, Take 1 tablet by mouth Daily., Disp: 30 tablet, Rfl: 5    diclofenac (VOLTAREN) 75 MG EC tablet, Take 1 tablet by mouth 2 (Two) Times a Day As Needed. for pain, Disp: , Rfl:     DULoxetine (CYMBALTA) 30 MG capsule, Take 1  "capsule by mouth Daily., Disp: , Rfl:     folic acid (FOLVITE) 1 MG tablet, Take 1 tablet by mouth Daily., Disp: , Rfl:     hydrOXYzine (ATARAX) 25 MG tablet, Take 1 tablet by mouth 3 (Three) Times a Day As Needed for Anxiety., Disp: 90 tablet, Rfl: 5    leflunomide (ARAVA) 20 MG tablet, Take 1 tablet by mouth Daily., Disp: , Rfl:     linaclotide (LINZESS) 290 MCG capsule capsule, Take 1 capsule by mouth Every Morning Before Breakfast., Disp: 30 capsule, Rfl: 5    lisinopril (PRINIVIL,ZESTRIL) 10 MG tablet, Take 1 tablet by mouth Daily., Disp: 30 tablet, Rfl: 5    omeprazole (priLOSEC) 40 MG capsule, Take 1 capsule by mouth 2 (Two) Times a Day., Disp: 60 capsule, Rfl: 5    phentermine (ADIPEX-P) 37.5 MG tablet, Take 1 tablet by mouth Every Morning Before Breakfast., Disp: 30 tablet, Rfl: 3    pregabalin (LYRICA) 75 MG capsule, Take 1 capsule by mouth 2 (Two) Times a Day., Disp: 60 capsule, Rfl: 5    Rinvoq 15 MG tablet sustained-release 24 hour, Take  by mouth Daily., Disp: , Rfl:     Tirzepatide-Weight Management (ZEPBOUND) 15 MG/0.5ML solution auto-injector, Inject 0.5 mL under the skin into the appropriate area as directed 1 (One) Time Per Week., Disp: 2 mL, Rfl: 0    tiZANidine (ZANAFLEX) 4 MG tablet, Take 1 tablet by mouth Daily., Disp: , Rfl:     alendronate (FOSAMAX) 70 MG tablet, Take 1 tablet by mouth Every 7 (Seven) Days. (Patient not taking: Reported on 2/5/2025), Disp: 4 tablet, Rfl: 5    predniSONE (DELTASONE) 10 MG tablet, TAKE 1 TABLET BY MOUTH EVERY DAY FOR 2 TO 5 DAYS AS NEEDED FOR FLARE UP. MAY REPEAT 1 TIME A WEEK (Patient not taking: Reported on 2/5/2025), Disp: , Rfl:     Allergies:   No Known Allergies    Objective     Physical Exam:  Vital Signs:   Vitals:    02/05/25 0954   BP: 140/80   Pulse: 73   Temp: 97.5 °F (36.4 °C)   SpO2: 100%   Weight: 103 kg (228 lb)   Height: 170.2 cm (67\")   PainSc:   6   PainLoc: Back     Body mass index is 35.71 kg/m².    Physical Exam  Constitutional:       " Appearance: Normal appearance.   HENT:      Head: Normocephalic.   Eyes:      General: Lids are normal.      Extraocular Movements: Extraocular movements intact.      Conjunctiva/sclera: Conjunctivae normal.   Pulmonary:      Effort: Pulmonary effort is normal.   Musculoskeletal:         General: Normal range of motion.   Skin:     General: Skin is warm and dry.   Neurological:      General: No focal deficit present.      Mental Status: She is alert and oriented to person, place, and time.      Cranial Nerves: Cranial nerves 2-12 are intact. No dysarthria.      Motor: Motor function is intact. No tremor.   Psychiatric:         Attention and Perception: Attention normal.         Mood and Affect: Mood and affect normal.         Speech: Speech normal.         Behavior: Behavior normal. Behavior is cooperative.         Thought Content: Thought content normal.         Cognition and Memory: Cognition normal.         Judgment: Judgment normal.         Neurological Exam  Mental Status  Alert. Oriented to person, place, time and situation. Oriented to person, place, and time. Recent and remote memory are intact. Speech is normal. no dysarthria present. Language is fluent with no aphasia. Attention and concentration are normal.    Cranial Nerves  CN III, IV, VI: Extraocular movements intact bilaterally. Normal lids and orbits bilaterally.  CN V: Facial sensation is normal.  CN VII: Full and symmetric facial movement.  CN XI: Shoulder shrug strength is normal.  CN XII: Tongue midline without atrophy or fasciculations.    Motor  Normal muscle bulk throughout. No fasciculations present. Normal muscle tone. No abnormal involuntary movements.    Coordination  No tremor    Gait  Casual gait is normal including stance, stride, and arm swing.      Assessment / Plan      Assessment/Plan:   Diagnoses and all orders for this visit:    1. Paresthesia of left arm  -     pregabalin (LYRICA) 75 MG capsule; Take 1 capsule by mouth 2 (Two)  Times a Day.  Dispense: 60 capsule; Refill: 5    2. Numbness and tingling of left side of face  -     pregabalin (LYRICA) 75 MG capsule; Take 1 capsule by mouth 2 (Two) Times a Day.  Dispense: 60 capsule; Refill: 5         Patient is doing well with Lyrica 75 mg twice daily.  She does have new tingling and numbness in her bilateral feet.  She is not interested in another EMG for the lower extremities at this point.  She will notify me if this becomes worse and we can consider increasing the dose of Lyrica.  In the meantime she will continue with rheumatology for RA.   Patient will call in the interim if she has any questions or concerns or changes.    Follow Up:   Return in about 6 months (around 8/5/2025).    HANNA Emery, FNP-[facility]

## 2025-02-18 ENCOUNTER — OFFICE VISIT (OUTPATIENT)
Dept: FAMILY MEDICINE CLINIC | Facility: CLINIC | Age: 55
End: 2025-02-18
Payer: OTHER GOVERNMENT

## 2025-02-18 DIAGNOSIS — K59.09 CHRONIC CONSTIPATION: ICD-10-CM

## 2025-02-18 DIAGNOSIS — G56.03 BILATERAL CARPAL TUNNEL SYNDROME: ICD-10-CM

## 2025-02-18 DIAGNOSIS — E66.812 CLASS 2 SEVERE OBESITY WITH SERIOUS COMORBIDITY AND BODY MASS INDEX (BMI) OF 36.0 TO 36.9 IN ADULT, UNSPECIFIED OBESITY TYPE: ICD-10-CM

## 2025-02-18 DIAGNOSIS — Z00.00 HEALTHCARE MAINTENANCE: ICD-10-CM

## 2025-02-18 DIAGNOSIS — I87.2 CHRONIC VENOUS INSUFFICIENCY: ICD-10-CM

## 2025-02-18 DIAGNOSIS — E66.812 CLASS 2 SEVERE OBESITY WITH SERIOUS COMORBIDITY AND BODY MASS INDEX (BMI) OF 39.0 TO 39.9 IN ADULT, UNSPECIFIED OBESITY TYPE: ICD-10-CM

## 2025-02-18 DIAGNOSIS — J30.9 CHRONIC ALLERGIC RHINITIS: Primary | ICD-10-CM

## 2025-02-18 DIAGNOSIS — Z98.84 HISTORY OF BARIATRIC SURGERY: ICD-10-CM

## 2025-02-18 DIAGNOSIS — K76.0 NON-ALCOHOLIC FATTY LIVER DISEASE: ICD-10-CM

## 2025-02-18 DIAGNOSIS — E66.01 CLASS 2 SEVERE OBESITY WITH SERIOUS COMORBIDITY AND BODY MASS INDEX (BMI) OF 39.0 TO 39.9 IN ADULT, UNSPECIFIED OBESITY TYPE: ICD-10-CM

## 2025-02-18 DIAGNOSIS — F41.8 DEPRESSION WITH ANXIETY: ICD-10-CM

## 2025-02-18 DIAGNOSIS — D17.1 LIPOMA OF TORSO: ICD-10-CM

## 2025-02-18 DIAGNOSIS — L63.9 ALOPECIA AREATA: ICD-10-CM

## 2025-02-18 DIAGNOSIS — K21.9 GASTROESOPHAGEAL REFLUX DISEASE WITHOUT ESOPHAGITIS: ICD-10-CM

## 2025-02-18 DIAGNOSIS — E66.01 CLASS 2 SEVERE OBESITY WITH SERIOUS COMORBIDITY AND BODY MASS INDEX (BMI) OF 36.0 TO 36.9 IN ADULT, UNSPECIFIED OBESITY TYPE: ICD-10-CM

## 2025-02-18 DIAGNOSIS — E78.2 MIXED HYPERLIPIDEMIA: ICD-10-CM

## 2025-02-18 DIAGNOSIS — M05.79 RHEUMATOID ARTHRITIS INVOLVING MULTIPLE SITES WITH POSITIVE RHEUMATOID FACTOR: ICD-10-CM

## 2025-02-18 DIAGNOSIS — I10 ESSENTIAL HYPERTENSION: ICD-10-CM

## 2025-02-18 DIAGNOSIS — R20.0 LEFT FACIAL NUMBNESS: ICD-10-CM

## 2025-02-18 DIAGNOSIS — M54.59 MECHANICAL LOW BACK PAIN: ICD-10-CM

## 2025-02-18 PROCEDURE — 99214 OFFICE O/P EST MOD 30 MIN: CPT | Performed by: GENERAL PRACTICE

## 2025-02-18 RX ORDER — PROMETHAZINE HYDROCHLORIDE 25 MG/1
25 TABLET ORAL EVERY 6 HOURS PRN
Qty: 60 TABLET | Refills: 5 | Status: SHIPPED | OUTPATIENT
Start: 2025-02-18

## 2025-02-18 NOTE — PROGRESS NOTES
Subjective   Susie Rangel is a 54 y.o. female.     Chief Complaint  She returns for a scheduled reassessment of multiple medical problems including intermittent numbness, rheumatoid arthritis, lumbar degenerative disc disease, chronic left lower extremity edema, chronic constipation, class II obesity, essential hypertension, hyperlipidemia, rest, and hair loss    History of Present Illness     Hair Loss  She noticed an area of hair loss over her left occipital scalp a few months ago.  She gives no history of previous, but several family members have had similar.  She denies any associated symptoms, there is no history of any redness, pruritus, or scaling.    Intermittent Numbness  MRI of the cervical spine performed on 2/28/2024 revealed evidence of degenerative disc disease but no compression of the cord or nerve roots was seen.  MRI of the brain performed the same day was unremarkable.  NCS/EMG of the upper extremities performed on 5/1/2024 revealed a bilateral median neuropathy and she underwent a left carpal tunnel release on 5/30/2024.  She denies any further left upper extremity numbness.  She continues to have numbness and tingling of her right hand and rarely left face.  She continues to deny any new headaches, visual disturbances, weakness, or difficulty talking or understanding what is said to her.  She continues to deny any new joint or muscle pain and there is no history of any rash, fever, or chills.  She underwent a neurology reassessment with HANNA Emery on 2/5/2025 and was continued on pregabalin 75 twice daily.  She is scheduled to return on 8/6/2025    Rheumatoid Arthritis  She has noted an improvement in her joint pain and stiffness since last here.  There is no history of any joint swelling or redness,and she has had no rash, fever, or chills.  She continues to be followed by rheumatology, and remains on leflunomide and rinvoq.  She has not required prednisone recently but has it  available if necessary.  She is taking alendronate as prescribed.  DEXA scan performed on 7/30/2024 returned with T-scores of 1.2 and 0.4 at the spine and left femoral neck respectively    Lumbar Degenerative Disc Disease  She complains of persistent low back pain. There has been no change in the quality nor any new associated symptoms.  MRI of the lumbar spine performed on 2/25/2022 revealed degenerative disc disease and osteoarthritis    Left Lower Extremity Edema  She has noted a continued improvement in the swelling about her left foot.  This has been unassociated with any other symptoms and she continues to deny any pain or discoloration.  The swelling tends to develop toward the end of the day and improves overnight.    Constipation  She reports a continued improvement in her constipation. She continues to deny any difficulty swallowing, vomiting, or heartburn, and there is no history of any diarrhea, hematochezia, or melena. EGD performed on 4/27/2021 revealed evidence of a previous sleeve gastrectomy along with mild gastritis.  Colonoscopy performed the same day revealed small internal hemorrhoids but was otherwise unremarkable.  She remains on omeprazole 40 daily    Class II Obesity  She remains on phentermine and tirzepatide.  She admits to occasional nausea following the latter.  She has had a decrease in her BMI from 38.4 to 35.1 over the last 6 months    Essential Hypertension  She is taking lisinopril as prescribed.    Hyperlipidemia  She is following an appropriate diet, but her activities remain limited due to her back pain    Stress  There has been some decrease in her stress with an improvement in her nervousness, worrying, difficulty sleeping. She continues to deny any depression, anxiety, loss interest in activities, or suicidal ideation.  She has a very supportive family.  She is on no psychiatric medication at present    The following portions of the patient's history were reviewed and updated  as appropriate: allergies, current medications, past medical history, past social history, and problem list.    Review of Systems   Constitutional:  Positive for fatigue. Negative for appetite change, chills, fever and unexpected weight change.   HENT:  Positive for rhinorrhea and sneezing. Negative for congestion, postnasal drip, sinus pressure, sore throat and voice change.    Eyes:  Negative for visual disturbance.   Respiratory:  Negative for cough, shortness of breath and wheezing.    Cardiovascular:  Positive for leg swelling (left). Negative for chest pain and palpitations.   Gastrointestinal:  Positive for nausea. Negative for abdominal pain, blood in stool, constipation, diarrhea and vomiting.   Endocrine:        Intermittent sweats   Genitourinary:  Negative for difficulty urinating, dysuria, frequency, hematuria and urgency.   Musculoskeletal:  Positive for arthralgias and back pain. Negative for neck pain.   Skin:  Negative for rash.        Area of hair loss left occipital scalp. Longstanding swelling left upper back. This has increased some in size over the last year. To date, there have been no associated symptoms   Neurological:  Positive for numbness. Negative for weakness and headaches.   Psychiatric/Behavioral:  Positive for decreased concentration and sleep disturbance. Negative for dysphoric mood. The patient is not nervous/anxious.      Objective   Physical Exam  Constitutional:       General: She is not in acute distress.     Appearance: Normal appearance. She is well-developed. She is not diaphoretic.      Comments: Bright and in good spirits. No apparent distress. No pallor, jaundice, diaphoresis, or cyanosis.   HENT:      Right Ear: Tympanic membrane, ear canal and external ear normal.      Left Ear: Tympanic membrane, ear canal and external ear normal.      Mouth/Throat:      Lips: No lesions.      Mouth: Mucous membranes are moist. No oral lesions.      Pharynx: No oropharyngeal exudate or  posterior oropharyngeal erythema.   Eyes:      General: Lids are normal. No visual field deficit.     Extraocular Movements: Extraocular movements intact.      Conjunctiva/sclera: Conjunctivae normal.      Pupils: Pupils are equal.   Neck:      Thyroid: No thyroid mass or thyromegaly.      Vascular: No carotid bruit or JVD.      Trachea: Trachea normal. No tracheal deviation.   Cardiovascular:      Rate and Rhythm: Normal rate and regular rhythm.      Heart sounds: Normal heart sounds, S1 normal and S2 normal. No murmur heard.     No gallop.   Pulmonary:      Effort: Pulmonary effort is normal.      Breath sounds: Normal breath sounds.   Abdominal:      General: Bowel sounds are normal. There is no distension.   Musculoskeletal:      Right lower leg: No edema.      Left lower leg: Edema (trace) present.      Comments: No peripheral joint redness or warmth.   Lymphadenopathy:      Head:      Right side of head: No submental, submandibular, tonsillar, preauricular, posterior auricular or occipital adenopathy.      Left side of head: No submental, submandibular, tonsillar, preauricular, posterior auricular or occipital adenopathy.      Cervical: No cervical adenopathy.      Upper Body:      Right upper body: No supraclavicular adenopathy.      Left upper body: No supraclavicular adenopathy.   Skin:     General: Skin is warm.      Coloration: Skin is not cyanotic, jaundiced or pale.      Findings: No rash.      Nails: There is no clubbing.      Comments: 3-4 cm well defined oval area of hair loss left occipital scalp.  New growth visible.  No erythema or scaling. 10-12 cm well-demarcated circular slightly mobile rubbery mass left upper medial back.  No overlying skin changes   Neurological:      Mental Status: She is alert and oriented to person, place, and time.      Cranial Nerves: No cranial nerve deficit, dysarthria or facial asymmetry.      Sensory: No sensory deficit.      Motor: No weakness, tremor or abnormal  muscle tone.      Coordination: Coordination normal.      Gait: Gait normal.   Psychiatric:         Attention and Perception: Attention normal.         Mood and Affect: Mood normal.         Speech: Speech normal.         Behavior: Behavior normal.         Thought Content: Thought content normal. Thought content does not include homicidal or suicidal ideation.       Assessment & Plan   Problems Addressed this Visit          Allergies and Adverse Reactions    Chronic allergic rhinitis       Cardiac and Vasculature    Chronic venous insufficiency  Reminded regarding lifestyle modification  Encouraged to report if any worse or if any new symptoms or concerns.    Essential hypertension   Hypertension: at goal. Evidence of target organ damage: none.  Encouraged to continue to work on diet and exercise plan.   Continue current medication    Relevant Medications    lisinopril (PRINIVIL,ZESTRIL) 10 MG tablet    Other Relevant Orders    CBC & Differential    Comprehensive Metabolic Panel    TSH    Hemoglobin A1c    Mixed hyperlipidemia  As above.  Scheduled for updated labs    Relevant Orders    Comprehensive Metabolic Panel    Lipid Panel    TSH    Hemoglobin A1c       Endocrine and Metabolic    Class 2 severe obesity with serious comorbidity and body mass index (BMI) of 36.0 to 36.9 in adult  As above.   Continue current medication.    Relevant Medications    promethazine (PHENERGAN) 25 MG tablet    phentermine (ADIPEX-P) 37.5 MG tablet    Other Relevant Orders    Hemoglobin A1c    History of bariatric surgery    Relevant Medications    promethazine (PHENERGAN) 25 MG tablet    Other Relevant Orders    CBC & Differential    Comprehensive Metabolic Panel       Gastrointestinal Abdominal     Chronic constipation  Reminded regarding lifestyle modification  Continue current medication  Encouraged to report if any worse or if any new symptoms or concerns.    Relevant Medications    linaclotide (LINZESS) 290 MCG capsule capsule     Other Relevant Orders    CBC & Differential    TSH    Gastroesophageal reflux disease without esophagitis  As above.   Continue current medication.    Relevant Medications    omeprazole (priLOSEC) 40 MG capsule    Other Relevant Orders    CBC & Differential    Non-alcoholic fatty liver disease  As above.  Will continue to monitor    Relevant Orders    Comprehensive Metabolic Panel    Hemoglobin A1c       Health Encounters    Healthcare maintenance  Recommended an updated COVID-19 shot  We will discuss an updated mammogram at her return       Hematology and Neoplasia    Lipoma of torso  Longstanding with some increase in size over the last year  Reviewed options going forward.  Patient wished to proceed with excision and arrangements will be made with general surgery    Relevant Orders    Ambulatory Referral to General Surgery       Mental Health    Depression with anxiety  Significant situational component.   Supportive therapy.   Continue current medication.  Encouraged to report if any worse or if any new symptoms or concerns.    Relevant Medications    phentermine (ADIPEX-P) 37.5 MG tablet    hydrOXYzine (ATARAX) 25 MG tablet    Other Relevant Orders    TSH       Musculoskeletal and Injuries    Mechanical low back pain    Rheumatoid arthritis involving multiple sites with positive rheumatoid factor  Reminded regarding symptomatic treatment.   Continue current medication  Follow up with  rheumatology    Relevant Orders    CBC & Differential    Comprehensive Metabolic Panel    Vitamin D,25-Hydroxy    Vitamin B12       Neuro    Bilateral carpal tunnel syndrome  S/P left release  Encouraged to report if any worse or if any new symptoms or concerns.       Skin    Alopecia areata  Left occipital scalp.  Appears to be resolving  Encouraged to report if any worse, any new symptoms, or if not continuing to improve over the next few months       Symptoms and Signs    Left facial numbness  Follow up with neurology    Encouraged to report if any worse or if any new symptoms or concerns.    Relevant Orders    Vitamin B12          Diagnoses         Codes Comments    Chronic allergic rhinitis    -  Primary ICD-10-CM: J30.9  ICD-9-CM: 477.9     Mixed hyperlipidemia     ICD-10-CM: E78.2  ICD-9-CM: 272.2     Essential hypertension     ICD-10-CM: I10  ICD-9-CM: 401.9     Chronic venous insufficiency     ICD-10-CM: I87.2  ICD-9-CM: 459.81     History of bariatric surgery     ICD-10-CM: Z98.84  ICD-9-CM: V45.86     Class 2 severe obesity with serious comorbidity and body mass index (BMI) of 36.0 to 36.9 in adult, unspecified obesity type     ICD-10-CM: E66.812, Z68.36, E66.01  ICD-9-CM: 278.01, V85.36     Non-alcoholic fatty liver disease     ICD-10-CM: K76.0  ICD-9-CM: 571.8     Gastroesophageal reflux disease without esophagitis     ICD-10-CM: K21.9  ICD-9-CM: 530.81     Chronic constipation     ICD-10-CM: K59.09  ICD-9-CM: 564.00     Healthcare maintenance     ICD-10-CM: Z00.00  ICD-9-CM: V70.0     Depression with anxiety     ICD-10-CM: F41.8  ICD-9-CM: 300.4     Rheumatoid arthritis involving multiple sites with positive rheumatoid factor     ICD-10-CM: M05.79  ICD-9-CM: 714.0     Mechanical low back pain     ICD-10-CM: M54.59  ICD-9-CM: 724.2     Lipoma of torso     ICD-10-CM: D17.1  ICD-9-CM: 214.1     Bilateral carpal tunnel syndrome     ICD-10-CM: G56.03  ICD-9-CM: 354.0     Alopecia areata     ICD-10-CM: L63.9  ICD-9-CM: 704.01     Left facial numbness     ICD-10-CM: R20.0  ICD-9-CM: 782.0     Class 2 severe obesity with serious comorbidity and body mass index (BMI) of 39.0 to 39.9 in adult, unspecified obesity type     ICD-10-CM: E66.812, Z68.39, E66.01  ICD-9-CM: 278.01, V85.39     Elevated blood pressure reading     ICD-10-CM: R03.0  ICD-9-CM: 796.2

## 2025-02-19 VITALS
BODY MASS INDEX: 35.16 KG/M2 | HEIGHT: 67 IN | RESPIRATION RATE: 14 BRPM | TEMPERATURE: 98.6 F | WEIGHT: 224 LBS | HEART RATE: 79 BPM | OXYGEN SATURATION: 98 % | DIASTOLIC BLOOD PRESSURE: 80 MMHG | SYSTOLIC BLOOD PRESSURE: 122 MMHG

## 2025-02-19 PROBLEM — D17.1 LIPOMA OF TORSO: Status: ACTIVE | Noted: 2025-02-19

## 2025-02-19 RX ORDER — LISINOPRIL 10 MG/1
10 TABLET ORAL DAILY
Qty: 30 TABLET | Refills: 5 | Status: SHIPPED | OUTPATIENT
Start: 2025-02-19

## 2025-02-19 RX ORDER — PHENTERMINE HYDROCHLORIDE 37.5 MG/1
37.5 TABLET ORAL
Qty: 30 TABLET | Refills: 3 | Status: SHIPPED | OUTPATIENT
Start: 2025-02-19

## 2025-02-19 RX ORDER — OMEPRAZOLE 40 MG/1
40 CAPSULE, DELAYED RELEASE ORAL 2 TIMES DAILY
Qty: 60 CAPSULE | Refills: 5 | Status: SHIPPED | OUTPATIENT
Start: 2025-02-19

## 2025-02-19 RX ORDER — HYDROXYZINE HYDROCHLORIDE 25 MG/1
25 TABLET, FILM COATED ORAL 3 TIMES DAILY PRN
Qty: 90 TABLET | Refills: 5 | Status: SHIPPED | OUTPATIENT
Start: 2025-02-19

## 2025-02-21 RX ORDER — ESTRADIOL 0.5 MG/1
0.5 TABLET ORAL DAILY
Qty: 30 TABLET | Refills: 5 | Status: SHIPPED | OUTPATIENT
Start: 2025-02-21

## 2025-03-04 ENCOUNTER — DISEASE STATE MANAGEMENT VISIT (OUTPATIENT)
Dept: PHARMACY | Facility: HOSPITAL | Age: 55
End: 2025-03-04
Payer: OTHER GOVERNMENT

## 2025-03-04 VITALS
WEIGHT: 222.8 LBS | HEIGHT: 67 IN | DIASTOLIC BLOOD PRESSURE: 98 MMHG | SYSTOLIC BLOOD PRESSURE: 148 MMHG | HEART RATE: 75 BPM | BODY MASS INDEX: 34.97 KG/M2

## 2025-03-04 DIAGNOSIS — E66.01 MORBID (SEVERE) OBESITY DUE TO EXCESS CALORIES: ICD-10-CM

## 2025-03-04 NOTE — PROGRESS NOTES
[facility]  Weight Management Program      Susie Rangel is a 54 y.o. female referred to the [facility] by Dr. Hoang Palacios for clinical pharmacy and specialty pharmacy management of GLP1 for weight management.  The patient enies a personal history or family history of thyroid cancer and denies a personal history of pancreatitis.     Susie Rangel has previously tried Adipex, Mounjaro (on and off for about a year due to shortage. Lost ~15 lbs), exercising, and gastric sleeve in 2014 for weight loss. Current weight loss efforts include walking, resistance training, Zepbound, and Adipex. She has also been eating around 1700 calories a day.    Patient is currently on Zepbound 15 mg weekly. Patient denies any lumps/swelling/hoarseness in neck/throat or abdominal pain. She reports some constipation early on, but not anymore. Patient denies any trouble giving injection or any missed doses. She still continues Adipex prescribed by Dr. Palacios.     Patient reports that she is still working on her SMART goals. Patient reports her mother in-law passed away recently so her diet hasn't been the best. However, she plans on working on that within the next month. She reports she drinks around 16 oz or more per day of water. She reports she will drink more water within this next month. She had not been walking as much due to her back pain. She does still do arm exercises without weights since her arthritis has been acting up. She is considering getting a gym membership and it will be much easier to be active when it is warmer outside.     In clinic today, blood pressure is 148/98. Patient reports that she took her blood pressure medications around 45 min ago.    Relevant Past Medical History and Co-morbidities  Past Medical History:   Diagnosis Date    Anxiety     Arthritis     Class 2 obesity with serious comorbidity and body mass index (BMI) of 38.0 to 38.9 in adult 06/16/2016     Hx laparoscopic sleeve gastrectomy     Dyslipidemia 06/16/2016    GERD (gastroesophageal reflux disease)     Glaucoma 01/25/2023    Hypertension     Low back pain     Macular degeneration 01/25/2023    blurry vision    Obesity     Rheumatoid arthritis     Vitamin D deficiency      Social History     Socioeconomic History    Marital status:      Spouse name: oren    Number of children: 1    Years of education: 14   Tobacco Use    Smoking status: Never     Passive exposure: Never    Smokeless tobacco: Never   Vaping Use    Vaping status: Never Used   Substance and Sexual Activity    Alcohol use: No    Drug use: Not Currently     Comment: occasional gummies with THC for RA pain/bedtime    Sexual activity: Yes       Allergies  Patient has no known allergies.    Current Medication List    Current Outpatient Medications:     alendronate (FOSAMAX) 70 MG tablet, Take 1 tablet by mouth Every 7 (Seven) Days., Disp: 4 tablet, Rfl: 5    aspirin 81 MG EC tablet, Take 1 tablet by mouth Daily., Disp: 30 tablet, Rfl: 5    diclofenac (VOLTAREN) 75 MG EC tablet, Take 1 tablet by mouth 2 (Two) Times a Day As Needed. for pain, Disp: , Rfl:     DULoxetine (CYMBALTA) 30 MG capsule, Take 1 capsule by mouth Daily., Disp: , Rfl:     estradiol (ESTRACE) 0.5 MG tablet, Take 1 tablet by mouth Daily., Disp: 30 tablet, Rfl: 5    folic acid (FOLVITE) 1 MG tablet, Take 1 tablet by mouth Daily., Disp: , Rfl:     hydrOXYzine (ATARAX) 25 MG tablet, Take 1 tablet by mouth 3 (Three) Times a Day As Needed for Anxiety., Disp: 90 tablet, Rfl: 5    leflunomide (ARAVA) 20 MG tablet, Take 1 tablet by mouth Daily., Disp: , Rfl:     linaclotide (LINZESS) 290 MCG capsule capsule, Take 1 capsule by mouth Every Morning Before Breakfast., Disp: 30 capsule, Rfl: 5    lisinopril (PRINIVIL,ZESTRIL) 10 MG tablet, Take 1 tablet by mouth Daily., Disp: 30 tablet, Rfl: 5    omeprazole (priLOSEC) 40 MG capsule, Take 1 capsule by mouth 2 (Two) Times a Day., Disp:  "60 capsule, Rfl: 5    phentermine (ADIPEX-P) 37.5 MG tablet, Take 1 tablet by mouth Every Morning Before Breakfast., Disp: 30 tablet, Rfl: 3    predniSONE (DELTASONE) 10 MG tablet, , Disp: , Rfl:     pregabalin (LYRICA) 75 MG capsule, Take 1 capsule by mouth 2 (Two) Times a Day., Disp: 60 capsule, Rfl: 5    promethazine (PHENERGAN) 25 MG tablet, Take 1 tablet by mouth Every 6 (Six) Hours As Needed for Nausea or Vomiting., Disp: 60 tablet, Rfl: 5    Rinvoq 15 MG tablet sustained-release 24 hour, Take  by mouth Daily., Disp: , Rfl:     Tirzepatide-Weight Management (ZEPBOUND) 15 MG/0.5ML solution auto-injector, Inject 0.5 mL under the skin into the appropriate area as directed 1 (One) Time Per Week., Disp: 2 mL, Rfl: 0    tiZANidine (ZANAFLEX) 4 MG tablet, Take 1 tablet by mouth Daily., Disp: , Rfl:     Drug Interactions  None with Zepbound    Relevant Laboratory Values  Lab Results   Component Value Date    CHOL 261 (H) 04/29/2024    TRIG 71 04/29/2024    HDL 63 (H) 04/29/2024     (H) 04/29/2024     Body mass index is 34.89 kg/m².    Vaccinations:   Patient recommended to keep up with routine vaccinations.     Goals of Therapy  Clinical Goals or Therapeutic Targets: Prevent weight associated co-morbidities and complications      Date 2/5/23 3/4/24 4/1/24 4/29/24 5/23/24 6/21/24 7/23/24 8/19/24 9/17/24 10/15/24 11/12/24   Weight (lb) 246.6 lb 249.2 lb 247.8 lb 243.8 lb 243.4 lb 243 lbs 244 lb 245.2 lb 236.8 lb 236.2 lb 230.4   BMI kg/ 38.62 39.03 38.80 38.18 38.11 38.05 38.20 38.39 37.42 36.98 36.07   Waist Circumference (in)  49\" 49\" 50\" 50.25 48.5\" 48.5\" 47.25\" 49.5 47.25\" 48.5\" 46\"     Date 12/10/24 1/7/25 2/4/25 3/4/25   Weight (lb) 227.6 lb 227.6 lb 228.2 lb  222.8 lb   BMI kg/ 35.64 35.64 35.73 34.89   Waist Circumference (in)  46.5 47\" 46.5\" 45.5\"       Medication Assessment & Plan    Patient has current BMI of 34.89 which is considered Class II Obesity. Patient has lost 23.8 lbs overall;. Patient was " congratulated on success thus far.     Will re-order Zepbound 15 mg SC weekly.      The following medications will need dose adjustments/closer monitoring once the GLP1 is started: None    Discussed lifestyle modifications, including diet and exercise.  Patient education provided.     Worked with patient to create SMART Goal(s):   Walk for 30 minutes 4 days a week  Hand weights 3 days a week  Eat < 1700 calories/day    Will follow-up with patient in clinic in 4 weeks.     Liz Chinchilla RPH  3/4/2025  09:55 EST

## 2025-03-18 ENCOUNTER — OFFICE VISIT (OUTPATIENT)
Dept: SURGERY | Facility: CLINIC | Age: 55
End: 2025-03-18
Payer: COMMERCIAL

## 2025-03-18 VITALS — BODY MASS INDEX: 34.84 KG/M2 | HEIGHT: 67 IN | WEIGHT: 222 LBS

## 2025-03-18 DIAGNOSIS — D17.1 LIPOMA OF TORSO: ICD-10-CM

## 2025-03-18 DIAGNOSIS — D17.1 LIPOMA OF TORSO: Primary | ICD-10-CM

## 2025-03-18 NOTE — PROGRESS NOTES
Subjective   Susie Rangel is a 54 y.o. female is being seen for consultation today at the request of Hoang Palacios MD    Susie Rangel is a 54 y.o. female with large raised area of the soft tissue of the upper back near the dorsal cervical fat pad.  This is well-circumscribed mobile fatty mass consistent with lipoma measuring 4.5 cm.    History of Present Illness      Past Medical History:   Diagnosis Date    Anxiety     Arthritis     Class 2 obesity with serious comorbidity and body mass index (BMI) of 38.0 to 38.9 in adult 06/16/2016    Hx laparoscopic sleeve gastrectomy     Dyslipidemia 06/16/2016    GERD (gastroesophageal reflux disease)     Glaucoma 01/25/2023    Hypertension     Low back pain     Macular degeneration 01/25/2023    blurry vision    Obesity     Rheumatoid arthritis     Vitamin D deficiency        Family History   Problem Relation Age of Onset    Hypertension Mother     Neuropathy Mother     Heart disease Father     Breast cancer Paternal Cousin        Social History     Socioeconomic History    Marital status:      Spouse name: oren    Number of children: 1    Years of education: 14   Tobacco Use    Smoking status: Never     Passive exposure: Never    Smokeless tobacco: Never   Vaping Use    Vaping status: Never Used   Substance and Sexual Activity    Alcohol use: No    Drug use: Not Currently     Comment: occasional gummies with THC for RA pain/bedtime    Sexual activity: Yes       Past Surgical History:   Procedure Laterality Date    CARPAL TUNNEL RELEASE Left 5/30/2024    Procedure: Left hand carpal tunnel release;  Surgeon: Fam Nevarez MD;  Location: [facility];  Service: Orthopedics;  Laterality: Left;    COLONOSCOPY N/A 04/27/2021    Procedure: COLONOSCOPY WITH BIOPSY;  Surgeon: Tamara Mehta MD;  Location: [facility];  Service: Gastroenterology;  Laterality: N/A;    ENDOSCOPY N/A 04/27/2021    Procedure: ESOPHAGOGASTRODUODENOSCOPY WITH  "BIOPSY;  Surgeon: Tamara Mehta MD;  Location: [facility];  Service: Gastroenterology;  Laterality: N/A;    GASTRIC SLEEVE LAPAROSCOPIC      [place]  [place] approx 2014 or 2016    HYSTERECTOMY      [place]  [place]    PANNICULECTOMY      REDUCTION MAMMAPLASTY      2013       Review of Systems   Constitutional:  Negative for activity change, appetite change, chills and fever.   HENT:  Negative for sore throat and trouble swallowing.    Eyes:  Negative for visual disturbance.   Respiratory:  Negative for cough and shortness of breath.    Cardiovascular:  Negative for chest pain and palpitations.   Gastrointestinal:  Negative for abdominal distention, abdominal pain, blood in stool, constipation, diarrhea, nausea and vomiting.   Endocrine: Negative for cold intolerance and heat intolerance.   Genitourinary:  Negative for dysuria.   Musculoskeletal:  Negative for joint swelling.   Skin:  Negative for color change, rash and wound.   Allergic/Immunologic: Negative for immunocompromised state.   Neurological:  Negative for dizziness, seizures, weakness and headaches.   Hematological:  Negative for adenopathy. Does not bruise/bleed easily.   Psychiatric/Behavioral:  Negative for agitation and confusion.        Results        Ht 170.2 cm (67\")   Wt 101 kg (222 lb)   BMI 34.77 kg/m²   Objective   Physical Exam  Constitutional:       Appearance: She is well-developed.   HENT:      Head: Normocephalic and atraumatic.   Eyes:      Conjunctiva/sclera: Conjunctivae normal.      Pupils: Pupils are equal, round, and reactive to light.   Neck:      Thyroid: No thyromegaly.      Vascular: No JVD.      Trachea: No tracheal deviation.   Cardiovascular:      Rate and Rhythm: Normal rate and regular rhythm.      Heart sounds: No murmur heard.     No friction rub. No gallop.   Pulmonary:      Effort: Pulmonary effort is normal.      Breath sounds: Normal breath sounds.   Abdominal:      General: There is no distension. "      Palpations: Abdomen is soft. There is no hepatomegaly or splenomegaly.      Tenderness: There is no abdominal tenderness.      Hernia: No hernia is present.   Musculoskeletal:         General: No deformity. Normal range of motion.      Cervical back: Neck supple.   Skin:     General: Skin is warm and dry.             Comments: 4.5 cm lipoma   Neurological:      Mental Status: She is alert and oriented to person, place, and time.       Physical Exam    Excision of 4.5 cm lipoma of the back    Patient upper back prepped and draped in usual sterile fashion.  Timeout procedure was performed.  After injection local anesthetic a transverse incision overlying the soft tissue mass was made and dissection was carried through the normal subcutaneous tissues to the underlying lipoma which was removed in a piecemeal fashion.  All lipomatous fat was removed and sent to pathology.  The wound was hemostatic and was closed in layers with absorbable Vicryl suture and the incision dressed with skin affix.  Patient tolerated the procedure well.    Estimated blood loss 5 mL    Specimens: Lipoma    Complications: None  Assessment & Plan            Assessment   Diagnoses and all orders for this visit:    1. Lipoma of torso (Primary)        Assessment & Plan      Susie Rangel is a 54 y.o. female with with growing painful lipoma of the upper back excised in the office today.  Follow-up in 2 weeks to discuss pathology.                 This document has been electronically signed by Aram Del Valle MD   March 18, 2025 12:45 EDT    Patient or patient representative verbalized consent for the use of Ambient Listening during the visit with  Aram Del Valle MD for chart documentation. 3/18/2025  12:46 EDT

## 2025-03-20 LAB — REF LAB TEST METHOD: NORMAL

## 2025-04-01 ENCOUNTER — OFFICE VISIT (OUTPATIENT)
Dept: SURGERY | Facility: CLINIC | Age: 55
End: 2025-04-01
Payer: OTHER GOVERNMENT

## 2025-04-01 ENCOUNTER — DISEASE STATE MANAGEMENT VISIT (OUTPATIENT)
Dept: PHARMACY | Facility: HOSPITAL | Age: 55
End: 2025-04-01
Payer: OTHER GOVERNMENT

## 2025-04-01 VITALS — WEIGHT: 221 LBS | HEIGHT: 67 IN | BODY MASS INDEX: 34.69 KG/M2

## 2025-04-01 VITALS
DIASTOLIC BLOOD PRESSURE: 85 MMHG | HEIGHT: 67 IN | HEART RATE: 76 BPM | SYSTOLIC BLOOD PRESSURE: 131 MMHG | WEIGHT: 221 LBS | BODY MASS INDEX: 34.69 KG/M2

## 2025-04-01 DIAGNOSIS — E66.01 MORBID (SEVERE) OBESITY DUE TO EXCESS CALORIES: ICD-10-CM

## 2025-04-01 DIAGNOSIS — D17.1 LIPOMA OF TORSO: Primary | ICD-10-CM

## 2025-04-01 PROCEDURE — 99212 OFFICE O/P EST SF 10 MIN: CPT | Performed by: SURGERY

## 2025-04-01 NOTE — PROGRESS NOTES
"   [facility]  Weight Management Program      Susie Rangel is a 54 y.o. female referred to the [facility] by Dr. Hoang Palacios for clinical pharmacy and specialty pharmacy management of GLP1 for weight management.  The patient enies a personal history or family history of thyroid cancer and denies a personal history of pancreatitis.     Susie Rangel has previously tried Adipex, Mounjaro (on and off for about a year due to shortage. Lost ~15 lbs), exercising, and gastric sleeve in 2014 for weight loss. Current weight loss efforts include walking, resistance training, Zepbound, and Adipex. She has also been eating around 1700 calories a day.    Patient is currently on Zepbound 15 mg weekly. Patient denies any lumps/swelling/hoarseness in neck/throat or abdominal pain. She reports some constipation early on, but not anymore. Patient denies any trouble giving injection or any missed doses. She still continues Adipex prescribed by Dr. Palacios. Patient reports she got a prescription for promethazine, but she has not had to use it much for nausea.    Patient reports that she is still working on her SMART goals. She reports she drinks around 32 oz or more per day of water. She has been walking more this past month. The first few days she said she was \"down\" due to back pain, but she has been trying to push through this. She says the arm weights were too much for her, so she does regular body weight arm movements as she is walking and plans to progress back up to the weights when she can. Patient reports that she does not log her daily calories. She says she has tracked her calories for so many years that she has the calories down mentally of the regular food items she eats.    Relevant Past Medical History and Co-morbidities  Past Medical History:   Diagnosis Date    Anxiety     Arthritis     Class 2 obesity with serious comorbidity and body mass index (BMI) of 38.0 to 38.9 " in adult 06/16/2016    Hx laparoscopic sleeve gastrectomy     Dyslipidemia 06/16/2016    GERD (gastroesophageal reflux disease)     Glaucoma 01/25/2023    Hypertension     Low back pain     Macular degeneration 01/25/2023    blurry vision    Obesity     Rheumatoid arthritis     Vitamin D deficiency      Social History     Socioeconomic History    Marital status:      Spouse name: oren    Number of children: 1    Years of education: 14   Tobacco Use    Smoking status: Never     Passive exposure: Never    Smokeless tobacco: Never   Vaping Use    Vaping status: Never Used   Substance and Sexual Activity    Alcohol use: No    Drug use: Not Currently     Comment: occasional gummies with THC for RA pain/bedtime    Sexual activity: Yes       Allergies  Patient has no known allergies.    Current Medication List    Current Outpatient Medications:     alendronate (FOSAMAX) 70 MG tablet, Take 1 tablet by mouth Every 7 (Seven) Days., Disp: 4 tablet, Rfl: 5    aspirin 81 MG EC tablet, Take 1 tablet by mouth Daily., Disp: 30 tablet, Rfl: 5    diclofenac (VOLTAREN) 75 MG EC tablet, Take 1 tablet by mouth 2 (Two) Times a Day As Needed. for pain, Disp: , Rfl:     DULoxetine (CYMBALTA) 30 MG capsule, Take 1 capsule by mouth Daily., Disp: , Rfl:     estradiol (ESTRACE) 0.5 MG tablet, Take 1 tablet by mouth Daily., Disp: 30 tablet, Rfl: 5    folic acid (FOLVITE) 1 MG tablet, Take 1 tablet by mouth Daily., Disp: , Rfl:     hydrOXYzine (ATARAX) 25 MG tablet, Take 1 tablet by mouth 3 (Three) Times a Day As Needed for Anxiety., Disp: 90 tablet, Rfl: 5    leflunomide (ARAVA) 20 MG tablet, Take 1 tablet by mouth Daily., Disp: , Rfl:     linaclotide (LINZESS) 290 MCG capsule capsule, Take 1 capsule by mouth Every Morning Before Breakfast., Disp: 30 capsule, Rfl: 5    lisinopril (PRINIVIL,ZESTRIL) 10 MG tablet, Take 1 tablet by mouth Daily., Disp: 30 tablet, Rfl: 5    omeprazole (priLOSEC) 40 MG capsule, Take 1 capsule by mouth 2 (Two)  "Times a Day., Disp: 60 capsule, Rfl: 5    phentermine (ADIPEX-P) 37.5 MG tablet, Take 1 tablet by mouth Every Morning Before Breakfast., Disp: 30 tablet, Rfl: 3    predniSONE (DELTASONE) 10 MG tablet, , Disp: , Rfl:     pregabalin (LYRICA) 75 MG capsule, Take 1 capsule by mouth 2 (Two) Times a Day., Disp: 60 capsule, Rfl: 5    promethazine (PHENERGAN) 25 MG tablet, Take 1 tablet by mouth Every 6 (Six) Hours As Needed for Nausea or Vomiting., Disp: 60 tablet, Rfl: 5    Rinvoq 15 MG tablet sustained-release 24 hour, Take  by mouth Daily., Disp: , Rfl:     Tirzepatide-Weight Management (ZEPBOUND) 15 MG/0.5ML solution auto-injector, Inject 0.5 mL under the skin into the appropriate area as directed 1 (One) Time Per Week., Disp: 2 mL, Rfl: 0    tiZANidine (ZANAFLEX) 4 MG tablet, Take 1 tablet by mouth Daily., Disp: , Rfl:     Drug Interactions  None with Zepbound    Relevant Laboratory Values  Lab Results   Component Value Date    CHOL 261 (H) 04/29/2024    TRIG 71 04/29/2024    HDL 63 (H) 04/29/2024     (H) 04/29/2024     There is no height or weight on file to calculate BMI.    Vaccinations:   Patient recommended to keep up with routine vaccinations.     Goals of Therapy  Clinical Goals or Therapeutic Targets: Prevent weight associated co-morbidities and complications      Date 2/5/23 3/4/24 4/1/24 4/29/24 5/23/24 6/21/24 7/23/24 8/19/24 9/17/24 10/15/24 11/12/24   Weight (lb) 246.6 lb 249.2 lb 247.8 lb 243.8 lb 243.4 lb 243 lbs 244 lb 245.2 lb 236.8 lb 236.2 lb 230.4   BMI kg/ 38.62 39.03 38.80 38.18 38.11 38.05 38.20 38.39 37.42 36.98 36.07   Waist Circumference (in)  49\" 49\" 50\" 50.25 48.5\" 48.5\" 47.25\" 49.5 47.25\" 48.5\" 46\"     Date 12/10/24 1/7/25 2/4/25 3/4/25 4/1/25   Weight (lb) 227.6 lb 227.6 lb 228.2 lb  222.8 lb 221 lb   BMI kg/ 35.64 35.64 35.73 34.89 34.60   Waist Circumference (in)  46.5 47\" 46.5\" 45.5\" 44.5\"       Medication Assessment & Plan    Patient has current BMI of 34.60 which is considered " Class II Obesity. Patient has lost 28.2 lbs overall;. Patient was congratulated on success thus far.     Will re-order Zepbound 15 mg SC weekly.      The following medications will need dose adjustments/closer monitoring once the GLP1 is started: None    Discussed lifestyle modifications, including diet and exercise.  Patient education provided.     Worked with patient to create SMART Goal(s):   Walk for 30 minutes 4 days a week  Hand weights 3 days a week  Eat < 1700 calories/day    Will follow-up with patient in clinic in 4 weeks.     Janay Arciniega, PharmD  4/1/2025  10:19 EDT

## 2025-04-01 NOTE — PROGRESS NOTES
Subjective   Susie Rangel is a 54 y.o. female  is here today for follow-up.         Susie Rangel is a 54 y.o. female here for follow-up after lipoma excision from the dorsal cervical fat pad.  Her incision is healing well without complication or recurrence.  Follow-up as needed.  History of Present Illness         Physical Exam  Dorsocervical fat pad incision healing well  Physical Exam              Assessment     Diagnoses and all orders for this visit:    1. Lipoma of torso (Primary)      Susie Rangel is a 54 y.o. female doing well after excision of lipoma from the dorsal cervical fat pad.  Pathology benign.  Follow-up as needed.  Assessment & Plan          This document has been electronically signed by Aram Del Valle MD   April 1, 2025 10:10 EDT

## 2025-04-25 DIAGNOSIS — I10 ESSENTIAL HYPERTENSION: ICD-10-CM

## 2025-04-25 RX ORDER — LISINOPRIL 10 MG/1
10 TABLET ORAL DAILY
Qty: 30 TABLET | Refills: 5 | OUTPATIENT
Start: 2025-04-25

## 2025-04-28 ENCOUNTER — DISEASE STATE MANAGEMENT VISIT (OUTPATIENT)
Dept: PHARMACY | Facility: HOSPITAL | Age: 55
End: 2025-04-28
Payer: OTHER GOVERNMENT

## 2025-04-28 VITALS
SYSTOLIC BLOOD PRESSURE: 170 MMHG | WEIGHT: 224.6 LBS | DIASTOLIC BLOOD PRESSURE: 97 MMHG | HEART RATE: 70 BPM | BODY MASS INDEX: 35.17 KG/M2

## 2025-04-28 DIAGNOSIS — E66.01 MORBID (SEVERE) OBESITY DUE TO EXCESS CALORIES: ICD-10-CM

## 2025-04-28 NOTE — PROGRESS NOTES
[facility]  Weight Management Program      Susie Rangel is a 54 y.o. female referred to the [facility] by Dr. Hoang Palacios for clinical pharmacy and specialty pharmacy management of GLP1 for weight management.  The patient enies a personal history or family history of thyroid cancer and denies a personal history of pancreatitis.     Susie Rangel has previously tried Adipex, Mounjaro (on and off for about a year due to shortage. Lost ~15 lbs), exercising, and gastric sleeve in 2014 for weight loss. Current weight loss efforts include walking, resistance training, Zepbound, and Adipex. She has also been eating around 1700 calories a day.    Patient is currently on Zepbound 15 mg weekly. Patient denies any lumps/swelling/hoarseness in neck/throat or abdominal pain. She reports some constipation early on, but not anymore. Patient denies any trouble giving injection or any missed doses. She still continues Adipex prescribed by Dr. Palacios. Patient reports she got a prescription for promethazine, but she has not had to use it much for nausea.    Patient reports that she is still working on her SMART goals. She reports she drinks around 32 oz or more per day of water. Patient reports that she has been doing well with medication overall. Patient admits that she had to hold Zepbound for ~2 weeks due to liposuction. Patient did report she has all ready started back on Zepbound and that she is tolerating it well so far but has a little nausea. Patient did state that she has an antiemetic at home.    Relevant Past Medical History and Co-morbidities  Past Medical History:   Diagnosis Date    Anxiety     Arthritis     Class 2 obesity with serious comorbidity and body mass index (BMI) of 38.0 to 38.9 in adult 06/16/2016    Hx laparoscopic sleeve gastrectomy     Dyslipidemia 06/16/2016    GERD (gastroesophageal reflux disease)     Glaucoma 01/25/2023    Hypertension     Low  back pain     Macular degeneration 01/25/2023    blurry vision    Obesity     Rheumatoid arthritis     Vitamin D deficiency      Social History     Socioeconomic History    Marital status:      Spouse name: oren    Number of children: 1    Years of education: 14   Tobacco Use    Smoking status: Never     Passive exposure: Never    Smokeless tobacco: Never   Vaping Use    Vaping status: Never Used   Substance and Sexual Activity    Alcohol use: No    Drug use: Not Currently     Comment: occasional gummies with THC for RA pain/bedtime    Sexual activity: Yes       Allergies  Patient has no known allergies.    Current Medication List    Current Outpatient Medications:     alendronate (FOSAMAX) 70 MG tablet, Take 1 tablet by mouth Every 7 (Seven) Days., Disp: 4 tablet, Rfl: 5    aspirin 81 MG EC tablet, Take 1 tablet by mouth Daily., Disp: 30 tablet, Rfl: 5    diclofenac (VOLTAREN) 75 MG EC tablet, Take 1 tablet by mouth 2 (Two) Times a Day As Needed. for pain, Disp: , Rfl:     DULoxetine (CYMBALTA) 30 MG capsule, Take 1 capsule by mouth Daily., Disp: , Rfl:     estradiol (ESTRACE) 0.5 MG tablet, Take 1 tablet by mouth Daily., Disp: 30 tablet, Rfl: 5    folic acid (FOLVITE) 1 MG tablet, Take 1 tablet by mouth Daily., Disp: , Rfl:     hydrOXYzine (ATARAX) 25 MG tablet, Take 1 tablet by mouth 3 (Three) Times a Day As Needed for Anxiety., Disp: 90 tablet, Rfl: 5    leflunomide (ARAVA) 20 MG tablet, Take 1 tablet by mouth Daily., Disp: , Rfl:     linaclotide (LINZESS) 290 MCG capsule capsule, Take 1 capsule by mouth Every Morning Before Breakfast., Disp: 30 capsule, Rfl: 5    lisinopril (PRINIVIL,ZESTRIL) 10 MG tablet, Take 1 tablet by mouth Daily., Disp: 30 tablet, Rfl: 5    omeprazole (priLOSEC) 40 MG capsule, Take 1 capsule by mouth 2 (Two) Times a Day., Disp: 60 capsule, Rfl: 5    phentermine (ADIPEX-P) 37.5 MG tablet, Take 1 tablet by mouth Every Morning Before Breakfast., Disp: 30 tablet, Rfl: 3    predniSONE  "(DELTASONE) 10 MG tablet, , Disp: , Rfl:     pregabalin (LYRICA) 75 MG capsule, Take 1 capsule by mouth 2 (Two) Times a Day., Disp: 60 capsule, Rfl: 5    promethazine (PHENERGAN) 25 MG tablet, Take 1 tablet by mouth Every 6 (Six) Hours As Needed for Nausea or Vomiting., Disp: 60 tablet, Rfl: 5    Rinvoq 15 MG tablet sustained-release 24 hour, Take  by mouth Daily., Disp: , Rfl:     Tirzepatide-Weight Management (ZEPBOUND) 15 MG/0.5ML solution auto-injector, Inject 0.5 mL under the skin into the appropriate area as directed 1 (One) Time Per Week., Disp: 2 mL, Rfl: 0    tiZANidine (ZANAFLEX) 4 MG tablet, Take 1 tablet by mouth Daily., Disp: , Rfl:     Drug Interactions  None with Zepbound    Relevant Laboratory Values  Lab Results   Component Value Date    CHOL 261 (H) 04/29/2024    TRIG 71 04/29/2024    HDL 63 (H) 04/29/2024     (H) 04/29/2024     There is no height or weight on file to calculate BMI.    Vaccinations:   Patient recommended to keep up with routine vaccinations.     Goals of Therapy  Clinical Goals or Therapeutic Targets: Prevent weight associated co-morbidities and complications      Date 2/5/23 3/4/24 4/1/24 4/29/24 5/23/24 6/21/24 7/23/24 8/19/24 9/17/24 10/15/24 11/12/24   Weight (lb) 246.6 lb 249.2 lb 247.8 lb 243.8 lb 243.4 lb 243 lbs 244 lb 245.2 lb 236.8 lb 236.2 lb 230.4   BMI kg/ 38.62 39.03 38.80 38.18 38.11 38.05 38.20 38.39 37.42 36.98 36.07   Waist Circumference (in)  49\" 49\" 50\" 50.25 48.5\" 48.5\" 47.25\" 49.5 47.25\" 48.5\" 46\"     Date 12/10/24 1/7/25 2/4/25 3/4/25 4/1/25 4/28/25   Weight (lb) 227.6 lb 227.6 lb 228.2 lb  222.8 lb 221 lb 224.6 lb   BMI kg/ 35.64 35.64 35.73 34.89 34.60 35.17   Waist Circumference (in)  46.5 47\" 46.5\" 45.5\" 44.5\" 47.5\"       Medication Assessment & Plan    Patient has current BMI of 35.17 which is considered Class II Obesity. Patient has lost 28.2 lbs overall;. Patient was congratulated on success thus far.     Will re-order Zepbound 15 mg SC weekly.  "     The following medications will need dose adjustments/closer monitoring once the GLP1 is started: None    Discussed lifestyle modifications, including diet and exercise.  Patient education provided.     Worked with patient to create SMART Goal(s):   Walk for 30 minutes 4 days a week  Hand weights 3 days a week  Eat < 1700 calories/day    Will follow-up with patient in clinic in 4 weeks.     Ephraim Burch, PharmD  4/28/2025  09:32 EDT

## 2025-04-29 ENCOUNTER — OFFICE VISIT (OUTPATIENT)
Dept: FAMILY MEDICINE CLINIC | Facility: CLINIC | Age: 55
End: 2025-04-29
Payer: COMMERCIAL

## 2025-04-29 DIAGNOSIS — I87.2 CHRONIC VENOUS INSUFFICIENCY: ICD-10-CM

## 2025-04-29 DIAGNOSIS — Z98.84 HISTORY OF BARIATRIC SURGERY: ICD-10-CM

## 2025-04-29 DIAGNOSIS — F41.8 DEPRESSION WITH ANXIETY: ICD-10-CM

## 2025-04-29 DIAGNOSIS — K21.9 GASTROESOPHAGEAL REFLUX DISEASE WITHOUT ESOPHAGITIS: ICD-10-CM

## 2025-04-29 DIAGNOSIS — G56.22 ULNAR NEUROPATHY OF LEFT UPPER EXTREMITY: ICD-10-CM

## 2025-04-29 DIAGNOSIS — L63.9 ALOPECIA AREATA: ICD-10-CM

## 2025-04-29 DIAGNOSIS — M05.79 RHEUMATOID ARTHRITIS INVOLVING MULTIPLE SITES WITH POSITIVE RHEUMATOID FACTOR: ICD-10-CM

## 2025-04-29 DIAGNOSIS — G56.22 CUBITAL TUNNEL SYNDROME ON LEFT: ICD-10-CM

## 2025-04-29 DIAGNOSIS — Z00.00 HEALTHCARE MAINTENANCE: ICD-10-CM

## 2025-04-29 DIAGNOSIS — E66.811 CLASS 1 OBESITY WITH SERIOUS COMORBIDITY AND BODY MASS INDEX (BMI) OF 34.0 TO 34.9 IN ADULT, UNSPECIFIED OBESITY TYPE: ICD-10-CM

## 2025-04-29 DIAGNOSIS — K76.0 NON-ALCOHOLIC FATTY LIVER DISEASE: ICD-10-CM

## 2025-04-29 DIAGNOSIS — E78.2 MIXED HYPERLIPIDEMIA: Primary | ICD-10-CM

## 2025-04-29 DIAGNOSIS — G56.03 BILATERAL CARPAL TUNNEL SYNDROME: ICD-10-CM

## 2025-04-29 DIAGNOSIS — M54.59 MECHANICAL LOW BACK PAIN: ICD-10-CM

## 2025-04-29 DIAGNOSIS — K59.09 CHRONIC CONSTIPATION: ICD-10-CM

## 2025-04-29 DIAGNOSIS — I10 ESSENTIAL HYPERTENSION: ICD-10-CM

## 2025-04-29 RX ORDER — LISINOPRIL 20 MG/1
20 TABLET ORAL DAILY
Qty: 30 TABLET | Refills: 5 | Status: SHIPPED | OUTPATIENT
Start: 2025-04-29

## 2025-04-29 NOTE — PROGRESS NOTES
Subjective   Susie Rangel is a 54 y.o. female.     Chief Complaint  She returns for a scheduled reassessment of multiple medical problems including rheumatoid arthritis, lumbar degenerative disc disease, carpal tunnel syndrome, class II obesity, essential hypertension, and hyperlipidemia    History of Present Illness     Rheumatoid Arthritis  She has a long history of joint pain and stiffness.  She continues to deny any joint swelling or redness,and there is no history of any rash, fever, or chills.  She is able to walk 1 mile at a relaxed pace daily, but has to be careful with repetitive or prolonged activities.  She admits to some difficulty sleeping due to discomfort.  She continues to be followed by rheumatology and remains on leflunomide and rinvoq.  She has not required prednisone recently but has it available if necessary.  She is taking alendronate as prescribed.  DEXA scan performed on 7/30/2024 returned with T-scores of 1.2 and 0.4 at the spine and left femoral neck respectively    Lumbar Degenerative Disc Disease  She has a long history of low back pain. There has been no change in the quality nor any new associated symptoms.  MRI of the lumbar spine performed on 2/25/2022 revealed degenerative disc disease and osteoarthritis    Carpal Tunnel Syndrome  MRI of the cervical spine performed on 2/28/2024 revealed evidence of degenerative disc disease but no compression of the cord or nerve roots was seen.  MRI of the brain performed the same day was unremarkable.  NCS/EMG of the upper extremities performed on 5/1/2024 revealed a bilateral median neuropathy, and she underwent a left carpal tunnel release on 5/30/2024.  Since last here she has had intermittent numbness and tingling of her left ring and little finger if she rests her arm for more than 5 or 10 minutes.  She continues to have numbness and tingling of her right hand.  She denies any recent numbness or tingling of her face.  She continues to deny  any new headaches, visual disturbances, weakness, or difficulty talking or understanding what is said to her.  She continues to deny any new joint or muscle pain and there is no history of any rash, fever, or chills.  She underwent a neurology reassessment with HANNA Emery on 2/5/2025 and was continued on pregabalin 75 twice daily.  She is scheduled to return on 8/6/2025    Left Lower Extremity Edema  She has noted a continued improvement in the swelling about her left foot.  This has been unassociated with any other symptoms and she continues to deny any pain or discoloration.  The swelling tends to develop toward the end of the day and improves overnight.    Constipation  She reports a continued improvement in her constipation. She continues to deny any difficulty swallowing, vomiting, or heartburn, and there is no history of any diarrhea, hematochezia, or melena. EGD performed on 4/27/2021 revealed evidence of a previous sleeve gastrectomy along with mild gastritis.  Colonoscopy performed the same day revealed small internal hemorrhoids but was otherwise unremarkable.  She remains on omeprazole 40 daily    Hair Loss  She has noticed an improvement in the area of hair loss over her left occipital scalp.  She gives no history of previous, but several family members have had similar.  She continues to deny any associated symptoms, there is no history of any redness, pruritus, or scaling.    Class II Obesity  She remains on phentermine and tirzepatide.  She admits to occasional nausea following the latter.  She has had a decrease in her BMI from 38.4 to 34.9     Essential Hypertension  She is taking lisinopril as prescribed.  Her blood pressure has been elevated on and off since last here    Hyperlipidemia  She is following an appropriate diet, but her activities remain limited due to her back and joint pain    Stress  There has been some decrease in her stress with an improvement in her nervousness, worrying,  difficulty sleeping. She continues to deny any depression, anxiety, loss interest in activities, or suicidal ideation.  She has a very supportive family.  She is on no psychiatric medication at present    The following portions of the patient's history were reviewed and updated as appropriate: allergies, current medications, past medical history, past social history, and problem list.    Review of Systems   Constitutional:  Positive for fatigue. Negative for chills and fever.   HENT:  Positive for rhinorrhea and sneezing. Negative for congestion, ear pain and sore throat.    Eyes:  Negative for visual disturbance.   Respiratory:  Negative for cough, shortness of breath and wheezing.    Cardiovascular:  Positive for leg swelling (left). Negative for chest pain and palpitations.   Gastrointestinal:  Negative for abdominal pain, blood in stool, constipation, diarrhea, nausea and vomiting.   Endocrine:        Hot flashes   Genitourinary:  Negative for dysuria and hematuria.   Musculoskeletal:  Positive for arthralgias and back pain. Negative for joint swelling and myalgias.   Skin:  Negative for rash.   Neurological:  Positive for numbness. Negative for dizziness, tremors and weakness.   Psychiatric/Behavioral:  Positive for decreased concentration and sleep disturbance. Negative for suicidal ideas and depressed mood. The patient is nervous/anxious.      Objective   Physical Exam  Constitutional:       General: She is not in acute distress.     Appearance: Normal appearance. She is well-developed. She is not diaphoretic.      Comments: Bright and in good spirits. No apparent distress. No pallor, jaundice, diaphoresis, or cyanosis.   HENT:      Right Ear: Tympanic membrane, ear canal and external ear normal.      Left Ear: Tympanic membrane, ear canal and external ear normal.      Mouth/Throat:      Lips: No lesions.      Mouth: Mucous membranes are moist. No oral lesions.      Pharynx: No oropharyngeal exudate or  posterior oropharyngeal erythema.   Eyes:      General: Lids are normal. No visual field deficit.     Extraocular Movements: Extraocular movements intact.      Conjunctiva/sclera: Conjunctivae normal.      Pupils: Pupils are equal.   Neck:      Thyroid: No thyroid mass or thyromegaly.      Vascular: No carotid bruit or JVD.      Trachea: Trachea normal. No tracheal deviation.   Cardiovascular:      Rate and Rhythm: Normal rate and regular rhythm.      Heart sounds: Normal heart sounds, S1 normal and S2 normal. No murmur heard.     No gallop.   Pulmonary:      Effort: Pulmonary effort is normal.      Breath sounds: Normal breath sounds.   Abdominal:      General: Bowel sounds are normal. There is no distension.   Musculoskeletal:      Right lower leg: No edema.      Left lower leg: Edema (trace) present.      Comments: No peripheral joint redness or warmth.   Lymphadenopathy:      Head:      Right side of head: No submental, submandibular, tonsillar, preauricular, posterior auricular or occipital adenopathy.      Left side of head: No submental, submandibular, tonsillar, preauricular, posterior auricular or occipital adenopathy.      Cervical: No cervical adenopathy.      Upper Body:      Right upper body: No supraclavicular adenopathy.      Left upper body: No supraclavicular adenopathy.   Skin:     General: Skin is warm.      Coloration: Skin is not cyanotic, jaundiced or pale.      Findings: No rash.      Nails: There is no clubbing.      Comments: Well-healed scar upper mid neck   Neurological:      Mental Status: She is alert and oriented to person, place, and time.      Cranial Nerves: No cranial nerve deficit, dysarthria or facial asymmetry.      Sensory: No sensory deficit.      Motor: No weakness, tremor or abnormal muscle tone.      Coordination: Coordination normal.      Gait: Gait normal.      Comments: Numbness and tingling left medial hand reproduced by palpation of the ulnar nerve at the elbow    Psychiatric:         Attention and Perception: Attention normal.         Mood and Affect: Mood normal.         Speech: Speech normal.         Behavior: Behavior normal.         Thought Content: Thought content normal. Thought content does not include homicidal or suicidal ideation.       Assessment & Plan   Problems Addressed this Visit          Cardiac and Vasculature    Chronic venous insufficiency  Reminded regarding lifestyle modification  Encouraged to report if any worse or if any new symptoms or concerns.    Essential hypertension   Hypertension: elevated today. Evidence of target organ damage: none.  Encouraged to continue to work on diet and exercise plan.   Agreed on an increase in the dose of lisinopril to 20 daily    Relevant Medications    lisinopril (PRINIVIL,ZESTRIL) 20 MG tablet    Mixed hyperlipidemia   As above.  Will continue to monitor       Endocrine and Metabolic    Class 1 obesity with serious comorbidity and body mass index (BMI) of 34.0 to 34.9 in adult  As above.   Continue current medication.    History of bariatric surgery       Gastrointestinal Abdominal     Chronic constipation  Reminded regarding lifestyle modification    Gastroesophageal reflux disease without esophagitis  As above.   Continue current medication.    Non-alcoholic fatty liver disease  Will continue to monitor       Health Encounters    Healthcare maintenance  Will arrange an updated mammogram at her return       Mental Health    Depression with anxiety  Significant situational component.   Doing well at present  Encouraged to report if this should change.       Musculoskeletal and Injuries    Mechanical low back pain  Reminded regarding symptomatic treatment.     Rheumatoid arthritis involving multiple sites with positive rheumatoid factor  As above.  Patient has chronic pain and stiffness and expressed an interest in a trial of medical cannabis.  She was advised of the potential benefits and risks including  potential medication interactions.  Her Ronnell was reviewed for 1 year back and was appropriate.  An electronic written certification was submitted ([id number])       Neuro    Bilateral carpal tunnel syndrome    S/P left release  Follow up with neurology     Ulnar neuropathy of left upper extremity  Advised to avoid resting her elbows  As above.       Skin    Alopecia areata  Encouraged to report if any worse or if any new symptoms or concerns.     Diagnoses         Codes Comments      Mixed hyperlipidemia    -  Primary ICD-10-CM: E78.2  ICD-9-CM: 272.2       Essential hypertension     ICD-10-CM: I10  ICD-9-CM: 401.9       Chronic venous insufficiency     ICD-10-CM: I87.2  ICD-9-CM: 459.81       History of bariatric surgery     ICD-10-CM: Z98.84  ICD-9-CM: V45.86       Class 1 obesity with serious comorbidity and body mass index (BMI) of 34.0 to 34.9 in adult, unspecified obesity type     ICD-10-CM: E66.811, Z68.34  ICD-9-CM: 278.00, V85.34       Non-alcoholic fatty liver disease     ICD-10-CM: K76.0  ICD-9-CM: 571.8       Healthcare maintenance     ICD-10-CM: Z00.00  ICD-9-CM: V70.0       Rheumatoid arthritis involving multiple sites with positive rheumatoid factor     ICD-10-CM: M05.79  ICD-9-CM: 714.0       Ulnar neuropathy of left upper extremity     ICD-10-CM: G56.22  ICD-9-CM: 354.2       Gastroesophageal reflux disease without esophagitis     ICD-10-CM: K21.9  ICD-9-CM: 530.81       Depression with anxiety     ICD-10-CM: F41.8  ICD-9-CM: 300.4       Mechanical low back pain     ICD-10-CM: M54.59  ICD-9-CM: 724.2       Cubital tunnel syndrome on left     ICD-10-CM: G56.22  ICD-9-CM: 354.2       Bilateral carpal tunnel syndrome     ICD-10-CM: G56.03  ICD-9-CM: 354.0       Alopecia areata     ICD-10-CM: L63.9  ICD-9-CM: 704.01       Chronic constipation     ICD-10-CM: K59.09  ICD-9-CM: 564.00

## 2025-04-30 VITALS
OXYGEN SATURATION: 96 % | HEIGHT: 67 IN | RESPIRATION RATE: 14 BRPM | WEIGHT: 223 LBS | BODY MASS INDEX: 35 KG/M2 | TEMPERATURE: 98.8 F | SYSTOLIC BLOOD PRESSURE: 148 MMHG | DIASTOLIC BLOOD PRESSURE: 90 MMHG | HEART RATE: 95 BPM

## 2025-04-30 PROBLEM — G56.22 CUBITAL TUNNEL SYNDROME ON LEFT: Status: RESOLVED | Noted: 2024-09-05 | Resolved: 2025-04-30

## 2025-04-30 PROBLEM — R20.0 LEFT FACIAL NUMBNESS: Status: RESOLVED | Noted: 2024-09-05 | Resolved: 2025-04-30

## 2025-04-30 PROBLEM — G56.22 ULNAR NEUROPATHY OF LEFT UPPER EXTREMITY: Status: ACTIVE | Noted: 2025-04-30

## 2025-04-30 PROBLEM — D17.1 LIPOMA OF TORSO: Status: RESOLVED | Noted: 2025-02-19 | Resolved: 2025-04-30

## 2025-05-23 ENCOUNTER — DISEASE STATE MANAGEMENT VISIT (OUTPATIENT)
Dept: PHARMACY | Facility: HOSPITAL | Age: 55
End: 2025-05-23
Payer: OTHER GOVERNMENT

## 2025-05-23 VITALS
WEIGHT: 215 LBS | SYSTOLIC BLOOD PRESSURE: 156 MMHG | DIASTOLIC BLOOD PRESSURE: 93 MMHG | HEART RATE: 74 BPM | HEIGHT: 67 IN | BODY MASS INDEX: 33.74 KG/M2

## 2025-05-23 DIAGNOSIS — E66.01 MORBID (SEVERE) OBESITY DUE TO EXCESS CALORIES: ICD-10-CM

## 2025-05-23 NOTE — PROGRESS NOTES
[facility]  Weight Management Program      Susie Rangel is a 54 y.o. female referred to the [facility] by Dr. Hoang Palacios for clinical pharmacy and specialty pharmacy management of GLP1 for weight management.  The patient enies a personal history or family history of thyroid cancer and denies a personal history of pancreatitis.     Susie Rangel has previously tried Adipex, Mounjaro (on and off for about a year due to shortage. Lost ~15 lbs), exercising, and gastric sleeve in 2014 for weight loss. Current weight loss efforts include walking, resistance training, Zepbound, and Adipex. She has also been eating around 1700 calories a day.    Patient is currently on Zepbound 15 mg weekly. Patient denies any lumps/swelling/hoarseness in neck/throat or abdominal pain. She reports some nausea here and there, but it is tolerable and she will use promethazine if needed. Patient denies any trouble giving injection or any missed doses. She still continues Adipex prescribed by Dr. Palacios.     Patient reports that she has been exercising more and set a goal for the month of May to walk 7 miles a week and she has been doing that consistently. She reports that she will do arm lifts while walking to get her HR up as well. She reports that she is eating less overall and is no longer counting her calories, because she feels as if she is staying under without having to count them. She reports getting in a good amount of protein. She reports that she is a Sprite Zero drinker, but would like to replace that with more water going forward.     Relevant Past Medical History and Co-morbidities  Past Medical History:   Diagnosis Date    Anxiety     Arthritis     Class 2 obesity with serious comorbidity and body mass index (BMI) of 38.0 to 38.9 in adult 06/16/2016    Hx laparoscopic sleeve gastrectomy     Dyslipidemia 06/16/2016    GERD (gastroesophageal reflux disease)     Glaucoma 01/25/2023     Hypertension     Low back pain     Macular degeneration 01/25/2023    blurry vision    Obesity     Rheumatoid arthritis     Vitamin D deficiency      Social History     Socioeconomic History    Marital status:      Spouse name: oren    Number of children: 1    Years of education: 14   Tobacco Use    Smoking status: Never     Passive exposure: Never    Smokeless tobacco: Never   Vaping Use    Vaping status: Never Used   Substance and Sexual Activity    Alcohol use: No    Drug use: Not Currently     Comment: occasional gummies with THC for RA pain/bedtime    Sexual activity: Yes       Allergies  Patient has no known allergies.    Current Medication List    Current Outpatient Medications:     alendronate (FOSAMAX) 70 MG tablet, Take 1 tablet by mouth Every 7 (Seven) Days., Disp: 4 tablet, Rfl: 5    aspirin 81 MG EC tablet, Take 1 tablet by mouth Daily., Disp: 30 tablet, Rfl: 5    diclofenac (VOLTAREN) 75 MG EC tablet, Take 1 tablet by mouth 2 (Two) Times a Day As Needed. for pain, Disp: , Rfl:     DULoxetine (CYMBALTA) 30 MG capsule, Take 1 capsule by mouth Daily., Disp: , Rfl:     estradiol (ESTRACE) 0.5 MG tablet, Take 1 tablet by mouth Daily., Disp: 30 tablet, Rfl: 5    folic acid (FOLVITE) 1 MG tablet, Take 1 tablet by mouth Daily., Disp: , Rfl:     hydrOXYzine (ATARAX) 25 MG tablet, Take 1 tablet by mouth 3 (Three) Times a Day As Needed for Anxiety., Disp: 90 tablet, Rfl: 5    leflunomide (ARAVA) 20 MG tablet, Take 1 tablet by mouth Daily., Disp: , Rfl:     linaclotide (LINZESS) 290 MCG capsule capsule, Take 1 capsule by mouth Every Morning Before Breakfast., Disp: 30 capsule, Rfl: 5    lisinopril (PRINIVIL,ZESTRIL) 20 MG tablet, Take 1 tablet by mouth Daily., Disp: 30 tablet, Rfl: 5    omeprazole (priLOSEC) 40 MG capsule, Take 1 capsule by mouth 2 (Two) Times a Day., Disp: 60 capsule, Rfl: 5    phentermine (ADIPEX-P) 37.5 MG tablet, Take 1 tablet by mouth Every Morning Before Breakfast., Disp: 30 tablet,  "Rfl: 3    predniSONE (DELTASONE) 10 MG tablet, , Disp: , Rfl:     pregabalin (LYRICA) 75 MG capsule, Take 1 capsule by mouth 2 (Two) Times a Day., Disp: 60 capsule, Rfl: 5    promethazine (PHENERGAN) 25 MG tablet, Take 1 tablet by mouth Every 6 (Six) Hours As Needed for Nausea or Vomiting., Disp: 60 tablet, Rfl: 5    Rinvoq 15 MG tablet sustained-release 24 hour, Take  by mouth Daily., Disp: , Rfl:     Tirzepatide-Weight Management (ZEPBOUND) 15 MG/0.5ML solution auto-injector, Inject 0.5 mL under the skin into the appropriate area as directed 1 (One) Time Per Week., Disp: 2 mL, Rfl: 0    tiZANidine (ZANAFLEX) 4 MG tablet, Take 1 tablet by mouth Daily., Disp: , Rfl:     Drug Interactions  None with Zepbound    Relevant Laboratory Values  Lab Results   Component Value Date    CHOL 261 (H) 04/29/2024    TRIG 71 04/29/2024    HDL 63 (H) 04/29/2024     (H) 04/29/2024     There is no height or weight on file to calculate BMI.    Vaccinations:   Patient recommended to keep up with routine vaccinations.     Goals of Therapy  Clinical Goals or Therapeutic Targets: Prevent weight associated co-morbidities and complications      Date 2/5/23 3/4/24 4/1/24 4/29/24 5/23/24 6/21/24 7/23/24 8/19/24 9/17/24 10/15/24 11/12/24   Weight (lb) 246.6 lb 249.2 lb 247.8 lb 243.8 lb 243.4 lb 243 lbs 244 lb 245.2 lb 236.8 lb 236.2 lb 230.4   BMI kg/ 38.62 39.03 38.80 38.18 38.11 38.05 38.20 38.39 37.42 36.98 36.07   Waist Circumference (in)  49\" 49\" 50\" 50.25 48.5\" 48.5\" 47.25\" 49.5 47.25\" 48.5\" 46\"     Date 12/10/24 1/7/25 2/4/25 3/4/25 4/1/25 4/28/25 5/23/25   Weight (lb) 227.6 lb 227.6 lb 228.2 lb  222.8 lb 221 lb 224.6 lb 215lb   BMI kg/ 35.64 35.64 35.73 34.89 34.60 35.17 33.67   Waist Circumference (in)  46.5 47\" 46.5\" 45.5\" 44.5\" 47.5\" 43\"       Medication Assessment & Plan    Patient has current BMI of 33.67 which is considered Class I Obesity. Patient has lost 31.6lbs overall and 9.6lb in the past month. Patient was " congratulated on success thus far.     Will re-order Zepbound 15 mg SC weekly.     BP was elevated in the clinic at 156/93. Patient reports that she had not taken her Lisinopril dose this morning. I encouraged her to take it as soon as she got home and to monitor her BP's at home.     The following medications will need dose adjustments/closer monitoring once the GLP1 is started: None    Discussed lifestyle modifications, including diet and exercise.  Patient education provided.     Worked with patient to create SMART Goal(s):   Walk for 30 minutes 4 days a week (May goal was to walk 7 miles a week)  Hand weights 3 days a week  Start replacing a sprite zero for a water    Patient already has labwork ordered and follows-up with referring on 6/19/25.     Will follow-up with patient in clinic in 4 weeks.     Cassandra Rangel, PharmD  5/23/2025  09:51 EDT

## 2025-06-16 ENCOUNTER — LAB (OUTPATIENT)
Dept: FAMILY MEDICINE CLINIC | Facility: CLINIC | Age: 55
End: 2025-06-16
Payer: MEDICARE

## 2025-06-16 DIAGNOSIS — K21.9 GASTROESOPHAGEAL REFLUX DISEASE WITHOUT ESOPHAGITIS: ICD-10-CM

## 2025-06-16 DIAGNOSIS — E66.01 CLASS 2 SEVERE OBESITY WITH SERIOUS COMORBIDITY AND BODY MASS INDEX (BMI) OF 36.0 TO 36.9 IN ADULT, UNSPECIFIED OBESITY TYPE: ICD-10-CM

## 2025-06-16 DIAGNOSIS — Z98.84 HISTORY OF BARIATRIC SURGERY: ICD-10-CM

## 2025-06-16 DIAGNOSIS — K59.09 CHRONIC CONSTIPATION: ICD-10-CM

## 2025-06-16 DIAGNOSIS — M05.79 RHEUMATOID ARTHRITIS INVOLVING MULTIPLE SITES WITH POSITIVE RHEUMATOID FACTOR: ICD-10-CM

## 2025-06-16 DIAGNOSIS — K76.0 NON-ALCOHOLIC FATTY LIVER DISEASE: ICD-10-CM

## 2025-06-16 DIAGNOSIS — R20.0 LEFT FACIAL NUMBNESS: ICD-10-CM

## 2025-06-16 DIAGNOSIS — E78.2 MIXED HYPERLIPIDEMIA: ICD-10-CM

## 2025-06-16 DIAGNOSIS — E66.812 CLASS 2 SEVERE OBESITY WITH SERIOUS COMORBIDITY AND BODY MASS INDEX (BMI) OF 36.0 TO 36.9 IN ADULT, UNSPECIFIED OBESITY TYPE: ICD-10-CM

## 2025-06-16 DIAGNOSIS — I10 ESSENTIAL HYPERTENSION: ICD-10-CM

## 2025-06-16 DIAGNOSIS — F41.8 DEPRESSION WITH ANXIETY: ICD-10-CM

## 2025-06-16 LAB
25(OH)D3 SERPL-MCNC: 44.1 NG/ML (ref 30–100)
ALBUMIN SERPL-MCNC: 4.4 G/DL (ref 3.5–5.2)
ALBUMIN/GLOB SERPL: 1.9 G/DL
ALP SERPL-CCNC: 96 U/L (ref 39–117)
ALT SERPL W P-5'-P-CCNC: 27 U/L (ref 1–33)
ANION GAP SERPL CALCULATED.3IONS-SCNC: 9.7 MMOL/L (ref 5–15)
AST SERPL-CCNC: 26 U/L (ref 1–32)
BASOPHILS # BLD AUTO: 0.05 10*3/MM3 (ref 0–0.2)
BASOPHILS NFR BLD AUTO: 0.7 % (ref 0–1.5)
BILIRUB SERPL-MCNC: 0.4 MG/DL (ref 0–1.2)
BUN SERPL-MCNC: 12.6 MG/DL (ref 6–20)
BUN/CREAT SERPL: 15.9 (ref 7–25)
CALCIUM SPEC-SCNC: 8.9 MG/DL (ref 8.6–10.5)
CHLORIDE SERPL-SCNC: 107 MMOL/L (ref 98–107)
CHOLEST SERPL-MCNC: 308 MG/DL (ref 0–200)
CO2 SERPL-SCNC: 26.3 MMOL/L (ref 22–29)
CREAT SERPL-MCNC: 0.79 MG/DL (ref 0.57–1)
DEPRECATED RDW RBC AUTO: 39.2 FL (ref 37–54)
EGFRCR SERPLBLD CKD-EPI 2021: 90.1 ML/MIN/1.73
EOSINOPHIL # BLD AUTO: 0.08 10*3/MM3 (ref 0–0.4)
EOSINOPHIL NFR BLD AUTO: 1.2 % (ref 0.3–6.2)
ERYTHROCYTE [DISTWIDTH] IN BLOOD BY AUTOMATED COUNT: 12.3 % (ref 12.3–15.4)
GLOBULIN UR ELPH-MCNC: 2.3 GM/DL
GLUCOSE SERPL-MCNC: 77 MG/DL (ref 65–99)
HBA1C MFR BLD: 5.23 % (ref 4.8–5.6)
HCT VFR BLD AUTO: 40.7 % (ref 34–46.6)
HDLC SERPL-MCNC: 72 MG/DL (ref 40–60)
HGB BLD-MCNC: 14.2 G/DL (ref 12–15.9)
IMM GRANULOCYTES # BLD AUTO: 0.01 10*3/MM3 (ref 0–0.05)
IMM GRANULOCYTES NFR BLD AUTO: 0.1 % (ref 0–0.5)
LDLC SERPL CALC-MCNC: 229 MG/DL (ref 0–100)
LDLC/HDLC SERPL: 3.12 {RATIO}
LYMPHOCYTES # BLD AUTO: 2.35 10*3/MM3 (ref 0.7–3.1)
LYMPHOCYTES NFR BLD AUTO: 34.2 % (ref 19.6–45.3)
MCH RBC QN AUTO: 30.5 PG (ref 26.6–33)
MCHC RBC AUTO-ENTMCNC: 34.9 G/DL (ref 31.5–35.7)
MCV RBC AUTO: 87.3 FL (ref 79–97)
MONOCYTES # BLD AUTO: 0.61 10*3/MM3 (ref 0.1–0.9)
MONOCYTES NFR BLD AUTO: 8.9 % (ref 5–12)
NEUTROPHILS NFR BLD AUTO: 3.78 10*3/MM3 (ref 1.7–7)
NEUTROPHILS NFR BLD AUTO: 54.9 % (ref 42.7–76)
NRBC BLD AUTO-RTO: 0 /100 WBC (ref 0–0.2)
PLATELET # BLD AUTO: 348 10*3/MM3 (ref 140–450)
PMV BLD AUTO: 10.3 FL (ref 6–12)
POTASSIUM SERPL-SCNC: 3.6 MMOL/L (ref 3.5–5.2)
PROT SERPL-MCNC: 6.7 G/DL (ref 6–8.5)
RBC # BLD AUTO: 4.66 10*6/MM3 (ref 3.77–5.28)
SODIUM SERPL-SCNC: 143 MMOL/L (ref 136–145)
TRIGL SERPL-MCNC: 57 MG/DL (ref 0–150)
TSH SERPL DL<=0.05 MIU/L-ACNC: 0.44 UIU/ML (ref 0.27–4.2)
VIT B12 BLD-MCNC: 440 PG/ML (ref 211–946)
VLDLC SERPL-MCNC: 7 MG/DL (ref 5–40)
WBC NRBC COR # BLD AUTO: 6.88 10*3/MM3 (ref 3.4–10.8)

## 2025-06-16 PROCEDURE — 36415 COLL VENOUS BLD VENIPUNCTURE: CPT

## 2025-06-16 PROCEDURE — 80053 COMPREHEN METABOLIC PANEL: CPT | Performed by: GENERAL PRACTICE

## 2025-06-16 PROCEDURE — 82306 VITAMIN D 25 HYDROXY: CPT | Performed by: GENERAL PRACTICE

## 2025-06-16 PROCEDURE — 84443 ASSAY THYROID STIM HORMONE: CPT | Performed by: GENERAL PRACTICE

## 2025-06-16 PROCEDURE — 82607 VITAMIN B-12: CPT | Performed by: GENERAL PRACTICE

## 2025-06-16 PROCEDURE — 80061 LIPID PANEL: CPT | Performed by: GENERAL PRACTICE

## 2025-06-16 PROCEDURE — 83036 HEMOGLOBIN GLYCOSYLATED A1C: CPT | Performed by: GENERAL PRACTICE

## 2025-06-16 PROCEDURE — 85025 COMPLETE CBC W/AUTO DIFF WBC: CPT | Performed by: GENERAL PRACTICE

## 2025-06-19 ENCOUNTER — OFFICE VISIT (OUTPATIENT)
Dept: FAMILY MEDICINE CLINIC | Facility: CLINIC | Age: 55
End: 2025-06-19
Payer: MEDICARE

## 2025-06-19 VITALS
HEIGHT: 67 IN | WEIGHT: 211 LBS | DIASTOLIC BLOOD PRESSURE: 78 MMHG | HEART RATE: 71 BPM | OXYGEN SATURATION: 98 % | TEMPERATURE: 98.6 F | BODY MASS INDEX: 33.12 KG/M2 | SYSTOLIC BLOOD PRESSURE: 132 MMHG | RESPIRATION RATE: 14 BRPM

## 2025-06-19 DIAGNOSIS — I10 ESSENTIAL HYPERTENSION: ICD-10-CM

## 2025-06-19 DIAGNOSIS — E78.2 MIXED HYPERLIPIDEMIA: ICD-10-CM

## 2025-06-19 DIAGNOSIS — K59.09 CHRONIC CONSTIPATION: ICD-10-CM

## 2025-06-19 DIAGNOSIS — Z12.31 ENCOUNTER FOR SCREENING MAMMOGRAM FOR BREAST CANCER: ICD-10-CM

## 2025-06-19 DIAGNOSIS — M05.79 RHEUMATOID ARTHRITIS INVOLVING MULTIPLE SITES WITH POSITIVE RHEUMATOID FACTOR: ICD-10-CM

## 2025-06-19 DIAGNOSIS — E66.811 CLASS 1 OBESITY WITH SERIOUS COMORBIDITY AND BODY MASS INDEX (BMI) OF 33.0 TO 33.9 IN ADULT, UNSPECIFIED OBESITY TYPE: ICD-10-CM

## 2025-06-19 DIAGNOSIS — K21.9 GASTROESOPHAGEAL REFLUX DISEASE WITHOUT ESOPHAGITIS: ICD-10-CM

## 2025-06-19 DIAGNOSIS — K76.0 NON-ALCOHOLIC FATTY LIVER DISEASE: ICD-10-CM

## 2025-06-19 DIAGNOSIS — L63.9 ALOPECIA AREATA: ICD-10-CM

## 2025-06-19 DIAGNOSIS — Z98.84 HISTORY OF BARIATRIC SURGERY: ICD-10-CM

## 2025-06-19 DIAGNOSIS — J30.9 CHRONIC ALLERGIC RHINITIS: Primary | ICD-10-CM

## 2025-06-19 DIAGNOSIS — F41.8 DEPRESSION WITH ANXIETY: ICD-10-CM

## 2025-06-19 DIAGNOSIS — M54.59 MECHANICAL LOW BACK PAIN: ICD-10-CM

## 2025-06-19 DIAGNOSIS — Z00.00 HEALTHCARE MAINTENANCE: ICD-10-CM

## 2025-06-19 DIAGNOSIS — I87.2 CHRONIC VENOUS INSUFFICIENCY: ICD-10-CM

## 2025-06-19 DIAGNOSIS — E66.812 CLASS 2 SEVERE OBESITY WITH SERIOUS COMORBIDITY AND BODY MASS INDEX (BMI) OF 39.0 TO 39.9 IN ADULT, UNSPECIFIED OBESITY TYPE: ICD-10-CM

## 2025-06-19 DIAGNOSIS — E66.01 CLASS 2 SEVERE OBESITY WITH SERIOUS COMORBIDITY AND BODY MASS INDEX (BMI) OF 39.0 TO 39.9 IN ADULT, UNSPECIFIED OBESITY TYPE: ICD-10-CM

## 2025-06-19 RX ORDER — ESTRADIOL 0.5 MG/1
0.5 TABLET ORAL DAILY
Qty: 30 TABLET | Refills: 5 | Status: SHIPPED | OUTPATIENT
Start: 2025-06-19

## 2025-06-19 RX ORDER — PHENTERMINE HYDROCHLORIDE 37.5 MG/1
37.5 TABLET ORAL
Qty: 30 TABLET | Refills: 3 | Status: SHIPPED | OUTPATIENT
Start: 2025-06-19

## 2025-06-19 RX ORDER — OMEPRAZOLE 40 MG/1
40 CAPSULE, DELAYED RELEASE ORAL 2 TIMES DAILY
Qty: 60 CAPSULE | Refills: 5 | Status: SHIPPED | OUTPATIENT
Start: 2025-06-19

## 2025-06-19 RX ORDER — HYDROXYZINE HYDROCHLORIDE 25 MG/1
25 TABLET, FILM COATED ORAL 3 TIMES DAILY PRN
Qty: 90 TABLET | Refills: 5 | Status: SHIPPED | OUTPATIENT
Start: 2025-06-19

## 2025-06-19 RX ORDER — ALENDRONATE SODIUM 70 MG/1
70 TABLET ORAL
Qty: 4 TABLET | Refills: 5 | Status: SHIPPED | OUTPATIENT
Start: 2025-06-19

## 2025-06-19 NOTE — ASSESSMENT & PLAN NOTE
Significant situational component.   Supportive therapy.   Continue current medication.  Encouraged to report if any worse or if any new symptoms or concerns.  Orders:    hydrOXYzine (ATARAX) 25 MG tablet; Take 1 tablet by mouth 3 (Three) Times a Day As Needed for Anxiety.

## 2025-06-19 NOTE — PROGRESS NOTES
Subjective   Susie Rangel is a 54 y.o. female.     Chief Complaint  She returns for a scheduled reassessment of multiple medical problems including    History of Present Illness     Rheumatoid Arthritis  She has a long history of joint pain and stiffness.  She continues to deny any joint swelling or redness,and there is no history of any rash, fever, or chills.  She is able to walk 1 mile at a relaxed pace daily, but has to be careful with repetitive or prolonged activities.  She admits to some difficulty sleeping due to discomfort.  She continues to be followed by rheumatology and remains on leflunomide and rinvoq.  She has not required prednisone recently but has it available if necessary.  She is taking alendronate as prescribed.  DEXA scan performed on 7/30/2024 returned with T-scores of 1.2 and 0.4 at the spine and left femoral neck respectively  Lab Results   Component Value Date    WBC 6.88 06/16/2025    HGB 14.2 06/16/2025    HCT 40.7 06/16/2025    MCV 87.3 06/16/2025     06/16/2025     Lab Results   Component Value Date    GLUCOSE 77 06/16/2025    BUN 12.6 06/16/2025    CREATININE 0.79 06/16/2025     06/16/2025    K 3.6 06/16/2025     06/16/2025    CALCIUM 8.9 06/16/2025    PROTEINTOT 6.7 06/16/2025    ALBUMIN 4.4 06/16/2025    ALT 27 06/16/2025    AST 26 06/16/2025    ALKPHOS 96 06/16/2025    BILITOT 0.4 06/16/2025    GLOB 2.3 06/16/2025    AGRATIO 1.9 06/16/2025    BCR 15.9 06/16/2025    ANIONGAP 9.7 06/16/2025    EGFR 90.1 06/16/2025     Lumbar Degenerative Disc Disease  She has a long history of low back pain. There has been no change in the quality nor any new associated symptoms.  MRI of the lumbar spine performed on 2/25/2022 revealed degenerative disc disease and osteoarthritis    Carpal Tunnel Syndrome  MRI of the cervical spine performed on 2/28/2024 revealed evidence of degenerative disc disease but no compression of the cord or nerve roots was seen.  MRI of the brain performed  the same day was unremarkable.  NCS/EMG of the upper extremities performed on 5/1/2024 revealed a bilateral median neuropathy, and she underwent a left carpal tunnel release on 5/30/2024.  Since last here she has had intermittent numbness and tingling of her left ring and little finger if she rests her arm for more than 5 or 10 minutes.  She continues to have numbness and tingling of her right hand.  She denies any recent numbness or tingling of her face.  She continues to deny any new headaches, visual disturbances, weakness, or difficulty talking or understanding what is said to her.  She continues to deny any new joint or muscle pain and there is no history of any rash, fever, or chills.  She underwent a neurology reassessment with HANNA Emery on 2/5/2025 and was continued on pregabalin 75 twice daily.  She is scheduled to return on 8/6/2025    Left Lower Extremity Edema  She has noted a continued improvement in the swelling about her left foot.  This has been unassociated with any other symptoms and she continues to deny any pain or discoloration.  The swelling tends to develop toward the end of the day and improves overnight.    Constipation  She reports a continued improvement in her constipation. She continues to deny any difficulty swallowing, vomiting, or heartburn, and there is no history of any diarrhea, hematochezia, or melena. EGD performed on 4/27/2021 revealed evidence of a previous sleeve gastrectomy along with mild gastritis.  Colonoscopy performed the same day revealed small internal hemorrhoids but was otherwise unremarkable.  She remains on omeprazole 40 daily    Hair Loss  She has noticed an improvement in the area of hair loss over her left occipital scalp.  She gives no history of previous, but several family members have had similar.  She continues to deny any associated symptoms, there is no history of any redness, pruritus, or scaling.    Class II Obesity  She remains on phentermine  and tirzepatide.  She admits to occasional nausea following the latter.  She has had a decrease in her BMI from 38.4 to 34.9     Essential Hypertension  She is taking lisinopril as prescribed.  Her blood pressure has been elevated on and off since last here    Hyperlipidemia  She is following an appropriate diet, but her activities remain limited due to her back and joint pain  Lab Results   Component Value Date    CHOL 308 (H) 06/16/2025    TRIG 57 06/16/2025    HDL 72 (H) 06/16/2025     (H) 06/16/2025     Lab Results   Component Value Date    HGBA1C 5.23 06/16/2025     Stress  There has been some decrease in her stress with an improvement in her nervousness, worrying, difficulty sleeping. She continues to deny any depression, anxiety, loss interest in activities, or suicidal ideation.  She has a very supportive family.  She is on no psychiatric medication at present  Lab Results   Component Value Date    TSH 0.444 06/16/2025     Labs  Most recent vitamin D 44.1  Lab Results   Component Value Date    [id number] 440 06/16/2025         The following portions of the patient's history were reviewed and updated as appropriate: allergies, current medications, past medical history, past social history, and problem list.    Review of Systems    Objective   Physical Exam      Assessment & Plan   Problems Addressed this Visit          Allergies and Adverse Reactions    Chronic allergic rhinitis - Primary       Cardiac and Vasculature    Chronic venous insufficiency    Essential hypertension    Mixed hyperlipidemia       Endocrine and Metabolic    Class 1 obesity with serious comorbidity and body mass index (BMI) of 33.0 to 33.9 in adult    History of bariatric surgery       Gastrointestinal Abdominal     Chronic constipation    Gastroesophageal reflux disease without esophagitis    Non-alcoholic fatty liver disease       Health Encounters    Healthcare maintenance       Mental Health    Depression with anxiety        Musculoskeletal and Injuries    Mechanical low back pain    Rheumatoid arthritis involving multiple sites with positive rheumatoid factor       Skin    Alopecia areata     Diagnoses         Codes Comments      Chronic allergic rhinitis    -  Primary ICD-10-CM: J30.9  ICD-9-CM: 477.9       Mixed hyperlipidemia     ICD-10-CM: E78.2  ICD-9-CM: 272.2       Essential hypertension     ICD-10-CM: I10  ICD-9-CM: 401.9       Chronic venous insufficiency     ICD-10-CM: I87.2  ICD-9-CM: 459.81       History of bariatric surgery     ICD-10-CM: Z98.84  ICD-9-CM: V45.86       Class 1 obesity with serious comorbidity and body mass index (BMI) of 33.0 to 33.9 in adult, unspecified obesity type     ICD-10-CM: E66.811, Z68.33  ICD-9-CM: 278.00, V85.33       Non-alcoholic fatty liver disease     ICD-10-CM: K76.0  ICD-9-CM: 571.8       Gastroesophageal reflux disease without esophagitis     ICD-10-CM: K21.9  ICD-9-CM: 530.81       Chronic constipation     ICD-10-CM: K59.09  ICD-9-CM: 564.00       Healthcare maintenance     ICD-10-CM: Z00.00  ICD-9-CM: V70.0       Depression with anxiety     ICD-10-CM: F41.8  ICD-9-CM: 300.4       Rheumatoid arthritis involving multiple sites with positive rheumatoid factor     ICD-10-CM: M05.79  ICD-9-CM: 714.0       Mechanical low back pain     ICD-10-CM: M54.59  ICD-9-CM: 724.2       Alopecia areata     ICD-10-CM: L63.9  ICD-9-CM: 704.01

## 2025-06-19 NOTE — ASSESSMENT & PLAN NOTE
As above.   Continue current medication.  Orders:    linaclotide (LINZESS) 290 MCG capsule capsule; Take 1 capsule by mouth Every Morning Before Breakfast.

## 2025-06-19 NOTE — PROGRESS NOTES
Subjective   The ABCs of the Annual Wellness Visit  Medicare Wellness Visit    Susie Rangel is a 54 y.o. patient who presents for a Medicare Wellness Visit.    The following portions of the patient's history were reviewed and   updated as appropriate: allergies, current medications, past family history, past medical history, past social history, past surgical history, and problem list.    Compared to one year ago, the patient's physical   health is the same.  Compared to one year ago, the patient's mental   health is the same.    Recent Hospitalizations:  She was not admitted to the hospital during the last year.     Current Medical Providers:  Patient Care Team:  Hoang Palacios MD as PCP - General (Family Medicine)  Bryce Gates MD as Consulting Physician (Pain Medicine)  Lonny Ramesh PA-C as Physician Assistant (Physician Assistant)  Coral King APRN as Nurse Practitioner (Neurology)    Outpatient Medications Prior to Visit   Medication Sig Dispense Refill    alendronate (FOSAMAX) 70 MG tablet Take 1 tablet by mouth Every 7 (Seven) Days. 4 tablet 5    aspirin 81 MG EC tablet Take 1 tablet by mouth Daily. 30 tablet 5    diclofenac (VOLTAREN) 75 MG EC tablet Take 1 tablet by mouth 2 (Two) Times a Day As Needed. for pain      DULoxetine (CYMBALTA) 30 MG capsule Take 1 capsule by mouth Daily.      estradiol (ESTRACE) 0.5 MG tablet Take 1 tablet by mouth Daily. 30 tablet 5    folic acid (FOLVITE) 1 MG tablet Take 1 tablet by mouth Daily.      hydrOXYzine (ATARAX) 25 MG tablet Take 1 tablet by mouth 3 (Three) Times a Day As Needed for Anxiety. 90 tablet 5    leflunomide (ARAVA) 20 MG tablet Take 1 tablet by mouth Daily.      linaclotide (LINZESS) 290 MCG capsule capsule Take 1 capsule by mouth Every Morning Before Breakfast. 30 capsule 5    lisinopril (PRINIVIL,ZESTRIL) 20 MG tablet Take 1 tablet by mouth Daily. 30 tablet 5    omeprazole (priLOSEC) 40 MG capsule Take 1 capsule by mouth 2 (Two)  Times a Day. 60 capsule 5    phentermine (ADIPEX-P) 37.5 MG tablet Take 1 tablet by mouth Every Morning Before Breakfast. 30 tablet 3    predniSONE (DELTASONE) 10 MG tablet       pregabalin (LYRICA) 75 MG capsule Take 1 capsule by mouth 2 (Two) Times a Day. 60 capsule 5    promethazine (PHENERGAN) 25 MG tablet Take 1 tablet by mouth Every 6 (Six) Hours As Needed for Nausea or Vomiting. 60 tablet 5    Rinvoq 15 MG tablet sustained-release 24 hour Take  by mouth Daily.      Tirzepatide-Weight Management (ZEPBOUND) 15 MG/0.5ML solution auto-injector Inject 0.5 mL under the skin into the appropriate area as directed 1 (One) Time Per Week. 2 mL 0    tiZANidine (ZANAFLEX) 4 MG tablet Take 1 tablet by mouth Daily.       No facility-administered medications prior to visit.     No opioid medication identified on active medication list. I have reviewed chart for other potential  high risk medication/s and harmful drug interactions in the elderly.      Aspirin is on active medication list. Aspirin use is indicated based on review of current medical condition/s. Pros and cons of this therapy have been discussed today. Benefits of this medication outweigh potential harm.  Patient has been encouraged to continue taking this medication.  .    Patient Active Problem List   Diagnosis    Mechanical low back pain    History of bariatric surgery    Rheumatoid arthritis involving multiple sites with positive rheumatoid factor    Chronic venous insufficiency    Class 1 obesity with serious comorbidity and body mass index (BMI) of 33.0 to 33.9 in adult    Non-alcoholic fatty liver disease    Depression with anxiety    Alopecia areata    Gastroesophageal reflux disease without esophagitis    Chronic constipation    Hot flashes    Healthcare maintenance    Encounter for immunization    Mixed hyperlipidemia    Chronic allergic rhinitis    Encounter for screening mammogram for breast cancer    Essential hypertension    Bilateral carpal tunnel  "syndrome    Status post carpal tunnel release    Ulnar neuropathy of left upper extremity     Advance Care Planning Advance Directive is not on file.  ACP discussion was held with the patient during this visit. Patient does not have an advance directive, information provided.      Objective   Vitals:    25 1153   BP: 132/78   Pulse: 71   Resp: 14   Temp: 98.6 °F (37 °C)   TempSrc: Temporal   SpO2: 98%   Weight: 95.7 kg (211 lb)   Height: 170.2 cm (67.01\")   PainSc: 8      Estimated body mass index is 33.04 kg/m² as calculated from the following:    Height as of this encounter: 170.2 cm (67.01\").    Weight as of this encounter: 95.7 kg (211 lb).    Does the patient have evidence of cognitive impairment? No                                                                                        Health  Risk Assessment    Smoking Status:  Social History     Tobacco Use   Smoking Status Never    Passive exposure: Never   Smokeless Tobacco Never     Alcohol Consumption:  Social History     Substance and Sexual Activity   Alcohol Use No     Fall Risk Screen  STEADI Fall Risk Assessment was completed, and patient is at LOW risk for falls.Assessment completed on:2025    Depression Screening   Little interest or pleasure in doing things? Not at all   Feeling down, depressed, or hopeless? Not at all   PHQ-2 Total Score 0      Health Habits and Functional and Cognitive Screenin/19/2025    11:52 AM   Functional & Cognitive Status   Do you have difficulty preparing food and eating? No   Do you have difficulty bathing yourself, getting dressed or grooming yourself? No   Do you have difficulty using the toilet? No   Do you have difficulty moving around from place to place? No   Do you have trouble with steps or getting out of a bed or a chair? No   Current Diet Well Balanced Diet   Dental Exam Up to date   Eye Exam Up to date   Exercise (times per week) 7 times per week   Current Exercises Include Walking   Do " you need help using the phone?  No   Are you deaf or do you have serious difficulty hearing?  No   Do you need help to go to places out of walking distance? No   Do you need help shopping? No   Do you need help preparing meals?  No   Do you need help with housework?  No   Do you need help with laundry? No   Do you need help taking your medications? No   Do you need help managing money? No   Do you ever drive or ride in a car without wearing a seat belt? No   Have you felt unusual stress, anger or loneliness in the last month? No   Who do you live with? Spouse   If you need help, do you have trouble finding someone available to you? No   Have you been bothered in the last four weeks by sexual problems? No   Do you have difficulty concentrating, remembering or making decisions? No           Age-appropriate Screening Schedule:  Refer to the list below for future screening recommendations based on patient's age, sex and/or medical conditions. Orders for these recommended tests are listed in the plan section. The patient has been provided with a written plan.    Health Maintenance List  Health Maintenance   Topic Date Due    COVID-19 Vaccine (3 - 2024-25 season) 09/01/2024    INFLUENZA VACCINE  07/01/2025    LIPID PANEL  06/16/2026    ANNUAL WELLNESS VISIT  06/19/2026    MAMMOGRAM  07/30/2026    DXA SCAN  07/30/2026    TDAP/TD VACCINES (2 - Td or Tdap) 10/18/2029    COLORECTAL CANCER SCREENING  04/27/2031    HEPATITIS C SCREENING  Completed    Pneumococcal Vaccine 50+  Completed    ZOSTER VACCINE  Completed                                                                                                                                              CMS Preventative Services Quick Reference  Risk Factors Identified During Encounter  Chronic Pain: Natural history and expected course discussed. Questions answered.  Depression/Dysphoria: Current medication is effective, no change recommended  Immunizations Discussed/Encouraged:  COVID19    The above risks/problems have been discussed with the patient.  Pertinent information has been shared with the patient in the After Visit Summary.  An After Visit Summary and PPPS were made available to the patient.    Follow Up:   Next Medicare Wellness visit to be scheduled in 1 year.     Additional E&M Note during same encounter follows:  Patient has additional, significant, and separately identifiable condition(s)/problem(s) that require work above and beyond the Medicare Wellness Visit     Chief Complaint  She returns for a scheduled reassessment of multiple medical problems including rheumatoid arthritis, lumbar degenerative disc disease, carpal tunnel syndrome, class II obesity, essential hypertension, and hyperlipidemia    Subjective   ITALO Richards is also being seen today for additional medical problem/s.    Rheumatoid Arthritis  She has a long history of joint pain and stiffness.  She continues to deny any joint swelling or redness,and there is no history of any rash, fever, or chills.  She is able to walk 1 mile at a relaxed pace daily, but has to be careful with repetitive or prolonged activities.  She admits to some difficulty sleeping due to discomfort.  She continues to be followed by rheumatology and remains on leflunomide and rinvoq.  She has not required prednisone recently but has it available if necessary.  She is taking alendronate as prescribed.  DEXA scan performed on 7/30/2024 returned with T-scores of 1.2 and 0.4 at the spine and left femoral neck respectively  Lab Results   Component Value Date    WBC 6.88 06/16/2025    HGB 14.2 06/16/2025    HCT 40.7 06/16/2025    MCV 87.3 06/16/2025     06/16/2025     Lab Results   Component Value Date    GLUCOSE 77 06/16/2025    BUN 12.6 06/16/2025    CREATININE 0.79 06/16/2025     06/16/2025    K 3.6 06/16/2025     06/16/2025    CALCIUM 8.9 06/16/2025    PROTEINTOT 6.7 06/16/2025    ALBUMIN 4.4 06/16/2025    ALT 27 06/16/2025     AST 26 06/16/2025    ALKPHOS 96 06/16/2025    BILITOT 0.4 06/16/2025    GLOB 2.3 06/16/2025    AGRATIO 1.9 06/16/2025    BCR 15.9 06/16/2025    ANIONGAP 9.7 06/16/2025    EGFR 90.1 06/16/2025     Lumbar Degenerative Disc Disease  She has a long history of low back pain. There has been no change in the quality nor any new associated symptoms.  MRI of the lumbar spine performed on 2/25/2022 revealed degenerative disc disease and osteoarthritis    Carpal Tunnel Syndrome  MRI of the cervical spine performed on 2/28/2024 revealed evidence of degenerative disc disease but no compression of the cord or nerve roots was seen.  MRI of the brain performed the same day was unremarkable.  NCS/EMG of the upper extremities performed on 5/1/2024 revealed a bilateral median neuropathy, and she underwent a left carpal tunnel release on 5/30/2024.  Since last here she has had intermittent numbness and tingling of her left ring and little finger if she rests her arm for more than 5 or 10 minutes.  She continues to have numbness and tingling of her right hand.  She denies any recent numbness or tingling of her face.  She continues to deny any new headaches, visual disturbances, weakness, or difficulty talking or understanding what is said to her.  She continues to deny any new joint or muscle pain and there is no history of any rash, fever, or chills.  She underwent a neurology reassessment with HANNA Emery on 2/5/2025 and was continued on pregabalin 75 twice daily.  She is scheduled to return on 8/6/2025    Left Lower Extremity Edema  She has noted a continued improvement in the swelling about her left foot.  This has been unassociated with any other symptoms and she continues to deny any pain or discoloration.  The swelling tends to develop toward the end of the day and improves overnight.    Constipation  She reports a continued improvement in her constipation. She continues to deny any difficulty swallowing, vomiting, or  heartburn, and there is no history of any diarrhea, hematochezia, or melena. EGD performed on 4/27/2021 revealed evidence of a previous sleeve gastrectomy along with mild gastritis.  Colonoscopy performed the same day revealed small internal hemorrhoids but was otherwise unremarkable.  She remains on omeprazole 40 daily    Class II Obesity  She remains on phentermine and tirzepatide.  She admits to occasional nausea following the latter.  She has had a decrease in her BMI from 38.4 to 33    Essential Hypertension  She is taking lisinopril as prescribed.  Her blood pressure remains somewhat labile    Hyperlipidemia  She is following an appropriate diet, but her activities remain limited due to her back and joint pain  Lab Results   Component Value Date    CHOL 308 (H) 06/16/2025    TRIG 57 06/16/2025    HDL 72 (H) 06/16/2025     (H) 06/16/2025     Stress  There has been some decrease in her stress with an improvement in her nervousness, worrying, difficulty sleeping. She continues to deny any depression, anxiety, loss interest in activities, or suicidal ideation.  She has a very supportive family.  She is on no psychiatric medication at present  Lab Results   Component Value Date    TSH 0.444 06/16/2025     Labs  Most recent vitamin D 44.1  Lab Results   Component Value Date    [id number] 440 06/16/2025     Lab Results   Component Value Date    HGBA1C 5.23 06/16/2025     Review of Systems   Constitutional:  Positive for fatigue. Negative for appetite change, chills, diaphoresis and fever.   HENT:  Positive for rhinorrhea and sneezing. Negative for congestion, ear pain, postnasal drip, sinus pressure, sore throat, tinnitus and voice change.    Eyes:  Negative for visual disturbance.   Respiratory:  Negative for cough, shortness of breath and wheezing.    Cardiovascular:  Positive for leg swelling (left). Negative for chest pain and palpitations.   Gastrointestinal:  Negative for abdominal pain, blood in stool,  "constipation, diarrhea, nausea and vomiting.   Endocrine: Negative for polydipsia and polyuria.        Hot flashes   Genitourinary:  Negative for dysuria, frequency, hematuria and urgency.   Musculoskeletal:  Positive for arthralgias and back pain. Negative for joint swelling and myalgias.   Skin:  Negative for rash.   Neurological:  Positive for numbness. Negative for weakness and confusion.   Psychiatric/Behavioral:  Positive for decreased concentration and sleep disturbance. Negative for dysphoric mood and suicidal ideas. The patient is nervous/anxious.       Objective   Vital Signs:  /78   Pulse 71   Temp 98.6 °F (37 °C) (Temporal)   Resp 14   Ht 170.2 cm (67.01\")   Wt 95.7 kg (211 lb)   SpO2 98%   BMI 33.04 kg/m²     Physical Exam  Constitutional:       General: She is not in acute distress.     Appearance: Normal appearance. She is well-developed. She is not diaphoretic.      Comments: Bright and in good spirits. No apparent distress. No pallor, jaundice, diaphoresis, or cyanosis.   HENT:      Head: Atraumatic.      Right Ear: Tympanic membrane, ear canal and external ear normal.      Left Ear: Tympanic membrane, ear canal and external ear normal.      Mouth/Throat:      Lips: No lesions.      Mouth: Mucous membranes are moist. No oral lesions.      Pharynx: No oropharyngeal exudate or posterior oropharyngeal erythema.   Eyes:      General: Lids are normal. No visual field deficit.     Extraocular Movements: Extraocular movements intact.      Conjunctiva/sclera: Conjunctivae normal.      Pupils: Pupils are equal.   Neck:      Thyroid: No thyroid mass or thyromegaly.      Vascular: No carotid bruit or JVD.      Trachea: Trachea normal. No tracheal deviation.   Cardiovascular:      Rate and Rhythm: Normal rate and regular rhythm.      Heart sounds: Normal heart sounds, S1 normal and S2 normal. No murmur heard.     No gallop.   Pulmonary:      Effort: Pulmonary effort is normal.      Breath sounds: " Normal breath sounds.   Abdominal:      General: Bowel sounds are normal. There is no distension or abdominal bruit.      Palpations: Abdomen is soft. There is no hepatomegaly, splenomegaly or mass.      Tenderness: There is no abdominal tenderness.      Hernia: No hernia is present.   Musculoskeletal:      Right lower leg: No edema.      Left lower leg: Edema (trace) present.      Comments: No peripheral joint redness or warmth.   Lymphadenopathy:      Head:      Right side of head: No submental, submandibular, tonsillar, preauricular, posterior auricular or occipital adenopathy.      Left side of head: No submental, submandibular, tonsillar, preauricular, posterior auricular or occipital adenopathy.      Cervical: No cervical adenopathy.      Upper Body:      Right upper body: No supraclavicular adenopathy.      Left upper body: No supraclavicular adenopathy.   Skin:     General: Skin is warm.      Coloration: Skin is not cyanotic, jaundiced or pale.      Findings: No rash.      Nails: There is no clubbing.   Neurological:      Mental Status: She is alert and oriented to person, place, and time.      Cranial Nerves: No cranial nerve deficit, dysarthria or facial asymmetry.      Sensory: No sensory deficit.      Motor: No weakness, tremor or abnormal muscle tone.      Coordination: Coordination normal.      Gait: Gait normal.   Psychiatric:         Attention and Perception: Attention normal.         Mood and Affect: Mood normal.         Speech: Speech normal.         Behavior: Behavior normal.         Thought Content: Thought content normal. Thought content does not include homicidal or suicidal ideation.        Assessment and Plan   Additional age appropriate preventative wellness advice topics were discussed during today's preventative wellness exam(some topics already addressed during AWV portion of the note above):    Physical Activity: Advised cardiovascular activity 150 minutes per week as tolerated. (example  brisk walk for 30 minutes, 5 days a week).     Nutrition: Discussed nutrition plan with patient. Information shared in after visit summary. Goal is for a well balanced diet to enhance overall health.     Healthy Weight: Discussed current and goal BMI with patient. Steps to attain this goal discussed. Information shared in after visit summary.     Chronic allergic rhinitis  Continue current medication  Encouraged to report if any worse or if any new symptoms or concerns.       Mixed hyperlipidemia   Encouraged to continue to work on her diet and exercise plan.  Reminded of the potential benefits and risks of statin therapy.  Patient will consider       Essential hypertension  As above.   Continue current medication       Chronic venous insufficiency       History of bariatric surgery       Class 1 obesity with serious comorbidity and body mass index (BMI) of 33.0 to 33.9 in adult, unspecified obesity type  As above.   Continue current medication       Non-alcoholic fatty liver disease  As above.  Will continue to monitor       Gastroesophageal reflux disease without esophagitis   Symptoms are currently well controlled.  Continue current medication.  Orders:    omeprazole (priLOSEC) 40 MG capsule; Take 1 capsule by mouth 2 (Two) Times a Day.    Chronic constipation  As above.   Continue current medication.  Orders:    linaclotide (LINZESS) 290 MCG capsule capsule; Take 1 capsule by mouth Every Morning Before Breakfast.    Healthcare maintenance  Will arrange an updated mammogram  Orders:    Mammo Screening Digital Tomosynthesis Bilateral With CAD; Future    Depression with anxiety  Significant situational component.   Supportive therapy.   Continue current medication.  Encouraged to report if any worse or if any new symptoms or concerns.  Orders:    hydrOXYzine (ATARAX) 25 MG tablet; Take 1 tablet by mouth 3 (Three) Times a Day As Needed for Anxiety.    Rheumatoid arthritis involving multiple sites with positive  rheumatoid factor  Reminded regarding symptomatic treatment.   Continue current medication  Follow up with  rheumatology  Orders:    alendronate (FOSAMAX) 70 MG tablet; Take 1 tablet by mouth Every 7 (Seven) Days.    Mechanical low back pain  As above.       Alopecia areata       Class 2 severe obesity with serious comorbidity and body mass index (BMI) of 39.0 to 39.9 in adult, unspecified obesity type  As above.  Orders:    phentermine (ADIPEX-P) 37.5 MG tablet; Take 1 tablet by mouth Every Morning Before Breakfast.    Encounter for screening mammogram for breast cancer  Orders:    Mammo Screening Digital Tomosynthesis Bilateral With CAD; Future            Follow Up   Return in about 4 months (around 10/19/2025).  Patient was given instructions and counseling regarding her condition or for health maintenance advice. Please see specific information pulled into the AVS if appropriate.

## 2025-06-19 NOTE — ASSESSMENT & PLAN NOTE
Will arrange an updated mammogram  Orders:    Mammo Screening Digital Tomosynthesis Bilateral With CAD; Future

## 2025-06-19 NOTE — ASSESSMENT & PLAN NOTE
Symptoms are currently well controlled.  Continue current medication.  Orders:    omeprazole (priLOSEC) 40 MG capsule; Take 1 capsule by mouth 2 (Two) Times a Day.

## 2025-06-19 NOTE — ASSESSMENT & PLAN NOTE
Reminded regarding symptomatic treatment.   Continue current medication  Follow up with  rheumatology  Orders:    alendronate (FOSAMAX) 70 MG tablet; Take 1 tablet by mouth Every 7 (Seven) Days.

## 2025-06-19 NOTE — ASSESSMENT & PLAN NOTE
Encouraged to continue to work on her diet and exercise plan.  Reminded of the potential benefits and risks of statin therapy.  Patient will consider

## 2025-06-23 ENCOUNTER — DISEASE STATE MANAGEMENT VISIT (OUTPATIENT)
Dept: PHARMACY | Facility: HOSPITAL | Age: 55
End: 2025-06-23
Payer: OTHER GOVERNMENT

## 2025-06-23 VITALS
HEART RATE: 74 BPM | HEIGHT: 67 IN | SYSTOLIC BLOOD PRESSURE: 148 MMHG | DIASTOLIC BLOOD PRESSURE: 86 MMHG | WEIGHT: 210.6 LBS | BODY MASS INDEX: 33.06 KG/M2

## 2025-06-23 NOTE — PROGRESS NOTES
[facility]  Weight Management Program      Susie Rangel is a 54 y.o. female referred to the [facility] by Dr. Hoang Palacios for clinical pharmacy and specialty pharmacy management of GLP1 for weight management.  The patient enies a personal history or family history of thyroid cancer and denies a personal history of pancreatitis.     Susie Rangel has previously tried Adipex, Mounjaro (on and off for about a year due to shortage. Lost ~15 lbs), exercising, and gastric sleeve in 2014 for weight loss. Current weight loss efforts include walking, resistance training, Zepbound, and Adipex. She has also been eating around 1700 calories a day.    Patient is currently on Zepbound 15 mg weekly. Patient denies any lumps/swelling/hoarseness in neck/throat or abdominal pain. She reports some nausea here and there, but it is tolerable and she will use promethazine if needed. Patient denies any trouble giving injection or any missed doses. She still continues Adipex prescribed by Dr. Palacios.     Patient reports that she is doing well with her SMART goals. She recently got a gym membership at [employer]. She has set a goal to walk 7 miles a week and plans to slowly increase her goal.      Relevant Past Medical History and Co-morbidities  Past Medical History:   Diagnosis Date    Anxiety     Arthritis     Class 2 obesity with serious comorbidity and body mass index (BMI) of 38.0 to 38.9 in adult 06/16/2016    Hx laparoscopic sleeve gastrectomy     Dyslipidemia 06/16/2016    GERD (gastroesophageal reflux disease)     Glaucoma 01/25/2023    Hypertension     Low back pain     Macular degeneration 01/25/2023    blurry vision    Obesity     Rheumatoid arthritis     Vitamin D deficiency      Social History     Socioeconomic History    Marital status:      Spouse name: oren    Number of children: 1    Years of education: 14   Tobacco Use    Smoking status: Never     Passive exposure:  Never    Smokeless tobacco: Never   Vaping Use    Vaping status: Never Used   Substance and Sexual Activity    Alcohol use: No    Drug use: Not Currently     Comment: occasional gummies with THC for RA pain/bedtime    Sexual activity: Yes       Allergies  Patient has no known allergies.    Current Medication List    Current Outpatient Medications:     alendronate (FOSAMAX) 70 MG tablet, Take 1 tablet by mouth Every 7 (Seven) Days., Disp: 4 tablet, Rfl: 5    aspirin 81 MG EC tablet, Take 1 tablet by mouth Daily., Disp: 30 tablet, Rfl: 5    diclofenac (VOLTAREN) 75 MG EC tablet, Take 1 tablet by mouth 2 (Two) Times a Day As Needed. for pain, Disp: , Rfl:     DULoxetine (CYMBALTA) 30 MG capsule, Take 1 capsule by mouth Daily., Disp: , Rfl:     estradiol (ESTRACE) 0.5 MG tablet, Take 1 tablet by mouth Daily., Disp: 30 tablet, Rfl: 5    folic acid (FOLVITE) 1 MG tablet, Take 1 tablet by mouth Daily., Disp: , Rfl:     hydrOXYzine (ATARAX) 25 MG tablet, Take 1 tablet by mouth 3 (Three) Times a Day As Needed for Anxiety., Disp: 90 tablet, Rfl: 5    leflunomide (ARAVA) 20 MG tablet, Take 1 tablet by mouth Daily., Disp: , Rfl:     linaclotide (LINZESS) 290 MCG capsule capsule, Take 1 capsule by mouth Every Morning Before Breakfast., Disp: 30 capsule, Rfl: 5    lisinopril (PRINIVIL,ZESTRIL) 20 MG tablet, Take 1 tablet by mouth Daily., Disp: 30 tablet, Rfl: 5    omeprazole (priLOSEC) 40 MG capsule, Take 1 capsule by mouth 2 (Two) Times a Day., Disp: 60 capsule, Rfl: 5    phentermine (ADIPEX-P) 37.5 MG tablet, Take 1 tablet by mouth Every Morning Before Breakfast., Disp: 30 tablet, Rfl: 3    predniSONE (DELTASONE) 10 MG tablet, , Disp: , Rfl:     pregabalin (LYRICA) 75 MG capsule, Take 1 capsule by mouth 2 (Two) Times a Day., Disp: 60 capsule, Rfl: 5    promethazine (PHENERGAN) 25 MG tablet, Take 1 tablet by mouth Every 6 (Six) Hours As Needed for Nausea or Vomiting., Disp: 60 tablet, Rfl: 5    Rinvoq 15 MG tablet  "sustained-release 24 hour, Take  by mouth Daily., Disp: , Rfl:     Tirzepatide-Weight Management (ZEPBOUND) 15 MG/0.5ML solution auto-injector, Inject 0.5 mL under the skin into the appropriate area as directed 1 (One) Time Per Week., Disp: 2 mL, Rfl: 0    tiZANidine (ZANAFLEX) 4 MG tablet, Take 1 tablet by mouth Daily., Disp: , Rfl:     Drug Interactions  None with Zepbound    Relevant Laboratory Values  Lab Results   Component Value Date    CHOL 308 (H) 06/16/2025    TRIG 57 06/16/2025    HDL 72 (H) 06/16/2025     (H) 06/16/2025     There is no height or weight on file to calculate BMI.    Vaccinations:   Patient recommended to keep up with routine vaccinations.     Goals of Therapy  Clinical Goals or Therapeutic Targets: Prevent weight associated co-morbidities and complications      Date 2/5/23 3/4/24 4/1/24 4/29/24 5/23/24 6/21/24 7/23/24 8/19/24 9/17/24 10/15/24 11/12/24   Weight (lb) 246.6 lb 249.2 lb 247.8 lb 243.8 lb 243.4 lb 243 lbs 244 lb 245.2 lb 236.8 lb 236.2 lb 230.4   BMI kg/ 38.62 39.03 38.80 38.18 38.11 38.05 38.20 38.39 37.42 36.98 36.07   Waist Circumference (in)  49\" 49\" 50\" 50.25 48.5\" 48.5\" 47.25\" 49.5 47.25\" 48.5\" 46\"     Date 12/10/24 1/7/25 2/4/25 3/4/25 4/1/25 4/28/25 5/23/25 6/23/25   Weight (lb) 227.6 lb 227.6 lb 228.2 lb  222.8 lb 221 lb 224.6 lb 215lb 210.6 lb   BMI kg/ 35.64 35.64 35.73 34.89 34.60 35.17 33.67 32.97   Waist Circumference (in)  46.5 47\" 46.5\" 45.5\" 44.5\" 47.5\" 43\" 42\"       Medication Assessment & Plan    Patient has current BMI of 32.97 which is considered Class I Obesity. Patient has lost 36 lbs since starting medication. Patient was congratulated on success thus far.     Will re-order Zepbound 15 mg SC weekly.     The following medications will need dose adjustments/closer monitoring once the GLP1 is started: None    Discussed lifestyle modifications, including diet and exercise.  Patient education provided.     Worked with patient to create SMART Goal(s): "   Walk for 30 minutes 4 days a week (May goal was to walk 7 miles a week)  Hand weights 3 days a week  Start replacing a sprite zero for a water    Reviewed labs from 6/16/25. Dr. Palacios has also discussed labs with patient and potentially starting a statin.    Will follow-up with patient in clinic in 4 weeks.     Stephanie Herndon, PharmD  6/23/2025  09:55 EDT

## 2025-07-21 ENCOUNTER — DISEASE STATE MANAGEMENT VISIT (OUTPATIENT)
Dept: PHARMACY | Facility: HOSPITAL | Age: 55
End: 2025-07-21
Payer: MEDICARE

## 2025-07-21 VITALS
HEIGHT: 67 IN | SYSTOLIC BLOOD PRESSURE: 145 MMHG | WEIGHT: 205.8 LBS | DIASTOLIC BLOOD PRESSURE: 89 MMHG | BODY MASS INDEX: 32.3 KG/M2 | HEART RATE: 69 BPM

## 2025-07-21 DIAGNOSIS — R20.2 NUMBNESS AND TINGLING OF BOTH LOWER EXTREMITIES: Primary | ICD-10-CM

## 2025-07-21 DIAGNOSIS — R20.0 NUMBNESS AND TINGLING OF BOTH LOWER EXTREMITIES: Primary | ICD-10-CM

## 2025-07-21 NOTE — PROGRESS NOTES
[facility]  Weight Management Program      Susie Rangel is a 54 y.o. female referred to the [facility] by Dr. Hoang Palacios for clinical pharmacy and specialty pharmacy management of GLP1 for weight management.  The patient enies a personal history or family history of thyroid cancer and denies a personal history of pancreatitis.     Susie Rangel has previously tried Adipex, Mounjaro (on and off for about a year due to shortage. Lost ~15 lbs), exercising, and gastric sleeve in 2014 for weight loss. Current weight loss efforts include walking, resistance training, Zepbound, and Adipex. She has also been eating around 1700 calories a day.    Patient is currently on Zepbound 15 mg weekly. Patient denies any lumps/swelling/hoarseness in neck/throat or abdominal pain. She reports some nausea here and there, but it is tolerable and she will use promethazine if needed. Patient denies any trouble giving injection or any missed doses. She still continues Adipex prescribed by Dr. Palacios.     Patient reports that she is doing well with her SMART goals. She recently got a gym membership at [employer]. She has set a goal to walk 7 miles a week and plans to slowly increase her goal.      Relevant Past Medical History and Co-morbidities  Past Medical History:   Diagnosis Date    Anxiety     Arthritis     Class 2 obesity with serious comorbidity and body mass index (BMI) of 38.0 to 38.9 in adult 06/16/2016    Hx laparoscopic sleeve gastrectomy     Dyslipidemia 06/16/2016    GERD (gastroesophageal reflux disease)     Glaucoma 01/25/2023    Hypertension     Low back pain     Macular degeneration 01/25/2023    blurry vision    Obesity     Rheumatoid arthritis     Vitamin D deficiency      Social History     Socioeconomic History    Marital status:      Spouse name: oren    Number of children: 1    Years of education: 14   Tobacco Use    Smoking status: Never     Passive exposure:  Never    Smokeless tobacco: Never   Vaping Use    Vaping status: Never Used   Substance and Sexual Activity    Alcohol use: No    Drug use: Not Currently     Comment: occasional gummies with THC for RA pain/bedtime    Sexual activity: Yes       Allergies  Patient has no known allergies.    Current Medication List    Current Outpatient Medications:     alendronate (FOSAMAX) 70 MG tablet, Take 1 tablet by mouth Every 7 (Seven) Days., Disp: 4 tablet, Rfl: 5    aspirin 81 MG EC tablet, Take 1 tablet by mouth Daily., Disp: 30 tablet, Rfl: 5    diclofenac (VOLTAREN) 75 MG EC tablet, Take 1 tablet by mouth 2 (Two) Times a Day As Needed. for pain, Disp: , Rfl:     DULoxetine (CYMBALTA) 30 MG capsule, Take 1 capsule by mouth Daily., Disp: , Rfl:     estradiol (ESTRACE) 0.5 MG tablet, Take 1 tablet by mouth Daily., Disp: 30 tablet, Rfl: 5    folic acid (FOLVITE) 1 MG tablet, Take 1 tablet by mouth Daily., Disp: , Rfl:     hydrOXYzine (ATARAX) 25 MG tablet, Take 1 tablet by mouth 3 (Three) Times a Day As Needed for Anxiety., Disp: 90 tablet, Rfl: 5    leflunomide (ARAVA) 20 MG tablet, Take 1 tablet by mouth Daily., Disp: , Rfl:     linaclotide (LINZESS) 290 MCG capsule capsule, Take 1 capsule by mouth Every Morning Before Breakfast., Disp: 30 capsule, Rfl: 5    lisinopril (PRINIVIL,ZESTRIL) 20 MG tablet, Take 1 tablet by mouth Daily., Disp: 30 tablet, Rfl: 5    omeprazole (priLOSEC) 40 MG capsule, Take 1 capsule by mouth 2 (Two) Times a Day., Disp: 60 capsule, Rfl: 5    phentermine (ADIPEX-P) 37.5 MG tablet, Take 1 tablet by mouth Every Morning Before Breakfast., Disp: 30 tablet, Rfl: 3    predniSONE (DELTASONE) 10 MG tablet, , Disp: , Rfl:     pregabalin (LYRICA) 75 MG capsule, Take 1 capsule by mouth 2 (Two) Times a Day., Disp: 60 capsule, Rfl: 5    promethazine (PHENERGAN) 25 MG tablet, Take 1 tablet by mouth Every 6 (Six) Hours As Needed for Nausea or Vomiting., Disp: 60 tablet, Rfl: 5    Rinvoq 15 MG tablet  "sustained-release 24 hour, Take  by mouth Daily., Disp: , Rfl:     Tirzepatide-Weight Management (ZEPBOUND) 15 MG/0.5ML solution auto-injector, Inject 0.5 mL under the skin into the appropriate area as directed 1 (One) Time Per Week., Disp: 2 mL, Rfl: 0    tiZANidine (ZANAFLEX) 4 MG tablet, Take 1 tablet by mouth Daily., Disp: , Rfl:     Drug Interactions  None with Zepbound    Relevant Laboratory Values  Lab Results   Component Value Date    CHOL 308 (H) 06/16/2025    TRIG 57 06/16/2025    HDL 72 (H) 06/16/2025     (H) 06/16/2025     There is no height or weight on file to calculate BMI.    Vaccinations:   Patient recommended to keep up with routine vaccinations.     Goals of Therapy  Clinical Goals or Therapeutic Targets: Prevent weight associated co-morbidities and complications      Date 2/5/23 3/4/24 4/1/24 4/29/24 5/23/24 6/21/24 7/23/24 8/19/24 9/17/24 10/15/24 11/12/24   Weight (lb) 246.6 lb 249.2 lb 247.8 lb 243.8 lb 243.4 lb 243 lbs 244 lb 245.2 lb 236.8 lb 236.2 lb 230.4   BMI kg/ 38.62 39.03 38.80 38.18 38.11 38.05 38.20 38.39 37.42 36.98 36.07   Waist Circumference (in)  49\" 49\" 50\" 50.25 48.5\" 48.5\" 47.25\" 49.5 47.25\" 48.5\" 46\"     Date 12/10/24 1/7/25 2/4/25 3/4/25 4/1/25 4/28/25 5/23/25 6/23/25 7/21/25   Weight (lb) 227.6 lb 227.6 lb 228.2 lb  222.8 lb 221 lb 224.6 lb 215lb 210.6 lb 205.8 lb   BMI kg/ 35.64 35.64 35.73 34.89 34.60 35.17 33.67 32.97 32.22   Waist Circumference (in)  46.5 47\" 46.5\" 45.5\" 44.5\" 47.5\" 43\" 42\" 42\"       Medication Assessment & Plan    Patient has current BMI of 32.22 which is considered Class I Obesity. Patient has lost 40.8 lbs since starting medication. Patient was congratulated on success thus far.     Will re-order Zepbound 15 mg SC weekly.     The following medications will need dose adjustments/closer monitoring once the GLP1 is started: None    Discussed lifestyle modifications, including diet and exercise.  Patient education provided.     Worked with " patient to create SMART Goal(s):   Walk for 30 minutes 4 days a week (May goal was to walk 7 miles a week)  Hand weights 3 days a week  Start replacing a sprite zero for a water    Will follow-up with patient in clinic in 4 weeks.     Stephanie Herndon, PharmD  7/21/2025  11:02 EDT

## 2025-07-25 ENCOUNTER — TELEPHONE (OUTPATIENT)
Dept: FAMILY MEDICINE CLINIC | Facility: CLINIC | Age: 55
End: 2025-07-25

## 2025-07-25 ENCOUNTER — OFFICE VISIT (OUTPATIENT)
Dept: FAMILY MEDICINE CLINIC | Facility: CLINIC | Age: 55
End: 2025-07-25
Payer: MEDICARE

## 2025-07-25 DIAGNOSIS — Z79.899 MEDICAL CANNABIS USE: ICD-10-CM

## 2025-07-25 DIAGNOSIS — M05.79 RHEUMATOID ARTHRITIS INVOLVING MULTIPLE SITES WITH POSITIVE RHEUMATOID FACTOR: Primary | ICD-10-CM

## 2025-07-25 DIAGNOSIS — M54.59 MECHANICAL LOW BACK PAIN: ICD-10-CM

## 2025-07-25 PROCEDURE — 3074F SYST BP LT 130 MM HG: CPT | Performed by: GENERAL PRACTICE

## 2025-07-25 PROCEDURE — 1125F AMNT PAIN NOTED PAIN PRSNT: CPT | Performed by: GENERAL PRACTICE

## 2025-07-25 PROCEDURE — 99213 OFFICE O/P EST LOW 20 MIN: CPT | Performed by: GENERAL PRACTICE

## 2025-07-25 PROCEDURE — 3078F DIAST BP <80 MM HG: CPT | Performed by: GENERAL PRACTICE

## 2025-07-26 VITALS
OXYGEN SATURATION: 98 % | SYSTOLIC BLOOD PRESSURE: 126 MMHG | TEMPERATURE: 97.8 F | DIASTOLIC BLOOD PRESSURE: 68 MMHG | HEART RATE: 88 BPM | BODY MASS INDEX: 32.18 KG/M2 | HEIGHT: 67 IN | WEIGHT: 205 LBS | RESPIRATION RATE: 14 BRPM

## 2025-07-26 PROBLEM — Z79.899 MEDICAL CANNABIS USE: Status: ACTIVE | Noted: 2025-07-26

## 2025-07-26 NOTE — PROGRESS NOTES
Subjective   Susie Rangel is a 54 y.o. female.     Chief Complaint  She returns for a medical cannabis reassessment    History of Present Illness     Medical Cannabis  She has been using cannabis for pain associated with rheumatoid arthritis and degenerative disc disease.  She has found this as effective as anything, and has not experienced any apparent side effects thus far.  Provider electronic certification for a medical cannabis card was submitted and approved this spring, but due to technical issues, she was unable to apply prior to its expiration and needs another certification submitted.    Rheumatoid Arthritis  She has a long history of joint pain and stiffness.  She continues to deny any joint swelling or redness,and there is no history of any rash, fever, or chills.  She is able to walk 1 mile at a relaxed pace daily, but has to be careful with repetitive or prolonged activities.  She admits to some difficulty sleeping due to discomfort.  She continues to be followed by rheumatology and remains on leflunomide and rinvoq.  She has not required prednisone recently but has it available if necessary.  She remains on alendronate.  DEXA scan performed on 7/30/2024 returned with T-scores of 1.2 and 0.4 at the spine and left femoral neck respectively    Lumbar Degenerative Disc Disease  She has a long history of low back pain. There has been no change in the quality nor any new associated symptoms.  MRI of the lumbar spine performed on 2/25/2022 revealed degenerative disc disease and osteoarthritis    The following portions of the patient's history were reviewed and updated as appropriate: allergies, current medications, past medical history, past social history, and problem list.    Review of Systems   Constitutional:  Positive for fatigue. Negative for chills and fever.   HENT:  Positive for rhinorrhea and sneezing. Negative for congestion, ear pain and sore throat.    Eyes:  Negative for visual disturbance.    Respiratory:  Negative for cough, shortness of breath and wheezing.    Cardiovascular:  Positive for leg swelling (left). Negative for chest pain and palpitations.   Gastrointestinal:  Negative for abdominal pain, blood in stool, constipation, diarrhea, nausea and vomiting.   Endocrine:        Hot flashes   Genitourinary:  Negative for dysuria and hematuria.   Musculoskeletal:  Positive for arthralgias and back pain. Negative for joint swelling and myalgias.   Skin:  Negative for rash.   Neurological:  Positive for numbness. Negative for dizziness, tremors and weakness.   Psychiatric/Behavioral:  Positive for decreased concentration and sleep disturbance. Negative for suicidal ideas and depressed mood. The patient is nervous/anxious.      Objective   Physical Exam  Constitutional:       General: She is not in acute distress.     Appearance: Normal appearance. She is well-developed. She is not diaphoretic.      Comments: Bright and in good spirits. No apparent distress. No pallor, jaundice, diaphoresis, or cyanosis.   HENT:      Head: Atraumatic.      Right Ear: Tympanic membrane, ear canal and external ear normal.      Left Ear: Tympanic membrane, ear canal and external ear normal.      Mouth/Throat:      Lips: No lesions.      Mouth: Mucous membranes are moist. No oral lesions.      Pharynx: No oropharyngeal exudate or posterior oropharyngeal erythema.   Eyes:      General: Lids are normal. No visual field deficit.     Extraocular Movements: Extraocular movements intact.      Conjunctiva/sclera: Conjunctivae normal.      Pupils: Pupils are equal.   Neck:      Thyroid: No thyroid mass or thyromegaly.      Vascular: No carotid bruit or JVD.      Trachea: Trachea normal. No tracheal deviation.   Cardiovascular:      Rate and Rhythm: Normal rate and regular rhythm.      Heart sounds: Normal heart sounds, S1 normal and S2 normal. No murmur heard.     No gallop.      Comments: Bilateral varicose veins worse on the  left  Pulmonary:      Effort: Pulmonary effort is normal.      Breath sounds: Normal breath sounds.   Abdominal:      General: Bowel sounds are normal. There is no distension.   Musculoskeletal:      Right lower leg: No edema.      Left lower leg: Edema (trace) present.      Comments: No peripheral joint redness or warmth.   Lymphadenopathy:      Head:      Right side of head: No submental, submandibular, tonsillar, preauricular, posterior auricular or occipital adenopathy.      Left side of head: No submental, submandibular, tonsillar, preauricular, posterior auricular or occipital adenopathy.      Cervical: No cervical adenopathy.      Upper Body:      Right upper body: No supraclavicular adenopathy.      Left upper body: No supraclavicular adenopathy.   Skin:     General: Skin is warm.      Coloration: Skin is not cyanotic, jaundiced or pale.      Findings: No rash.      Nails: There is no clubbing.   Neurological:      Mental Status: She is alert and oriented to person, place, and time.      Cranial Nerves: No cranial nerve deficit, dysarthria or facial asymmetry.      Sensory: No sensory deficit.      Motor: No weakness, tremor or abnormal muscle tone.      Coordination: Coordination normal.      Gait: Gait normal.   Psychiatric:         Attention and Perception: Attention normal.         Mood and Affect: Mood normal.         Speech: Speech normal.         Behavior: Behavior normal.         Thought Content: Thought content normal. Thought content does not include homicidal or suicidal ideation.       Assessment & Plan   Problems Addressed this Visit          Advance Directives and General Issues    Medical cannabis use  Patient has longstanding pain and has had a good response to cannabis.  Her 1 year Ronnell was updated and reviewed.  Another electronic certification was submitted ([id number])       Musculoskeletal and Injuries    Mechanical low back pain  As above.  Continue current medication    Rheumatoid  arthritis involving multiple sites with positive rheumatoid factor - Primary  As above.   Continue current medication.  Follow up with  rheumatology     Diagnoses         Codes Comments      Rheumatoid arthritis involving multiple sites with positive rheumatoid factor    -  Primary ICD-10-CM: M05.79  ICD-9-CM: 714.0       Mechanical low back pain     ICD-10-CM: M54.59  ICD-9-CM: 724.2       Medical cannabis use     ICD-10-CM: Z79.899  ICD-9-CM: V58.69

## 2025-07-30 ENCOUNTER — PROCEDURE VISIT (OUTPATIENT)
Dept: ORTHOPEDIC SURGERY | Facility: CLINIC | Age: 55
End: 2025-07-30
Payer: MEDICARE

## 2025-07-30 DIAGNOSIS — R20.2 NUMBNESS AND TINGLING OF BOTH LOWER EXTREMITIES: ICD-10-CM

## 2025-07-30 DIAGNOSIS — R20.0 NUMBNESS AND TINGLING OF BOTH LOWER EXTREMITIES: ICD-10-CM

## 2025-08-01 ENCOUNTER — HOSPITAL ENCOUNTER (OUTPATIENT)
Dept: MAMMOGRAPHY | Facility: HOSPITAL | Age: 55
Discharge: HOME | End: 2025-08-01
Admitting: GENERAL PRACTICE
Payer: MEDICARE

## 2025-08-01 DIAGNOSIS — Z00.00 HEALTHCARE MAINTENANCE: ICD-10-CM

## 2025-08-01 DIAGNOSIS — Z12.31 ENCOUNTER FOR SCREENING MAMMOGRAM FOR BREAST CANCER: ICD-10-CM

## 2025-08-01 PROCEDURE — 77063 BREAST TOMOSYNTHESIS BI: CPT

## 2025-08-01 PROCEDURE — 77067 SCR MAMMO BI INCL CAD: CPT | Performed by: RADIOLOGY

## 2025-08-01 PROCEDURE — 77067 SCR MAMMO BI INCL CAD: CPT

## 2025-08-01 PROCEDURE — 77063 BREAST TOMOSYNTHESIS BI: CPT | Performed by: RADIOLOGY

## 2025-08-06 ENCOUNTER — OFFICE VISIT (OUTPATIENT)
Dept: NEUROLOGY | Facility: CLINIC | Age: 55
End: 2025-08-06
Payer: MEDICARE

## 2025-08-06 VITALS
BODY MASS INDEX: 32.18 KG/M2 | OXYGEN SATURATION: 100 % | WEIGHT: 205 LBS | DIASTOLIC BLOOD PRESSURE: 74 MMHG | HEIGHT: 67 IN | SYSTOLIC BLOOD PRESSURE: 120 MMHG | HEART RATE: 96 BPM

## 2025-08-06 DIAGNOSIS — R20.2 NUMBNESS AND TINGLING OF LEFT SIDE OF FACE: ICD-10-CM

## 2025-08-06 DIAGNOSIS — R20.2 PARESTHESIA OF LEFT ARM: ICD-10-CM

## 2025-08-06 DIAGNOSIS — R20.0 NUMBNESS AND TINGLING OF LEFT SIDE OF FACE: ICD-10-CM

## 2025-08-06 RX ORDER — PREGABALIN 100 MG/1
100 CAPSULE ORAL 2 TIMES DAILY
Qty: 60 CAPSULE | Refills: 5 | Status: SHIPPED | OUTPATIENT
Start: 2025-08-06

## 2025-08-06 RX ORDER — DULOXETIN HYDROCHLORIDE 60 MG/1
60 CAPSULE, DELAYED RELEASE ORAL DAILY
Qty: 30 CAPSULE | Refills: 6 | Status: SHIPPED | OUTPATIENT
Start: 2025-08-06

## 2025-08-18 DIAGNOSIS — U07.1 COVID-19 VIRUS INFECTION: Primary | ICD-10-CM

## 2025-08-28 ENCOUNTER — DISEASE STATE MANAGEMENT VISIT (OUTPATIENT)
Dept: PHARMACY | Facility: HOSPITAL | Age: 55
End: 2025-08-28
Payer: OTHER GOVERNMENT

## 2025-08-28 VITALS
HEART RATE: 69 BPM | SYSTOLIC BLOOD PRESSURE: 138 MMHG | DIASTOLIC BLOOD PRESSURE: 89 MMHG | WEIGHT: 202.2 LBS | BODY MASS INDEX: 31.74 KG/M2 | HEIGHT: 67 IN

## 2025-08-28 RX ORDER — TIRZEPATIDE 15 MG/.5ML
15 INJECTION, SOLUTION SUBCUTANEOUS WEEKLY
Qty: 2 ML | Refills: 0 | Status: SHIPPED | OUTPATIENT
Start: 2025-08-28 | End: 2025-08-28 | Stop reason: SDUPTHER

## 2025-08-29 RX ORDER — ESTRADIOL 0.5 MG/1
0.5 TABLET ORAL DAILY
Qty: 30 TABLET | Refills: 5 | Status: SHIPPED | OUTPATIENT
Start: 2025-08-29

## (undated) DEVICE — THE BITE BLOCK MAXI, LATEX FREE STRAP IS USED TO PROTECT THE ENDOSCOPE INSERTION TUBE FROM BEING BITTEN BY THE PATIENT.

## (undated) DEVICE — SUCTION CANISTER, 1500CC, RIGID: Brand: DEROYAL

## (undated) DEVICE — ELECTRD NDL EZ CLN MOD 2.75IN

## (undated) DEVICE — SINGLE PORT MANIFOLD: Brand: NEPTUNE 2

## (undated) DEVICE — FRCP BX RADJAW4 NDL 2.8 240CM LG OG BX40

## (undated) DEVICE — Device: Brand: DEFENDO AIR/WATER/SUCTION AND BIOPSY VALVE

## (undated) DEVICE — TUBING, SUCTION, 1/4" X 20', STRAIGHT: Brand: MEDLINE INDUSTRIES, INC.

## (undated) DEVICE — GOWN,REINF,POLY,ECL,PP SLV,XL: Brand: MEDLINE

## (undated) DEVICE — SYR LUERLOK 30CC

## (undated) DEVICE — PK EXTRM UPPR 20

## (undated) DEVICE — DRSNG SURG AQUACEL AG 9X15CM

## (undated) DEVICE — SUT ETHLN 3/0 FS1 663G

## (undated) DEVICE — TUBING, SUCTION, 3/16" X 10', STRAIGHT: Brand: MEDLINE

## (undated) DEVICE — SLV SCD CALF HEMOFORCE DVT THERP REPROC MD

## (undated) DEVICE — CATH IV INSYTE AUTOGARD 14G 1 1/2IN ORNG

## (undated) DEVICE — CONN Y IRR DISP 1P/U

## (undated) DEVICE — CONTROL SYRINGE LUER-LOCK TIP: Brand: MONOJECT

## (undated) DEVICE — GLOVE,SURG,SENSICARE SLT,LF,PF,8: Brand: MEDLINE

## (undated) DEVICE — ENDOGATOR AUXILIARY WATER JET CONNECTOR: Brand: ENDOGATOR

## (undated) DEVICE — GLV SURG SENSICARE ORTHO PF LF 8 STRL

## (undated) DEVICE — Device

## (undated) DEVICE — DISPOSABLE TOURNIQUET CUFF SINGLE BLADDER, SINGLE PORT AND QUICK CONNECT CONNECTOR: Brand: COLOR CUFF